# Patient Record
Sex: FEMALE | Race: OTHER | HISPANIC OR LATINO
[De-identification: names, ages, dates, MRNs, and addresses within clinical notes are randomized per-mention and may not be internally consistent; named-entity substitution may affect disease eponyms.]

---

## 2017-02-03 ENCOUNTER — TRANSCRIPTION ENCOUNTER (OUTPATIENT)
Age: 62
End: 2017-02-03

## 2017-09-30 ENCOUNTER — TRANSCRIPTION ENCOUNTER (OUTPATIENT)
Age: 62
End: 2017-09-30

## 2017-11-28 ENCOUNTER — TRANSCRIPTION ENCOUNTER (OUTPATIENT)
Age: 62
End: 2017-11-28

## 2017-12-06 ENCOUNTER — APPOINTMENT (OUTPATIENT)
Dept: NEUROSURGERY | Facility: CLINIC | Age: 62
End: 2017-12-06
Payer: MEDICARE

## 2017-12-06 VITALS
BODY MASS INDEX: 39.34 KG/M2 | TEMPERATURE: 98 F | HEART RATE: 76 BPM | WEIGHT: 222 LBS | SYSTOLIC BLOOD PRESSURE: 100 MMHG | HEIGHT: 63 IN | OXYGEN SATURATION: 95 % | DIASTOLIC BLOOD PRESSURE: 68 MMHG

## 2017-12-06 DIAGNOSIS — Z84.1 FAMILY HISTORY OF DISORDERS OF KIDNEY AND URETER: ICD-10-CM

## 2017-12-06 DIAGNOSIS — I10 ESSENTIAL (PRIMARY) HYPERTENSION: ICD-10-CM

## 2017-12-06 DIAGNOSIS — N20.0 CALCULUS OF KIDNEY: ICD-10-CM

## 2017-12-06 DIAGNOSIS — G43.011 MIGRAINE W/OUT AURA, INTRACTABLE, WITH STATUS MIGRAINOSUS: ICD-10-CM

## 2017-12-06 DIAGNOSIS — E66.01 MORBID (SEVERE) OBESITY DUE TO EXCESS CALORIES: ICD-10-CM

## 2017-12-06 DIAGNOSIS — Z82.49 FAMILY HISTORY OF ISCHEMIC HEART DISEASE AND OTHER DISEASES OF THE CIRCULATORY SYSTEM: ICD-10-CM

## 2017-12-06 PROCEDURE — 99215 OFFICE O/P EST HI 40 MIN: CPT

## 2017-12-06 RX ORDER — MECLIZINE HYDROCHLORIDE 12.5 MG/1
12.5 TABLET ORAL
Refills: 0 | Status: ACTIVE | COMMUNITY
Start: 2017-12-06

## 2017-12-06 RX ORDER — LOSARTAN POTASSIUM 50 MG/1
50 TABLET, FILM COATED ORAL TWICE DAILY
Refills: 0 | Status: ACTIVE | COMMUNITY
Start: 2017-12-06

## 2017-12-06 RX ORDER — CARVEDILOL 25 MG/1
25 TABLET, FILM COATED ORAL TWICE DAILY
Refills: 0 | Status: ACTIVE | COMMUNITY
Start: 2017-12-06

## 2018-01-03 ENCOUNTER — OTHER (OUTPATIENT)
Age: 63
End: 2018-01-03

## 2018-01-24 ENCOUNTER — APPOINTMENT (OUTPATIENT)
Dept: NEUROSURGERY | Facility: CLINIC | Age: 63
End: 2018-01-24
Payer: MEDICARE

## 2018-01-24 PROCEDURE — 99214 OFFICE O/P EST MOD 30 MIN: CPT

## 2018-02-01 ENCOUNTER — APPOINTMENT (OUTPATIENT)
Dept: SPINE | Facility: CLINIC | Age: 63
End: 2018-02-01
Payer: MEDICARE

## 2018-02-01 VITALS
DIASTOLIC BLOOD PRESSURE: 83 MMHG | HEIGHT: 63 IN | WEIGHT: 216 LBS | SYSTOLIC BLOOD PRESSURE: 155 MMHG | HEART RATE: 73 BPM | BODY MASS INDEX: 38.27 KG/M2

## 2018-02-01 DIAGNOSIS — Z83.3 FAMILY HISTORY OF DIABETES MELLITUS: ICD-10-CM

## 2018-02-01 DIAGNOSIS — Z82.49 FAMILY HISTORY OF ISCHEMIC HEART DISEASE AND OTHER DISEASES OF THE CIRCULATORY SYSTEM: ICD-10-CM

## 2018-02-01 DIAGNOSIS — Z84.1 FAMILY HISTORY OF DISORDERS OF KIDNEY AND URETER: ICD-10-CM

## 2018-02-01 PROCEDURE — 99215 OFFICE O/P EST HI 40 MIN: CPT

## 2018-02-01 RX ORDER — ACETAMINOPHEN AND CODEINE 300; 30 MG/1; MG/1
300-30 TABLET ORAL
Qty: 42 | Refills: 0 | Status: COMPLETED | COMMUNITY
Start: 2017-12-21

## 2018-02-01 RX ORDER — DIAZEPAM 5 MG/1
5 TABLET ORAL
Qty: 3 | Refills: 0 | Status: COMPLETED | COMMUNITY
Start: 2017-12-06 | End: 2018-02-01

## 2018-02-01 RX ORDER — FLUOROMETHOLONE 1 MG/ML
0.1 SOLUTION/ DROPS OPHTHALMIC
Qty: 5 | Refills: 0 | Status: COMPLETED | COMMUNITY
Start: 2017-08-03

## 2018-02-01 RX ORDER — SUMATRIPTAN 100 MG/1
100 TABLET, FILM COATED ORAL
Refills: 0 | Status: COMPLETED | COMMUNITY
Start: 2017-12-06 | End: 2018-02-01

## 2018-02-01 RX ORDER — TOPIRAMATE 25 MG/1
25 TABLET, FILM COATED ORAL
Qty: 30 | Refills: 0 | Status: COMPLETED | COMMUNITY
Start: 2017-08-08

## 2018-02-01 RX ORDER — MUPIROCIN 20 MG/G
2 OINTMENT TOPICAL
Qty: 22 | Refills: 0 | Status: COMPLETED | COMMUNITY
Start: 2017-10-24

## 2018-02-01 RX ORDER — TOLTERODINE TARTRATE 4 MG/1
4 CAPSULE, EXTENDED RELEASE ORAL
Qty: 30 | Refills: 0 | Status: COMPLETED | COMMUNITY
Start: 2017-08-08

## 2018-02-01 RX ORDER — AZITHROMYCIN 250 MG/1
250 TABLET, FILM COATED ORAL
Qty: 6 | Refills: 0 | Status: COMPLETED | COMMUNITY
Start: 2017-10-24

## 2018-02-01 RX ORDER — ONDANSETRON 4 MG/1
4 TABLET, ORALLY DISINTEGRATING ORAL
Qty: 12 | Refills: 0 | Status: COMPLETED | COMMUNITY
Start: 2017-09-29

## 2018-02-01 RX ORDER — AMITRIPTYLINE HYDROCHLORIDE 10 MG/1
10 TABLET, FILM COATED ORAL
Qty: 30 | Refills: 0 | Status: COMPLETED | COMMUNITY
Start: 2017-11-30

## 2018-02-01 RX ORDER — B-COMPLEX WITH VITAMIN C
TABLET ORAL
Qty: 30 | Refills: 0 | Status: COMPLETED | COMMUNITY
Start: 2017-08-08

## 2018-02-01 RX ORDER — MELOXICAM 7.5 MG/1
7.5 TABLET ORAL
Qty: 14 | Refills: 0 | Status: COMPLETED | COMMUNITY
Start: 2017-10-10

## 2018-02-01 RX ORDER — DICLOFENAC SODIUM 10 MG/G
1 GEL TOPICAL
Qty: 100 | Refills: 0 | Status: COMPLETED | COMMUNITY
Start: 2017-09-15

## 2018-02-21 ENCOUNTER — OUTPATIENT (OUTPATIENT)
Dept: OUTPATIENT SERVICES | Facility: HOSPITAL | Age: 63
LOS: 1 days | End: 2018-02-21
Payer: MEDICAID

## 2018-02-21 VITALS
DIASTOLIC BLOOD PRESSURE: 81 MMHG | TEMPERATURE: 98 F | OXYGEN SATURATION: 98 % | RESPIRATION RATE: 15 BRPM | HEIGHT: 61 IN | HEART RATE: 67 BPM | WEIGHT: 222.01 LBS | SYSTOLIC BLOOD PRESSURE: 124 MMHG

## 2018-02-21 DIAGNOSIS — E66.9 OBESITY, UNSPECIFIED: ICD-10-CM

## 2018-02-21 DIAGNOSIS — Z01.818 ENCOUNTER FOR OTHER PREPROCEDURAL EXAMINATION: ICD-10-CM

## 2018-02-21 DIAGNOSIS — Z98.49 CATARACT EXTRACTION STATUS, UNSPECIFIED EYE: Chronic | ICD-10-CM

## 2018-02-21 DIAGNOSIS — M54.42 LUMBAGO WITH SCIATICA, LEFT SIDE: ICD-10-CM

## 2018-02-21 DIAGNOSIS — G56.03 CARPAL TUNNEL SYNDROME, BILATERAL UPPER LIMBS: Chronic | ICD-10-CM

## 2018-02-21 DIAGNOSIS — I10 ESSENTIAL (PRIMARY) HYPERTENSION: ICD-10-CM

## 2018-02-21 DIAGNOSIS — M54.40 LUMBAGO WITH SCIATICA, UNSPECIFIED SIDE: ICD-10-CM

## 2018-02-21 LAB
ANION GAP SERPL CALC-SCNC: 10 MMOL/L — SIGNIFICANT CHANGE UP (ref 5–17)
BUN SERPL-MCNC: 16 MG/DL — SIGNIFICANT CHANGE UP (ref 7–23)
CALCIUM SERPL-MCNC: 9.1 MG/DL — SIGNIFICANT CHANGE UP (ref 8.4–10.5)
CHLORIDE SERPL-SCNC: 101 MMOL/L — SIGNIFICANT CHANGE UP (ref 96–108)
CO2 SERPL-SCNC: 30 MMOL/L — SIGNIFICANT CHANGE UP (ref 22–31)
CREAT SERPL-MCNC: 0.85 MG/DL — SIGNIFICANT CHANGE UP (ref 0.5–1.3)
GLUCOSE SERPL-MCNC: 104 MG/DL — HIGH (ref 70–99)
HCT VFR BLD CALC: 43.2 % — SIGNIFICANT CHANGE UP (ref 34.5–45)
HGB BLD-MCNC: 14.2 G/DL — SIGNIFICANT CHANGE UP (ref 11.5–15.5)
MCHC RBC-ENTMCNC: 31.1 PG — SIGNIFICANT CHANGE UP (ref 27–34)
MCHC RBC-ENTMCNC: 32.9 GM/DL — SIGNIFICANT CHANGE UP (ref 32–36)
MCV RBC AUTO: 94.3 FL — SIGNIFICANT CHANGE UP (ref 80–100)
MRSA PCR RESULT.: SIGNIFICANT CHANGE UP
PLATELET # BLD AUTO: 197 K/UL — SIGNIFICANT CHANGE UP (ref 150–400)
POTASSIUM SERPL-MCNC: 4.5 MMOL/L — SIGNIFICANT CHANGE UP (ref 3.5–5.3)
POTASSIUM SERPL-SCNC: 4.5 MMOL/L — SIGNIFICANT CHANGE UP (ref 3.5–5.3)
RBC # BLD: 4.58 M/UL — SIGNIFICANT CHANGE UP (ref 3.8–5.2)
RBC # FLD: 12.6 % — SIGNIFICANT CHANGE UP (ref 10.3–14.5)
S AUREUS DNA NOSE QL NAA+PROBE: SIGNIFICANT CHANGE UP
SODIUM SERPL-SCNC: 141 MMOL/L — SIGNIFICANT CHANGE UP (ref 135–145)
WBC # BLD: 5.4 K/UL — SIGNIFICANT CHANGE UP (ref 3.8–10.5)
WBC # FLD AUTO: 5.4 K/UL — SIGNIFICANT CHANGE UP (ref 3.8–10.5)

## 2018-02-21 PROCEDURE — 85027 COMPLETE CBC AUTOMATED: CPT

## 2018-02-21 PROCEDURE — 87640 STAPH A DNA AMP PROBE: CPT

## 2018-02-21 PROCEDURE — 80048 BASIC METABOLIC PNL TOTAL CA: CPT

## 2018-02-21 PROCEDURE — 87641 MR-STAPH DNA AMP PROBE: CPT

## 2018-02-21 PROCEDURE — G0463: CPT

## 2018-02-21 RX ORDER — LIDOCAINE HCL 20 MG/ML
0.2 VIAL (ML) INJECTION ONCE
Qty: 0 | Refills: 0 | Status: DISCONTINUED | OUTPATIENT
Start: 2018-02-28 | End: 2018-03-02

## 2018-02-21 RX ORDER — SODIUM CHLORIDE 9 MG/ML
3 INJECTION INTRAMUSCULAR; INTRAVENOUS; SUBCUTANEOUS EVERY 8 HOURS
Qty: 0 | Refills: 0 | Status: DISCONTINUED | OUTPATIENT
Start: 2018-02-28 | End: 2018-03-06

## 2018-02-21 NOTE — H&P PST ADULT - HISTORY OF PRESENT ILLNESS
64 y/o Gibraltarian speaking female with PMHx HTN, 64 y/o Armenian speaking female with PMHx HTN and chronic back x 4yrs c/o worsening lower back pain radiating to bilateral legs R>L and numbness to both feet. Reports trying physical therapy and has had epidural injections without significant relief. Patient accompanied by daughter. Presents to PST for scheduled L2-S1 lumbar laminectomy on 2/28/2018.

## 2018-02-21 NOTE — H&P PST ADULT - PMH
HTN (hypertension)    Lumbago with sciatica HTN (hypertension)    Kidney stones    Lumbago with sciatica    Obesity HTN (hypertension)    Kidney stones    Lumbago with sciatica    Obesity    Vertigo

## 2018-02-21 NOTE — H&P PST ADULT - PROBLEM SELECTOR PLAN 1
Scheduled L2-S1 lumbar laminectomy on 2/28/2018  CBC, BMP, nasal swab sent at PST  Last dose of ASA 81mg 2/21/2018 Scheduled L2-S1 lumbar laminectomy on 2/28/2018  CBC, BMP, nasal swab sent at [facility]  Last dose of ASA 81mg 2/21/2018  PMD sent pt for cardiac eval to see Dr. Barone. Holter monitor placed 2/20/18 for 3 days. Pt has follow-up appt with cards next week.

## 2018-02-21 NOTE — H&P PST ADULT - PAIN DESCRIPTION (FREQUENCY/QUALITY), PROFILE
[place]  Caller (mom) states patient is having a hard time eating   Caller states patient has a bad cough  Caller also states patient is having water stools  Caller states if nurse could call back soon   frequent

## 2018-02-21 NOTE — H&P PST ADULT - ACTIVITY
walks daily, grocery shopping, able to participate in moderate activity, limited due to chronic back

## 2018-02-28 ENCOUNTER — TRANSCRIPTION ENCOUNTER (OUTPATIENT)
Age: 63
End: 2018-02-28

## 2018-02-28 ENCOUNTER — INPATIENT (INPATIENT)
Facility: HOSPITAL | Age: 63
LOS: 5 days | Discharge: HOME | DRG: 516 | End: 2018-03-06
Attending: NEUROLOGICAL SURGERY | Admitting: NEUROLOGICAL SURGERY
Payer: MEDICAID

## 2018-02-28 ENCOUNTER — APPOINTMENT (OUTPATIENT)
Dept: SPINE | Facility: HOSPITAL | Age: 63
End: 2018-02-28

## 2018-02-28 VITALS
WEIGHT: 222.01 LBS | DIASTOLIC BLOOD PRESSURE: 84 MMHG | OXYGEN SATURATION: 100 % | TEMPERATURE: 98 F | HEIGHT: 61 IN | SYSTOLIC BLOOD PRESSURE: 157 MMHG | RESPIRATION RATE: 18 BRPM

## 2018-02-28 DIAGNOSIS — M54.42 LUMBAGO WITH SCIATICA, LEFT SIDE: ICD-10-CM

## 2018-02-28 DIAGNOSIS — Z98.49 CATARACT EXTRACTION STATUS, UNSPECIFIED EYE: Chronic | ICD-10-CM

## 2018-02-28 DIAGNOSIS — Z01.818 ENCOUNTER FOR OTHER PREPROCEDURAL EXAMINATION: ICD-10-CM

## 2018-02-28 DIAGNOSIS — G56.03 CARPAL TUNNEL SYNDROME, BILATERAL UPPER LIMBS: Chronic | ICD-10-CM

## 2018-02-28 PROCEDURE — 72020 X-RAY EXAM OF SPINE 1 VIEW: CPT | Mod: 26

## 2018-02-28 RX ORDER — DEXTROSE MONOHYDRATE, SODIUM CHLORIDE, AND POTASSIUM CHLORIDE 50; .745; 4.5 G/1000ML; G/1000ML; G/1000ML
1000 INJECTION, SOLUTION INTRAVENOUS
Qty: 0 | Refills: 0 | Status: DISCONTINUED | OUTPATIENT
Start: 2018-02-28 | End: 2018-03-02

## 2018-02-28 RX ORDER — DOCUSATE SODIUM 100 MG
100 CAPSULE ORAL THREE TIMES A DAY
Qty: 0 | Refills: 0 | Status: DISCONTINUED | OUTPATIENT
Start: 2018-02-28 | End: 2018-03-06

## 2018-02-28 RX ORDER — CEFAZOLIN SODIUM 1 G
2000 VIAL (EA) INJECTION EVERY 8 HOURS
Qty: 0 | Refills: 0 | Status: COMPLETED | OUTPATIENT
Start: 2018-02-28 | End: 2018-03-01

## 2018-02-28 RX ORDER — NALOXONE HYDROCHLORIDE 4 MG/.1ML
0.1 SPRAY NASAL
Qty: 0 | Refills: 0 | Status: DISCONTINUED | OUTPATIENT
Start: 2018-02-28 | End: 2018-03-02

## 2018-02-28 RX ORDER — GABAPENTIN 400 MG/1
300 CAPSULE ORAL THREE TIMES A DAY
Qty: 0 | Refills: 0 | Status: DISCONTINUED | OUTPATIENT
Start: 2018-02-28 | End: 2018-03-06

## 2018-02-28 RX ORDER — HYDROMORPHONE HYDROCHLORIDE 2 MG/ML
0.5 INJECTION INTRAMUSCULAR; INTRAVENOUS; SUBCUTANEOUS
Qty: 0 | Refills: 0 | Status: DISCONTINUED | OUTPATIENT
Start: 2018-02-28 | End: 2018-03-01

## 2018-02-28 RX ORDER — HYDROMORPHONE HYDROCHLORIDE 2 MG/ML
0.25 INJECTION INTRAMUSCULAR; INTRAVENOUS; SUBCUTANEOUS ONCE
Qty: 0 | Refills: 0 | Status: DISCONTINUED | OUTPATIENT
Start: 2018-02-28 | End: 2018-02-28

## 2018-02-28 RX ORDER — SENNA PLUS 8.6 MG/1
2 TABLET ORAL AT BEDTIME
Qty: 0 | Refills: 0 | Status: DISCONTINUED | OUTPATIENT
Start: 2018-02-28 | End: 2018-03-06

## 2018-02-28 RX ORDER — HYDROMORPHONE HYDROCHLORIDE 2 MG/ML
30 INJECTION INTRAMUSCULAR; INTRAVENOUS; SUBCUTANEOUS
Qty: 0 | Refills: 0 | Status: DISCONTINUED | OUTPATIENT
Start: 2018-02-28 | End: 2018-03-02

## 2018-02-28 RX ORDER — CARVEDILOL PHOSPHATE 80 MG/1
25 CAPSULE, EXTENDED RELEASE ORAL EVERY 12 HOURS
Qty: 0 | Refills: 0 | Status: DISCONTINUED | OUTPATIENT
Start: 2018-02-28 | End: 2018-03-06

## 2018-02-28 RX ORDER — HYDROMORPHONE HYDROCHLORIDE 2 MG/ML
0.5 INJECTION INTRAMUSCULAR; INTRAVENOUS; SUBCUTANEOUS
Qty: 0 | Refills: 0 | Status: DISCONTINUED | OUTPATIENT
Start: 2018-02-28 | End: 2018-03-02

## 2018-02-28 RX ORDER — ONDANSETRON 8 MG/1
4 TABLET, FILM COATED ORAL EVERY 6 HOURS
Qty: 0 | Refills: 0 | Status: DISCONTINUED | OUTPATIENT
Start: 2018-02-28 | End: 2018-03-06

## 2018-02-28 RX ORDER — ENOXAPARIN SODIUM 100 MG/ML
40 INJECTION SUBCUTANEOUS AT BEDTIME
Qty: 0 | Refills: 0 | Status: DISCONTINUED | OUTPATIENT
Start: 2018-03-01 | End: 2018-03-06

## 2018-02-28 RX ORDER — LOSARTAN POTASSIUM 100 MG/1
50 TABLET, FILM COATED ORAL
Qty: 0 | Refills: 0 | Status: DISCONTINUED | OUTPATIENT
Start: 2018-02-28 | End: 2018-03-06

## 2018-02-28 RX ORDER — MECLIZINE HCL 12.5 MG
12.5 TABLET ORAL
Qty: 0 | Refills: 0 | Status: DISCONTINUED | OUTPATIENT
Start: 2018-02-28 | End: 2018-03-06

## 2018-02-28 RX ORDER — ONDANSETRON 8 MG/1
4 TABLET, FILM COATED ORAL ONCE
Qty: 0 | Refills: 0 | Status: COMPLETED | OUTPATIENT
Start: 2018-02-28 | End: 2018-02-28

## 2018-02-28 RX ORDER — CEFAZOLIN SODIUM 1 G
2000 VIAL (EA) INJECTION ONCE
Qty: 0 | Refills: 0 | Status: COMPLETED | OUTPATIENT
Start: 2018-02-28 | End: 2018-02-28

## 2018-02-28 RX ADMIN — Medication 100 MILLIGRAM(S): at 17:33

## 2018-02-28 RX ADMIN — HYDROMORPHONE HYDROCHLORIDE 30 MILLILITER(S): 2 INJECTION INTRAMUSCULAR; INTRAVENOUS; SUBCUTANEOUS at 12:45

## 2018-02-28 RX ADMIN — ONDANSETRON 4 MILLIGRAM(S): 8 TABLET, FILM COATED ORAL at 23:00

## 2018-02-28 RX ADMIN — HYDROMORPHONE HYDROCHLORIDE 0.25 MILLIGRAM(S): 2 INJECTION INTRAMUSCULAR; INTRAVENOUS; SUBCUTANEOUS at 11:45

## 2018-02-28 RX ADMIN — DEXTROSE MONOHYDRATE, SODIUM CHLORIDE, AND POTASSIUM CHLORIDE 75 MILLILITER(S): 50; .745; 4.5 INJECTION, SOLUTION INTRAVENOUS at 11:20

## 2018-02-28 RX ADMIN — LOSARTAN POTASSIUM 50 MILLIGRAM(S): 100 TABLET, FILM COATED ORAL at 17:37

## 2018-02-28 RX ADMIN — SODIUM CHLORIDE 3 MILLILITER(S): 9 INJECTION INTRAMUSCULAR; INTRAVENOUS; SUBCUTANEOUS at 22:58

## 2018-02-28 RX ADMIN — Medication 100 MILLIGRAM(S): at 13:51

## 2018-02-28 RX ADMIN — HYDROMORPHONE HYDROCHLORIDE 30 MILLILITER(S): 2 INJECTION INTRAMUSCULAR; INTRAVENOUS; SUBCUTANEOUS at 23:59

## 2018-02-28 RX ADMIN — CARVEDILOL PHOSPHATE 25 MILLIGRAM(S): 80 CAPSULE, EXTENDED RELEASE ORAL at 17:37

## 2018-02-28 RX ADMIN — HYDROMORPHONE HYDROCHLORIDE 30 MILLILITER(S): 2 INJECTION INTRAMUSCULAR; INTRAVENOUS; SUBCUTANEOUS at 16:21

## 2018-02-28 RX ADMIN — GABAPENTIN 300 MILLIGRAM(S): 400 CAPSULE ORAL at 16:09

## 2018-02-28 NOTE — BRIEF OPERATIVE NOTE - PROCEDURE
<<-----Click on this checkbox to enter Procedure Lumbar laminectomy  02/28/2018  L2-L5 laminectomies  Active  NMEHAN

## 2018-03-01 LAB
ANION GAP SERPL CALC-SCNC: 9 MMOL/L — SIGNIFICANT CHANGE UP (ref 5–17)
APPEARANCE UR: CLEAR — SIGNIFICANT CHANGE UP
BASOPHILS # BLD AUTO: 0 K/UL — SIGNIFICANT CHANGE UP (ref 0–0.2)
BASOPHILS NFR BLD AUTO: 0.2 % — SIGNIFICANT CHANGE UP (ref 0–2)
BILIRUB UR-MCNC: NEGATIVE — SIGNIFICANT CHANGE UP
BUN SERPL-MCNC: 10 MG/DL — SIGNIFICANT CHANGE UP (ref 7–23)
CALCIUM SERPL-MCNC: 8.5 MG/DL — SIGNIFICANT CHANGE UP (ref 8.4–10.5)
CHLORIDE SERPL-SCNC: 102 MMOL/L — SIGNIFICANT CHANGE UP (ref 96–108)
CO2 SERPL-SCNC: 27 MMOL/L — SIGNIFICANT CHANGE UP (ref 22–31)
COLOR SPEC: YELLOW — SIGNIFICANT CHANGE UP
CREAT SERPL-MCNC: 0.72 MG/DL — SIGNIFICANT CHANGE UP (ref 0.5–1.3)
DIFF PNL FLD: NEGATIVE — SIGNIFICANT CHANGE UP
EOSINOPHIL # BLD AUTO: 0.1 K/UL — SIGNIFICANT CHANGE UP (ref 0–0.5)
EOSINOPHIL NFR BLD AUTO: 0.7 % — SIGNIFICANT CHANGE UP (ref 0–6)
GLUCOSE SERPL-MCNC: 120 MG/DL — HIGH (ref 70–99)
GLUCOSE UR QL: NEGATIVE — SIGNIFICANT CHANGE UP
HCT VFR BLD CALC: 38.4 % — SIGNIFICANT CHANGE UP (ref 34.5–45)
HGB BLD-MCNC: 13.2 G/DL — SIGNIFICANT CHANGE UP (ref 11.5–15.5)
KETONES UR-MCNC: NEGATIVE — SIGNIFICANT CHANGE UP
LEUKOCYTE ESTERASE UR-ACNC: NEGATIVE — SIGNIFICANT CHANGE UP
LYMPHOCYTES # BLD AUTO: 1.9 K/UL — SIGNIFICANT CHANGE UP (ref 1–3.3)
LYMPHOCYTES # BLD AUTO: 18.4 % — SIGNIFICANT CHANGE UP (ref 13–44)
MCHC RBC-ENTMCNC: 32.3 PG — SIGNIFICANT CHANGE UP (ref 27–34)
MCHC RBC-ENTMCNC: 34.5 GM/DL — SIGNIFICANT CHANGE UP (ref 32–36)
MCV RBC AUTO: 93.6 FL — SIGNIFICANT CHANGE UP (ref 80–100)
MONOCYTES # BLD AUTO: 0.9 K/UL — SIGNIFICANT CHANGE UP (ref 0–0.9)
MONOCYTES NFR BLD AUTO: 8.8 % — SIGNIFICANT CHANGE UP (ref 2–14)
NEUTROPHILS # BLD AUTO: 7.6 K/UL — HIGH (ref 1.8–7.4)
NEUTROPHILS NFR BLD AUTO: 72 % — SIGNIFICANT CHANGE UP (ref 43–77)
NITRITE UR-MCNC: NEGATIVE — SIGNIFICANT CHANGE UP
PH UR: 6.5 — SIGNIFICANT CHANGE UP (ref 5–8)
PLATELET # BLD AUTO: 167 K/UL — SIGNIFICANT CHANGE UP (ref 150–400)
POTASSIUM SERPL-MCNC: 4.2 MMOL/L — SIGNIFICANT CHANGE UP (ref 3.5–5.3)
POTASSIUM SERPL-SCNC: 4.2 MMOL/L — SIGNIFICANT CHANGE UP (ref 3.5–5.3)
PROT UR-MCNC: NEGATIVE — SIGNIFICANT CHANGE UP
RBC # BLD: 4.11 M/UL — SIGNIFICANT CHANGE UP (ref 3.8–5.2)
RBC # FLD: 12.5 % — SIGNIFICANT CHANGE UP (ref 10.3–14.5)
SODIUM SERPL-SCNC: 138 MMOL/L — SIGNIFICANT CHANGE UP (ref 135–145)
SP GR SPEC: 1.01 — SIGNIFICANT CHANGE UP (ref 1.01–1.02)
UROBILINOGEN FLD QL: NEGATIVE — SIGNIFICANT CHANGE UP
WBC # BLD: 10.5 K/UL — SIGNIFICANT CHANGE UP (ref 3.8–10.5)
WBC # FLD AUTO: 10.5 K/UL — SIGNIFICANT CHANGE UP (ref 3.8–10.5)

## 2018-03-01 RX ORDER — ACETAMINOPHEN 500 MG
650 TABLET ORAL EVERY 6 HOURS
Qty: 0 | Refills: 0 | Status: DISCONTINUED | OUTPATIENT
Start: 2018-03-01 | End: 2018-03-06

## 2018-03-01 RX ADMIN — GABAPENTIN 300 MILLIGRAM(S): 400 CAPSULE ORAL at 00:38

## 2018-03-01 RX ADMIN — Medication 100 MILLIGRAM(S): at 21:18

## 2018-03-01 RX ADMIN — SODIUM CHLORIDE 3 MILLILITER(S): 9 INJECTION INTRAMUSCULAR; INTRAVENOUS; SUBCUTANEOUS at 06:05

## 2018-03-01 RX ADMIN — Medication 100 MILLIGRAM(S): at 06:04

## 2018-03-01 RX ADMIN — Medication 650 MILLIGRAM(S): at 12:42

## 2018-03-01 RX ADMIN — HYDROMORPHONE HYDROCHLORIDE 30 MILLILITER(S): 2 INJECTION INTRAMUSCULAR; INTRAVENOUS; SUBCUTANEOUS at 11:36

## 2018-03-01 RX ADMIN — HYDROMORPHONE HYDROCHLORIDE 30 MILLILITER(S): 2 INJECTION INTRAMUSCULAR; INTRAVENOUS; SUBCUTANEOUS at 23:36

## 2018-03-01 RX ADMIN — CARVEDILOL PHOSPHATE 25 MILLIGRAM(S): 80 CAPSULE, EXTENDED RELEASE ORAL at 06:05

## 2018-03-01 RX ADMIN — GABAPENTIN 300 MILLIGRAM(S): 400 CAPSULE ORAL at 23:44

## 2018-03-01 RX ADMIN — CARVEDILOL PHOSPHATE 25 MILLIGRAM(S): 80 CAPSULE, EXTENDED RELEASE ORAL at 17:38

## 2018-03-01 RX ADMIN — ENOXAPARIN SODIUM 40 MILLIGRAM(S): 100 INJECTION SUBCUTANEOUS at 21:18

## 2018-03-01 RX ADMIN — SODIUM CHLORIDE 3 MILLILITER(S): 9 INJECTION INTRAMUSCULAR; INTRAVENOUS; SUBCUTANEOUS at 12:33

## 2018-03-01 RX ADMIN — HYDROMORPHONE HYDROCHLORIDE 30 MILLILITER(S): 2 INJECTION INTRAMUSCULAR; INTRAVENOUS; SUBCUTANEOUS at 18:17

## 2018-03-01 RX ADMIN — LOSARTAN POTASSIUM 50 MILLIGRAM(S): 100 TABLET, FILM COATED ORAL at 06:05

## 2018-03-01 RX ADMIN — Medication 100 MILLIGRAM(S): at 12:42

## 2018-03-01 RX ADMIN — SODIUM CHLORIDE 3 MILLILITER(S): 9 INJECTION INTRAMUSCULAR; INTRAVENOUS; SUBCUTANEOUS at 21:16

## 2018-03-01 RX ADMIN — SENNA PLUS 2 TABLET(S): 8.6 TABLET ORAL at 21:18

## 2018-03-01 RX ADMIN — HYDROMORPHONE HYDROCHLORIDE 30 MILLILITER(S): 2 INJECTION INTRAMUSCULAR; INTRAVENOUS; SUBCUTANEOUS at 12:46

## 2018-03-01 RX ADMIN — ONDANSETRON 4 MILLIGRAM(S): 8 TABLET, FILM COATED ORAL at 09:20

## 2018-03-01 RX ADMIN — Medication 100 MILLIGRAM(S): at 01:00

## 2018-03-01 RX ADMIN — LOSARTAN POTASSIUM 50 MILLIGRAM(S): 100 TABLET, FILM COATED ORAL at 17:38

## 2018-03-01 RX ADMIN — HYDROMORPHONE HYDROCHLORIDE 30 MILLILITER(S): 2 INJECTION INTRAMUSCULAR; INTRAVENOUS; SUBCUTANEOUS at 19:22

## 2018-03-01 RX ADMIN — GABAPENTIN 300 MILLIGRAM(S): 400 CAPSULE ORAL at 08:00

## 2018-03-01 RX ADMIN — HYDROMORPHONE HYDROCHLORIDE 30 MILLILITER(S): 2 INJECTION INTRAMUSCULAR; INTRAVENOUS; SUBCUTANEOUS at 08:04

## 2018-03-01 RX ADMIN — GABAPENTIN 300 MILLIGRAM(S): 400 CAPSULE ORAL at 17:39

## 2018-03-01 RX ADMIN — HYDROMORPHONE HYDROCHLORIDE 30 MILLILITER(S): 2 INJECTION INTRAMUSCULAR; INTRAVENOUS; SUBCUTANEOUS at 14:59

## 2018-03-01 NOTE — PHYSICAL THERAPY INITIAL EVALUATION ADULT - PERTINENT HX OF CURRENT PROBLEM, REHAB EVAL
62 y/o Beninese speaking female with PMHx HTN and chronic back x 4yrs c/o worsening lower back pain radiating to bilateral legs R>L and numbness to both feet. Reports trying physical therapy and has had epidural injections without significant relief. Patient accompanied by daughter. pt s/p L2-S1 lami on 2/28/18

## 2018-03-01 NOTE — PROGRESS NOTE ADULT - ASSESSMENT
64 y/o Korean speaking female with PMHx HTN and chronic back x 4yrs c/o worsening lower back pain radiating to bilateral legs R>L and numbness to both feet. Reports trying physical therapy and has had epidural injections without significant relief. Patient accompanied by daughter. Presents to PST for scheduled L2-S1 lumbar laminectomy on 2/28/2018.    PAST MEDICAL & SURGICAL HISTORY:  Vertigo  Kidney stones  Obesity  HTN (hypertension)  Lumbago with sciatica  History of cataract surgery: both eyes 2017  Carpal tunnel syndrome on both sides: sx done 2013    PROCEDURE: 2/28/18 L2-S1 Lumbar laminectomy     PLAN:  Neuro: cont PCA/IVF/GARCIA, increase OOB, valium PRN   Respiratory: patient instructed on incentive spirometer  CV: cont carvedilol and losartan for HTN               DVT ppx: [] SQH [x] SQL and venodynes bilaterally  Renal:   ID: tmax 101.3, UA negative, tylenol prn and monitor, will check cbc in am  GI: bowel regimen   PT/OT: ordered  f/u am labs    Will discuss with Dr Tee  [facility] # [id number]

## 2018-03-01 NOTE — PROGRESS NOTE ADULT - ATTENDING COMMENTS
I saw and evaluated the patient. The patient is a 63-year-old female s/p L2-S1 laminectomies and foraminotomies on 2/28/18. Postoperatively, the patient notes improvement in her bilateral lower extremity pain and paresthesias. The patient is currently neurologically stable. Begin mobilization OOB with PT and disposition planning.

## 2018-03-01 NOTE — PHYSICAL THERAPY INITIAL EVALUATION ADULT - ADDITIONAL COMMENTS
pt is Armenian speaking, lives with  and son in private house, no stairs to negotiate. pt independent with mobility, did not use assistive device. daughter present at bedside, provided history, dtr inquiring about HHA, HERMES zamora notified

## 2018-03-01 NOTE — PHYSICAL THERAPY INITIAL EVALUATION ADULT - DISCHARGE DISPOSITION, PT EVAL
anticipate home PT pending further ambulation, family available to assist, dtr requests info on HHA, HERMES david

## 2018-03-01 NOTE — PROGRESS NOTE ADULT - SUBJECTIVE AND OBJECTIVE BOX
CHIEF COMPLAINT:  patient c/o back pain, PCA working well, daughter at bedside translating- she refused the phone  +rodgers/IVF/PCA    Vital Signs Last 24 Hrs  T(C): 38.5 (01 Mar 2018 12:33), Max: 38.5 (01 Mar 2018 12:33)  T(F): 101.3 (01 Mar 2018 12:33), Max: 101.3 (01 Mar 2018 12:33)  HR: 95 (01 Mar 2018 12:33) (56 - 95)  BP: 130/71 (01 Mar 2018 15:16) (116/63 - 168/74)  BP(mean): 83 (01 Mar 2018 08:00) (80 - 114)  RR: 19 (01 Mar 2018 12:33) (14 - 19)  SpO2: 96% (01 Mar 2018 15:16) (95% - 100%)    PHYSICAL EXAM:    Constitutional: No Acute Distress     Neurological: AOx3, Following Commands, Moving all Extremities     Motor exam:          Upper extremity                         Delt     Bicep     Tricep    HG                                                 R         5/5        5/5        5/5       5/5                                               L          5/5        5/5        5/5       5/5          Lower extremity                        HF         KF        KE       DF         PF                                                  R        5/5        5/5        5/5       5/5         5/5                                               L         5/5        5/5       5/5       5/5          5/5                                                 Sensation: [x] intact to light touch  [] decreased:     Pulmonary: Clear to Auscultation, No rales, No rhonchi, No wheezes     Cardiovascular: S1, S2, Regular rate and rhythm     Gastrointestinal: Soft, Non-tender, Non-distended     Extremities: No calf tenderness     Incision: +dressing, c/d/i    LABS:                        13.2   10.5  )-----------( 167      ( 01 Mar 2018 04:50 )             38.4    03-01    138  |  102  |  10  ----------------------------<  120<H>  4.2   |  27  |  0.72    Ca    8.5      01 Mar 2018 04:50        MEDICATIONS:    Neuro:  acetaminophen   Tablet 650 milliGRAM(s) Oral every 6 hours PRN For Temp greater than 38 C (100.4 F)  acetaminophen   Tablet. 650 milliGRAM(s) Oral every 6 hours PRN Mild Pain (1 - 3)  diazepam    Tablet 5 milliGRAM(s) Oral every 8 hours PRN Muscle spasms  gabapentin 300 milliGRAM(s) Oral three times a day  HYDROmorphone PCA (1 mG/mL) 30 milliLiter(s) PCA Continuous PCA Continuous  HYDROmorphone PCA (1 mG/mL) Rescue Clinician Bolus 0.5 milliGRAM(s) IV Push every 15 minutes PRN for Pain Scale GREATER THAN 6  meclizine 12.5 milliGRAM(s) Oral two times a day PRN Dizziness  ondansetron Injectable 4 milliGRAM(s) IV Push every 6 hours PRN Nausea    Cardiac:  carvedilol 25 milliGRAM(s) Oral every 12 hours  losartan 50 milliGRAM(s) Oral two times a day    GI/:  docusate sodium 100 milliGRAM(s) Oral three times a day  senna 2 Tablet(s) Oral at bedtime    Other:   enoxaparin Injectable 40 milliGRAM(s) SubCutaneous at bedtime  lidocaine 1% Injectable 0.2 milliLiter(s) Local Injection once  naloxone Injectable 0.1 milliGRAM(s) IV Push every 3 minutes PRN For ANY of the following changes in patient status:  A. RR LESS THAN 10 breaths per minute, B. Oxygen saturation LESS THAN 90%, C. Sedation score of 6  sodium chloride 0.9% lock flush 3 milliLiter(s) IV Push every 8 hours  sodium chloride 0.9% with potassium chloride 20 mEq/L 1000 milliLiter(s) IV Continuous <Continuous>    DIET: [x] Regular [] CCD [] Renal [] Puree [] Dysphagia [] Tube Feeds:

## 2018-03-01 NOTE — PROGRESS NOTE ADULT - SUBJECTIVE AND OBJECTIVE BOX
Day 1 of Anesthesia Pain Management Service    SUBJECTIVE: Patient is doing well with IV PCA    Pain Scale Score:	[X] Refer to charted pain scores    THERAPY:    [ ] IV PCA Morphine		[ ] 5 mg/mL	[ ] 1 mg/mL  [X] IV PCA Hydromorphone	[ ] 5 mg/mL	[X] 1 mg/mL  [ ] IV PCA Fentanyl		[ ] 50 micrograms/mL    Demand dose: 0.2 mg     Lockout: 6 minutes   Continuous Rate: 0 mg/hr  4 Hour Limit: 4 mg    MEDICATIONS  (STANDING):  carvedilol 25 milliGRAM(s) Oral every 12 hours  docusate sodium 100 milliGRAM(s) Oral three times a day  enoxaparin Injectable 40 milliGRAM(s) SubCutaneous at bedtime  gabapentin 300 milliGRAM(s) Oral three times a day  HYDROmorphone PCA (1 mG/mL) 30 milliLiter(s) PCA Continuous PCA Continuous  lidocaine 1% Injectable 0.2 milliLiter(s) Local Injection once  losartan 50 milliGRAM(s) Oral two times a day  senna 2 Tablet(s) Oral at bedtime  sodium chloride 0.9% lock flush 3 milliLiter(s) IV Push every 8 hours  sodium chloride 0.9% with potassium chloride 20 mEq/L 1000 milliLiter(s) (75 mL/Hr) IV Continuous <Continuous>    MEDICATIONS  (PRN):  diazepam    Tablet 5 milliGRAM(s) Oral every 8 hours PRN Muscle spasms  HYDROmorphone  Injectable 0.5 milliGRAM(s) IV Push every 10 minutes PRN Moderate Pain (4 - 6)  HYDROmorphone PCA (1 mG/mL) Rescue Clinician Bolus 0.5 milliGRAM(s) IV Push every 15 minutes PRN for Pain Scale GREATER THAN 6  meclizine 12.5 milliGRAM(s) Oral two times a day PRN Dizziness  naloxone Injectable 0.1 milliGRAM(s) IV Push every 3 minutes PRN For ANY of the following changes in patient status:  A. RR LESS THAN 10 breaths per minute, B. Oxygen saturation LESS THAN 90%, C. Sedation score of 6  ondansetron Injectable 4 milliGRAM(s) IV Push every 6 hours PRN Nausea      OBJECTIVE:    Sedation Score:	[ X] Alert	[ ] Drowsy 	[ ] Arousable	[ ] Asleep	[ ] Unresponsive    Side Effects:	[X ] None	[ ] Nausea	[ ] Vomiting	[ ] Pruritus  		[ ] Other:    Vital Signs Last 24 Hrs  T(C): 36.5 (01 Mar 2018 06:00), Max: 36.5 (01 Mar 2018 06:00)  T(F): 97.7 (01 Mar 2018 06:00), Max: 97.7 (01 Mar 2018 06:00)  HR: 90 (01 Mar 2018 08:00) (46 - 92)  BP: 124/60 (01 Mar 2018 08:00) (110/58 - 168/74)  BP(mean): 83 (01 Mar 2018 08:00) (80 - 114)  RR: 16 (01 Mar 2018 08:00) (9 - 17)  SpO2: 96% (01 Mar 2018 08:00) (94% - 100%)    ASSESSMENT/ PLAN    Therapy to  be:               [X] Continued   [ ] Discontinued   [ ] Changed to PRN Analgesics    Documentation and Verification of current medications:   [X] Done	[ ] Not done, not eligible    Comments:

## 2018-03-02 LAB
ANION GAP SERPL CALC-SCNC: 9 MMOL/L — SIGNIFICANT CHANGE UP (ref 5–17)
BASOPHILS # BLD AUTO: 0 K/UL — SIGNIFICANT CHANGE UP (ref 0–0.2)
BASOPHILS NFR BLD AUTO: 0.1 % — SIGNIFICANT CHANGE UP (ref 0–2)
BUN SERPL-MCNC: 7 MG/DL — SIGNIFICANT CHANGE UP (ref 7–23)
CALCIUM SERPL-MCNC: 8.5 MG/DL — SIGNIFICANT CHANGE UP (ref 8.4–10.5)
CHLORIDE SERPL-SCNC: 101 MMOL/L — SIGNIFICANT CHANGE UP (ref 96–108)
CO2 SERPL-SCNC: 24 MMOL/L — SIGNIFICANT CHANGE UP (ref 22–31)
CREAT SERPL-MCNC: 0.78 MG/DL — SIGNIFICANT CHANGE UP (ref 0.5–1.3)
EOSINOPHIL # BLD AUTO: 0.1 K/UL — SIGNIFICANT CHANGE UP (ref 0–0.5)
EOSINOPHIL NFR BLD AUTO: 0.5 % — SIGNIFICANT CHANGE UP (ref 0–6)
GLUCOSE SERPL-MCNC: 139 MG/DL — HIGH (ref 70–99)
HCT VFR BLD CALC: 35.7 % — SIGNIFICANT CHANGE UP (ref 34.5–45)
HGB BLD-MCNC: 12.3 G/DL — SIGNIFICANT CHANGE UP (ref 11.5–15.5)
LYMPHOCYTES # BLD AUTO: 17.4 % — SIGNIFICANT CHANGE UP (ref 13–44)
LYMPHOCYTES # BLD AUTO: 2 K/UL — SIGNIFICANT CHANGE UP (ref 1–3.3)
MCHC RBC-ENTMCNC: 32.1 PG — SIGNIFICANT CHANGE UP (ref 27–34)
MCHC RBC-ENTMCNC: 34.5 GM/DL — SIGNIFICANT CHANGE UP (ref 32–36)
MCV RBC AUTO: 93.1 FL — SIGNIFICANT CHANGE UP (ref 80–100)
MONOCYTES # BLD AUTO: 1.4 K/UL — HIGH (ref 0–0.9)
MONOCYTES NFR BLD AUTO: 11.9 % — SIGNIFICANT CHANGE UP (ref 2–14)
NEUTROPHILS # BLD AUTO: 8 K/UL — HIGH (ref 1.8–7.4)
NEUTROPHILS NFR BLD AUTO: 70 % — SIGNIFICANT CHANGE UP (ref 43–77)
PLATELET # BLD AUTO: 148 K/UL — LOW (ref 150–400)
POTASSIUM SERPL-MCNC: 3.7 MMOL/L — SIGNIFICANT CHANGE UP (ref 3.5–5.3)
POTASSIUM SERPL-SCNC: 3.7 MMOL/L — SIGNIFICANT CHANGE UP (ref 3.5–5.3)
RBC # BLD: 3.83 M/UL — SIGNIFICANT CHANGE UP (ref 3.8–5.2)
RBC # FLD: 12.5 % — SIGNIFICANT CHANGE UP (ref 10.3–14.5)
SODIUM SERPL-SCNC: 134 MMOL/L — LOW (ref 135–145)
WBC # BLD: 11.4 K/UL — HIGH (ref 3.8–10.5)
WBC # FLD AUTO: 11.4 K/UL — HIGH (ref 3.8–10.5)

## 2018-03-02 RX ORDER — OXYCODONE AND ACETAMINOPHEN 5; 325 MG/1; MG/1
2 TABLET ORAL EVERY 4 HOURS
Qty: 0 | Refills: 0 | Status: DISCONTINUED | OUTPATIENT
Start: 2018-03-02 | End: 2018-03-06

## 2018-03-02 RX ORDER — ENOXAPARIN SODIUM 100 MG/ML
40 INJECTION SUBCUTANEOUS
Qty: 0 | Refills: 0 | Status: DISCONTINUED | OUTPATIENT
Start: 2018-03-02 | End: 2018-03-03

## 2018-03-02 RX ORDER — OXYCODONE AND ACETAMINOPHEN 5; 325 MG/1; MG/1
1 TABLET ORAL EVERY 4 HOURS
Qty: 0 | Refills: 0 | Status: DISCONTINUED | OUTPATIENT
Start: 2018-03-02 | End: 2018-03-06

## 2018-03-02 RX ORDER — POTASSIUM CHLORIDE 20 MEQ
40 PACKET (EA) ORAL EVERY 4 HOURS
Qty: 0 | Refills: 0 | Status: COMPLETED | OUTPATIENT
Start: 2018-03-02 | End: 2018-03-02

## 2018-03-02 RX ADMIN — HYDROMORPHONE HYDROCHLORIDE 30 MILLILITER(S): 2 INJECTION INTRAMUSCULAR; INTRAVENOUS; SUBCUTANEOUS at 03:01

## 2018-03-02 RX ADMIN — ENOXAPARIN SODIUM 40 MILLIGRAM(S): 100 INJECTION SUBCUTANEOUS at 21:51

## 2018-03-02 RX ADMIN — GABAPENTIN 300 MILLIGRAM(S): 400 CAPSULE ORAL at 18:22

## 2018-03-02 RX ADMIN — OXYCODONE AND ACETAMINOPHEN 2 TABLET(S): 5; 325 TABLET ORAL at 14:30

## 2018-03-02 RX ADMIN — CARVEDILOL PHOSPHATE 25 MILLIGRAM(S): 80 CAPSULE, EXTENDED RELEASE ORAL at 06:35

## 2018-03-02 RX ADMIN — SODIUM CHLORIDE 3 MILLILITER(S): 9 INJECTION INTRAMUSCULAR; INTRAVENOUS; SUBCUTANEOUS at 05:11

## 2018-03-02 RX ADMIN — GABAPENTIN 300 MILLIGRAM(S): 400 CAPSULE ORAL at 12:57

## 2018-03-02 RX ADMIN — LOSARTAN POTASSIUM 50 MILLIGRAM(S): 100 TABLET, FILM COATED ORAL at 12:56

## 2018-03-02 RX ADMIN — Medication 40 MILLIEQUIVALENT(S): at 12:57

## 2018-03-02 RX ADMIN — Medication 650 MILLIGRAM(S): at 06:37

## 2018-03-02 RX ADMIN — OXYCODONE AND ACETAMINOPHEN 2 TABLET(S): 5; 325 TABLET ORAL at 13:35

## 2018-03-02 RX ADMIN — OXYCODONE AND ACETAMINOPHEN 2 TABLET(S): 5; 325 TABLET ORAL at 21:10

## 2018-03-02 RX ADMIN — OXYCODONE AND ACETAMINOPHEN 2 TABLET(S): 5; 325 TABLET ORAL at 20:41

## 2018-03-02 RX ADMIN — Medication 100 MILLIGRAM(S): at 06:35

## 2018-03-02 RX ADMIN — SODIUM CHLORIDE 3 MILLILITER(S): 9 INJECTION INTRAMUSCULAR; INTRAVENOUS; SUBCUTANEOUS at 18:17

## 2018-03-02 RX ADMIN — Medication 100 MILLIGRAM(S): at 13:36

## 2018-03-02 RX ADMIN — HYDROMORPHONE HYDROCHLORIDE 30 MILLILITER(S): 2 INJECTION INTRAMUSCULAR; INTRAVENOUS; SUBCUTANEOUS at 07:41

## 2018-03-02 RX ADMIN — CARVEDILOL PHOSPHATE 25 MILLIGRAM(S): 80 CAPSULE, EXTENDED RELEASE ORAL at 18:22

## 2018-03-02 RX ADMIN — SODIUM CHLORIDE 3 MILLILITER(S): 9 INJECTION INTRAMUSCULAR; INTRAVENOUS; SUBCUTANEOUS at 21:49

## 2018-03-02 RX ADMIN — SENNA PLUS 2 TABLET(S): 8.6 TABLET ORAL at 21:51

## 2018-03-02 RX ADMIN — GABAPENTIN 300 MILLIGRAM(S): 400 CAPSULE ORAL at 23:13

## 2018-03-02 RX ADMIN — ENOXAPARIN SODIUM 40 MILLIGRAM(S): 100 INJECTION SUBCUTANEOUS at 18:22

## 2018-03-02 RX ADMIN — Medication 100 MILLIGRAM(S): at 21:51

## 2018-03-02 RX ADMIN — Medication 650 MILLIGRAM(S): at 07:15

## 2018-03-02 RX ADMIN — Medication 40 MILLIEQUIVALENT(S): at 18:21

## 2018-03-02 RX ADMIN — LOSARTAN POTASSIUM 50 MILLIGRAM(S): 100 TABLET, FILM COATED ORAL at 18:23

## 2018-03-02 NOTE — PROGRESS NOTE ADULT - SUBJECTIVE AND OBJECTIVE BOX
SUBJECTIVE: pain better, complaining of b/l groin pain R >L, along with back pain    OVERNIGHT EVENTS: none    Vital Signs Last 24 Hrs  T(C): 37.7 (02 Mar 2018 06:07), Max: 38.5 (01 Mar 2018 12:33)  T(F): 99.9 (02 Mar 2018 06:07), Max: 101.3 (01 Mar 2018 12:33)  HR: 98 (02 Mar 2018 06:07) (78 - 98)  BP: 108/71 (02 Mar 2018 06:07) (106/63 - 152/83)  BP(mean): --  RR: 19 (02 Mar 2018 06:07) (16 - 19)  SpO2: 94% (02 Mar 2018 06:07) (93% - 97%)    DRAINS: no surgical drain, rodgers only    PHYSICAL EXAM:    Constitutional: No Acute Distress     Neurological: AOx3, Following Commands, Moving all Extremities     Motor exam:          Upper extremity                         Delt     Bicep     Tricep    HG                                                 R         5/5        5/5        5/5       5/5                                               L          5/5        5/5        5/5       5/5          Lower extremity                        HF         KF        KE       DF         PF                                                  R        5/5        5/5        5/5       5/5         5/5                                               L         5/5        5/5       5/5       5/5          5/5                                                 Sensation: [xxxxxxxxxxxx] intact to light touch  [] decreased:     Pulmonary: Clear to Auscultation, No rales, No rhonchi, No wheezes     Cardiovascular: S1, S2, Regular rate and rhythm     Gastrointestinal: Soft, Non-tender, Non-distended     Extremities: No calf tenderness     Incision: cdi    LABS:                        12.3   11.4  )-----------( 148      ( 02 Mar 2018 07:01 )             35.7    03-02    134<L>  |  101  |  7   ----------------------------<  139<H>  3.7   |  24  |  0.78    Ca    8.5      02 Mar 2018 07:01        MEDICATIONS:  Antibiotics:    Neuro:  acetaminophen   Tablet 650 milliGRAM(s) Oral every 6 hours PRN For Temp greater than 38 C (100.4 F)  acetaminophen   Tablet. 650 milliGRAM(s) Oral every 6 hours PRN Mild Pain (1 - 3)  gabapentin 300 milliGRAM(s) Oral three times a day  meclizine 12.5 milliGRAM(s) Oral two times a day PRN Dizziness  ondansetron Injectable 4 milliGRAM(s) IV Push every 6 hours PRN Nausea  oxyCODONE    5 mG/acetaminophen 325 mG 1 Tablet(s) Oral every 4 hours PRN Moderate Pain (4 - 6)  oxyCODONE    5 mG/acetaminophen 325 mG 2 Tablet(s) Oral every 4 hours PRN Severe Pain (7 - 10)    Cardiac:  carvedilol 25 milliGRAM(s) Oral every 12 hours  losartan 50 milliGRAM(s) Oral two times a day    Pulm:    GI/:  docusate sodium 100 milliGRAM(s) Oral three times a day  senna 2 Tablet(s) Oral at bedtime    Other:   enoxaparin Injectable 40 milliGRAM(s) SubCutaneous at bedtime  potassium chloride    Tablet ER 40 milliEquivalent(s) Oral every 4 hours  sodium chloride 0.9% lock flush 3 milliLiter(s) IV Push every 8 hours  sodium chloride 0.9% with potassium chloride 20 mEq/L 1000 milliLiter(s) IV Continuous <Continuous>    DIET: [xxxxxxxxxxxx] Regular [] CCD [] Renal [] Puree [] Dysphagia [] Tube Feeds:     IMAGING:

## 2018-03-02 NOTE — PROGRESS NOTE ADULT - ATTENDING COMMENTS
I saw and evaluated the patient. The patient is a 63-year-old female s/p L2-S1 laminectomies and foraminotomies on 2/28/18. Postoperatively, the patient notes improvement in her bilateral lower extremity pain and paresthesias. The patient is currently neurologically stable. D/C Matta catheter when more ambulatory and F/U trial of void. Continue mobilization OOB with PT and disposition planning.

## 2018-03-02 NOTE — PROGRESS NOTE ADULT - SUBJECTIVE AND OBJECTIVE BOX
Day 2 of Anesthesia Pain Management Service    SUBJECTIVE: Patient is doing well with IV PCA    Pain Scale Score:	[X] Refer to charted pain scores    THERAPY:    [ ] IV PCA Morphine		[ ] 5 mg/mL	[ ] 1 mg/mL  [X] IV PCA Hydromorphone	[ ] 5 mg/mL	[X] 1 mg/mL  [ ] IV PCA Fentanyl		[ ] 50 micrograms/mL    Demand dose: 0.2 mg     Lockout: 6 minutes   Continuous Rate: 0 mg/hr  4 Hour Limit: 4 mg    MEDICATIONS  (STANDING):  carvedilol 25 milliGRAM(s) Oral every 12 hours  docusate sodium 100 milliGRAM(s) Oral three times a day  enoxaparin Injectable 40 milliGRAM(s) SubCutaneous at bedtime  enoxaparin Injectable 40 milliGRAM(s) SubCutaneous <User Schedule>  gabapentin 300 milliGRAM(s) Oral three times a day  losartan 50 milliGRAM(s) Oral two times a day  potassium chloride    Tablet ER 40 milliEquivalent(s) Oral every 4 hours  senna 2 Tablet(s) Oral at bedtime  sodium chloride 0.9% lock flush 3 milliLiter(s) IV Push every 8 hours    MEDICATIONS  (PRN):  acetaminophen   Tablet 650 milliGRAM(s) Oral every 6 hours PRN For Temp greater than 38 C (100.4 F)  acetaminophen   Tablet. 650 milliGRAM(s) Oral every 6 hours PRN Mild Pain (1 - 3)  meclizine 12.5 milliGRAM(s) Oral two times a day PRN Dizziness  ondansetron Injectable 4 milliGRAM(s) IV Push every 6 hours PRN Nausea  oxyCODONE    5 mG/acetaminophen 325 mG 1 Tablet(s) Oral every 4 hours PRN Moderate Pain (4 - 6)  oxyCODONE    5 mG/acetaminophen 325 mG 2 Tablet(s) Oral every 4 hours PRN Severe Pain (7 - 10)      OBJECTIVE:    Sedation Score:	[ X] Alert	[ ] Drowsy 	[ ] Arousable	[ ] Asleep	[ ] Unresponsive    Side Effects:	[X ] None	[ ] Nausea	[ ] Vomiting	[ ] Pruritus  		[ ] Other:    Vital Signs Last 24 Hrs  T(C): 37.7 (02 Mar 2018 06:07), Max: 38.5 (01 Mar 2018 12:33)  T(F): 99.9 (02 Mar 2018 06:07), Max: 101.3 (01 Mar 2018 12:33)  HR: 98 (02 Mar 2018 06:07) (78 - 98)  BP: 108/71 (02 Mar 2018 06:07) (106/63 - 152/83)  BP(mean): --  RR: 19 (02 Mar 2018 06:07) (16 - 19)  SpO2: 94% (02 Mar 2018 06:07) (93% - 97%)    ASSESSMENT/ PLAN    Therapy to  be:               [ ] Continued   [X] Discontinued   [ ] Changed to PRN Analgesics    Documentation and Verification of current medications:   [X] Done	[ ] Not done, not eligible    Comments:

## 2018-03-02 NOTE — PROGRESS NOTE ADULT - ASSESSMENT
HPI:    PROCEDURE: Lumbar laminectomy     POD# 2 s/p L2-S1 lami    PLAN:  Neuro: dc pca, add percocet, mobilize, remove rodgers  Respiratory: IS, RA  CV: stable, dc bp meds by 2 hours each  Endocrine:   Heme/Onc:             DVT ppx: lovenox to be added  Renal: stable, potassium repleted  ID: tm yesterday 101.3 at noon, UA negative, observe, no sign of sepsis, no dysuria.  GI:          reg                  GI ppx:  PT/OT: pending  Will discuss with ___      Spectralink # HPI:    PROCEDURE: Lumbar laminectomy     POD# 2 s/p L2-S1 lami    PLAN:  Neuro: dc pca, add percocet, mobilize, remove rodgers  Respiratory: IS, RA  CV: stable, dc bp meds by 2 hours each  Endocrine:   Heme/Onc:             DVT ppx: lovenox added  Renal: stable, potassium repleted  ID: tm yesterday 101.3 at noon, UA negative, observe, no sign of sepsis, no dysuria, incentive   GI:          reg                  GI ppx:  PT/OT: pending  Will discuss with ___      Spectralink #

## 2018-03-03 RX ORDER — DEXAMETHASONE 0.5 MG/5ML
6 ELIXIR ORAL EVERY 6 HOURS
Qty: 0 | Refills: 0 | Status: COMPLETED | OUTPATIENT
Start: 2018-03-03 | End: 2018-03-04

## 2018-03-03 RX ADMIN — SODIUM CHLORIDE 3 MILLILITER(S): 9 INJECTION INTRAMUSCULAR; INTRAVENOUS; SUBCUTANEOUS at 05:29

## 2018-03-03 RX ADMIN — GABAPENTIN 300 MILLIGRAM(S): 400 CAPSULE ORAL at 23:06

## 2018-03-03 RX ADMIN — ONDANSETRON 4 MILLIGRAM(S): 8 TABLET, FILM COATED ORAL at 07:28

## 2018-03-03 RX ADMIN — Medication 6 MILLIGRAM(S): at 11:41

## 2018-03-03 RX ADMIN — Medication 100 MILLIGRAM(S): at 21:18

## 2018-03-03 RX ADMIN — Medication 6 MILLIGRAM(S): at 18:22

## 2018-03-03 RX ADMIN — LOSARTAN POTASSIUM 50 MILLIGRAM(S): 100 TABLET, FILM COATED ORAL at 05:27

## 2018-03-03 RX ADMIN — LOSARTAN POTASSIUM 50 MILLIGRAM(S): 100 TABLET, FILM COATED ORAL at 21:18

## 2018-03-03 RX ADMIN — SENNA PLUS 2 TABLET(S): 8.6 TABLET ORAL at 21:18

## 2018-03-03 RX ADMIN — Medication 100 MILLIGRAM(S): at 13:45

## 2018-03-03 RX ADMIN — OXYCODONE AND ACETAMINOPHEN 2 TABLET(S): 5; 325 TABLET ORAL at 23:06

## 2018-03-03 RX ADMIN — GABAPENTIN 300 MILLIGRAM(S): 400 CAPSULE ORAL at 11:41

## 2018-03-03 RX ADMIN — ENOXAPARIN SODIUM 40 MILLIGRAM(S): 100 INJECTION SUBCUTANEOUS at 21:18

## 2018-03-03 RX ADMIN — SODIUM CHLORIDE 3 MILLILITER(S): 9 INJECTION INTRAMUSCULAR; INTRAVENOUS; SUBCUTANEOUS at 21:21

## 2018-03-03 RX ADMIN — OXYCODONE AND ACETAMINOPHEN 2 TABLET(S): 5; 325 TABLET ORAL at 05:26

## 2018-03-03 RX ADMIN — CARVEDILOL PHOSPHATE 25 MILLIGRAM(S): 80 CAPSULE, EXTENDED RELEASE ORAL at 18:23

## 2018-03-03 RX ADMIN — GABAPENTIN 300 MILLIGRAM(S): 400 CAPSULE ORAL at 18:23

## 2018-03-03 RX ADMIN — OXYCODONE AND ACETAMINOPHEN 2 TABLET(S): 5; 325 TABLET ORAL at 18:31

## 2018-03-03 RX ADMIN — CARVEDILOL PHOSPHATE 25 MILLIGRAM(S): 80 CAPSULE, EXTENDED RELEASE ORAL at 05:27

## 2018-03-03 RX ADMIN — Medication 6 MILLIGRAM(S): at 23:06

## 2018-03-03 RX ADMIN — OXYCODONE AND ACETAMINOPHEN 2 TABLET(S): 5; 325 TABLET ORAL at 06:00

## 2018-03-03 RX ADMIN — Medication 100 MILLIGRAM(S): at 05:27

## 2018-03-03 RX ADMIN — OXYCODONE AND ACETAMINOPHEN 2 TABLET(S): 5; 325 TABLET ORAL at 18:57

## 2018-03-03 RX ADMIN — SODIUM CHLORIDE 3 MILLILITER(S): 9 INJECTION INTRAMUSCULAR; INTRAVENOUS; SUBCUTANEOUS at 13:45

## 2018-03-03 RX ADMIN — OXYCODONE AND ACETAMINOPHEN 2 TABLET(S): 5; 325 TABLET ORAL at 23:36

## 2018-03-03 NOTE — PROGRESS NOTE ADULT - ATTENDING COMMENTS
I reviewed the PA's note and agree.. The patient is a 63-year-old female s/p L2-S1 laminectomies and foraminotomies on 2/28/18. Postoperatively, the patient notes improvement in her bilateral lower extremity pain and paresthesias. The patient is currently neurologically stable. Continue mobilization OOB with PT and disposition planning. I reviewed the PA's note and agree. The patient is a 63-year-old female s/p L2-S1 laminectomies and foraminotomies on 2/28/18. Postoperatively, the patient notes improvement in her bilateral lower extremity pain and paresthesias. The patient is currently neurologically stable. Continue mobilization OOB with PT and disposition planning.

## 2018-03-03 NOTE — PROVIDER CONTACT NOTE (OTHER) - ACTION/TREATMENT ORDERED:
Tylenol ordered.
As per Jose ALVAREZ, pt started on 6mg decadron IVP today. 2nd dose given now. Pt currently on gabapentin PO as well. As per Jose, cont treatment as ordered. NO interventions at this time. Will monitor.
MD stated to let her know if pt does not void. no orders for blisters- will maintain intact blisters. will monitor.

## 2018-03-03 NOTE — PROVIDER CONTACT NOTE (OTHER) - ASSESSMENT
C/o of slight back pain however indicated PCA pump is maintaining pain. Back dsg CDI.
Pt denies calf pain, +pulses. NO discoloration or edema noted. However pt c/o "numbness" (denies tingling) in left inner calf area. States new numbness, different than preop (which was left upper thigh numbess). No sensation noted on left inner calf.
d/c rodgers was ordered but rodgers was still in place, so rodgers was d/c'd at 2030 and is now DTV at 0430. pt noted to have multiple fluid filled blisters to rt back.

## 2018-03-03 NOTE — PROGRESS NOTE ADULT - ASSESSMENT
64 y/o Yakut speaking female with PMHx HTN and chronic back x 4yrs c/o worsening lower back pain radiating to bilateral legs R>L and numbness to both feet. Reports trying physical therapy and has had epidural injections without significant relief. Patient accompanied by daughter. Presents to PST for scheduled L2-S1 lumbar laminectomy on 2/28/2018.    PAST MEDICAL & SURGICAL HISTORY:  Vertigo  Kidney stones  Obesity  HTN (hypertension)  Lumbago with sciatica  History of cataract surgery: both eyes 2017  Carpal tunnel syndrome on both sides: sx done 2013    PROCEDURE: 2/28/18 L2-S1 Lumbar laminectomy     PLAN:  Neuro: cont PO pain meds, valium PRN, decadron x 4doses for radicular pain  Respiratory: patient instructed on incentive spirometer  CV: cont carvedilol and losartan for HTN               DVT ppx: [] SQH [x] SQL and venodynes bilaterally  ID: remains afebrile  GI: bowel regimen   PT/OT: LINDA upon d/c  Monitor incision/blisters  f/u am labs    Will discuss with Dr Tee  [facility] # [id number]

## 2018-03-03 NOTE — PROGRESS NOTE ADULT - SUBJECTIVE AND OBJECTIVE BOX
CHIEF COMPLAINT:  sitting in chair, patient c/o back pain, radiating into her post thighs, daughter at bedside translating- she refused the phone    Vital Signs Last 24 Hrs  T(C): 36.9 (03 Mar 2018 05:01), Max: 37.6 (02 Mar 2018 14:39)  T(F): 98.4 (03 Mar 2018 05:01), Max: 99.7 (02 Mar 2018 14:39)  HR: 94 (03 Mar 2018 05:01) (82 - 94)  BP: 150/60 (03 Mar 2018 05:01) (103/63 - 150/60)  BP(mean): --  RR: 18 (03 Mar 2018 05:01) (18 - 18)  SpO2: 95% (03 Mar 2018 05:01) (94% - 97%)    PHYSICAL EXAM:    Constitutional: No Acute Distress     Neurological: AOx3, Following Commands, Moving all Extremities     Motor exam:          Upper extremity                         Delt     Bicep     Tricep    HG                                                 R         5/5        5/5        5/5       5/5                                               L          5/5        5/5        5/5       5/5          Lower extremity                        HF         KF        KE       DF         PF                                                  R        5/5        5/5        5/5       5/5         5/5                                               L         5/5        5/5       5/5       5/5          5/5                                                 Sensation: [x] intact to light touch  [] decreased:     Pulmonary: Clear to Auscultation, No rales, No rhonchi, No wheezes     Cardiovascular: S1, S2, Regular rate and rhythm     Gastrointestinal: Soft, Non-tender, Non-distended     Extremities: No calf tenderness     Incision: +staples, c/d/i, some bruising and blisters from dressing, will monitor    LABS:                        12.3   11.4  )-----------( 148      ( 02 Mar 2018 07:01 )             35.7   03-02    134<L>  |  101  |  7   ----------------------------<  139<H>  3.7   |  24  |  0.78    Ca    8.5      02 Mar 2018 07:01      MEDICATIONS  (STANDING):  carvedilol 25 milliGRAM(s) Oral every 12 hours  dexamethasone  Injectable 6 milliGRAM(s) IV Push every 6 hours  docusate sodium 100 milliGRAM(s) Oral three times a day  enoxaparin Injectable 40 milliGRAM(s) SubCutaneous at bedtime  gabapentin 300 milliGRAM(s) Oral three times a day  losartan 50 milliGRAM(s) Oral two times a day  senna 2 Tablet(s) Oral at bedtime  sodium chloride 0.9% lock flush 3 milliLiter(s) IV Push every 8 hours    MEDICATIONS  (PRN):  acetaminophen   Tablet 650 milliGRAM(s) Oral every 6 hours PRN For Temp greater than 38 C (100.4 F)  acetaminophen   Tablet. 650 milliGRAM(s) Oral every 6 hours PRN Mild Pain (1 - 3)  diazepam    Tablet 5 milliGRAM(s) Oral every 8 hours PRN spasms  meclizine 12.5 milliGRAM(s) Oral two times a day PRN Dizziness  ondansetron Injectable 4 milliGRAM(s) IV Push every 6 hours PRN Nausea  oxyCODONE    5 mG/acetaminophen 325 mG 1 Tablet(s) Oral every 4 hours PRN Moderate Pain (4 - 6)  oxyCODONE    5 mG/acetaminophen 325 mG 2 Tablet(s) Oral every 4 hours PRN Severe Pain (7 - 10)                DIET: [x] Regular [] CCD [] Renal [] Puree [] Dysphagia [] Tube Feeds:

## 2018-03-04 ENCOUNTER — TRANSCRIPTION ENCOUNTER (OUTPATIENT)
Age: 63
End: 2018-03-04

## 2018-03-04 LAB
ANION GAP SERPL CALC-SCNC: 14 MMOL/L — SIGNIFICANT CHANGE UP (ref 5–17)
BASOPHILS # BLD AUTO: 0 K/UL — SIGNIFICANT CHANGE UP (ref 0–0.2)
BASOPHILS NFR BLD AUTO: 0 % — SIGNIFICANT CHANGE UP (ref 0–2)
BUN SERPL-MCNC: 17 MG/DL — SIGNIFICANT CHANGE UP (ref 7–23)
CALCIUM SERPL-MCNC: 9.5 MG/DL — SIGNIFICANT CHANGE UP (ref 8.4–10.5)
CHLORIDE SERPL-SCNC: 99 MMOL/L — SIGNIFICANT CHANGE UP (ref 96–108)
CO2 SERPL-SCNC: 25 MMOL/L — SIGNIFICANT CHANGE UP (ref 22–31)
CREAT SERPL-MCNC: 0.73 MG/DL — SIGNIFICANT CHANGE UP (ref 0.5–1.3)
EOSINOPHIL # BLD AUTO: 0 K/UL — SIGNIFICANT CHANGE UP (ref 0–0.5)
EOSINOPHIL NFR BLD AUTO: 0 % — SIGNIFICANT CHANGE UP (ref 0–6)
GLUCOSE SERPL-MCNC: 174 MG/DL — HIGH (ref 70–99)
HCT VFR BLD CALC: 36 % — SIGNIFICANT CHANGE UP (ref 34.5–45)
HGB BLD-MCNC: 11.9 G/DL — SIGNIFICANT CHANGE UP (ref 11.5–15.5)
LYMPHOCYTES # BLD AUTO: 1.3 K/UL — SIGNIFICANT CHANGE UP (ref 1–3.3)
LYMPHOCYTES # BLD AUTO: 11.9 % — LOW (ref 13–44)
MCHC RBC-ENTMCNC: 30.8 PG — SIGNIFICANT CHANGE UP (ref 27–34)
MCHC RBC-ENTMCNC: 33.1 GM/DL — SIGNIFICANT CHANGE UP (ref 32–36)
MCV RBC AUTO: 92.9 FL — SIGNIFICANT CHANGE UP (ref 80–100)
MONOCYTES # BLD AUTO: 0.5 K/UL — SIGNIFICANT CHANGE UP (ref 0–0.9)
MONOCYTES NFR BLD AUTO: 4.6 % — SIGNIFICANT CHANGE UP (ref 2–14)
NEUTROPHILS # BLD AUTO: 9.2 K/UL — HIGH (ref 1.8–7.4)
NEUTROPHILS NFR BLD AUTO: 83.4 % — HIGH (ref 43–77)
PLATELET # BLD AUTO: 204 K/UL — SIGNIFICANT CHANGE UP (ref 150–400)
POTASSIUM SERPL-MCNC: 4.2 MMOL/L — SIGNIFICANT CHANGE UP (ref 3.5–5.3)
POTASSIUM SERPL-SCNC: 4.2 MMOL/L — SIGNIFICANT CHANGE UP (ref 3.5–5.3)
RBC # BLD: 3.88 M/UL — SIGNIFICANT CHANGE UP (ref 3.8–5.2)
RBC # FLD: 12.4 % — SIGNIFICANT CHANGE UP (ref 10.3–14.5)
SODIUM SERPL-SCNC: 138 MMOL/L — SIGNIFICANT CHANGE UP (ref 135–145)
WBC # BLD: 11.1 K/UL — HIGH (ref 3.8–10.5)
WBC # FLD AUTO: 11.1 K/UL — HIGH (ref 3.8–10.5)

## 2018-03-04 RX ADMIN — OXYCODONE AND ACETAMINOPHEN 2 TABLET(S): 5; 325 TABLET ORAL at 05:52

## 2018-03-04 RX ADMIN — OXYCODONE AND ACETAMINOPHEN 2 TABLET(S): 5; 325 TABLET ORAL at 17:28

## 2018-03-04 RX ADMIN — OXYCODONE AND ACETAMINOPHEN 2 TABLET(S): 5; 325 TABLET ORAL at 22:16

## 2018-03-04 RX ADMIN — SODIUM CHLORIDE 3 MILLILITER(S): 9 INJECTION INTRAMUSCULAR; INTRAVENOUS; SUBCUTANEOUS at 21:40

## 2018-03-04 RX ADMIN — SODIUM CHLORIDE 3 MILLILITER(S): 9 INJECTION INTRAMUSCULAR; INTRAVENOUS; SUBCUTANEOUS at 15:02

## 2018-03-04 RX ADMIN — CARVEDILOL PHOSPHATE 25 MILLIGRAM(S): 80 CAPSULE, EXTENDED RELEASE ORAL at 17:27

## 2018-03-04 RX ADMIN — Medication 6 MILLIGRAM(S): at 05:51

## 2018-03-04 RX ADMIN — LOSARTAN POTASSIUM 50 MILLIGRAM(S): 100 TABLET, FILM COATED ORAL at 21:44

## 2018-03-04 RX ADMIN — SODIUM CHLORIDE 3 MILLILITER(S): 9 INJECTION INTRAMUSCULAR; INTRAVENOUS; SUBCUTANEOUS at 05:45

## 2018-03-04 RX ADMIN — LOSARTAN POTASSIUM 50 MILLIGRAM(S): 100 TABLET, FILM COATED ORAL at 08:46

## 2018-03-04 RX ADMIN — SENNA PLUS 2 TABLET(S): 8.6 TABLET ORAL at 21:44

## 2018-03-04 RX ADMIN — ENOXAPARIN SODIUM 40 MILLIGRAM(S): 100 INJECTION SUBCUTANEOUS at 21:44

## 2018-03-04 RX ADMIN — GABAPENTIN 300 MILLIGRAM(S): 400 CAPSULE ORAL at 21:44

## 2018-03-04 RX ADMIN — OXYCODONE AND ACETAMINOPHEN 2 TABLET(S): 5; 325 TABLET ORAL at 06:22

## 2018-03-04 RX ADMIN — Medication 100 MILLIGRAM(S): at 05:51

## 2018-03-04 RX ADMIN — GABAPENTIN 300 MILLIGRAM(S): 400 CAPSULE ORAL at 08:46

## 2018-03-04 RX ADMIN — CARVEDILOL PHOSPHATE 25 MILLIGRAM(S): 80 CAPSULE, EXTENDED RELEASE ORAL at 05:51

## 2018-03-04 RX ADMIN — Medication 100 MILLIGRAM(S): at 17:27

## 2018-03-04 RX ADMIN — OXYCODONE AND ACETAMINOPHEN 2 TABLET(S): 5; 325 TABLET ORAL at 21:46

## 2018-03-04 RX ADMIN — Medication 100 MILLIGRAM(S): at 21:44

## 2018-03-04 RX ADMIN — GABAPENTIN 300 MILLIGRAM(S): 400 CAPSULE ORAL at 17:27

## 2018-03-04 NOTE — DISCHARGE NOTE ADULT - DURABLE MEDICAL EQUIPMENT AGENCY
[facility] SURGICAL   ([facility] )    [phone]

## 2018-03-04 NOTE — DISCHARGE NOTE ADULT - MEDICATION SUMMARY - MEDICATIONS TO TAKE
I will START or STAY ON the medications listed below when I get home from the hospital:    rolling walker  -- L2-S1 laminectomy  -- Indication: For walker     oxyCODONE-acetaminophen 5 mg-325 mg oral tablet  -- 1 tab(s) by mouth every 4 hours, As needed, Moderate Pain (4 - 6) MDD:6  -- Indication: For pain    acetaminophen 325 mg oral tablet  -- 2 tab(s) by mouth every 6 hours, As needed, Mild Pain (1 - 3)  -- Indication: For pain    diazePAM 5 mg oral tablet  -- 1 tab(s) by mouth every 8 hours, As needed, spasms MDD:3  -- Indication: For muscle spasm    gabapentin 300 mg oral capsule  -- 1 cap(s) by mouth 3 times a day  -- Indication: For pain    meclizine 12.5 mg oral tablet  -- 1 tab(s) by mouth 2 times a day, As Needed  -- Indication: For Vertigo     carvedilol 25 mg oral tablet  -- 1 tab(s) by mouth 2 times a day  -- Indication: For blood pressure     docusate sodium 100 mg oral capsule  -- 1 cap(s) by mouth 3 times a day  -- Indication: For stool softener     senna oral tablet  -- 2 tab(s) by mouth once a day (at bedtime)  -- Indication: For stool softner

## 2018-03-04 NOTE — DISCHARGE NOTE ADULT - MEDICATION SUMMARY - MEDICATIONS TO STOP TAKING
I will STOP taking the medications listed below when I get home from the hospital:    aspirin 81 mg oral tablet  -- 1 tab(s) by mouth once a day, prophylaxis    meloxicam 15 mg oral tablet  -- 1 tab(s) by mouth once a day

## 2018-03-04 NOTE — DISCHARGE NOTE ADULT - PLAN OF CARE
2/28/18 s/p L2-S1 lumbar laminectomies and foraminotomies for lumbar stenosis with claudication. Dr Tee- neurosurgeon- please call office for appointment upon discharge from rehab.   Primary care physician upon discharge from rehab.   please notify physician if fevers, bleeding, swelling, pain not relieved by medication, increased sluggishness or irritability, increased nausea or vomiting, inability to tolerate foods or liquids.   please do not engage in strenuous activity, heavy lifting, drive, or return to work or school until cleared by surgeon.   please keep incision clean and dry, do not submerge wound in water for prolonged periods of time, pat dry after showering, and do not use any creams or ointments to incision. Please continue medications and follow up with your primary care physician upon discharge from rehab. Dr Tee- neurosurgeon- please call office for appointment -see in 10 days   Primary care physician upon discharge from rehab.   please notify physician if fevers, bleeding, swelling, pain not relieved by medication, increased sluggishness or irritability, increased nausea or vomiting, inability to tolerate foods or liquids.   please do not engage in strenuous activity, heavy lifting, drive, or return to work or school until cleared by surgeon.   please keep incision clean and dry, do not submerge wound in water for prolonged periods of time, pat dry after showering, and do not use any creams or ointments to incision. Please continue medications and follow up with your primary care physician upon discharge

## 2018-03-04 NOTE — DISCHARGE NOTE ADULT - ADDITIONAL INSTRUCTIONS
staple removal for 3/14/18.   May restart aspirin on 3/14/18 POD #14 Please follow up with Surgeon regarding when to restart aspirin.

## 2018-03-04 NOTE — PROGRESS NOTE ADULT - ASSESSMENT
64 y/o Yoruba speaking female with PMHx HTN and chronic back x 4yrs c/o worsening lower back pain radiating to bilateral legs R>L and numbness to both feet. Reports trying physical therapy and has had epidural injections without significant relief. Patient accompanied by daughter. Presents to PST for scheduled L2-S1 lumbar laminectomy on 2/28/2018.    PAST MEDICAL & SURGICAL HISTORY:  Vertigo  Kidney stones  Obesity  HTN (hypertension)  Lumbago with sciatica  History of cataract surgery: both eyes 2017  Carpal tunnel syndrome on both sides: sx done 2013    PROCEDURE: 2/28/18 L2-S1 Lumbar laminectomy     PLAN:  Neuro: cont PO pain meds, valium PRN, decadron completed and improved radicular pain  Respiratory: patient instructed on incentive spirometer  CV: cont carvedilol and losartan for HTN               DVT ppx: [] SQH [x] SQL and venodynes bilaterally  ID: remains afebrile  GI: bowel regimen   PT/OT: LINDA upon d/c  Monitor incision/blisters    Will discuss with Dr Tee  [facility] # [id number]

## 2018-03-04 NOTE — DISCHARGE NOTE ADULT - PATIENT PORTAL LINK FT
You can access the [facility] Patient Portal, offered by [facility], by registering with the following website: [email or web address]

## 2018-03-04 NOTE — DISCHARGE NOTE ADULT - CARE PLAN
Principal Discharge DX:	Lumbar stenosis with neurogenic claudication  Goal:	2/28/18 s/p L2-S1 lumbar laminectomies and foraminotomies for lumbar stenosis with claudication.  Assessment and plan of treatment:	Dr Tee- neurosurgeon- please call office for appointment upon discharge from rehab.   Primary care physician upon discharge from rehab.   please notify physician if fevers, bleeding, swelling, pain not relieved by medication, increased sluggishness or irritability, increased nausea or vomiting, inability to tolerate foods or liquids.   please do not engage in strenuous activity, heavy lifting, drive, or return to work or school until cleared by surgeon.   please keep incision clean and dry, do not submerge wound in water for prolonged periods of time, pat dry after showering, and do not use any creams or ointments to incision.  Secondary Diagnosis:	HTN (hypertension)  Assessment and plan of treatment:	Please continue medications and follow up with your primary care physician upon discharge from rehab.  Secondary Diagnosis:	Vertigo  Assessment and plan of treatment:	Please continue medications and follow up with your primary care physician upon discharge from rehab. Principal Discharge DX:	Lumbar stenosis with neurogenic claudication  Goal:	2/28/18 s/p L2-S1 lumbar laminectomies and foraminotomies for lumbar stenosis with claudication.  Assessment and plan of treatment:	Dr Tee- neurosurgeon- please call office for appointment -see in 10 days   Primary care physician upon discharge from rehab.   please notify physician if fevers, bleeding, swelling, pain not relieved by medication, increased sluggishness or irritability, increased nausea or vomiting, inability to tolerate foods or liquids.   please do not engage in strenuous activity, heavy lifting, drive, or return to work or school until cleared by surgeon.   please keep incision clean and dry, do not submerge wound in water for prolonged periods of time, pat dry after showering, and do not use any creams or ointments to incision.  Secondary Diagnosis:	HTN (hypertension)  Assessment and plan of treatment:	Please continue medications and follow up with your primary care physician upon discharge  Secondary Diagnosis:	Vertigo  Assessment and plan of treatment:	Please continue medications and follow up with your primary care physician upon discharge

## 2018-03-04 NOTE — DISCHARGE NOTE ADULT - NS AS ACTIVITY OBS
No Heavy lifting/straining/Stairs allowed/Showering allowed/Walking-Indoors allowed/Do not drive or operate machinery/do not return to work/school/driving until cleared by surgeon/Bathing allowed/Walking-Outdoors allowed/Do not make important decisions

## 2018-03-04 NOTE — DISCHARGE NOTE ADULT - HOSPITAL COURSE
64 y/o Bulgarian speaking female with PMHx HTN and chronic back x 4yrs c/o worsening lower back pain radiating to bilateral legs R>L and numbness to both feet. Reports trying physical therapy and has had epidural injections without significant relief. Patient accompanied by daughter. Presented to PST for scheduled L2-S1 lumbar laminectomy on 2/28/2018.    Patient admitted via SDA and underwent on 2/28/18 a L2-S1 lumbar laminectomies and foraminotomies for lumbar stenosis with claudication. Post operative day #1 had fever, with negative workup and no further fevers. Routine post operative management with antibiotics, DVT prophylaxis, and PT evaluation. PT evaluated her and recommended subacute rehab. Blisters tim incisional from dressing stable. On the day of discharge she was medically and neurologically stable for discharge to rehab. 64 y/o Telugu speaking female with PMHx HTN and chronic back x 4yrs c/o worsening lower back pain radiating to bilateral legs R>L and numbness to both feet. Reports trying physical therapy and has had epidural injections without significant relief. Patient accompanied by daughter. Presented to PST for scheduled L2-S1 lumbar laminectomy on 2/28/2018.    Patient admitted via SDA and underwent on 2/28/18 a L2-S1 lumbar laminectomies and foraminotomies for lumbar stenosis with claudication. Post operative day #1 had fever, with negative workup and no further fevers. Routine post operative management with antibiotics, DVT prophylaxis, and PT evaluation. PT evaluated her and recommended home. Blisters tim incisional from dressing stable. On the day of discharge she was medically and neurologically stable for discharge to home

## 2018-03-04 NOTE — PROGRESS NOTE ADULT - SUBJECTIVE AND OBJECTIVE BOX
CHIEF COMPLAINT:  sitting in chair, reports back pain and radiating pain improved into her post thighs, daughter at bedside translating- she refused the phone    Vital Signs Last 24 Hrs  T(C): 36.7 (04 Mar 2018 08:06), Max: 36.7 (03 Mar 2018 23:39)  T(F): 98 (04 Mar 2018 08:06), Max: 98 (03 Mar 2018 23:39)  HR: 73 (04 Mar 2018 08:06) (73 - 83)  BP: 137/71 (04 Mar 2018 08:06) (109/68 - 144/75)  BP(mean): --  RR: 18 (04 Mar 2018 08:06) (16 - 18)  SpO2: 93% (04 Mar 2018 08:06) (93% - 96%)    PHYSICAL EXAM:    Constitutional: No Acute Distress     Neurological: AOx3, Following Commands, Moving all Extremities     Motor exam:          Upper extremity                         Delt     Bicep     Tricep    HG                                                 R         5/5        5/5        5/5       5/5                                               L          5/5        5/5        5/5       5/5          Lower extremity                        HF         KF        KE       DF         PF                                                  R        5/5        5/5        5/5       5/5         5/5                                               L         5/5        5/5       5/5       5/5          5/5                                                 Sensation: [x] intact to light touch  [] decreased:     Pulmonary: Clear to Auscultation, No rales, No rhonchi, No wheezes     Cardiovascular: S1, S2, Regular rate and rhythm     Gastrointestinal: Soft, Non-tender, Non-distended     Extremities: No calf tenderness     Incision: +staples, c/d/i, some bruising and blisters from dressing, will monitor    LABS:                                      11.9   11.1  )-----------( 204      ( 04 Mar 2018 06:23 )             36.0   03-04    138  |  99  |  17  ----------------------------<  174<H>  4.2   |  25  |  0.73    Ca    9.5      04 Mar 2018 06:21      MEDICATIONS  (STANDING):  carvedilol 25 milliGRAM(s) Oral every 12 hours  (dexamethasone  Injectable 6 milliGRAM(s) IV Push every 6 hours)-completed   docusate sodium 100 milliGRAM(s) Oral three times a day  enoxaparin Injectable 40 milliGRAM(s) SubCutaneous at bedtime  gabapentin 300 milliGRAM(s) Oral three times a day  losartan 50 milliGRAM(s) Oral two times a day  senna 2 Tablet(s) Oral at bedtime  sodium chloride 0.9% lock flush 3 milliLiter(s) IV Push every 8 hours    MEDICATIONS  (PRN):  acetaminophen   Tablet 650 milliGRAM(s) Oral every 6 hours PRN For Temp greater than 38 C (100.4 F)  acetaminophen   Tablet. 650 milliGRAM(s) Oral every 6 hours PRN Mild Pain (1 - 3)  diazepam    Tablet 5 milliGRAM(s) Oral every 8 hours PRN spasms  meclizine 12.5 milliGRAM(s) Oral two times a day PRN Dizziness  ondansetron Injectable 4 milliGRAM(s) IV Push every 6 hours PRN Nausea  oxyCODONE    5 mG/acetaminophen 325 mG 1 Tablet(s) Oral every 4 hours PRN Moderate Pain (4 - 6)  oxyCODONE    5 mG/acetaminophen 325 mG 2 Tablet(s) Oral every 4 hours PRN Severe Pain (7 - 10)                DIET: [x] Regular [] CCD [] Renal [] Puree [] Dysphagia [] Tube Feeds:

## 2018-03-04 NOTE — DISCHARGE NOTE ADULT - CARE PROVIDER_API CALL
Panda Tee (DO), Neurosurgery  Spine  [address]  [address]  [place] [address]  Phone: [phone]  Fax: [phone]

## 2018-03-05 DIAGNOSIS — M48.062 SPINAL STENOSIS, LUMBAR REGION WITH NEUROGENIC CLAUDICATION: ICD-10-CM

## 2018-03-05 RX ADMIN — SODIUM CHLORIDE 3 MILLILITER(S): 9 INJECTION INTRAMUSCULAR; INTRAVENOUS; SUBCUTANEOUS at 05:54

## 2018-03-05 RX ADMIN — CARVEDILOL PHOSPHATE 25 MILLIGRAM(S): 80 CAPSULE, EXTENDED RELEASE ORAL at 17:17

## 2018-03-05 RX ADMIN — SODIUM CHLORIDE 3 MILLILITER(S): 9 INJECTION INTRAMUSCULAR; INTRAVENOUS; SUBCUTANEOUS at 21:30

## 2018-03-05 RX ADMIN — LOSARTAN POTASSIUM 50 MILLIGRAM(S): 100 TABLET, FILM COATED ORAL at 21:27

## 2018-03-05 RX ADMIN — OXYCODONE AND ACETAMINOPHEN 2 TABLET(S): 5; 325 TABLET ORAL at 06:00

## 2018-03-05 RX ADMIN — OXYCODONE AND ACETAMINOPHEN 2 TABLET(S): 5; 325 TABLET ORAL at 06:30

## 2018-03-05 RX ADMIN — SODIUM CHLORIDE 3 MILLILITER(S): 9 INJECTION INTRAMUSCULAR; INTRAVENOUS; SUBCUTANEOUS at 13:14

## 2018-03-05 RX ADMIN — LOSARTAN POTASSIUM 50 MILLIGRAM(S): 100 TABLET, FILM COATED ORAL at 09:27

## 2018-03-05 RX ADMIN — GABAPENTIN 300 MILLIGRAM(S): 400 CAPSULE ORAL at 13:17

## 2018-03-05 RX ADMIN — GABAPENTIN 300 MILLIGRAM(S): 400 CAPSULE ORAL at 21:27

## 2018-03-05 RX ADMIN — ENOXAPARIN SODIUM 40 MILLIGRAM(S): 100 INJECTION SUBCUTANEOUS at 21:29

## 2018-03-05 RX ADMIN — OXYCODONE AND ACETAMINOPHEN 2 TABLET(S): 5; 325 TABLET ORAL at 23:35

## 2018-03-05 RX ADMIN — SENNA PLUS 2 TABLET(S): 8.6 TABLET ORAL at 21:27

## 2018-03-05 RX ADMIN — GABAPENTIN 300 MILLIGRAM(S): 400 CAPSULE ORAL at 05:55

## 2018-03-05 RX ADMIN — OXYCODONE AND ACETAMINOPHEN 2 TABLET(S): 5; 325 TABLET ORAL at 21:26

## 2018-03-05 RX ADMIN — Medication 100 MILLIGRAM(S): at 13:17

## 2018-03-05 RX ADMIN — Medication 100 MILLIGRAM(S): at 05:55

## 2018-03-05 RX ADMIN — Medication 100 MILLIGRAM(S): at 21:27

## 2018-03-05 RX ADMIN — CARVEDILOL PHOSPHATE 25 MILLIGRAM(S): 80 CAPSULE, EXTENDED RELEASE ORAL at 05:55

## 2018-03-05 NOTE — PROGRESS NOTE ADULT - PROBLEM SELECTOR PLAN 1
s/p laminectomy  1 Out of bed   2 continue PT  3 PRN pain meds   4 dvt ppx sql scds   5 continue valium for spasm   6 continue gabapentin   7 continue meclizine   8 dispo rehab:p

## 2018-03-05 NOTE — PROGRESS NOTE ADULT - SUBJECTIVE AND OBJECTIVE BOX
SUBJECTIVE: Patient was seen and evaluated at bedside. Patient is resting comfortably in bed and is in no new acute distress.     OVERNIGHT EVENTS: none     Vital Signs Last 24 Hrs  T(C): 36.8 (05 Mar 2018 08:00), Max: 36.8 (05 Mar 2018 08:00)  T(F): 98.2 (05 Mar 2018 08:00), Max: 98.2 (05 Mar 2018 08:00)  HR: 68 (05 Mar 2018 08:00) (68 - 74)  BP: 129/75 (05 Mar 2018 08:00) (129/75 - 155/81)  BP(mean): --  RR: 18 (05 Mar 2018 08:00) (18 - 18)  SpO2: 95% (05 Mar 2018 08:00) (94% - 96%)    PHYSICAL EXAM:    General: No Acute Distress     Neurological: Awake, alert oriented to person, place and time, Following Commands, PERRL, EOMI, Face Symmetrical, Speech Fluent, Moving all extremities, Muscle Strength normal in all four extremities, No Drift, Sensation to Light Touch Intact    Pulmonary: Clear to Auscultation, No Rales, No Rhonchi, No Wheezes     Cardiovascular: S1, S2, Regular Rate and Rhythm     Gastrointestinal: Soft, Nontender, Nondistended     Extremities: No calf tenderness     Incision: clean and dry -blisters resolved.     LABS:                        11.9   11.1  )-----------( 204      ( 04 Mar 2018 06:23 )             36.0    03-04    138  |  99  |  17  ----------------------------<  174<H>  4.2   |  25  |  0.73    Ca    9.5      04 Mar 2018 06:21          03-04 @ 07:01  -  03-05 @ 07:00  --------------------------------------------------------  IN: 1640 mL / OUT: 0 mL / NET: 1640 mL      DRAINS:     MEDICATIONS:  Antibiotics:    Neuro:  acetaminophen   Tablet 650 milliGRAM(s) Oral every 6 hours PRN For Temp greater than 38 C (100.4 F)  acetaminophen   Tablet. 650 milliGRAM(s) Oral every 6 hours PRN Mild Pain (1 - 3)  diazepam    Tablet 5 milliGRAM(s) Oral every 8 hours PRN spasms  gabapentin 300 milliGRAM(s) Oral three times a day  meclizine 12.5 milliGRAM(s) Oral two times a day PRN Dizziness  ondansetron Injectable 4 milliGRAM(s) IV Push every 6 hours PRN Nausea  oxyCODONE    5 mG/acetaminophen 325 mG 1 Tablet(s) Oral every 4 hours PRN Moderate Pain (4 - 6)  oxyCODONE    5 mG/acetaminophen 325 mG 2 Tablet(s) Oral every 4 hours PRN Severe Pain (7 - 10)    Cardiac:  carvedilol 25 milliGRAM(s) Oral every 12 hours  losartan 50 milliGRAM(s) Oral two times a day    Pulm:    GI/:  docusate sodium 100 milliGRAM(s) Oral three times a day  senna 2 Tablet(s) Oral at bedtime    Other:   enoxaparin Injectable 40 milliGRAM(s) SubCutaneous at bedtime  sodium chloride 0.9% lock flush 3 milliLiter(s) IV Push every 8 hours    DIET: [] Regular [] CCD [] Renal [] Puree [] Dysphagia [] Tube Feeds: regular     IMAGING: no new imaging

## 2018-03-05 NOTE — PROGRESS NOTE ADULT - ATTENDING COMMENTS
I saw and evaluated the patient. The patient is a 63-year-old female s/p L2-S1 laminectomies and foraminotomies on 2/28/18. Postoperatively, the patient notes improvement in her bilateral lower extremity pain and paresthesias. The patient is currently neurologically stable. Continue mobilization OOB with PT and disposition planning.

## 2018-03-06 VITALS
TEMPERATURE: 98 F | OXYGEN SATURATION: 95 % | HEART RATE: 69 BPM | RESPIRATION RATE: 17 BRPM | SYSTOLIC BLOOD PRESSURE: 138 MMHG | DIASTOLIC BLOOD PRESSURE: 81 MMHG

## 2018-03-06 RX ORDER — LOSARTAN POTASSIUM 100 MG/1
1 TABLET, FILM COATED ORAL
Qty: 0 | Refills: 0 | COMMUNITY

## 2018-03-06 RX ORDER — DOCUSATE SODIUM 100 MG
1 CAPSULE ORAL
Qty: 0 | Refills: 0 | COMMUNITY
Start: 2018-03-06

## 2018-03-06 RX ORDER — DIAZEPAM 5 MG
1 TABLET ORAL
Qty: 15 | Refills: 0 | OUTPATIENT
Start: 2018-03-06 | End: 2018-03-10

## 2018-03-06 RX ORDER — ACETAMINOPHEN 500 MG
2 TABLET ORAL
Qty: 0 | Refills: 0 | COMMUNITY
Start: 2018-03-06

## 2018-03-06 RX ORDER — ASPIRIN/CALCIUM CARB/MAGNESIUM 324 MG
1 TABLET ORAL
Qty: 0 | Refills: 0 | COMMUNITY

## 2018-03-06 RX ORDER — MELOXICAM 15 MG/1
1 TABLET ORAL
Qty: 0 | Refills: 0 | COMMUNITY

## 2018-03-06 RX ORDER — SENNA PLUS 8.6 MG/1
2 TABLET ORAL
Qty: 0 | Refills: 0 | COMMUNITY
Start: 2018-03-06

## 2018-03-06 RX ADMIN — CARVEDILOL PHOSPHATE 25 MILLIGRAM(S): 80 CAPSULE, EXTENDED RELEASE ORAL at 05:32

## 2018-03-06 RX ADMIN — Medication 100 MILLIGRAM(S): at 13:06

## 2018-03-06 RX ADMIN — SODIUM CHLORIDE 3 MILLILITER(S): 9 INJECTION INTRAMUSCULAR; INTRAVENOUS; SUBCUTANEOUS at 05:32

## 2018-03-06 RX ADMIN — LOSARTAN POTASSIUM 50 MILLIGRAM(S): 100 TABLET, FILM COATED ORAL at 09:41

## 2018-03-06 RX ADMIN — SODIUM CHLORIDE 3 MILLILITER(S): 9 INJECTION INTRAMUSCULAR; INTRAVENOUS; SUBCUTANEOUS at 13:06

## 2018-03-06 RX ADMIN — GABAPENTIN 300 MILLIGRAM(S): 400 CAPSULE ORAL at 13:06

## 2018-03-06 RX ADMIN — GABAPENTIN 300 MILLIGRAM(S): 400 CAPSULE ORAL at 05:32

## 2018-03-06 RX ADMIN — Medication 100 MILLIGRAM(S): at 05:32

## 2018-03-06 NOTE — PROGRESS NOTE ADULT - SUBJECTIVE AND OBJECTIVE BOX
SUBJECTIVE: Patient was seen and evaluated at bedside. Patient is resting comfortably in bed and is in no new acute distress.     OVERNIGHT EVENTS:  none     Vital Signs Last 24 Hrs  T(C): 36.5 (06 Mar 2018 04:18), Max: 36.8 (05 Mar 2018 13:15)  T(F): 97.7 (06 Mar 2018 04:18), Max: 98.2 (05 Mar 2018 13:15)  HR: 58 (06 Mar 2018 04:18) (58 - 75)  BP: 150/83 (06 Mar 2018 04:18) (121/73 - 156/75)  BP(mean): --  RR: 18 (06 Mar 2018 04:18) (18 - 18)  SpO2: 98% (06 Mar 2018 04:18) (94% - 98%)    PHYSICAL EXAM:    General: No Acute Distress     Neurological: Awake, alert oriented to person, place and time, Following Commands, PERRL, EOMI, Face Symmetrical, Speech Fluent, Moving all extremities, Muscle Strength normal in all four extremities, No Drift, Sensation to Light Touch Intact    Pulmonary: Clear to Auscultation, No Rales, No Rhonchi, No Wheezes     Cardiovascular: S1, S2, Regular Rate and Rhythm     Gastrointestinal: Soft, Nontender, Nondistended     Extremities: No calf tenderness     Incision:     LABS:             03-05 @ 07:01  -  03-06 @ 07:00  --------------------------------------------------------  IN: 1310 mL / OUT: 250 mL / NET: 1060 mL      DRAINS:     MEDICATIONS:  Antibiotics:    Neuro:  acetaminophen   Tablet 650 milliGRAM(s) Oral every 6 hours PRN For Temp greater than 38 C (100.4 F)  acetaminophen   Tablet. 650 milliGRAM(s) Oral every 6 hours PRN Mild Pain (1 - 3)  diazepam    Tablet 5 milliGRAM(s) Oral every 8 hours PRN spasms  gabapentin 300 milliGRAM(s) Oral three times a day  meclizine 12.5 milliGRAM(s) Oral two times a day PRN Dizziness  ondansetron Injectable 4 milliGRAM(s) IV Push every 6 hours PRN Nausea  oxyCODONE    5 mG/acetaminophen 325 mG 1 Tablet(s) Oral every 4 hours PRN Moderate Pain (4 - 6)  oxyCODONE    5 mG/acetaminophen 325 mG 2 Tablet(s) Oral every 4 hours PRN Severe Pain (7 - 10)    Cardiac:  carvedilol 25 milliGRAM(s) Oral every 12 hours  losartan 50 milliGRAM(s) Oral two times a day    Pulm:    GI/:  docusate sodium 100 milliGRAM(s) Oral three times a day  senna 2 Tablet(s) Oral at bedtime    Other:   enoxaparin Injectable 40 milliGRAM(s) SubCutaneous at bedtime  sodium chloride 0.9% lock flush 3 milliLiter(s) IV Push every 8 hours    DIET: [] Regular [] CCD [] Renal [] Puree [] Dysphagia [] Tube Feeds: regular     IMAGING: no new imaging

## 2018-03-06 NOTE — PROGRESS NOTE ADULT - PROBLEM SELECTOR PLAN 1
s/p laminectomy  1 Out of bed   2 continue PT  3 PRN pain meds   4 dvt ppx sql scds   5 continue valium for spasm   6 continue gabapentin   7 continue meclizine   8 dispo dc home today

## 2018-03-12 ENCOUNTER — APPOINTMENT (OUTPATIENT)
Dept: SPINE | Facility: CLINIC | Age: 63
End: 2018-03-12
Payer: MEDICARE

## 2018-03-12 VITALS
SYSTOLIC BLOOD PRESSURE: 110 MMHG | TEMPERATURE: 97.8 F | HEIGHT: 63 IN | DIASTOLIC BLOOD PRESSURE: 70 MMHG | BODY MASS INDEX: 38.62 KG/M2 | WEIGHT: 218 LBS

## 2018-03-12 PROCEDURE — 99024 POSTOP FOLLOW-UP VISIT: CPT

## 2018-03-12 RX ORDER — TRAMADOL HYDROCHLORIDE 50 MG/1
50 TABLET, COATED ORAL 4 TIMES DAILY
Refills: 0 | Status: DISCONTINUED | COMMUNITY
Start: 2017-12-06 | End: 2018-03-12

## 2018-03-15 ENCOUNTER — INPATIENT (INPATIENT)
Facility: HOSPITAL | Age: 63
LOS: 4 days | Discharge: HOME HEALTH | DRG: 857 | End: 2018-03-20
Attending: NEUROLOGICAL SURGERY | Admitting: NEUROLOGICAL SURGERY
Payer: MEDICAID

## 2018-03-15 ENCOUNTER — TRANSCRIPTION ENCOUNTER (OUTPATIENT)
Age: 63
End: 2018-03-15

## 2018-03-15 ENCOUNTER — OTHER (OUTPATIENT)
Age: 63
End: 2018-03-15

## 2018-03-15 VITALS
RESPIRATION RATE: 17 BRPM | SYSTOLIC BLOOD PRESSURE: 117 MMHG | OXYGEN SATURATION: 97 % | TEMPERATURE: 99 F | HEART RATE: 79 BPM | DIASTOLIC BLOOD PRESSURE: 69 MMHG

## 2018-03-15 DIAGNOSIS — E66.9 OBESITY, UNSPECIFIED: ICD-10-CM

## 2018-03-15 DIAGNOSIS — Z79.82 LONG TERM (CURRENT) USE OF ASPIRIN: ICD-10-CM

## 2018-03-15 DIAGNOSIS — B96.1 KLEBSIELLA PNEUMONIAE [K. PNEUMONIAE] AS THE CAUSE OF DISEASES CLASSIFIED ELSEWHERE: ICD-10-CM
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
## 2018-03-15 DIAGNOSIS — G56.03 CARPAL TUNNEL SYNDROME, BILATERAL UPPER LIMBS: Chronic | ICD-10-CM

## 2018-03-15 DIAGNOSIS — Z79.899 OTHER LONG TERM (CURRENT) DRUG THERAPY: ICD-10-CM

## 2018-03-15 DIAGNOSIS — R42 DIZZINESS AND GIDDINESS: ICD-10-CM

## 2018-03-15 DIAGNOSIS — Z98.49 CATARACT EXTRACTION STATUS, UNSPECIFIED EYE: Chronic | ICD-10-CM

## 2018-03-15 DIAGNOSIS — T81.4XXA INFECTION FOLLOWING A PROCEDURE, INITIAL ENCOUNTER: ICD-10-CM
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
## 2018-03-15 DIAGNOSIS — I10 ESSENTIAL (PRIMARY) HYPERTENSION: ICD-10-CM

## 2018-03-15 DIAGNOSIS — T81.31XA DISRUPTION OF EXTERNAL OPERATION (SURGICAL) WOUND, NOT ELSEWHERE CLASSIFIED, INITIAL ENCOUNTER: ICD-10-CM

## 2018-03-15 DIAGNOSIS — B95.2 ENTEROCOCCUS AS THE CAUSE OF DISEASES CLASSIFIED ELSEWHERE: ICD-10-CM

## 2018-03-15 LAB
ALBUMIN SERPL ELPH-MCNC: 3.9 G/DL — SIGNIFICANT CHANGE UP (ref 3.3–5)
ALP SERPL-CCNC: 64 U/L — SIGNIFICANT CHANGE UP (ref 40–120)
ALT FLD-CCNC: 13 U/L RC — SIGNIFICANT CHANGE UP (ref 10–45)
ANION GAP SERPL CALC-SCNC: 13 MMOL/L — SIGNIFICANT CHANGE UP (ref 5–17)
APTT BLD: 22.9 SEC — LOW (ref 27.5–37.4)
AST SERPL-CCNC: 14 U/L — SIGNIFICANT CHANGE UP (ref 10–40)
BASOPHILS # BLD AUTO: 0 K/UL — SIGNIFICANT CHANGE UP (ref 0–0.2)
BASOPHILS NFR BLD AUTO: 0.5 % — SIGNIFICANT CHANGE UP (ref 0–2)
BILIRUB SERPL-MCNC: 0.3 MG/DL — SIGNIFICANT CHANGE UP (ref 0.2–1.2)
BLD GP AB SCN SERPL QL: NEGATIVE — SIGNIFICANT CHANGE UP
BUN SERPL-MCNC: 18 MG/DL — SIGNIFICANT CHANGE UP (ref 7–23)
CALCIUM SERPL-MCNC: 9.7 MG/DL — SIGNIFICANT CHANGE UP (ref 8.4–10.5)
CHLORIDE SERPL-SCNC: 99 MMOL/L — SIGNIFICANT CHANGE UP (ref 96–108)
CO2 SERPL-SCNC: 28 MMOL/L — SIGNIFICANT CHANGE UP (ref 22–31)
CREAT SERPL-MCNC: 0.86 MG/DL — SIGNIFICANT CHANGE UP (ref 0.5–1.3)
EOSINOPHIL # BLD AUTO: 0.1 K/UL — SIGNIFICANT CHANGE UP (ref 0–0.5)
EOSINOPHIL NFR BLD AUTO: 1.4 % — SIGNIFICANT CHANGE UP (ref 0–6)
GLUCOSE SERPL-MCNC: 129 MG/DL — HIGH (ref 70–99)
HCT VFR BLD CALC: 40.3 % — SIGNIFICANT CHANGE UP (ref 34.5–45)
HGB BLD-MCNC: 13.8 G/DL — SIGNIFICANT CHANGE UP (ref 11.5–15.5)
INR BLD: 1.02 RATIO — SIGNIFICANT CHANGE UP (ref 0.88–1.16)
LYMPHOCYTES # BLD AUTO: 2.6 K/UL — SIGNIFICANT CHANGE UP (ref 1–3.3)
LYMPHOCYTES # BLD AUTO: 30.7 % — SIGNIFICANT CHANGE UP (ref 13–44)
MCHC RBC-ENTMCNC: 32.1 PG — SIGNIFICANT CHANGE UP (ref 27–34)
MCHC RBC-ENTMCNC: 34.3 GM/DL — SIGNIFICANT CHANGE UP (ref 32–36)
MCV RBC AUTO: 93.7 FL — SIGNIFICANT CHANGE UP (ref 80–100)
MONOCYTES # BLD AUTO: 0.7 K/UL — SIGNIFICANT CHANGE UP (ref 0–0.9)
MONOCYTES NFR BLD AUTO: 8.5 % — SIGNIFICANT CHANGE UP (ref 2–14)
NEUTROPHILS # BLD AUTO: 5 K/UL — SIGNIFICANT CHANGE UP (ref 1.8–7.4)
NEUTROPHILS NFR BLD AUTO: 58.8 % — SIGNIFICANT CHANGE UP (ref 43–77)
PLATELET # BLD AUTO: 254 K/UL — SIGNIFICANT CHANGE UP (ref 150–400)
POTASSIUM SERPL-MCNC: 4.1 MMOL/L — SIGNIFICANT CHANGE UP (ref 3.5–5.3)
POTASSIUM SERPL-SCNC: 4.1 MMOL/L — SIGNIFICANT CHANGE UP (ref 3.5–5.3)
PROT SERPL-MCNC: 8 G/DL — SIGNIFICANT CHANGE UP (ref 6–8.3)
PROTHROM AB SERPL-ACNC: 11 SEC — SIGNIFICANT CHANGE UP (ref 9.8–12.7)
RBC # BLD: 4.3 M/UL — SIGNIFICANT CHANGE UP (ref 3.8–5.2)
RBC # FLD: 12.6 % — SIGNIFICANT CHANGE UP (ref 10.3–14.5)
RH IG SCN BLD-IMP: POSITIVE — SIGNIFICANT CHANGE UP
SODIUM SERPL-SCNC: 140 MMOL/L — SIGNIFICANT CHANGE UP (ref 135–145)
WBC # BLD: 8.6 K/UL — SIGNIFICANT CHANGE UP (ref 3.8–10.5)
WBC # FLD AUTO: 8.6 K/UL — SIGNIFICANT CHANGE UP (ref 3.8–10.5)

## 2018-03-15 PROCEDURE — 99223 1ST HOSP IP/OBS HIGH 75: CPT

## 2018-03-15 PROCEDURE — 93010 ELECTROCARDIOGRAM REPORT: CPT

## 2018-03-15 PROCEDURE — 99285 EMERGENCY DEPT VISIT HI MDM: CPT

## 2018-03-15 RX ORDER — GABAPENTIN 400 MG/1
300 CAPSULE ORAL THREE TIMES A DAY
Qty: 0 | Refills: 0 | Status: DISCONTINUED | OUTPATIENT
Start: 2018-03-15 | End: 2018-03-16

## 2018-03-15 RX ORDER — ONDANSETRON 8 MG/1
4 TABLET, FILM COATED ORAL EVERY 6 HOURS
Qty: 0 | Refills: 0 | Status: DISCONTINUED | OUTPATIENT
Start: 2018-03-15 | End: 2018-03-16

## 2018-03-15 RX ORDER — DEXTROSE MONOHYDRATE, SODIUM CHLORIDE, AND POTASSIUM CHLORIDE 50; .745; 4.5 G/1000ML; G/1000ML; G/1000ML
1000 INJECTION, SOLUTION INTRAVENOUS
Qty: 0 | Refills: 0 | Status: DISCONTINUED | OUTPATIENT
Start: 2018-03-15 | End: 2018-03-16

## 2018-03-15 RX ORDER — SENNA PLUS 8.6 MG/1
2 TABLET ORAL AT BEDTIME
Qty: 0 | Refills: 0 | Status: DISCONTINUED | OUTPATIENT
Start: 2018-03-15 | End: 2018-03-16

## 2018-03-15 RX ORDER — OXYCODONE HYDROCHLORIDE 5 MG/1
10 TABLET ORAL
Qty: 0 | Refills: 0 | Status: DISCONTINUED | OUTPATIENT
Start: 2018-03-15 | End: 2018-03-16

## 2018-03-15 RX ORDER — MECLIZINE HCL 12.5 MG
12.5 TABLET ORAL
Qty: 0 | Refills: 0 | Status: DISCONTINUED | OUTPATIENT
Start: 2018-03-15 | End: 2018-03-16

## 2018-03-15 RX ORDER — ACETAMINOPHEN 500 MG
650 TABLET ORAL EVERY 6 HOURS
Qty: 0 | Refills: 0 | Status: DISCONTINUED | OUTPATIENT
Start: 2018-03-15 | End: 2018-03-16

## 2018-03-15 RX ORDER — DOCUSATE SODIUM 100 MG
100 CAPSULE ORAL THREE TIMES A DAY
Qty: 0 | Refills: 0 | Status: DISCONTINUED | OUTPATIENT
Start: 2018-03-15 | End: 2018-03-16

## 2018-03-15 RX ORDER — CARVEDILOL PHOSPHATE 80 MG/1
25 CAPSULE, EXTENDED RELEASE ORAL EVERY 12 HOURS
Qty: 0 | Refills: 0 | Status: DISCONTINUED | OUTPATIENT
Start: 2018-03-15 | End: 2018-03-16

## 2018-03-15 RX ADMIN — CARVEDILOL PHOSPHATE 25 MILLIGRAM(S): 80 CAPSULE, EXTENDED RELEASE ORAL at 23:10

## 2018-03-15 RX ADMIN — Medication 100 MILLIGRAM(S): at 23:10

## 2018-03-15 RX ADMIN — SENNA PLUS 2 TABLET(S): 8.6 TABLET ORAL at 23:11

## 2018-03-15 RX ADMIN — Medication 650 MILLIGRAM(S): at 19:58

## 2018-03-15 RX ADMIN — GABAPENTIN 300 MILLIGRAM(S): 400 CAPSULE ORAL at 23:10

## 2018-03-15 NOTE — H&P ADULT - ASSESSMENT
63F s/p L2-S1 laminectomy POD15 with wound infection    -Admit to neurosurgery floor  -Hold Antibiotics at this time- will send cultures in OR  -Pre-op for wound washout in AM  -Medical clearance  -Neurochecks q4h  -Plastics evaluation

## 2018-03-15 NOTE — CONSULT NOTE ADULT - PROBLEM SELECTOR PROBLEM 2
Post op infection Preoperative evaluation of a medical condition to rule out surgical contraindications (TAR required)

## 2018-03-15 NOTE — ED PROVIDER NOTE - PROGRESS NOTE DETAILS
Radha Dowling DO: Neurosurgery consulted - will see patient. Tia ALVAREZ: Neurosurgery saw patient. There is concern for infection but no Abx recommended at this time. Requesting pre-op labs and admission.

## 2018-03-15 NOTE — CONSULT NOTE ADULT - ASSESSMENT
62 yo  female, PMH of HTN, Obesity, vertigo s/p L2-S1 laminectomy on 2/28/18, presents with wound dehiscence.

## 2018-03-15 NOTE — CONSULT NOTE ADULT - SUBJECTIVE AND OBJECTIVE BOX
64 yo  female, PMH of HTN, Obesity, vertigo s/p L2-S1 laminectomy on 2/28/18, presents with wound dehiscence.  Patient was found to have erythema and tenderness at the incision site last week.  She was seen at the office for stable removal and was prescribed amoxicillin.  However, she noticed worsening of erythema and tenderness along with purulent discharge.  She had subjective fever, no chills.  Denies nausea, vomiting.  Denies numbness, tingling, anesthesia, incontinence.  She is sent here for further evaluation, and now planned for wound wash out on 3/16/18.      Patient at baseline, denies any chest pain, anginal symptoms, has no prior arrhythmias, no known valvular disease or heart failure.  She is planned for intermediate risk procedure.  She is able to climb flight of stairs without symptoms and able to walk 1-2 blocks without symptoms.  EKG is normal sinus rhythm, has no hx of DM, CKD or CVA. History obtained from Daughter (Felicita) by bedside    64 yo  female, PMH of HTN, Obesity, vertigo s/p L2-S1 laminectomy on 2/28/18, presents with wound dehiscence.  Patient was found to have erythema and tenderness at the incision site last week.  She was seen at the office for stable removal and was prescribed cephalexin on Monday.  However, she noticed worsening of erythema and tenderness along with purulent discharge.  Visiting nurse called the doctor today, who recommended coming to hospital.  She had subjective fever, no chills.  Denies nausea, vomiting.  Denies numbness, tingling, anesthesia, incontinence.  She is sent here for further evaluation, and now planned for wound wash out on 3/16/18.      Patient is s/p L2-S1 laminectomies and foraminotomies on 2/28/18.  She tolerated anesthesia well.  Discharged with walker after 1 week hospital stay.  Prior to surgery, she was able to perform moderate activities without any symptoms.  She had no chest pain, dyspnea.  No prior history of arrhythmias or heart failure.       ROS  CONSTITUTIONAL: No weakness; subjective fever, no chills  EYES/ENT: No visual changes;  No dysphagia  NECK: No pain or stiffness  RESPIRATORY: No cough, wheezing, hemoptysis; No shortness of breath  CARDIOVASCULAR: No chest pain or palpitations; No lower extremity edema  EXTREMITIES: no le edema, cyanosis, clubbing  MUSCULOSKELETAL: back pain radiates to lower extremities (improved from before)  GASTROINTESTINAL: No abdominal or epigastric pain. No nausea, vomiting, or hematemesis; No diarrhea or constipation. No melena or hematochezia.  BACK: No back pain  GENITOURINARY: No dysuria, frequency or hematuria  NEUROLOGICAL: No numbness or weakness  SKIN: +wound dehiscence  PSYCH: no agitation  All other review of systems is negative unless indicated above.      PMH: HTN, Obesity, Vertigo, s/p laminectomy, kidney stone, headache  PSH: Carpel tunnel syndrome, cataract, kidney stone, laminectomy    Medicine: Docusate 100mg tid, diazepam 5mg q8h PRN, oxycodone-acetaminophen 5/325 q4h PRN, meclizine 12.5mg bid, losratan 50mg bid, carvedilolol 25mg bid, gabapentin 300mg tid, cephalexin, florastor    Social history: denies tobacco use, denies alcohol use, , lives with  and son, walks with walker post op  FH: mother with history of heart disease    PHYSICAL EXAM:  Vital Signs Last 24 Hrs  T(C): 36.7 (03-15-18 @ 19:51)  T(F): 98 (03-15-18 @ 19:51), Max: 98.9 (03-15-18 @ 19:51)  HR: 81 (03-15-18 @ 19:51) (79 - 83)  BP: 126/76 (03-15-18 @ 19:51)  BP(mean): --  RR: 18 (03-15-18 @ 19:51) (17 - 18)  SpO2: 98% (03-15-18 @ 19:51) (97% - 98%)  Wt(kg): --    Constitutional: NAD, awake and alert  EYES: EOMI  ENT:  Normal Hearing, no tonsillar exudates   Neck: Soft and supple, No JVD  Lungs: Breath sounds are clear bilaterally, No wheezing, rales or rhonchi  Heart: S1 and S2, regular rate and rhythm, no Murmurs, gallops or rubs  Abdomen: Bowel Sounds present, soft, nontender, nondistended, no guarding, no rebound  Extremities: No cyanosis or clubbing; warm to touch  Vascular: 2+ peripheral pulses lower ex  Neurological: A/O x 3, no focal deficits  Musculoskeletal: 5/5 strength b/l upper and lower extremities  Skin: +open wound, with purulent discharge, slight erythema surrounding  Psych: no depression or anhedonia  HEME: no bruises, no nose bleeds    EKG ordered    Reviewed previous documentation  XR spine 2/28/18  posterior localizer noted at level of superior endplate of L4, no compression fracture or subluxation, multilevel marginal osteophytes, severe disc space narrowing L3-L4 and L5-S1 with vacuum phenomenon from L3-4 and L5-S1 History obtained from Daughter (Felicita) by bedside    64 yo  female, PMH of HTN, Obesity, vertigo s/p L2-S1 laminectomy on 2/28/18, presents with wound dehiscence.  Patient was found to have erythema and tenderness at the incision site last week.  She was seen at the office for stable removal and was prescribed cephalexin on Monday.  However, she noticed worsening of erythema and tenderness along with purulent discharge.  Visiting nurse called the doctor today, who recommended coming to hospital.  She had subjective fever, no chills.  Denies nausea, vomiting.  Denies numbness, tingling, anesthesia, incontinence.  She is sent here for further evaluation, and now planned for wound wash out on 3/16/18.      Patient is s/p L2-S1 laminectomies and foraminotomies on 2/28/18.  She tolerated anesthesia well.  Discharged with walker after 1 week hospital stay.  Prior to surgery, she was able to perform moderate activities without any symptoms.  She had no chest pain, dyspnea.  No prior history of arrhythmias or heart failure.       ROS  CONSTITUTIONAL: No weakness; subjective fever, no chills  EYES/ENT: No visual changes;  No dysphagia  NECK: No pain or stiffness  RESPIRATORY: No cough, wheezing, hemoptysis; No shortness of breath  CARDIOVASCULAR: No chest pain or palpitations; No lower extremity edema  EXTREMITIES: no le edema, cyanosis, clubbing  MUSCULOSKELETAL: back pain radiates to lower extremities (improved from before)  GASTROINTESTINAL: No abdominal or epigastric pain. No nausea, vomiting, or hematemesis; No diarrhea or constipation. No melena or hematochezia.  BACK: + back pain radiates to LE (improved)  GENITOURINARY: No dysuria, frequency or hematuria  NEUROLOGICAL: No numbness or weakness  SKIN: +wound dehiscence  PSYCH: no agitation  All other review of systems is negative unless indicated above.      PMH: HTN, Obesity, Vertigo, s/p laminectomy, kidney stone, headache  PSH: Carpel tunnel syndrome, cataract, kidney stone, laminectomy    Medicine: Docusate 100mg tid, diazepam 5mg q8h PRN, oxycodone-acetaminophen 5/325 q4h PRN, meclizine 12.5mg bid, losratan 50mg bid, carvedilolol 25mg bid, gabapentin 300mg tid, cephalexin, florastor    Social history: denies tobacco use, denies alcohol use, , lives with  and son, walks with walker post op  FH: mother with history of heart disease    PHYSICAL EXAM:  Vital Signs Last 24 Hrs  T(C): 36.7 (03-15-18 @ 19:51)  T(F): 98 (03-15-18 @ 19:51), Max: 98.9 (03-15-18 @ 19:51)  HR: 81 (03-15-18 @ 19:51) (79 - 83)  BP: 126/76 (03-15-18 @ 19:51)  BP(mean): --  RR: 18 (03-15-18 @ 19:51) (17 - 18)  SpO2: 98% (03-15-18 @ 19:51) (97% - 98%)  Wt(kg): --    Constitutional: NAD, awake and alert  EYES: EOMI  ENT:  Normal Hearing, no tonsillar exudates   Neck: Soft and supple, No JVD  Lungs: Breath sounds are clear bilaterally, No wheezing, rales or rhonchi  Heart: S1 and S2, regular rate and rhythm, no Murmurs, gallops or rubs  Abdomen: Bowel Sounds present, soft, nontender, nondistended, no guarding, no rebound  Extremities: No cyanosis or clubbing; warm to touch  Vascular: 2+ peripheral pulses lower ex  Neurological: A/O x 3, no focal deficits  Musculoskeletal: 5/5 strength b/l upper and lower extremities  Skin: +open wound, with purulent discharge, slight erythema surrounding  Psych: no depression or anhedonia  HEME: no bruises, no nose bleeds    EKG ordered    Reviewed previous documentation  XR spine 2/28/18  posterior localizer noted at level of superior endplate of L4, no compression fracture or subluxation, multilevel marginal osteophytes, severe disc space narrowing L3-L4 and L5-S1 with vacuum phenomenon from L3-4 and L5-S1

## 2018-03-15 NOTE — ED ADULT NURSE NOTE - OBJECTIVE STATEMENT
pt c/o erythema and pain to post surgical wound site to lumbar back.  pt has laminectomy on 2/28/2018.  pt states that she pt c/o erythema and pain to post surgical wound site to lumbar back associated with fever that has since resolved.  pt has laminectomy on 2/28/2018.  pt states that she went to have the site assessed by neurosurgeon who took out the staples. pt came because she still was uncomfortable.  pt is awake, alert and responsive to all stimuili.  no sob or respiratory distress noted.  no neurological deficits noted.  pt resting in bed awaiting admitting bed.  will continue to monitor.

## 2018-03-15 NOTE — H&P ADULT - NSHPPHYSICALEXAM_GEN_ALL_CORE
PHYSICAL EXAM:    Constitutional: No Acute Distress     Neurological: AOx3, Following Commands    Motor exam:          Upper extremity                         Delt     Bicep     Tricep    HG                                                 R         5/5 5/5 5/5 5/5                                               L          5/5 5/5 5/5 5/5          Lower extremity                        HF         KF        KE       DF         PF                                                  R        5/5 5/5 5/5 5/5 5/5                                               L         5/5 5/5 5/5 5/5 5/5                                                 Sensation: [x] intact to light touch  [] decreased:     No clonus    Incision: superficial dehiscence, purulent material noted, erythema, slightly tender

## 2018-03-15 NOTE — ED PROVIDER NOTE - OBJECTIVE STATEMENT
Radha Dowling, DO: 62 y/o Wolof speaking female with PMHx HTN and chronic back x 4yrs s/p L2-S1 lumbar laminectomy/foraminotomies on 2/28/2018 c/b POD #1 had fever, with negative workup and no further fevers here for Radha Dowling, DO: 62 y/o Bengali speaking female with PMHx HTN and chronic back x 4yrs s/p L2-S1 lumbar laminectomy/foraminotomies on 2/28/2018 c/b POD #1 had fever, with negative workup and no further fevers here for surgical scar erythema & concern for pustular discharge. Pt had fever on 4 days ago & was evaluated by her PMD at that time who removed staples. No fevers since. Denies nausea/vomiting, urinary symptoms, SOB. No motor weakness or new sensory deficits. No bowel or bladder dysfunction. Ambulatory with walker at baseline.

## 2018-03-15 NOTE — H&P ADULT - HISTORY OF PRESENT ILLNESS
63F with HTN, obesity, vertigo s/p L2-S1 laminectomy 2/28 p/w wound dehiscence. Patient noticed erythema and tenderness at incision beginning early this week and she was seen in the office for staple removal. At this time, patient was prescribed Amoxicillin however noticed purulent drainage from incision along with fevers. She was advised to come to ER for evaluation today. She denies weakness, numbness, tingling, saddle anesthesia, incontinence.

## 2018-03-15 NOTE — ED ADULT NURSE REASSESSMENT NOTE - NS ED NURSE REASSESS COMMENT FT1
Patient awake and alert with daughter at bedside.  Patient endorses some pain in the back, but is in no acute distress and safety ensured.  Patient updated on plan of care.

## 2018-03-15 NOTE — ED PROVIDER NOTE - MEDICAL DECISION MAKING DETAILS
Radha Dowling, DO: 63F with resolved infectious symptoms and well healing surgical scar. Denies calf pain or SOB/respiratory symptoms concerning for thromboembolism. Neurosurgical c/s for surgical scar evaluation. Dispo. Radha Dowling, DO: 63F with resolved infectious symptoms with purulence vs. fibrinous healing. Denies calf pain or SOB/respiratory symptoms concerning for thromboembolism. Neurosurgical c/s for surgical scar evaluation with pre-op labs for possible debridement.

## 2018-03-15 NOTE — CONSULT NOTE ADULT - PROBLEM SELECTOR PROBLEM 1
Preoperative evaluation of a medical condition to rule out surgical contraindications (TAR required) Post op infection

## 2018-03-15 NOTE — CONSULT NOTE ADULT - PROBLEM SELECTOR RECOMMENDATION 2
Plan for wash out  NPO after midnight  no abx as per primary team  c/w with pain control  anticoagulation per primary team Patient is planned for wound wash out.  Patient at baseline, denies any chest pain, anginal symptoms, has no prior arrhythmias, no known valvular disease or heart failure.  She is able to climb flight of stairs without symptoms and able to walk 1-2 blocks without symptoms, prior to the recent surgery.  EKG is not available at this time; EKG ordered STAT  If EKG shows normal sinus rhythm, without arrhythmia, without pathologic Qwaves, no further cardiac work up is necessary.  She also has no hx of DM, CKD or CVA.  Her RCRI score 0 with 0.4% for major cardiac risk.  She is now with acceptable risk for planned intermediate risk procedure.

## 2018-03-15 NOTE — CONSULT NOTE ADULT - PROBLEM SELECTOR RECOMMENDATION 9
Patient is planned for wound wash out.  Patient at baseline, denies any chest pain, anginal symptoms, has no prior arrhythmias, no known valvular disease or heart failure.  She is able to climb flight of stairs without symptoms and able to walk 1-2 blocks without symptoms.  EKG is not available at this time; EKG ordered STAT  If EKG shows normal sinus rhythm, without arrhythmia, without pathologic Qwaves, no further cardiac work up is necessary.  She also has no hx of DM, CKD or CVA.  Her RCRI score 0 with 0.4% for major cardiac risk.  She is now with acceptable risk for planned intermediate risk procedure. Patient is planned for wound wash out.  Patient at baseline, denies any chest pain, anginal symptoms, has no prior arrhythmias, no known valvular disease or heart failure.  She is able to climb flight of stairs without symptoms and able to walk 1-2 blocks without symptoms, prior to the recent surgery.  EKG is not available at this time; EKG ordered STAT  If EKG shows normal sinus rhythm, without arrhythmia, without pathologic Qwaves, no further cardiac work up is necessary.  She also has no hx of DM, CKD or CVA.  Her RCRI score 0 with 0.4% for major cardiac risk.  She is now with acceptable risk for planned intermediate risk procedure. Plan for wash out  NPO after midnight  no abx as per primary team  c/w with pain control  anticoagulation per primary team

## 2018-03-15 NOTE — CONSULT NOTE ADULT - PROBLEM SELECTOR RECOMMENDATION 3
c/w home regimen carvedilol and losartan c/w home regimen carvedilol and losartan until today;  can possibly hold losartan on day of surgery

## 2018-03-16 DIAGNOSIS — T81.30XA DISRUPTION OF WOUND, UNSPECIFIED, INITIAL ENCOUNTER: ICD-10-CM

## 2018-03-16 DIAGNOSIS — R42 DIZZINESS AND GIDDINESS: ICD-10-CM

## 2018-03-16 DIAGNOSIS — I10 ESSENTIAL (PRIMARY) HYPERTENSION: ICD-10-CM

## 2018-03-16 LAB — RH IG SCN BLD-IMP: POSITIVE — SIGNIFICANT CHANGE UP

## 2018-03-16 PROCEDURE — 14302 TIS TRNFR ADDL 30 SQ CM: CPT | Mod: 80,78

## 2018-03-16 PROCEDURE — 99232 SBSQ HOSP IP/OBS MODERATE 35: CPT

## 2018-03-16 PROCEDURE — 14301 TIS TRNFR ANY 30.1-60 SQ CM: CPT | Mod: 80,78

## 2018-03-16 RX ORDER — ACETAMINOPHEN 500 MG
1000 TABLET ORAL ONCE
Qty: 0 | Refills: 0 | Status: COMPLETED | OUTPATIENT
Start: 2018-03-16 | End: 2018-03-16

## 2018-03-16 RX ORDER — OXYCODONE HYDROCHLORIDE 5 MG/1
10 TABLET ORAL
Qty: 0 | Refills: 0 | Status: DISCONTINUED | OUTPATIENT
Start: 2018-03-16 | End: 2018-03-20

## 2018-03-16 RX ORDER — ACETAMINOPHEN 500 MG
650 TABLET ORAL EVERY 6 HOURS
Qty: 0 | Refills: 0 | Status: DISCONTINUED | OUTPATIENT
Start: 2018-03-16 | End: 2018-03-20

## 2018-03-16 RX ORDER — SENNA PLUS 8.6 MG/1
2 TABLET ORAL AT BEDTIME
Qty: 0 | Refills: 0 | Status: DISCONTINUED | OUTPATIENT
Start: 2018-03-16 | End: 2018-03-20

## 2018-03-16 RX ORDER — VANCOMYCIN HCL 1 G
1500 VIAL (EA) INTRAVENOUS ONCE
Qty: 0 | Refills: 0 | Status: COMPLETED | OUTPATIENT
Start: 2018-03-16 | End: 2018-03-16

## 2018-03-16 RX ORDER — MECLIZINE HCL 12.5 MG
12.5 TABLET ORAL
Qty: 0 | Refills: 0 | Status: DISCONTINUED | OUTPATIENT
Start: 2018-03-16 | End: 2018-03-20

## 2018-03-16 RX ORDER — GABAPENTIN 400 MG/1
300 CAPSULE ORAL THREE TIMES A DAY
Qty: 0 | Refills: 0 | Status: DISCONTINUED | OUTPATIENT
Start: 2018-03-16 | End: 2018-03-20

## 2018-03-16 RX ORDER — DOCUSATE SODIUM 100 MG
100 CAPSULE ORAL THREE TIMES A DAY
Qty: 0 | Refills: 0 | Status: DISCONTINUED | OUTPATIENT
Start: 2018-03-16 | End: 2018-03-20

## 2018-03-16 RX ORDER — ONDANSETRON 8 MG/1
4 TABLET, FILM COATED ORAL EVERY 6 HOURS
Qty: 0 | Refills: 0 | Status: DISCONTINUED | OUTPATIENT
Start: 2018-03-16 | End: 2018-03-20

## 2018-03-16 RX ORDER — DEXTROSE MONOHYDRATE, SODIUM CHLORIDE, AND POTASSIUM CHLORIDE 50; .745; 4.5 G/1000ML; G/1000ML; G/1000ML
1000 INJECTION, SOLUTION INTRAVENOUS
Qty: 0 | Refills: 0 | Status: DISCONTINUED | OUTPATIENT
Start: 2018-03-16 | End: 2018-03-17

## 2018-03-16 RX ORDER — BENZOCAINE AND MENTHOL 5; 1 G/100ML; G/100ML
1 LIQUID ORAL
Qty: 0 | Refills: 0 | Status: DISCONTINUED | OUTPATIENT
Start: 2018-03-16 | End: 2018-03-19

## 2018-03-16 RX ORDER — OXYCODONE HYDROCHLORIDE 5 MG/1
5 TABLET ORAL EVERY 4 HOURS
Qty: 0 | Refills: 0 | Status: DISCONTINUED | OUTPATIENT
Start: 2018-03-16 | End: 2018-03-16

## 2018-03-16 RX ORDER — OXYCODONE HYDROCHLORIDE 5 MG/1
5 TABLET ORAL EVERY 4 HOURS
Qty: 0 | Refills: 0 | Status: DISCONTINUED | OUTPATIENT
Start: 2018-03-16 | End: 2018-03-20

## 2018-03-16 RX ORDER — CARVEDILOL PHOSPHATE 80 MG/1
25 CAPSULE, EXTENDED RELEASE ORAL EVERY 12 HOURS
Qty: 0 | Refills: 0 | Status: DISCONTINUED | OUTPATIENT
Start: 2018-03-16 | End: 2018-03-20

## 2018-03-16 RX ORDER — ENOXAPARIN SODIUM 100 MG/ML
40 INJECTION SUBCUTANEOUS AT BEDTIME
Qty: 0 | Refills: 0 | Status: DISCONTINUED | OUTPATIENT
Start: 2018-03-17 | End: 2018-03-20

## 2018-03-16 RX ORDER — HYDROMORPHONE HYDROCHLORIDE 2 MG/ML
0.5 INJECTION INTRAMUSCULAR; INTRAVENOUS; SUBCUTANEOUS
Qty: 0 | Refills: 0 | Status: DISCONTINUED | OUTPATIENT
Start: 2018-03-16 | End: 2018-03-16

## 2018-03-16 RX ADMIN — OXYCODONE HYDROCHLORIDE 10 MILLIGRAM(S): 5 TABLET ORAL at 20:09

## 2018-03-16 RX ADMIN — CARVEDILOL PHOSPHATE 25 MILLIGRAM(S): 80 CAPSULE, EXTENDED RELEASE ORAL at 17:32

## 2018-03-16 RX ADMIN — CARVEDILOL PHOSPHATE 25 MILLIGRAM(S): 80 CAPSULE, EXTENDED RELEASE ORAL at 05:14

## 2018-03-16 RX ADMIN — Medication 1000 MILLIGRAM(S): at 19:40

## 2018-03-16 RX ADMIN — HYDROMORPHONE HYDROCHLORIDE 0.5 MILLIGRAM(S): 2 INJECTION INTRAMUSCULAR; INTRAVENOUS; SUBCUTANEOUS at 13:10

## 2018-03-16 RX ADMIN — HYDROMORPHONE HYDROCHLORIDE 0.5 MILLIGRAM(S): 2 INJECTION INTRAMUSCULAR; INTRAVENOUS; SUBCUTANEOUS at 12:55

## 2018-03-16 RX ADMIN — GABAPENTIN 300 MILLIGRAM(S): 400 CAPSULE ORAL at 05:14

## 2018-03-16 RX ADMIN — Medication 1000 MILLIGRAM(S): at 22:48

## 2018-03-16 RX ADMIN — HYDROMORPHONE HYDROCHLORIDE 0.5 MILLIGRAM(S): 2 INJECTION INTRAMUSCULAR; INTRAVENOUS; SUBCUTANEOUS at 12:37

## 2018-03-16 RX ADMIN — OXYCODONE HYDROCHLORIDE 5 MILLIGRAM(S): 5 TABLET ORAL at 05:33

## 2018-03-16 RX ADMIN — Medication 100 MILLIGRAM(S): at 22:04

## 2018-03-16 RX ADMIN — GABAPENTIN 300 MILLIGRAM(S): 400 CAPSULE ORAL at 22:04

## 2018-03-16 RX ADMIN — BENZOCAINE AND MENTHOL 1 LOZENGE: 5; 1 LIQUID ORAL at 16:42

## 2018-03-16 RX ADMIN — Medication 12.5 MILLIGRAM(S): at 05:18

## 2018-03-16 RX ADMIN — OXYCODONE HYDROCHLORIDE 5 MILLIGRAM(S): 5 TABLET ORAL at 07:38

## 2018-03-16 RX ADMIN — OXYCODONE HYDROCHLORIDE 10 MILLIGRAM(S): 5 TABLET ORAL at 20:40

## 2018-03-16 RX ADMIN — SENNA PLUS 2 TABLET(S): 8.6 TABLET ORAL at 22:04

## 2018-03-16 RX ADMIN — Medication 12.5 MILLIGRAM(S): at 00:13

## 2018-03-16 RX ADMIN — GABAPENTIN 300 MILLIGRAM(S): 400 CAPSULE ORAL at 15:44

## 2018-03-16 RX ADMIN — Medication 400 MILLIGRAM(S): at 22:18

## 2018-03-16 RX ADMIN — DEXTROSE MONOHYDRATE, SODIUM CHLORIDE, AND POTASSIUM CHLORIDE 75 MILLILITER(S): 50; .745; 4.5 INJECTION, SOLUTION INTRAVENOUS at 00:00

## 2018-03-16 RX ADMIN — Medication 400 MILLIGRAM(S): at 15:42

## 2018-03-16 RX ADMIN — HYDROMORPHONE HYDROCHLORIDE 0.5 MILLIGRAM(S): 2 INJECTION INTRAMUSCULAR; INTRAVENOUS; SUBCUTANEOUS at 13:25

## 2018-03-16 NOTE — BRIEF OPERATIVE NOTE - PROCEDURE
<<-----Click on this checkbox to enter Procedure Wound debridement  03/16/2018  Lumbar wound debridement and I&D  Active  FLETCHRE

## 2018-03-16 NOTE — PROGRESS NOTE ADULT - ASSESSMENT
64 yo  female, PMH of HTN, Obesity, vertigo s/p L2-S1 laminectomy on 2/28/18, presents with wound dehiscence.

## 2018-03-16 NOTE — PROGRESS NOTE ADULT - PROBLEM SELECTOR PLAN 1
L2-S1 laminectomy on 2/28/18, complicated by wound dehiscence and concern for wound infection   s/p wound washout today, no gross purulence   post-op IV Vanco   Pain control   F/up cultures   PT eval

## 2018-03-17 LAB
ANION GAP SERPL CALC-SCNC: 10 MMOL/L — SIGNIFICANT CHANGE UP (ref 5–17)
BASOPHILS # BLD AUTO: 0.02 K/UL — SIGNIFICANT CHANGE UP (ref 0–0.2)
BASOPHILS NFR BLD AUTO: 0.3 % — SIGNIFICANT CHANGE UP (ref 0–2)
BUN SERPL-MCNC: 9 MG/DL — SIGNIFICANT CHANGE UP (ref 7–23)
CALCIUM SERPL-MCNC: 9.2 MG/DL — SIGNIFICANT CHANGE UP (ref 8.4–10.5)
CHLORIDE SERPL-SCNC: 103 MMOL/L — SIGNIFICANT CHANGE UP (ref 96–108)
CO2 SERPL-SCNC: 28 MMOL/L — SIGNIFICANT CHANGE UP (ref 22–31)
CREAT SERPL-MCNC: 0.82 MG/DL — SIGNIFICANT CHANGE UP (ref 0.5–1.3)
EOSINOPHIL # BLD AUTO: 0.18 K/UL — SIGNIFICANT CHANGE UP (ref 0–0.5)
EOSINOPHIL NFR BLD AUTO: 2.5 % — SIGNIFICANT CHANGE UP (ref 0–6)
GLUCOSE SERPL-MCNC: 95 MG/DL — SIGNIFICANT CHANGE UP (ref 70–99)
GRAM STN FLD: SIGNIFICANT CHANGE UP
GRAM STN FLD: SIGNIFICANT CHANGE UP
HCT VFR BLD CALC: 39.1 % — SIGNIFICANT CHANGE UP (ref 34.5–45)
HGB BLD-MCNC: 12.6 G/DL — SIGNIFICANT CHANGE UP (ref 11.5–15.5)
IMM GRANULOCYTES NFR BLD AUTO: 0.3 % — SIGNIFICANT CHANGE UP (ref 0–1.5)
LYMPHOCYTES # BLD AUTO: 2.25 K/UL — SIGNIFICANT CHANGE UP (ref 1–3.3)
LYMPHOCYTES # BLD AUTO: 30.8 % — SIGNIFICANT CHANGE UP (ref 13–44)
MCHC RBC-ENTMCNC: 31 PG — SIGNIFICANT CHANGE UP (ref 27–34)
MCHC RBC-ENTMCNC: 32.2 GM/DL — SIGNIFICANT CHANGE UP (ref 32–36)
MCV RBC AUTO: 96.1 FL — SIGNIFICANT CHANGE UP (ref 80–100)
MONOCYTES # BLD AUTO: 0.75 K/UL — SIGNIFICANT CHANGE UP (ref 0–0.9)
MONOCYTES NFR BLD AUTO: 10.3 % — SIGNIFICANT CHANGE UP (ref 2–14)
NEUTROPHILS # BLD AUTO: 4.08 K/UL — SIGNIFICANT CHANGE UP (ref 1.8–7.4)
NEUTROPHILS NFR BLD AUTO: 55.8 % — SIGNIFICANT CHANGE UP (ref 43–77)
PLATELET # BLD AUTO: 207 K/UL — SIGNIFICANT CHANGE UP (ref 150–400)
POTASSIUM SERPL-MCNC: 4.9 MMOL/L — SIGNIFICANT CHANGE UP (ref 3.5–5.3)
POTASSIUM SERPL-SCNC: 4.9 MMOL/L — SIGNIFICANT CHANGE UP (ref 3.5–5.3)
RBC # BLD: 4.07 M/UL — SIGNIFICANT CHANGE UP (ref 3.8–5.2)
RBC # FLD: 14 % — SIGNIFICANT CHANGE UP (ref 10.3–14.5)
SODIUM SERPL-SCNC: 141 MMOL/L — SIGNIFICANT CHANGE UP (ref 135–145)
SPECIMEN SOURCE: SIGNIFICANT CHANGE UP
SPECIMEN SOURCE: SIGNIFICANT CHANGE UP
WBC # BLD: 7.3 K/UL — SIGNIFICANT CHANGE UP (ref 3.8–10.5)
WBC # FLD AUTO: 7.3 K/UL — SIGNIFICANT CHANGE UP (ref 3.8–10.5)

## 2018-03-17 RX ADMIN — OXYCODONE HYDROCHLORIDE 10 MILLIGRAM(S): 5 TABLET ORAL at 17:38

## 2018-03-17 RX ADMIN — SENNA PLUS 2 TABLET(S): 8.6 TABLET ORAL at 21:40

## 2018-03-17 RX ADMIN — Medication 100 MILLIGRAM(S): at 21:40

## 2018-03-17 RX ADMIN — ENOXAPARIN SODIUM 40 MILLIGRAM(S): 100 INJECTION SUBCUTANEOUS at 21:40

## 2018-03-17 RX ADMIN — GABAPENTIN 300 MILLIGRAM(S): 400 CAPSULE ORAL at 06:42

## 2018-03-17 RX ADMIN — GABAPENTIN 300 MILLIGRAM(S): 400 CAPSULE ORAL at 21:40

## 2018-03-17 RX ADMIN — Medication 5 MILLIGRAM(S): at 21:40

## 2018-03-17 RX ADMIN — CARVEDILOL PHOSPHATE 25 MILLIGRAM(S): 80 CAPSULE, EXTENDED RELEASE ORAL at 17:39

## 2018-03-17 RX ADMIN — OXYCODONE HYDROCHLORIDE 10 MILLIGRAM(S): 5 TABLET ORAL at 06:42

## 2018-03-17 RX ADMIN — CARVEDILOL PHOSPHATE 25 MILLIGRAM(S): 80 CAPSULE, EXTENDED RELEASE ORAL at 06:42

## 2018-03-17 RX ADMIN — Medication 100 MILLIGRAM(S): at 06:42

## 2018-03-17 RX ADMIN — OXYCODONE HYDROCHLORIDE 10 MILLIGRAM(S): 5 TABLET ORAL at 22:01

## 2018-03-17 RX ADMIN — OXYCODONE HYDROCHLORIDE 10 MILLIGRAM(S): 5 TABLET ORAL at 07:12

## 2018-03-17 RX ADMIN — Medication 300 MILLIGRAM(S): at 00:32

## 2018-03-17 RX ADMIN — OXYCODONE HYDROCHLORIDE 10 MILLIGRAM(S): 5 TABLET ORAL at 18:13

## 2018-03-17 RX ADMIN — OXYCODONE HYDROCHLORIDE 10 MILLIGRAM(S): 5 TABLET ORAL at 15:39

## 2018-03-17 RX ADMIN — GABAPENTIN 300 MILLIGRAM(S): 400 CAPSULE ORAL at 15:38

## 2018-03-17 RX ADMIN — OXYCODONE HYDROCHLORIDE 10 MILLIGRAM(S): 5 TABLET ORAL at 11:40

## 2018-03-17 RX ADMIN — OXYCODONE HYDROCHLORIDE 10 MILLIGRAM(S): 5 TABLET ORAL at 23:46

## 2018-03-17 NOTE — PROGRESS NOTE ADULT - SUBJECTIVE AND OBJECTIVE BOX
SUBJECTIVE:   Ambulating with walker. Some incisional pain. No fevers. Interpretter phone used for exam and explanation of situation  OVERNIGHT EVENTS: none    Vital Signs Last 24 Hrs  T(C): 36.3 (17 Mar 2018 09:27), Max: 36.7 (17 Mar 2018 05:36)  T(F): 97.3 (17 Mar 2018 09:27), Max: 98.1 (17 Mar 2018 05:36)  HR: 79 (17 Mar 2018 09:27) (65 - 79)  BP: 132/81 (17 Mar 2018 09:27) (108/61 - 132/81)  BP(mean): --  RR: 18 (17 Mar 2018 09:27) (18 - 18)  SpO2: 94% (17 Mar 2018 09:27) (92% - 95%)    PHYSICAL EXAM:    Constitutional: No Acute Distress     Neurological: AOx3, Following Commands, Moving all Extremities 5/5. Lumbar dressing C/D/I. KEATON x 1 50 cc serousanginous drainage      Pulmonary: Clear to Auscultation, No rales, No rhonchi, No wheezes     Cardiovascular: S1, S2, Regular rate and rhythm     Gastrointestinal: Soft, Non-tender, Non-distended     Extremities: No calf tenderness     LABS:                        12.6   7.30  )-----------( 207      ( 17 Mar 2018 08:33 )             39.1    03-17    141  |  103  |  9   ----------------------------<  95  4.9   |  28  |  0.82    Ca    9.2      17 Mar 2018 06:39       Prelim- surgical Cx neg             IMAGING:         MEDICATIONS:    acetaminophen   Tablet. 650 milliGRAM(s) Oral every 6 hours PRN Mild Pain (1 - 3)  diazepam    Tablet 5 milliGRAM(s) Oral every 8 hours PRN spasms  gabapentin 300 milliGRAM(s) Oral three times a day  meclizine 12.5 milliGRAM(s) Oral two times a day PRN Dizziness  ondansetron Injectable 4 milliGRAM(s) IV Push every 6 hours PRN Nausea and/or Vomiting  oxyCODONE    IR 10 milliGRAM(s) Oral four times a day PRN Moderate Pain (4 - 6)  oxyCODONE    IR 5 milliGRAM(s) Oral every 4 hours PRN Moderate Pain (4 - 6)  carvedilol 25 milliGRAM(s) Oral every 12 hours  docusate sodium 100 milliGRAM(s) Oral three times a day  senna 2 Tablet(s) Oral at bedtime  enoxaparin Injectable 40 milliGRAM(s) SubCutaneous at bedtime        DIET:

## 2018-03-17 NOTE — PROGRESS NOTE ADULT - ASSESSMENT
63F with HTN, obesity, vertigo s/p L2-S1 laminectomy 2/28 p/w wound dehiscence. Presents with  erythema and tenderness at incision with yellowish drainage and fevers. s/p 03/16/2018  Lumbar wound debridement and I&D. No gross purulence; facial layer intact pod #1      Plan:  Neuro stable  Vitals stable  ID- Monitor OR Cx. No antibiotics for now  DVT ppx

## 2018-03-18 LAB
-  AMIKACIN: SIGNIFICANT CHANGE UP
-  AMPICILLIN/SULBACTAM: SIGNIFICANT CHANGE UP
-  AMPICILLIN: SIGNIFICANT CHANGE UP
-  AMPICILLIN: SIGNIFICANT CHANGE UP
-  AZTREONAM: SIGNIFICANT CHANGE UP
-  CEFAZOLIN: SIGNIFICANT CHANGE UP
-  CEFEPIME: SIGNIFICANT CHANGE UP
-  CEFOXITIN: SIGNIFICANT CHANGE UP
-  CEFTAZIDIME: SIGNIFICANT CHANGE UP
-  CEFTRIAXONE: SIGNIFICANT CHANGE UP
-  CIPROFLOXACIN: SIGNIFICANT CHANGE UP
-  CIPROFLOXACIN: SIGNIFICANT CHANGE UP
-  ERTAPENEM: SIGNIFICANT CHANGE UP
-  ERYTHROMYCIN: SIGNIFICANT CHANGE UP
-  GENTAMICIN: SIGNIFICANT CHANGE UP
-  IMIPENEM: SIGNIFICANT CHANGE UP
-  LEVOFLOXACIN: SIGNIFICANT CHANGE UP
-  MEROPENEM: SIGNIFICANT CHANGE UP
-  PIPERACILLIN/TAZOBACTAM: SIGNIFICANT CHANGE UP
-  TETRACYCLINE: SIGNIFICANT CHANGE UP
-  TOBRAMYCIN: SIGNIFICANT CHANGE UP
-  TRIMETHOPRIM/SULFAMETHOXAZOLE: SIGNIFICANT CHANGE UP
-  VANCOMYCIN: SIGNIFICANT CHANGE UP
METHOD TYPE: SIGNIFICANT CHANGE UP
METHOD TYPE: SIGNIFICANT CHANGE UP

## 2018-03-18 PROCEDURE — 99222 1ST HOSP IP/OBS MODERATE 55: CPT | Mod: GC

## 2018-03-18 RX ORDER — MEROPENEM 1 G/30ML
INJECTION INTRAVENOUS
Qty: 0 | Refills: 0 | Status: DISCONTINUED | OUTPATIENT
Start: 2018-03-18 | End: 2018-03-19

## 2018-03-18 RX ORDER — MEROPENEM 1 G/30ML
1000 INJECTION INTRAVENOUS ONCE
Qty: 0 | Refills: 0 | Status: COMPLETED | OUTPATIENT
Start: 2018-03-18 | End: 2018-03-18

## 2018-03-18 RX ORDER — CEFTRIAXONE 500 MG/1
INJECTION, POWDER, FOR SOLUTION INTRAMUSCULAR; INTRAVENOUS
Qty: 0 | Refills: 0 | Status: DISCONTINUED | OUTPATIENT
Start: 2018-03-18 | End: 2018-03-18

## 2018-03-18 RX ORDER — DIPHENHYDRAMINE HCL 50 MG
50 CAPSULE ORAL AT BEDTIME
Qty: 0 | Refills: 0 | Status: DISCONTINUED | OUTPATIENT
Start: 2018-03-18 | End: 2018-03-19

## 2018-03-18 RX ORDER — VANCOMYCIN HCL 1 G
1000 VIAL (EA) INTRAVENOUS EVERY 12 HOURS
Qty: 0 | Refills: 0 | Status: DISCONTINUED | OUTPATIENT
Start: 2018-03-18 | End: 2018-03-18

## 2018-03-18 RX ORDER — MEROPENEM 1 G/30ML
1000 INJECTION INTRAVENOUS EVERY 8 HOURS
Qty: 0 | Refills: 0 | Status: DISCONTINUED | OUTPATIENT
Start: 2018-03-18 | End: 2018-03-19

## 2018-03-18 RX ORDER — CEFTRIAXONE 500 MG/1
1 INJECTION, POWDER, FOR SOLUTION INTRAMUSCULAR; INTRAVENOUS ONCE
Qty: 0 | Refills: 0 | Status: COMPLETED | OUTPATIENT
Start: 2018-03-18 | End: 2018-03-18

## 2018-03-18 RX ORDER — VANCOMYCIN HCL 1 G
1250 VIAL (EA) INTRAVENOUS EVERY 12 HOURS
Qty: 0 | Refills: 0 | Status: DISCONTINUED | OUTPATIENT
Start: 2018-03-18 | End: 2018-03-19

## 2018-03-18 RX ADMIN — Medication 100 MILLIGRAM(S): at 22:05

## 2018-03-18 RX ADMIN — MEROPENEM 100 MILLIGRAM(S): 1 INJECTION INTRAVENOUS at 17:11

## 2018-03-18 RX ADMIN — GABAPENTIN 300 MILLIGRAM(S): 400 CAPSULE ORAL at 13:45

## 2018-03-18 RX ADMIN — Medication 650 MILLIGRAM(S): at 09:19

## 2018-03-18 RX ADMIN — Medication 650 MILLIGRAM(S): at 17:10

## 2018-03-18 RX ADMIN — Medication 650 MILLIGRAM(S): at 17:40

## 2018-03-18 RX ADMIN — Medication 100 MILLIGRAM(S): at 06:03

## 2018-03-18 RX ADMIN — CARVEDILOL PHOSPHATE 25 MILLIGRAM(S): 80 CAPSULE, EXTENDED RELEASE ORAL at 17:10

## 2018-03-18 RX ADMIN — SENNA PLUS 2 TABLET(S): 8.6 TABLET ORAL at 22:05

## 2018-03-18 RX ADMIN — GABAPENTIN 300 MILLIGRAM(S): 400 CAPSULE ORAL at 06:03

## 2018-03-18 RX ADMIN — Medication 166.67 MILLIGRAM(S): at 17:55

## 2018-03-18 RX ADMIN — CEFTRIAXONE 100 GRAM(S): 500 INJECTION, POWDER, FOR SOLUTION INTRAMUSCULAR; INTRAVENOUS at 12:00

## 2018-03-18 RX ADMIN — Medication 100 MILLIGRAM(S): at 13:45

## 2018-03-18 RX ADMIN — Medication 50 MILLIGRAM(S): at 23:36

## 2018-03-18 RX ADMIN — ENOXAPARIN SODIUM 40 MILLIGRAM(S): 100 INJECTION SUBCUTANEOUS at 22:05

## 2018-03-18 RX ADMIN — GABAPENTIN 300 MILLIGRAM(S): 400 CAPSULE ORAL at 22:05

## 2018-03-18 RX ADMIN — Medication 650 MILLIGRAM(S): at 09:49

## 2018-03-18 NOTE — PROGRESS NOTE ADULT - SUBJECTIVE AND OBJECTIVE BOX
Visit Summary: Patient seen and evaluated at bedside.    Overnight Events: none    Exam:  T(C): 36.9 (03-18-18 @ 06:02), Max: 37.2 (03-18-18 @ 01:11)  HR: 86 (03-18-18 @ 06:02) (75 - 86)  BP: 108/62 (03-18-18 @ 06:02) (103/56 - 112/66)  RR: 18 (03-18-18 @ 06:02) (18 - 18)  SpO2: 96% (03-18-18 @ 06:02) (94% - 96%)  Wt(kg): --    AOx3, FC, PERRL, EOMI, V1-3 intact, no facial, palate casey symmetric, tongue midline, shrug 5/5  5/5 throughout, no drift  SILT  No clonus or babinski                          12.6   7.30  )-----------( 207      ( 17 Mar 2018 08:33 )             39.1     03-17    141  |  103  |  9   ----------------------------<  95  4.9   |  28  |  0.82    Ca    9.2      17 Mar 2018 06:39

## 2018-03-18 NOTE — CONSULT NOTE ADULT - SUBJECTIVE AND OBJECTIVE BOX
HPI:  63F with HTN, obesity, vertigo s/p L2-S1 laminectomy 2/28 p/w wound dehiscence. Patient noticed erythema and tenderness at incision beginning early this week and she was seen in the office for staple removal. At this time, patient was prescribed Amoxicillin however noticed purulent drainage from incision along with fevers. She was advised to come to ER for evaluation today. She denies weakness, numbness, tingling, saddle anesthesia, incontinence. (15 Mar 2018 18:13)  Id note-above reviewed. Recent laminectomy 02/28, noted erythema pain and drainage with fever. Admitted 03/15, s/p I+D 03/16, Or cultures growing kliebsiella and e faecalis. Afebrile no leukocystosis    PAST MEDICAL & SURGICAL HISTORY:  Vertigo  Kidney stones  Obesity  HTN (hypertension)  Lumbago with sciatica  History of cataract surgery: both eyes 2017  Carpal tunnel syndrome on both sides: sx done 2013      Allergies  No Known Allergies        ANTIMICROBIALS:  cefTRIAXone   IVPB    vancomycin  IVPB 1000 every 12 hours      OTHER MEDS: MEDICATIONS  (STANDING):  acetaminophen   Tablet 650 every 6 hours PRN  acetaminophen   Tablet. 650 every 6 hours PRN  carvedilol 25 every 12 hours  diazepam    Tablet 5 every 8 hours PRN  docusate sodium 100 three times a day  enoxaparin Injectable 40 at bedtime  gabapentin 300 three times a day  meclizine 12.5 two times a day PRN  ondansetron Injectable 4 every 6 hours PRN  oxyCODONE    IR 10 four times a day PRN  oxyCODONE    IR 5 every 4 hours PRN  senna 2 at bedtime      SOCIAL HISTORY:  [ ] etoh [ ] tobacco [ ] former smoker [ ] IVDU    FAMILY HISTORY:      REVIEW OF SYSTEMS  [  ] ROS unobtainable because:    [ x ] All other systems negative except as noted below:	    Constitutional:  [ ] fever [ ] chills  [ ] weight loss  [ ] weakness  Skin:  [ ] rash [ ] phlebitis	  Eyes: [ ] icterus [ ] pain  [ ] discharge	  ENMT: [ ] sore throat  [ ] thrush [ ] ulcers [ ] exudates  Respiratory: [ ] dyspnea [ ] hemoptysis [ ] cough [ ] sputum	  Cardiovascular:  [ ] chest pain [ ] palpitations [ ] edema	  Gastrointestinal:  [ ] nausea [ ] vomiting [ ] diarrhea [ ] constipation [ ] pain	  Genitourinary:  [ ] dysuria [ ] frequency [ ] hematuria [ ] discharge [ ] flank pain  [ ] incontinence  Musculoskeletal:  [ ] myalgias [ ] arthralgias [ ] arthritis  [ ] back pain  Neurological:  [ ] headache [ ] seizures  [ ] confusion/altered mental status  Psychiatric:  [ ] anxiety [ ] depression	  Hematology/Lymphatics:  [ ] lymphadenopathy  Endocrine:  [ ] adrenal [ ] thyroid  Allergic/Immunologic:	 [ ] transplant [ ] seasonal    Vital Signs Last 24 Hrs  T(F): 98.2 (03-18-18 @ 11:11), Max: 98.9 (03-15-18 @ 19:51)    Vital Signs Last 24 Hrs  HR: 86 (03-18-18 @ 11:52) (75 - 86)  BP: 118/81 (03-18-18 @ 11:52) (103/56 - 120/72)  RR: 18 (03-18-18 @ 11:11)  SpO2: 95% (03-18-18 @ 11:52) (94% - 96%)  Wt(kg): --    PHYSICAL EXAM:  General: non-toxic  HEAD/EYES: anicteric, PERRL  ENT:  supple  Cardiovascular:   S1, S2  Respiratory:  clear bilaterally  GI:  soft, non-tender, normal bowel sounds  :  no CVA tenderness   Musculoskeletal:  no synovitis  Neurologic:  grossly non-focal  Skin:  no rash  Lymph: no lymphadenopathy  Psychiatric:  appropriate affect  Vascular:  no phlebitis                                12.6   7.30  )-----------( 207      ( 17 Mar 2018 08:33 )             39.1       03-17    141  |  103  |  9   ----------------------------<  95  4.9   |  28  |  0.82    Ca    9.2      17 Mar 2018 06:39            MICROBIOLOGY:  .Surgical Swab lower back wound  03-17-18   Few Klebsiella pneumoniae  Few Enterococcus faecalis  --  --      .Tissue Other, lower back wound #1  03-17-18   Growing in broth media only Gram Positive Cocci in Pairs and Chains  --    No polymorphonuclear cells seen  No organisms seen      .Blood Blood  03-15-18   No growth to date.  --  --      .Blood Blood-Peripheral  03-15-18   No growth to date.  --  --              v            RADIOLOGY: HPI:  63F with HTN, obesity, vertigo s/p L2-S1 laminectomy 2/28 p/w wound dehiscence. Patient noticed erythema and tenderness at incision beginning early this week and she was seen in the office for staple removal. At this time, patient was prescribed Amoxicillin however noticed purulent drainage from incision along with fevers. She was advised to come to ER for evaluation today. She denies weakness, numbness, tingling, saddle anesthesia, incontinence. (15 Mar 2018 18:13)  Id note-above reviewed. Recent laminectomy 02/28, noted erythema pain and drainage with fever. Admitted 03/15, s/p I+D 03/16, Or cultures growing kliebsiella and e faecalis. Afebrile c/o pain at surgical site, no leukocytosis    PAST MEDICAL & SURGICAL HISTORY:  Vertigo  Kidney stones  Obesity  HTN (hypertension)  Lumbago with sciatica  History of cataract surgery: both eyes 2017  Carpal tunnel syndrome on both sides: sx done 2013      Allergies  No Known Allergies        ANTIMICROBIALS:  cefTRIAXone   IVPB    vancomycin  IVPB 1000 every 12 hours      OTHER MEDS: MEDICATIONS  (STANDING):  acetaminophen   Tablet 650 every 6 hours PRN  acetaminophen   Tablet. 650 every 6 hours PRN  carvedilol 25 every 12 hours  diazepam    Tablet 5 every 8 hours PRN  docusate sodium 100 three times a day  enoxaparin Injectable 40 at bedtime  gabapentin 300 three times a day  meclizine 12.5 two times a day PRN  ondansetron Injectable 4 every 6 hours PRN  oxyCODONE    IR 10 four times a day PRN  oxyCODONE    IR 5 every 4 hours PRN  senna 2 at bedtime      SOCIAL HISTORY: none [ ] etoh [ ] tobacco [ ] former smoker [ ] IVDU    FAMILY HISTORY:      REVIEW OF SYSTEMS  [  ] ROS unobtainable because:    [ x ] All other systems negative except as noted below:	    Constitutional:  [x ] fever [ ] chills  [ ] weight loss  [ ] weakness  Skin:  [ ] rash [ ] phlebitis	  Eyes: [ ] icterus [ ] pain  [ ] discharge	  ENMT: [ ] sore throat  [ ] thrush [ ] ulcers [ ] exudates  Respiratory: [ ] dyspnea [ ] hemoptysis [ ] cough [ ] sputum	  Cardiovascular:  [ ] chest pain [ ] palpitations [ ] edema	  Gastrointestinal:  [ ] nausea [ ] vomiting [ ] diarrhea [ ] constipation [ ] pain	  Genitourinary:  [ ] dysuria [ ] frequency [ ] hematuria [ ] discharge [ ] flank pain  [ ] incontinence  Musculoskeletal:  [ ] myalgias [ ] arthralgias [ ] arthritis  [x ] back pain  Neurological:  [ ] headache [ ] seizures  [ ] confusion/altered mental status  Psychiatric:  [ ] anxiety [ ] depression	  Hematology/Lymphatics:  [ ] lymphadenopathy  Endocrine:  [ ] adrenal [ ] thyroid  Allergic/Immunologic:	 [ ] transplant [ ] seasonal    Vital Signs Last 24 Hrs  T(F): 98.2 (03-18-18 @ 11:11), Max: 98.9 (03-15-18 @ 19:51)    Vital Signs Last 24 Hrs  HR: 86 (03-18-18 @ 11:52) (75 - 86)  BP: 118/81 (03-18-18 @ 11:52) (103/56 - 120/72)  RR: 18 (03-18-18 @ 11:11)  SpO2: 95% (03-18-18 @ 11:52) (94% - 96%)  Wt(kg): --    PHYSICAL EXAM:  General: non-toxic morbidly obese  HEAD/EYES: anicteric, PERRL  ENT:  supple  Cardiovascular:   S1, S2  Respiratory:  clear bilaterally  GI:  soft, non-tender, normal bowel sounds  :  no CVA tenderness   Musculoskeletal:  no synovitis  Neurologic:  grossly non-focal  Skin:  lumbar region midline incision erythema, tender no drainage, KEATON site in place  Lymph: no lymphadenopathy  Psychiatric:  appropriate affect  Vascular:  no phlebitis                                12.6   7.30  )-----------( 207      ( 17 Mar 2018 08:33 )             39.1       03-17    141  |  103  |  9   ----------------------------<  95  4.9   |  28  |  0.82    Ca    9.2      17 Mar 2018 06:39            MICROBIOLOGY:  .Surgical Swab lower back wound  03-17-18   Few Klebsiella pneumoniae  Few Enterococcus faecalis  --  --      .Tissue Other, lower back wound #1  03-17-18   Growing in broth media only Gram Positive Cocci in Pairs and Chains  --    No polymorphonuclear cells seen  No organisms seen      .Blood Blood  03-15-18   No growth to date.  --  --      .Blood Blood-Peripheral  03-15-18   No growth to date.  --  --              v            RADIOLOGY:

## 2018-03-18 NOTE — PHYSICAL THERAPY INITIAL EVALUATION ADULT - PERTINENT HX OF CURRENT PROBLEM, REHAB EVAL
63F with HTN, obesity, vertigo s/p L2-S1 laminectomy 2/28 now p/w wound dehiscence. Pt noticed erythema and tenderness at incision beginning early this week and she was seen in the office for staple removal.Pt was prescribed Amoxicillin however noticed purulent drainage from incision along with fevers. She was advised to come to ER for evaluation. Hospital course: Pt went to OR for Lumbar wound debridement and I&D on 3/16. Or cultures growing kliebsiella and e faecalis.

## 2018-03-18 NOTE — PHYSICAL THERAPY INITIAL EVALUATION ADULT - BALANCE TRAINING, PT EVAL
GOAL: Pt will demonstrate improved static/dynamic standing balance to good, in order to improve stability, decrease fall risk and increase independence with ADLs within 2 weeks.

## 2018-03-18 NOTE — PROGRESS NOTE ADULT - ASSESSMENT
63F with HTN, obesity, vertigo s/p L2-S1 laminectomy 2/28 p/w wound dehiscence. Presents with  erythema and tenderness at incision with yellowish drainage and fevers. s/p 03/16/2018  Lumbar wound debridement and I&D. No gross purulence; facial layer intact pod #1      Plan:  Neuro stable  Vitals stable  ID- Monitor OR Cx. now growing gram + cocci, Klebsiella and enterococci. Will call ID for Abx coverage  DVT ppx

## 2018-03-18 NOTE — PHYSICAL THERAPY INITIAL EVALUATION ADULT - GENERAL OBSERVATIONS, REHAB EVAL
Pt rec'd sitting at EOB in NAD, VSS, +dressing to low back C/D/I, +Croatian speaking  used t/o session #[id number], agreeable to PT

## 2018-03-18 NOTE — CONSULT NOTE ADULT - ATTENDING COMMENTS
Patient seen and examined with Dr. Coto  I agree with history , exam findings and plans as noted above.  Obese Patient with laminectomy L2-S1 with development of SSI requiring OR washout, drain placement and OR cultures growing klebsiella and e.faecalis sensitivities pending.  On exam, afebrile non-toxic  wound with mild erythema and a lumbar drain in place with sanguinous fluid    Would give Vanco and Meropenem pending sensitivities of the organisms  Will follow with you

## 2018-03-18 NOTE — PHYSICAL THERAPY INITIAL EVALUATION ADULT - STRENGTHENING, PT EVAL
GOAL: Pt will improve bilateral LE strength to 4/5, for increased limb stability, to improve gait and facilitate stair negotiation in 2 weeks.

## 2018-03-18 NOTE — PHYSICAL THERAPY INITIAL EVALUATION ADULT - ADDITIONAL COMMENTS
Pt lives in a  with her  and son, no stairs to negotiate. pt reports she was independent with mobility using rolling walker.

## 2018-03-18 NOTE — CONSULT NOTE ADULT - ASSESSMENT
63F with HTN, obesity, vertigo s/p L2-S1 laminectomy 2/28 p/w wound dehiscence after developing erythema, fever and purulent drainage. On 03/16 s/p I+D OR cultures growing kliebsiella and E.faecalis  Afebrile, no leukocytosis 63F with HTN, obesity, vertigo s/p L2-S1 laminectomy 2/28 p/w wound dehiscence after developing erythema, fever and purulent drainage. On 03/16 s/p I+D OR cultures growing kliebsiella and E.faecalis  Afebrile, no leukocytosis    #Post laminectomy surgical site infection    Recommend:  Stop ceftriaxone  Start meropenem 1g iv q8h and increase vancomycin to 1250 mg q12h  Check trough prior to 4th dose  Follow final surgical cx results

## 2018-03-19 PROCEDURE — 99232 SBSQ HOSP IP/OBS MODERATE 35: CPT

## 2018-03-19 RX ORDER — PIPERACILLIN AND TAZOBACTAM 4; .5 G/20ML; G/20ML
3.38 INJECTION, POWDER, LYOPHILIZED, FOR SOLUTION INTRAVENOUS EVERY 8 HOURS
Qty: 0 | Refills: 0 | Status: DISCONTINUED | OUTPATIENT
Start: 2018-03-19 | End: 2018-03-20

## 2018-03-19 RX ORDER — LANOLIN ALCOHOL/MO/W.PET/CERES
1 CREAM (GRAM) TOPICAL AT BEDTIME
Qty: 0 | Refills: 0 | Status: DISCONTINUED | OUTPATIENT
Start: 2018-03-19 | End: 2018-03-20

## 2018-03-19 RX ADMIN — Medication 650 MILLIGRAM(S): at 06:10

## 2018-03-19 RX ADMIN — Medication 100 MILLIGRAM(S): at 06:09

## 2018-03-19 RX ADMIN — Medication 100 MILLIGRAM(S): at 14:51

## 2018-03-19 RX ADMIN — Medication 650 MILLIGRAM(S): at 06:40

## 2018-03-19 RX ADMIN — Medication 100 MILLIGRAM(S): at 22:23

## 2018-03-19 RX ADMIN — OXYCODONE HYDROCHLORIDE 5 MILLIGRAM(S): 5 TABLET ORAL at 22:33

## 2018-03-19 RX ADMIN — OXYCODONE HYDROCHLORIDE 5 MILLIGRAM(S): 5 TABLET ORAL at 23:03

## 2018-03-19 RX ADMIN — CARVEDILOL PHOSPHATE 25 MILLIGRAM(S): 80 CAPSULE, EXTENDED RELEASE ORAL at 17:29

## 2018-03-19 RX ADMIN — ENOXAPARIN SODIUM 40 MILLIGRAM(S): 100 INJECTION SUBCUTANEOUS at 22:24

## 2018-03-19 RX ADMIN — MEROPENEM 100 MILLIGRAM(S): 1 INJECTION INTRAVENOUS at 10:53

## 2018-03-19 RX ADMIN — Medication 166.67 MILLIGRAM(S): at 06:09

## 2018-03-19 RX ADMIN — GABAPENTIN 300 MILLIGRAM(S): 400 CAPSULE ORAL at 06:09

## 2018-03-19 RX ADMIN — Medication 166.67 MILLIGRAM(S): at 17:29

## 2018-03-19 RX ADMIN — SENNA PLUS 2 TABLET(S): 8.6 TABLET ORAL at 22:23

## 2018-03-19 RX ADMIN — Medication 1 MILLIGRAM(S): at 22:22

## 2018-03-19 RX ADMIN — GABAPENTIN 300 MILLIGRAM(S): 400 CAPSULE ORAL at 14:51

## 2018-03-19 RX ADMIN — GABAPENTIN 300 MILLIGRAM(S): 400 CAPSULE ORAL at 22:23

## 2018-03-19 RX ADMIN — PIPERACILLIN AND TAZOBACTAM 25 GRAM(S): 4; .5 INJECTION, POWDER, LYOPHILIZED, FOR SOLUTION INTRAVENOUS at 22:22

## 2018-03-19 RX ADMIN — CARVEDILOL PHOSPHATE 25 MILLIGRAM(S): 80 CAPSULE, EXTENDED RELEASE ORAL at 06:09

## 2018-03-19 RX ADMIN — MEROPENEM 100 MILLIGRAM(S): 1 INJECTION INTRAVENOUS at 02:30

## 2018-03-19 NOTE — PROGRESS NOTE ADULT - SUBJECTIVE AND OBJECTIVE BOX
INFECTIOUS DISEASES FOLLOW UP-- Sun Reddy  [phone]    This is a follow up note for this  63yFemale with SSI post laminectomy L2-S1 s/p washout and OR debridement with a drain in place lumbar region  Infection following procedure      ROS:  CONSTITUTIONAL:  No fever, good appetite  CARDIOVASCULAR:  No chest pain or palpitations  RESPIRATORY:  No dyspnea  GASTROINTESTINAL:  No nausea, vomiting, diarrhea, or abdominal pain  GENITOURINARY:  No dysuria  NEUROLOGIC:  No headache,     Allergies    No Known Allergies    Intolerances        ANTIBIOTICS/RELEVANT:  antimicrobials  meropenem  IVPB      meropenem  IVPB 1000 milliGRAM(s) IV Intermittent every 8 hours  vancomycin  IVPB 1250 milliGRAM(s) IV Intermittent every 12 hours    immunologic:    OTHER:  acetaminophen   Tablet 650 milliGRAM(s) Oral every 6 hours PRN  acetaminophen   Tablet. 650 milliGRAM(s) Oral every 6 hours PRN  carvedilol 25 milliGRAM(s) Oral every 12 hours  diazepam    Tablet 5 milliGRAM(s) Oral every 8 hours PRN  docusate sodium 100 milliGRAM(s) Oral three times a day  enoxaparin Injectable 40 milliGRAM(s) SubCutaneous at bedtime  gabapentin 300 milliGRAM(s) Oral three times a day  meclizine 12.5 milliGRAM(s) Oral two times a day PRN  melatonin 1 milliGRAM(s) Oral at bedtime  ondansetron Injectable 4 milliGRAM(s) IV Push every 6 hours PRN  oxyCODONE    IR 10 milliGRAM(s) Oral four times a day PRN  oxyCODONE    IR 5 milliGRAM(s) Oral every 4 hours PRN  senna 2 Tablet(s) Oral at bedtime      Objective:  Vital Signs Last 24 Hrs  T(C): 37 (19 Mar 2018 17:28), Max: 37 (19 Mar 2018 17:28)  T(F): 98.6 (19 Mar 2018 17:28), Max: 98.6 (19 Mar 2018 17:28)  HR: 86 (19 Mar 2018 17:28) (79 - 86)  BP: 149/87 (19 Mar 2018 17:28) (119/76 - 149/88)  BP(mean): --  RR: 18 (19 Mar 2018 17:28) (17 - 18)  SpO2: 97% (19 Mar 2018 17:28) (93% - 97%)    PHYSICAL EXAM:  Constitutional:no acute distress  Eyes:WARREN, EOMI  Ear/Nose/Throat: no oral lesions, 	  Respiratory: clear BL  Cardiovascular: S1S2  Gastrointestinal:soft, (+) BS, no tenderness  posterior back with surgical wound apposed except in lower portion where KEATON drain in place  Extremities:no e/e/c  No Lymphadenopathy  IV sites not inflammed.    LABS:                MICROBIOLOGY:            RECENT CULTURES:  03-17 @ 00:34  .Surgical Swab lower back wound  Klebsiella pneumoniae  Enterococcus faecalis  Klebsiella pneumoniae  ARA    Few Klebsiella pneumoniae  Few Enterococcus faecalis  --  03-17 @ 00:21  .Tissue Other, lower back wound #1  --  --  --    Growing in broth media only Gram Positive Cocci in Pairs and Chains  --  03-15 @ 23:12  .Blood Blood  --  --  --    No growth to date.  --  03-15 @ 23:10  .Blood Blood-Peripheral  --  --  --    No growth to date.  --      RADIOLOGY & ADDITIONAL STUDIES:    < from: Xray Spine Single View (02.28.18 @ 10:56) >  IMPRESSION:   Posterior localizer is noted at the level of the superior endplate of L4.  No compression fracture or subluxation.  Multilevel marginal osteophytes, severe disc space narrowing at L3-4 and   L5-S1 with vacuum phenomenon from L3-4 and L5-S1.     < end of copied text >

## 2018-03-19 NOTE — PROGRESS NOTE ADULT - ASSESSMENT
63F with HTN, obesity, vertigo s/p L2-S1 laminectomy 2/28 p/w wound dehiscence after developing erythema, fever and purulent drainage. On 03/16 s/p I+D OR cultures growing kliebsiella and E.faecalis  Afebrile, no leukocytosis    #Post laminectomy surgical site infection    Recommend:  Change antibiotics to Zosyn 3.375gm IV q 8hr as it will cover both the enterococcus and the klebsiella    Per discussion with surgical service the infection depth was superficial and above the fascial plane and did not appear to involve bone or hardware.      Taz Reddy MD  [phone]  After 5pm/weekends [phone]

## 2018-03-19 NOTE — PROGRESS NOTE ADULT - SUBJECTIVE AND OBJECTIVE BOX
SUBJECTIVE:   OOB to chair. no fevers  OVERNIGHT EVENTS: none    Vital Signs Last 24 Hrs  T(C): 36.5 (19 Mar 2018 14:51), Max: 37 (18 Mar 2018 17:09)  T(F): 97.7 (19 Mar 2018 14:51), Max: 98.6 (18 Mar 2018 17:09)  HR: 81 (19 Mar 2018 14:51) (78 - 86)  BP: 123/69 (19 Mar 2018 14:51) (119/76 - 149/88)  RR: 18 (19 Mar 2018 14:51) (16 - 18)  SpO2: 96% (19 Mar 2018 14:51) (93% - 97%)    PHYSICAL EXAM:    Constitutional: No Acute Distress     Neurological: AOx3, Following Commands, Moving all Extremities 5/5. Lumbar incision -no drainage. Not closely  approximated. KEATON x1 10cc/24 hrs      Pulmonary: Clear to Auscultation,    Cardiovascular: S1, S2, Regular rate and rhythm     Gastrointestinal: Soft, Non-tender, Non-distended     Extremities: No calf tenderness       LABS:               IMAGING:   Surg Cx- Klebsiella and Enterococcus fecalis      MEDICATIONS:      meropenem  IVPB 1000 milliGRAM(s) IV Intermittent every 8 hours  vancomycin  IVPB 1250 milliGRAM(s) IV Intermittent every 12 hours  acetaminophen   Tablet 650 milliGRAM(s) Oral every 6 hours PRN For Temp greater than 38 C (100.4 F)  acetaminophen   Tablet. 650 milliGRAM(s) Oral every 6 hours PRN Mild Pain (1 - 3)  diazepam    Tablet 5 milliGRAM(s) Oral every 8 hours PRN spasms  diphenhydrAMINE   Capsule 50 milliGRAM(s) Oral at bedtime PRN Insomnia  gabapentin 300 milliGRAM(s) Oral three times a day  meclizine 12.5 milliGRAM(s) Oral two times a day PRN Dizziness  ondansetron Injectable 4 milliGRAM(s) IV Push every 6 hours PRN Nausea and/or Vomiting  oxyCODONE    IR 10 milliGRAM(s) Oral four times a day PRN Moderate Pain (4 - 6)  oxyCODONE    IR 5 milliGRAM(s) Oral every 4 hours PRN Moderate Pain (4 - 6)  carvedilol 25 milliGRAM(s) Oral every 12 hours  docusate sodium 100 milliGRAM(s) Oral three times a day  senna 2 Tablet(s) Oral at bedtime  enoxaparin Injectable 40 milliGRAM(s) SubCutaneous at bedtime      DIET:

## 2018-03-19 NOTE — PROGRESS NOTE ADULT - ASSESSMENT
63F with HTN, obesity, vertigo s/p L2-S1 laminectomy 2/28 p/w wound dehiscence. Presents with  erythema and tenderness at incision with yellowish drainage and fevers. s/p 03/16/2018  Lumbar wound debridement and I&D  .pod #3      Plan:  Neuro stable. Dressing changed with aquacel and tegaderm today  Incision care  and drain as per plastics-   ID- On meropenam and Vanco. Sensitivities back, ID to taper abx  Vitals stable  DVT ppx 63F with HTN, obesity, vertigo s/p L2-S1 laminectomy 2/28 p/w wound dehiscence. Presents with  erythema and tenderness at incision with yellowish drainage and fevers. s/p 03/16/2018  Lumbar wound debridement and I&D  .pod #3      Plan:  Neuro stable. Dressing changed with aquacel and tegaderm today  Incision care  and drain as per plastics-   ID- On meropenam and Vanco. Sensitivities back, Augmentin 875 BID x 2weeks. Follow up with Dr Reddy in 3 weeks  Vitals stable  DVT ppx  D/W Dr Yoo

## 2018-03-20 ENCOUNTER — TRANSCRIPTION ENCOUNTER (OUTPATIENT)
Age: 63
End: 2018-03-20

## 2018-03-20 VITALS
TEMPERATURE: 98 F | SYSTOLIC BLOOD PRESSURE: 166 MMHG | HEART RATE: 79 BPM | RESPIRATION RATE: 18 BRPM | DIASTOLIC BLOOD PRESSURE: 91 MMHG | OXYGEN SATURATION: 98 %

## 2018-03-20 RX ORDER — ACETAMINOPHEN 500 MG
2 TABLET ORAL
Qty: 0 | Refills: 0 | DISCHARGE
Start: 2018-03-20

## 2018-03-20 RX ORDER — OXYCODONE HYDROCHLORIDE 5 MG/1
1 TABLET ORAL
Qty: 42 | Refills: 0
Start: 2018-03-20 | End: 2018-03-26

## 2018-03-20 RX ORDER — DOCUSATE SODIUM 100 MG
1 CAPSULE ORAL
Qty: 0 | Refills: 0 | DISCHARGE
Start: 2018-03-20

## 2018-03-20 RX ORDER — CARVEDILOL PHOSPHATE 80 MG/1
1 CAPSULE, EXTENDED RELEASE ORAL
Qty: 0 | Refills: 0 | DISCHARGE
Start: 2018-03-20

## 2018-03-20 RX ORDER — GABAPENTIN 400 MG/1
1 CAPSULE ORAL
Qty: 0 | Refills: 0 | COMMUNITY

## 2018-03-20 RX ORDER — SENNA PLUS 8.6 MG/1
2 TABLET ORAL
Qty: 0 | Refills: 0 | DISCHARGE
Start: 2018-03-20

## 2018-03-20 RX ORDER — MECLIZINE HCL 12.5 MG
1 TABLET ORAL
Qty: 0 | Refills: 0 | DISCHARGE
Start: 2018-03-20

## 2018-03-20 RX ORDER — DIAZEPAM 5 MG
1 TABLET ORAL
Qty: 0 | Refills: 0 | DISCHARGE
Start: 2018-03-20

## 2018-03-20 RX ORDER — CARVEDILOL PHOSPHATE 80 MG/1
1 CAPSULE, EXTENDED RELEASE ORAL
Qty: 0 | Refills: 0 | COMMUNITY

## 2018-03-20 RX ORDER — MECLIZINE HCL 12.5 MG
1 TABLET ORAL
Qty: 0 | Refills: 0 | COMMUNITY

## 2018-03-20 RX ORDER — GABAPENTIN 400 MG/1
1 CAPSULE ORAL
Qty: 0 | Refills: 0 | DISCHARGE
Start: 2018-03-20

## 2018-03-20 RX ADMIN — Medication 100 MILLIGRAM(S): at 05:28

## 2018-03-20 RX ADMIN — PIPERACILLIN AND TAZOBACTAM 25 GRAM(S): 4; .5 INJECTION, POWDER, LYOPHILIZED, FOR SOLUTION INTRAVENOUS at 15:00

## 2018-03-20 RX ADMIN — CARVEDILOL PHOSPHATE 25 MILLIGRAM(S): 80 CAPSULE, EXTENDED RELEASE ORAL at 05:28

## 2018-03-20 RX ADMIN — Medication 100 MILLIGRAM(S): at 15:00

## 2018-03-20 RX ADMIN — CARVEDILOL PHOSPHATE 25 MILLIGRAM(S): 80 CAPSULE, EXTENDED RELEASE ORAL at 17:37

## 2018-03-20 RX ADMIN — GABAPENTIN 300 MILLIGRAM(S): 400 CAPSULE ORAL at 05:28

## 2018-03-20 RX ADMIN — GABAPENTIN 300 MILLIGRAM(S): 400 CAPSULE ORAL at 15:00

## 2018-03-20 RX ADMIN — PIPERACILLIN AND TAZOBACTAM 25 GRAM(S): 4; .5 INJECTION, POWDER, LYOPHILIZED, FOR SOLUTION INTRAVENOUS at 05:28

## 2018-03-20 NOTE — DISCHARGE NOTE ADULT - PATIENT PORTAL LINK FT
You can access the [facility] Patient Portal, offered by [facility], by registering with the following website: [email or web address]

## 2018-03-20 NOTE — DISCHARGE NOTE ADULT - HOSPITAL COURSE
The patient was admitted on 3/15/2018 and taken to the OR this same day.  Postop course was uneventful.  The patient was on Ancef, SQL, PCA Dilaudid and Decadron for routine postop management.    PROCEDURE: Adm 3/15 s/p Wound debridement and plastic closure     Neuro: Inc activity/OOB.  DC Home today once Home care in place.     ID-Zosyn 3.375gm IV q 8hr to cover both the enterococcus and the klebsiella, this was DC on 3/20 upon pt discharge from hospital and started on Augmentin 875 BID x 2weeks. Follow up with Dr Taz Reddy ([phone]) in 3 weeks. Per discussion with surgical service the infection depth was superficial and above the fascial plane and did not appear to involve bone or hardware.    Plastic-Per bang Bhatti to discharge patient today. Plan for Aquacell and dry dressing changes daily. Followup outpt.    Respiratory: Patient instructed to use incentive spirometer [X ] YES [ ] NO               DVT ppx: [ X] SQL [ ] SQH and Venodynes [ ] Left [ ] Right [X ] Bilateral    Discharge planning: PT-Home PT, Own    The patient was evaluated by PT and recommended out patient PT/A. Rehab/S. Acute Rehab.   She/He was subsequently DC on /2018 in stable condition.

## 2018-03-20 NOTE — DISCHARGE NOTE ADULT - MEDICATION SUMMARY - MEDICATIONS TO TAKE
I will START or STAY ON the medications listed below when I get home from the hospital:    rolling walker  -- L2-S1 laminectomy  -- Indication: For aid in walking    acetaminophen 325 mg oral tablet  -- 2 tab(s) by mouth every 6 hours, As needed, For Temp greater than 38 C (100.4 F)  -- Indication: For fever    acetaminophen 325 mg oral tablet  -- 2 tab(s) by mouth every 6 hours, As needed, Mild Pain (1 - 3)  -- Indication: For mild pain    oxyCODONE 5 mg oral tablet  -- 1 to 2 tab(s) by mouth every 4 hours, As needed, Moderate to severe Pain (4 - 6) MDD:5   -- Indication: For moderate to severe pain    gabapentin 300 mg oral capsule  -- 1 cap(s) by mouth 3 times a day  -- Indication: For nerve pain    diazePAM 5 mg oral tablet  -- 1 tab(s) by mouth every 8 hours, As needed, spasms  -- Indication: For muscle spasm    meclizine 12.5 mg oral tablet  -- 1 tab(s) by mouth 2 times a day, As needed, Dizziness  -- Indication: For diziness    carvedilol 25 mg oral tablet  -- 1 tab(s) by mouth every 12 hours  -- Indication: For Essential hypertension    docusate sodium 100 mg oral capsule  -- 1 cap(s) by mouth 3 times a day  -- Indication: For Laxative    senna oral tablet  -- 2 tab(s) by mouth once a day (at bedtime)  -- Indication: For constipation    amoxicillin-clavulanate 875 mg-125 mg oral tablet  -- 1 tab(s) by mouth 2 times a day   -- Finish all this medication unless otherwise directed by prescriber.  Take with food or milk.    -- Indication: For Wound dehiscence

## 2018-03-20 NOTE — PROGRESS NOTE ADULT - ASSESSMENT
A/P: 63F s/p lumbar wound washout and closure (POD 4)  - Will speak w/ attending re incision  - KEATON care  - care per NSx

## 2018-03-20 NOTE — DISCHARGE NOTE ADULT - CARE PROVIDERS DIRECT ADDRESSES
[email or web address]

## 2018-03-20 NOTE — DISCHARGE NOTE ADULT - PLAN OF CARE
Call Neurosurgery Dr MONROE Sheppard for appointment in 1 week for wound check.  Followup with Plastic surgery-Dr Pierce in  1week for wound check.  Followup with your Private MD in 1-2 weeks.  Return to Emergency Department or contact your Neurosurgeon if any changes in mental status, weakness, numbness or tingling of extremities; difficulty swallowing; drainage or redness of wound, fever; pain in legs; difficulty urinating or constipation.  Donot restart your Aspirin or take any Motrin/NSAIDS until checking with your Neurosurgeon. No strenous activity. No heavy lifting. Do not return to work until cleared by physician. No driving until cleared by physician.  You may shower on the 3rd day after you surgery.  No soaking in tub,  Donot apply any ointements to incision.  Aquacell and dry dressing changes to wound daily Wound healing, strengthening

## 2018-03-20 NOTE — DISCHARGE NOTE ADULT - MEDICATION SUMMARY - MEDICATIONS TO STOP TAKING
I will STOP taking the medications listed below when I get home from the hospital:    oxyCODONE-acetaminophen 5 mg-325 mg oral tablet  -- 1 tab(s) by mouth every 4 hours, As needed, Moderate Pain (4 - 6) MDD:6

## 2018-03-20 NOTE — DISCHARGE NOTE ADULT - CARE PROVIDER_API CALL
Scott Sheppard (DO), Neurological Surgery  [address]  [address]  [place] [address]  Phone: [phone]  Fax: [phone]    Marcelo Pierce), Plastic Surgery  [address] [place] [id number]  Phone: [phone]  Fax: [phone]    Sun Reddy), Infectious Disease; Internal Medicine  [address] [place] [id number]  Phone: [phone]  Fax: [phone]

## 2018-03-20 NOTE — DISCHARGE NOTE ADULT - CARE PLAN
Principal Discharge DX:	Wound dehiscence  Goal:	Wound healing, strengthening  Assessment and plan of treatment:	Call Neurosurgery Dr MONROE Sheppard for appointment in 1 week for wound check.  Followup with Plastic surgery-Dr Pierce in  1week for wound check.  Followup with your Private MD in 1-2 weeks.  Return to Emergency Department or contact your Neurosurgeon if any changes in mental status, weakness, numbness or tingling of extremities; difficulty swallowing; drainage or redness of wound, fever; pain in legs; difficulty urinating or constipation.  Donot restart your Aspirin or take any Motrin/NSAIDS until checking with your Neurosurgeon.  Assessment and plan of treatment:	No strenous activity. No heavy lifting. Do not return to work until cleared by physician. No driving until cleared by physician.  You may shower on the 3rd day after you surgery.  No soaking in tub,  Donot apply any ointements to incision.  Aquacell and dry dressing changes to wound daily

## 2018-03-20 NOTE — PROGRESS NOTE ADULT - SUBJECTIVE AND OBJECTIVE BOX
SUBJECTIVE: In chair.  Doing well, no complaints.     OVERNIGHT EVENTS: None    Vital Signs Last 24 Hrs  T(C): 36.6 (20 Mar 2018 05:17), Max: 37 (19 Mar 2018 17:28)  T(F): 97.9 (20 Mar 2018 05:17), Max: 98.6 (19 Mar 2018 17:28)  HR: 89 (20 Mar 2018 05:17) (81 - 89)  BP: 129/73 (20 Mar 2018 05:17) (123/69 - 149/87)  BP(mean): --  RR: 18 (20 Mar 2018 05:17) (18 - 18)  SpO2: 94% (20 Mar 2018 05:17) (94% - 97%)  IVF: [X ] IVL [ ] NS+K@   DIET: [ X] Regular [ ] CCD [ ] Renal [ ] Puree [ ] Dysphagia [ ] Tube Feeds:   PCA: [ ] YES [X ] NO   GARCIA: [ ] YES [X ] NO [X ] VOID   BM: [ X] YES on 3/19     DRAINS: [ X] KEATON (32cc/24h) - DC'd this AM    PHYSICAL EXAM:    Constitutional: No Acute Distress     Neurological: Lithuanian speaking. AOx3, Following Commands, Moving all Extremities     Motor exam:          Upper extremity                         Delt     Bicep     Tricep    HG                                                 R         5/5        5/5        5/5       5/5                                               L          5/5        5/5        5/5       5/5          Lower extremity                        HF         KF        KE       DF         PF                                                  R        5/5        5/5        5/5       5/5         5/5                                               L         5/5        5/5       5/5       5/5          5/5                                                 Sensation: [X] intact to light touch  [] decreased:     Pulmonary: Clear to Auscultation, No rales, No rhonchi, No wheezes     Cardiovascular: S1, S2, Regular rate and rhythm     Gastrointestinal: Soft, Non-tender, Non-distended     Extremities: No calf tenderness     Incision: wound is clean, no erythema, no drainage.  Superficial skin widening noted, no deep opening.      LABS:      IMAGING:     MEDICATIONS  (STANDING):  carvedilol 25 milliGRAM(s) Oral every 12 hours  docusate sodium 100 milliGRAM(s) Oral three times a day  enoxaparin Injectable 40 milliGRAM(s) SubCutaneous at bedtime  gabapentin 300 milliGRAM(s) Oral three times a day  melatonin 1 milliGRAM(s) Oral at bedtime  piperacillin/tazobactam IVPB. 3.375 Gram(s) IV Intermittent every 8 hours  senna 2 Tablet(s) Oral at bedtime    MEDICATIONS  (PRN):  acetaminophen   Tablet 650 milliGRAM(s) Oral every 6 hours PRN For Temp greater than 38 C (100.4 F)  acetaminophen   Tablet. 650 milliGRAM(s) Oral every 6 hours PRN Mild Pain (1 - 3)  diazepam    Tablet 5 milliGRAM(s) Oral every 8 hours PRN spasms  meclizine 12.5 milliGRAM(s) Oral two times a day PRN Dizziness  ondansetron Injectable 4 milliGRAM(s) IV Push every 6 hours PRN Nausea and/or Vomiting  oxyCODONE    IR 10 milliGRAM(s) Oral four times a day PRN Moderate Pain (4 - 6)  oxyCODONE    IR 5 milliGRAM(s) Oral every 4 hours PRN Moderate Pain (4 - 6)

## 2018-03-20 NOTE — DISCHARGE NOTE ADULT - REASON FOR ADMISSION
Wound infection Wound infection.  63F with HTN, obesity, vertigo s/p L2-S1 laminectomy 2/28 p/w wound dehiscence. Patient noticed erythema and tenderness at incision beginning early this week and she was seen in the office for staple removal. At this time, patient was prescribed Amoxicillin however noticed purulent drainage from incision along with fevers. She was advised to come to ER for evaluation today. She denies weakness, numbness, tingling, saddle anesthesia, incontinence.

## 2018-03-20 NOTE — PROGRESS NOTE ADULT - ASSESSMENT
63F with HTN, obesity, vertigo s/p L2-S1 laminectomy 2/28 p/w wound dehiscence. Patient noticed erythema and tenderness at incision beginning early this week and she was seen in the office for staple removal. At this time, patient was prescribed Amoxicillin however noticed purulent drainage from incision along with fevers. She was advised to come to ER for evaluation today. She denies weakness, numbness, tingling, saddle anesthesia, incontinence. (15 Mar 2018 18:13)    PROCEDURE: Adm 3/15 s/p Wound debridement and plastic closure     POD#5    PLAN:  Neuro: Inc activity/OOB.  DC Home today once Home care in place.     ID-Change antibiotics to Zosyn 3.375gm IV q 8hr as it will cover both the enterococcus and the klebsiella.  Sensitivities back, Augmentin 875 BID x 2weeks. Follow up with Dr Taz Reddy ([phone]) in 3 weeks. Per discussion with surgical service the infection depth was superficial and above the fascial plane and did not appear to involve bone or hardware.    Plastic-Per Dr. Pierce, ok to discharge patient today. Plan for Aquacell and dry dressing changes daily. Followup outpt.    Respiratory: Patient instructed to use incentive spirometer [X ] YES [ ] NO               DVT ppx: [ X] SQL [ ] SQH and Venodynes [ ] Left [ ] Right [X ] Bilateral    Discharge planning: PT-Home PT, Owns RW.

## 2018-03-20 NOTE — PROGRESS NOTE ADULT - PROVIDER SPECIALTY LIST ADULT
Hospitalist
Infectious Disease
Neurosurgery
Neurosurgery
Plastic Surgery
Plastic Surgery
Neurosurgery
Neurosurgery

## 2018-03-20 NOTE — PROGRESS NOTE ADULT - SUBJECTIVE AND OBJECTIVE BOX
Pt seen and examined  AVSS NAD    Lumbar Spine: wound is clean, no erythema, no drainage.  Superficial skin widening noted, no deep opening.    KEATON stripped and ss    A/P: Plan for Aquacell and dry dressing changes daily  continue to monitor drain output  anbx per ID   will follow

## 2018-03-20 NOTE — DISCHARGE NOTE ADULT - NS AS ACTIVITY OBS
Do not make important decisions/Walking-Outdoors allowed/Bathing allowed/Showering allowed/Walking-Indoors allowed/Stairs allowed/Do not drive or operate machinery/No Heavy lifting/straining

## 2018-03-20 NOTE — PROGRESS NOTE ADULT - SUBJECTIVE AND OBJECTIVE BOX
Plastic Surgery Progress Note (pg [facility]: [id number], [facility]: [phone])    SUBJECTIVE:  Doing well. No overnight events. Pain well controlled.     OBJECTIVE:     ** VITAL SIGNS / I&O's **    Vital Signs Last 24 Hrs  T(C): 36.6 (20 Mar 2018 05:17), Max: 37 (19 Mar 2018 17:28)  T(F): 97.9 (20 Mar 2018 05:17), Max: 98.6 (19 Mar 2018 17:28)  HR: 89 (20 Mar 2018 05:17) (81 - 89)  BP: 129/73 (20 Mar 2018 05:17) (119/76 - 149/87)  BP(mean): --  RR: 18 (20 Mar 2018 05:17) (18 - 18)  SpO2: 94% (20 Mar 2018 05:17) (94% - 97%)      18 Mar 2018 07:01  -  19 Mar 2018 07:00  --------------------------------------------------------  IN:    IV PiggyBack: 650 mL    Oral Fluid: 620 mL  Total IN: 1270 mL    OUT:    Bulb: 10 mL  Total OUT: 10 mL    Total NET: 1260 mL      19 Mar 2018 07:01  -  20 Mar 2018 06:34  --------------------------------------------------------  IN:    IV PiggyBack: 300 mL    Oral Fluid: 1380 mL  Total IN: 1680 mL    OUT:    Bulb: 32 mL  Total OUT: 32 mL    Total NET: 1648 mL          ** PHYSICAL EXAM **    -- CONSTITUTIONAL: AOx3. NAD.   -- BACK: the incision is not completely healed; the epidermis not intact; gentle pressure separates the incision throughout the majority of its length; there is no drainage, no palpable collections, KEATON ss.    **MEDS**  acetaminophen   Tablet 650 milliGRAM(s) Oral every 6 hours PRN  acetaminophen   Tablet. 650 milliGRAM(s) Oral every 6 hours PRN  carvedilol 25 milliGRAM(s) Oral every 12 hours  diazepam    Tablet 5 milliGRAM(s) Oral every 8 hours PRN  docusate sodium 100 milliGRAM(s) Oral three times a day  enoxaparin Injectable 40 milliGRAM(s) SubCutaneous at bedtime  gabapentin 300 milliGRAM(s) Oral three times a day  meclizine 12.5 milliGRAM(s) Oral two times a day PRN  melatonin 1 milliGRAM(s) Oral at bedtime  ondansetron Injectable 4 milliGRAM(s) IV Push every 6 hours PRN  oxyCODONE    IR 10 milliGRAM(s) Oral four times a day PRN  oxyCODONE    IR 5 milliGRAM(s) Oral every 4 hours PRN  piperacillin/tazobactam IVPB. 3.375 Gram(s) IV Intermittent every 8 hours  senna 2 Tablet(s) Oral at bedtime      ** LABS **              CAPILLARY BLOOD GLUCOSE Plastic Surgery Progress Note (pg [facility]: [id number], [facility]: [phone])    SUBJECTIVE:  Doing well. No overnight events. Pain well controlled.     OBJECTIVE:     ** VITAL SIGNS / I&O's **    Vital Signs Last 24 Hrs  T(C): 36.6 (20 Mar 2018 05:17), Max: 37 (19 Mar 2018 17:28)  T(F): 97.9 (20 Mar 2018 05:17), Max: 98.6 (19 Mar 2018 17:28)  HR: 89 (20 Mar 2018 05:17) (81 - 89)  BP: 129/73 (20 Mar 2018 05:17) (119/76 - 149/87)  BP(mean): --  RR: 18 (20 Mar 2018 05:17) (18 - 18)  SpO2: 94% (20 Mar 2018 05:17) (94% - 97%)      18 Mar 2018 07:01  -  19 Mar 2018 07:00  --------------------------------------------------------  IN:    IV PiggyBack: 650 mL    Oral Fluid: 620 mL  Total IN: 1270 mL    OUT:    Bulb: 10 mL  Total OUT: 10 mL    Total NET: 1260 mL      19 Mar 2018 07:01  -  20 Mar 2018 06:34  --------------------------------------------------------  IN:    IV PiggyBack: 300 mL    Oral Fluid: 1380 mL  Total IN: 1680 mL    OUT:    Bulb: 32 mL  Total OUT: 32 mL    Total NET: 1648 mL          ** PHYSICAL EXAM **    -- CONSTITUTIONAL: AOx3. NAD.   -- BACK: the incision is not completely healed; the epidermis not intact; gentle pressure separates the incision throughout the majority of its length; there is no drainage, no palpable collections, KEATON ss.    **MEDS**  acetaminophen   Tablet 650 milliGRAM(s) Oral every 6 hours PRN  acetaminophen   Tablet. 650 milliGRAM(s) Oral every 6 hours PRN  carvedilol 25 milliGRAM(s) Oral every 12 hours  diazepam    Tablet 5 milliGRAM(s) Oral every 8 hours PRN  docusate sodium 100 milliGRAM(s) Oral three times a day  enoxaparin Injectable 40 milliGRAM(s) SubCutaneous at bedtime  gabapentin 300 milliGRAM(s) Oral three times a day  meclizine 12.5 milliGRAM(s) Oral two times a day PRN  melatonin 1 milliGRAM(s) Oral at bedtime  ondansetron Injectable 4 milliGRAM(s) IV Push every 6 hours PRN  oxyCODONE    IR 10 milliGRAM(s) Oral four times a day PRN  oxyCODONE    IR 5 milliGRAM(s) Oral every 4 hours PRN  piperacillin/tazobactam IVPB. 3.375 Gram(s) IV Intermittent every 8 hours  senna 2 Tablet(s) Oral at bedtime      Culture - Surgical Swab (03.17.18 @ 00:34)  Enterococcus faecalis    Organism: Klebsiella pneumoniae    Organism: Enterococcus faecalis

## 2018-03-21 LAB
CULTURE RESULTS: SIGNIFICANT CHANGE UP
ORGANISM # SPEC MICROSCOPIC CNT: SIGNIFICANT CHANGE UP
SPECIMEN SOURCE: SIGNIFICANT CHANGE UP

## 2018-03-26 ENCOUNTER — APPOINTMENT (OUTPATIENT)
Dept: SPINE | Facility: CLINIC | Age: 63
End: 2018-03-26
Payer: MEDICARE

## 2018-03-26 VITALS
TEMPERATURE: 98.1 F | HEART RATE: 68 BPM | SYSTOLIC BLOOD PRESSURE: 120 MMHG | BODY MASS INDEX: 38.62 KG/M2 | WEIGHT: 218 LBS | HEIGHT: 63 IN | DIASTOLIC BLOOD PRESSURE: 80 MMHG

## 2018-03-26 DIAGNOSIS — Z86.19 PERSONAL HISTORY OF OTHER INFECTIOUS AND PARASITIC DISEASES: ICD-10-CM

## 2018-03-26 DIAGNOSIS — Z86.39 PERSONAL HISTORY OF OTHER ENDOCRINE, NUTRITIONAL AND METABOLIC DISEASE: ICD-10-CM

## 2018-03-26 PROCEDURE — 99024 POSTOP FOLLOW-UP VISIT: CPT

## 2018-03-26 RX ORDER — CEPHALEXIN 500 MG/1
500 CAPSULE ORAL
Qty: 20 | Refills: 0 | Status: DISCONTINUED | COMMUNITY
Start: 2018-03-12 | End: 2018-03-26

## 2018-04-02 ENCOUNTER — OUTPATIENT (OUTPATIENT)
Dept: OUTPATIENT SERVICES | Facility: HOSPITAL | Age: 63
LOS: 1 days | End: 2018-04-02
Payer: MEDICAID

## 2018-04-02 ENCOUNTER — APPOINTMENT (OUTPATIENT)
Dept: INFECTIOUS DISEASE | Facility: CLINIC | Age: 63
End: 2018-04-02
Payer: MEDICAID

## 2018-04-02 VITALS
DIASTOLIC BLOOD PRESSURE: 84 MMHG | HEART RATE: 68 BPM | TEMPERATURE: 97 F | BODY MASS INDEX: 39.69 KG/M2 | SYSTOLIC BLOOD PRESSURE: 162 MMHG | HEIGHT: 63 IN | OXYGEN SATURATION: 98 % | WEIGHT: 224 LBS

## 2018-04-02 DIAGNOSIS — G56.03 CARPAL TUNNEL SYNDROME, BILATERAL UPPER LIMBS: Chronic | ICD-10-CM

## 2018-04-02 DIAGNOSIS — Z98.49 CATARACT EXTRACTION STATUS, UNSPECIFIED EYE: Chronic | ICD-10-CM

## 2018-04-02 DIAGNOSIS — B97.89 OTHER VIRAL AGENTS AS THE CAUSE OF DISEASES CLASSIFIED ELSEWHERE: ICD-10-CM

## 2018-04-02 PROBLEM — Z86.39 HISTORY OF OBESITY: Status: RESOLVED | Noted: 2018-04-02 | Resolved: 2018-04-02

## 2018-04-02 PROBLEM — Z86.19 HISTORY OF SURGICAL SITE INFECTION: Status: RESOLVED | Noted: 2018-04-02 | Resolved: 2018-04-02

## 2018-04-02 PROCEDURE — G0009: CPT

## 2018-04-02 PROCEDURE — G0463: CPT | Mod: 25

## 2018-04-02 PROCEDURE — 99213 OFFICE O/P EST LOW 20 MIN: CPT

## 2018-04-02 PROCEDURE — 90670 PCV13 VACCINE IM: CPT

## 2018-04-13 DIAGNOSIS — Z23 ENCOUNTER FOR IMMUNIZATION: ICD-10-CM

## 2018-04-13 DIAGNOSIS — L03.90 CELLULITIS, UNSPECIFIED: ICD-10-CM

## 2018-04-26 ENCOUNTER — APPOINTMENT (OUTPATIENT)
Dept: SPINE | Facility: CLINIC | Age: 63
End: 2018-04-26
Payer: MEDICARE

## 2018-04-26 VITALS
BODY MASS INDEX: 39.69 KG/M2 | DIASTOLIC BLOOD PRESSURE: 83 MMHG | HEART RATE: 66 BPM | HEIGHT: 63 IN | SYSTOLIC BLOOD PRESSURE: 134 MMHG | WEIGHT: 224 LBS

## 2018-04-26 PROCEDURE — 99024 POSTOP FOLLOW-UP VISIT: CPT

## 2018-04-26 RX ORDER — SACCHAROMYCES BOULARDII 250 MG
250 CAPSULE ORAL TWICE DAILY
Qty: 60 | Refills: 0 | Status: COMPLETED | COMMUNITY
Start: 2018-03-12 | End: 2018-04-26

## 2018-04-26 RX ORDER — DIAZEPAM 5 MG/1
5 TABLET ORAL
Refills: 0 | Status: COMPLETED | COMMUNITY
End: 2018-04-26

## 2018-04-26 RX ORDER — AMOXICILLIN AND CLAVULANATE POTASSIUM 875; 125 MG/1; MG/1
875-125 TABLET, COATED ORAL
Refills: 0 | Status: COMPLETED | COMMUNITY
End: 2018-04-26

## 2018-05-23 PROCEDURE — 99285 EMERGENCY DEPT VISIT HI MDM: CPT

## 2018-05-23 PROCEDURE — 80048 BASIC METABOLIC PNL TOTAL CA: CPT

## 2018-05-23 PROCEDURE — 97116 GAIT TRAINING THERAPY: CPT

## 2018-05-23 PROCEDURE — 86901 BLOOD TYPING SEROLOGIC RH(D): CPT

## 2018-05-23 PROCEDURE — 93005 ELECTROCARDIOGRAM TRACING: CPT

## 2018-05-23 PROCEDURE — C1889: CPT

## 2018-05-23 PROCEDURE — 86850 RBC ANTIBODY SCREEN: CPT

## 2018-05-23 PROCEDURE — 86900 BLOOD TYPING SEROLOGIC ABO: CPT

## 2018-05-23 PROCEDURE — 81003 URINALYSIS AUTO W/O SCOPE: CPT

## 2018-05-23 PROCEDURE — 97110 THERAPEUTIC EXERCISES: CPT

## 2018-05-23 PROCEDURE — 85610 PROTHROMBIN TIME: CPT

## 2018-05-23 PROCEDURE — 97161 PT EVAL LOW COMPLEX 20 MIN: CPT

## 2018-05-23 PROCEDURE — 87040 BLOOD CULTURE FOR BACTERIA: CPT

## 2018-05-23 PROCEDURE — 85730 THROMBOPLASTIN TIME PARTIAL: CPT

## 2018-05-23 PROCEDURE — 72020 X-RAY EXAM OF SPINE 1 VIEW: CPT

## 2018-05-23 PROCEDURE — 87186 SC STD MICRODIL/AGAR DIL: CPT

## 2018-05-23 PROCEDURE — 85027 COMPLETE CBC AUTOMATED: CPT

## 2018-05-23 PROCEDURE — 87070 CULTURE OTHR SPECIMN AEROBIC: CPT

## 2018-05-23 PROCEDURE — 80053 COMPREHEN METABOLIC PANEL: CPT

## 2018-05-23 PROCEDURE — 97530 THERAPEUTIC ACTIVITIES: CPT

## 2018-06-07 ENCOUNTER — APPOINTMENT (OUTPATIENT)
Dept: SPINE | Facility: CLINIC | Age: 63
End: 2018-06-07
Payer: MEDICARE

## 2018-06-07 VITALS
HEART RATE: 62 BPM | DIASTOLIC BLOOD PRESSURE: 97 MMHG | SYSTOLIC BLOOD PRESSURE: 183 MMHG | WEIGHT: 224 LBS | HEIGHT: 63 IN | BODY MASS INDEX: 39.69 KG/M2

## 2018-06-07 PROCEDURE — 99214 OFFICE O/P EST MOD 30 MIN: CPT

## 2018-08-24 ENCOUNTER — TRANSCRIPTION ENCOUNTER (OUTPATIENT)
Age: 63
End: 2018-08-24

## 2018-09-06 ENCOUNTER — APPOINTMENT (OUTPATIENT)
Dept: SPINE | Facility: CLINIC | Age: 63
End: 2018-09-06
Payer: MEDICARE

## 2018-09-06 VITALS
RESPIRATION RATE: 14 BRPM | HEART RATE: 61 BPM | BODY MASS INDEX: 38.45 KG/M2 | DIASTOLIC BLOOD PRESSURE: 76 MMHG | HEIGHT: 63 IN | SYSTOLIC BLOOD PRESSURE: 123 MMHG | TEMPERATURE: 97.4 F | WEIGHT: 217 LBS

## 2018-09-06 DIAGNOSIS — Z86.79 PERSONAL HISTORY OF OTHER DISEASES OF THE CIRCULATORY SYSTEM: ICD-10-CM

## 2018-09-06 PROCEDURE — 99214 OFFICE O/P EST MOD 30 MIN: CPT

## 2018-10-01 ENCOUNTER — OUTPATIENT (OUTPATIENT)
Dept: OUTPATIENT SERVICES | Facility: HOSPITAL | Age: 63
LOS: 1 days | End: 2018-10-01
Payer: MEDICAID

## 2018-10-01 DIAGNOSIS — Z98.49 CATARACT EXTRACTION STATUS, UNSPECIFIED EYE: Chronic | ICD-10-CM

## 2018-10-01 DIAGNOSIS — G56.03 CARPAL TUNNEL SYNDROME, BILATERAL UPPER LIMBS: Chronic | ICD-10-CM

## 2018-10-01 PROCEDURE — G9001: CPT

## 2018-10-19 DIAGNOSIS — Z71.89 OTHER SPECIFIED COUNSELING: ICD-10-CM

## 2018-10-19 PROBLEM — M54.40 LUMBAGO WITH SCIATICA, UNSPECIFIED SIDE: Chronic | Status: ACTIVE | Noted: 2018-02-21

## 2018-10-19 PROBLEM — R42 DIZZINESS AND GIDDINESS: Chronic | Status: ACTIVE | Noted: 2018-02-21

## 2018-10-19 PROBLEM — N20.0 CALCULUS OF KIDNEY: Chronic | Status: ACTIVE | Noted: 2018-02-21

## 2018-10-19 PROBLEM — I10 ESSENTIAL (PRIMARY) HYPERTENSION: Chronic | Status: ACTIVE | Noted: 2018-02-21

## 2018-10-19 PROBLEM — E66.9 OBESITY, UNSPECIFIED: Chronic | Status: ACTIVE | Noted: 2018-02-21

## 2019-11-25 NOTE — ED PROVIDER NOTE - PHYSICAL EXAMINATION
Radha Dowling, DO:   Gen: Well appearing, obese, NAD  Head: NCAT  HEENT: PERRL, MMM, normal conjunctiva, anicteric, neck supple  Lung: CTAB, no adventitious sounds  CV: RRR, no murmurs, rubs or gallops  Abd: soft, NTND, no rebound or guarding, no CVAT  MSK: No edema, no visible deformities  Neuro: 5/5 strength b/l LE with sensation intact. Ambulatory with walker.   Skin: Warm and dry, surgical scar with minimal surrounding erythema and granulation tissue within scar consistent with well healing scar, no warmth.   Psych: normal mood and affect
149.9

## 2020-02-15 ENCOUNTER — TRANSCRIPTION ENCOUNTER (OUTPATIENT)
Age: 65
End: 2020-02-15

## 2020-07-13 ENCOUNTER — FORM ENCOUNTER (OUTPATIENT)
Age: 65
End: 2020-07-13

## 2020-07-14 ENCOUNTER — APPOINTMENT (OUTPATIENT)
Dept: RHEUMATOLOGY | Facility: CLINIC | Age: 65
End: 2020-07-14
Payer: MEDICARE

## 2020-07-14 VITALS
TEMPERATURE: 97.2 F | HEART RATE: 70 BPM | SYSTOLIC BLOOD PRESSURE: 151 MMHG | WEIGHT: 217 LBS | DIASTOLIC BLOOD PRESSURE: 89 MMHG | OXYGEN SATURATION: 98 % | BODY MASS INDEX: 42.6 KG/M2 | HEIGHT: 60 IN

## 2020-07-14 DIAGNOSIS — M19.079 PRIMARY OSTEOARTHRITIS, UNSPECIFIED ANKLE AND FOOT: ICD-10-CM

## 2020-07-14 DIAGNOSIS — M25.511 PAIN IN RIGHT SHOULDER: ICD-10-CM

## 2020-07-14 PROCEDURE — 99204 OFFICE O/P NEW MOD 45 MIN: CPT

## 2020-07-14 PROCEDURE — 73610 X-RAY EXAM OF ANKLE: CPT | Mod: LT

## 2020-07-14 PROCEDURE — 73030 X-RAY EXAM OF SHOULDER: CPT | Mod: LT,76

## 2020-07-16 NOTE — HISTORY OF PRESENT ILLNESS
[FreeTextEntry1] : patient with ongoing back pain with radiculopathy bilaterally - had previous laminectomy about 3 years ago with residual symptoms at the time - but reports that this now has worsen. Cannot stand for more than 5 minutes - has also numbness over bilateral legs - \par Takes tylenol as needed with mild improvement - was given naproxen with no improvement\par PMH of HTN and migraines\par

## 2020-07-16 NOTE — PHYSICAL EXAM
[General Appearance - Alert] : alert [Neck Appearance] : the appearance of the neck was normal [General Appearance - In No Acute Distress] : in no acute distress [Neck Cervical Mass (___cm)] : no neck mass was observed [Jugular Venous Distention Increased] : there was no jugular-venous distention [Thyroid Diffuse Enlargement] : the thyroid was not enlarged [Auscultation Breath Sounds / Voice Sounds] : lungs were clear to auscultation bilaterally [Thyroid Nodule] : there were no palpable thyroid nodules [Heart Sounds] : normal S1 and S2 [Heart Rate And Rhythm] : heart rate was normal and rhythm regular [Murmurs] : no murmurs [Heart Sounds Pericardial Friction Rub] : no pericardial rub [Heart Sounds Gallop] : no gallops [Edema] : there was no peripheral edema [Bowel Sounds] : normal bowel sounds [Abdomen Tenderness] : non-tender [Abdomen Soft] : soft [Cervical Lymph Nodes Enlarged Posterior Bilaterally] : posterior cervical [Cervical Lymph Nodes Enlarged Anterior Bilaterally] : anterior cervical [Abdomen Mass (___ Cm)] : no abdominal mass palpated [Supraclavicular Lymph Nodes Enlarged Bilaterally] : supraclavicular [No CVA Tenderness] : no ~M costovertebral angle tenderness [Skin Color & Pigmentation] : normal skin color and pigmentation [FreeTextEntry1] : lumbar tenderness - left leg weakness 3/5 [Oriented To Time, Place, And Person] : oriented to person, place, and time [Skin Turgor] : normal skin turgor [] : no rash [Impaired Insight] : insight and judgment were intact [Affect] : the affect was normal

## 2020-07-16 NOTE — ASSESSMENT
[FreeTextEntry1] : 65 year-old female with hx of chronic lumbar radiculopathy, now worsening with severe pain and bilateral leg radiculopathy\par Right shoulder with decreased ROM - x-rays today - r/o OA X R tear\par left ankle swelling - x-rays today\par Lumbar MRI order given severe pain and radiculopathy\par start gabapentin 300 mg at bed time to help with her radiculopathy symptoms\par meloxicam 15 mg daily -\par may consider PT - but will wait on results - will need referral to physiatry X spine surgery\par f/u 4 weeks after MRI\par

## 2020-07-16 NOTE — PHYSICAL EXAM
[General Appearance - Alert] : alert [Neck Appearance] : the appearance of the neck was normal [Neck Cervical Mass (___cm)] : no neck mass was observed [General Appearance - In No Acute Distress] : in no acute distress [Thyroid Diffuse Enlargement] : the thyroid was not enlarged [Jugular Venous Distention Increased] : there was no jugular-venous distention [Auscultation Breath Sounds / Voice Sounds] : lungs were clear to auscultation bilaterally [Thyroid Nodule] : there were no palpable thyroid nodules [Heart Sounds] : normal S1 and S2 [Heart Rate And Rhythm] : heart rate was normal and rhythm regular [Heart Sounds Pericardial Friction Rub] : no pericardial rub [Murmurs] : no murmurs [Heart Sounds Gallop] : no gallops [Bowel Sounds] : normal bowel sounds [Edema] : there was no peripheral edema [Abdomen Soft] : soft [Abdomen Tenderness] : non-tender [Cervical Lymph Nodes Enlarged Posterior Bilaterally] : posterior cervical [Cervical Lymph Nodes Enlarged Anterior Bilaterally] : anterior cervical [Abdomen Mass (___ Cm)] : no abdominal mass palpated [Supraclavicular Lymph Nodes Enlarged Bilaterally] : supraclavicular [No CVA Tenderness] : no ~M costovertebral angle tenderness [FreeTextEntry1] : right shoulder with decreased ROM in all directions and pain on movement and passive ROM -  left ankle swelling and pain to palpation and movement [Skin Color & Pigmentation] : normal skin color and pigmentation [Oriented To Time, Place, And Person] : oriented to person, place, and time [] : no rash [Skin Turgor] : normal skin turgor [Impaired Insight] : insight and judgment were intact [Affect] : the affect was normal

## 2020-07-22 ENCOUNTER — OUTPATIENT (OUTPATIENT)
Dept: OUTPATIENT SERVICES | Facility: HOSPITAL | Age: 65
LOS: 1 days | End: 2020-07-22
Payer: MEDICARE

## 2020-07-22 ENCOUNTER — APPOINTMENT (OUTPATIENT)
Dept: MRI IMAGING | Facility: CLINIC | Age: 65
End: 2020-07-22
Payer: MEDICARE

## 2020-07-22 ENCOUNTER — RESULT REVIEW (OUTPATIENT)
Age: 65
End: 2020-07-22

## 2020-07-22 DIAGNOSIS — R20.0 ANESTHESIA OF SKIN: ICD-10-CM

## 2020-07-22 DIAGNOSIS — Z98.49 CATARACT EXTRACTION STATUS, UNSPECIFIED EYE: Chronic | ICD-10-CM

## 2020-07-22 DIAGNOSIS — G56.03 CARPAL TUNNEL SYNDROME, BILATERAL UPPER LIMBS: Chronic | ICD-10-CM

## 2020-07-22 PROCEDURE — 72148 MRI LUMBAR SPINE W/O DYE: CPT | Mod: 26

## 2020-07-22 PROCEDURE — 72148 MRI LUMBAR SPINE W/O DYE: CPT

## 2020-07-24 RX ORDER — MELOXICAM 15 MG/1
15 TABLET ORAL DAILY
Refills: 0 | Status: DISCONTINUED | COMMUNITY
Start: 2017-12-06 | End: 2020-07-24

## 2020-07-29 ENCOUNTER — APPOINTMENT (OUTPATIENT)
Dept: CT IMAGING | Facility: CLINIC | Age: 65
End: 2020-07-29
Payer: MEDICARE

## 2020-07-29 ENCOUNTER — OUTPATIENT (OUTPATIENT)
Dept: OUTPATIENT SERVICES | Facility: HOSPITAL | Age: 65
LOS: 1 days | End: 2020-07-29
Payer: MEDICARE

## 2020-07-29 DIAGNOSIS — N28.89 OTHER SPECIFIED DISORDERS OF KIDNEY AND URETER: ICD-10-CM

## 2020-07-29 DIAGNOSIS — Z98.49 CATARACT EXTRACTION STATUS, UNSPECIFIED EYE: Chronic | ICD-10-CM

## 2020-07-29 DIAGNOSIS — G56.03 CARPAL TUNNEL SYNDROME, BILATERAL UPPER LIMBS: Chronic | ICD-10-CM

## 2020-07-29 PROCEDURE — 82565 ASSAY OF CREATININE: CPT

## 2020-07-29 PROCEDURE — 74170 CT ABD WO CNTRST FLWD CNTRST: CPT | Mod: 26

## 2020-07-29 PROCEDURE — 74170 CT ABD WO CNTRST FLWD CNTRST: CPT

## 2020-08-05 ENCOUNTER — APPOINTMENT (OUTPATIENT)
Dept: SPINE | Facility: CLINIC | Age: 65
End: 2020-08-05
Payer: MEDICARE

## 2020-08-05 VITALS
SYSTOLIC BLOOD PRESSURE: 150 MMHG | TEMPERATURE: 97 F | HEART RATE: 80 BPM | HEIGHT: 60 IN | WEIGHT: 217 LBS | BODY MASS INDEX: 42.6 KG/M2 | DIASTOLIC BLOOD PRESSURE: 100 MMHG | RESPIRATION RATE: 18 BRPM

## 2020-08-05 DIAGNOSIS — R20.0 ANESTHESIA OF SKIN: ICD-10-CM

## 2020-08-05 PROCEDURE — 99214 OFFICE O/P EST MOD 30 MIN: CPT

## 2020-08-05 NOTE — REVIEW OF SYSTEMS
[Poor Coordination] : poor coordination [Tingling] : tingling [Abnormal Sensation] : an abnormal sensation [Numbness] : numbness [Joint Pain] : joint pain [Difficulty Walking] : difficulty walking [Joint Stiffness] : joint stiffness [Limb Pain] : limb pain [Negative] : Heme/Lymph [Joint Swelling] : no joint swelling

## 2020-08-05 NOTE — PHYSICAL EXAM
[General Appearance - In No Acute Distress] : in no acute distress [General Appearance - Alert] : alert [Impaired Insight] : insight and judgment were intact [Oriented To Time, Place, And Person] : oriented to person, place, and time [Cranial Nerves Oculomotor (III)] : extraocular motion intact [Cranial Nerves Optic (II)] : visual acuity intact bilaterally,  pupils equal round and reactive to light [Cranial Nerves Glossopharyngeal (IX)] : tongue and palate midline [Cranial Nerves Vestibulocochlear (VIII)] : hearing was intact bilaterally [Cranial Nerves Facial (VII)] : face symmetrical [Cranial Nerves Trigeminal (V)] : facial sensation intact symmetrically [Cranial Nerves Hypoglossal (XII)] : there was no tongue deviation with protrusion [Cranial Nerves Accessory (XI - Cranial And Spinal)] : head turning and shoulder shrug symmetric [5] : C8 finger flexors 5/5 [4] : S1 toe walking 4/5 [Limited Balance] : the patient's balance was impaired [2+] : Ankle jerk left 2+ [Straight-Leg Raise Test - Left] : straight leg raise of the left leg was positive [No Visual Abnormalities] : no visible abnormailities [Antalgic] : antalgic [Straight-Leg Raise Test - Right] : straight leg raise of the right leg was positive [Sclera] : the sclera and conjunctiva were normal [] : no respiratory distress [Heart Rate And Rhythm] : heart rate was normal and rhythm regular [Neck Appearance] : the appearance of the neck was normal [Skin Color & Pigmentation] : normal skin color and pigmentation [Able to toe walk] : the patient was not able to toe walk [Able to heel walk] : the patient was not able to heel walk [FreeTextEntry1] : ambulates with a quad cane

## 2020-08-05 NOTE — ASSESSMENT
[FreeTextEntry1] : surgery recommended; THORACIC TO PELVIS FUSION\par patient and daughter are ready to proceed as soon as possible\par \par She will need scoliosis x-ray prior to surgery\par \par After detailed imaging review and patient examination surgical intervention was discussed with the patient to halt progression of symptoms. \par Potential surgical risks and benefits and alternative discussed with time allowed for questions and answers given. \par Dr Sheppard discussed in detail that loss of function preop is not guaranteed to return. Surgical intervention is to halt further progression although at times the process still continues despite intervention Risks could include but is not limited to infection, no resolution of symptoms/device failure; Paralysis, death.\par Pre-op requirements and postop recovery and expectations discussed regarding Follow up NP for wound care assessment and medication management.\par \par \par \par \par \par

## 2020-08-05 NOTE — REASON FOR VISIT
[Follow-Up: _____] : a [unfilled] follow-up visit [FreeTextEntry1] : this is a 65-year-old woman status post L2-S1 lumbar laminectomies and foraminotomieson 2/28/18 for the treatment of  lumbar spinal stenosis with neurogenic claudication. She reports that she did well for about 5 months after surgery and then her symptoms reoccurred and continued to progress. she now report [place] pain accompanied by Bilateral leg pain and numbness. The pain is rated 9/10 most days and is debilitating. the pain interferes with all levels of activities and during sleep. she ambulates with a quad cane for balance. \par \par \par \par \par

## 2020-08-06 ENCOUNTER — APPOINTMENT (OUTPATIENT)
Dept: RADIOLOGY | Facility: CLINIC | Age: 65
End: 2020-08-06
Payer: MEDICARE

## 2020-08-06 ENCOUNTER — OUTPATIENT (OUTPATIENT)
Dept: OUTPATIENT SERVICES | Facility: HOSPITAL | Age: 65
LOS: 1 days | End: 2020-08-06
Payer: MEDICARE

## 2020-08-06 ENCOUNTER — APPOINTMENT (OUTPATIENT)
Dept: UROLOGY | Facility: CLINIC | Age: 65
End: 2020-08-06
Payer: MEDICARE

## 2020-08-06 VITALS
OXYGEN SATURATION: 98 % | SYSTOLIC BLOOD PRESSURE: 144 MMHG | WEIGHT: 217 LBS | TEMPERATURE: 97.3 F | BODY MASS INDEX: 42.6 KG/M2 | RESPIRATION RATE: 14 BRPM | HEIGHT: 60 IN | HEART RATE: 60 BPM | DIASTOLIC BLOOD PRESSURE: 80 MMHG

## 2020-08-06 DIAGNOSIS — Z98.890 OTHER SPECIFIED POSTPROCEDURAL STATES: ICD-10-CM

## 2020-08-06 DIAGNOSIS — G56.03 CARPAL TUNNEL SYNDROME, BILATERAL UPPER LIMBS: Chronic | ICD-10-CM

## 2020-08-06 DIAGNOSIS — Z98.49 CATARACT EXTRACTION STATUS, UNSPECIFIED EYE: Chronic | ICD-10-CM

## 2020-08-06 DIAGNOSIS — R32 UNSPECIFIED URINARY INCONTINENCE: ICD-10-CM

## 2020-08-06 DIAGNOSIS — N28.89 OTHER SPECIFIED DISORDERS OF KIDNEY AND URETER: ICD-10-CM

## 2020-08-06 PROCEDURE — 72082 X-RAY EXAM ENTIRE SPI 2/3 VW: CPT | Mod: 26

## 2020-08-06 PROCEDURE — 72082 X-RAY EXAM ENTIRE SPI 2/3 VW: CPT

## 2020-08-06 PROCEDURE — 99204 OFFICE O/P NEW MOD 45 MIN: CPT

## 2020-08-06 NOTE — HISTORY OF PRESENT ILLNESS
[FreeTextEntry1] : She is a 65-year-old [place]-speaking woman who is seen today for initial visit. She has nocturia 8-10 times and sometimes urge incontinence. She also has daytime frequency and urgency. There is no gross hematuria or dysuria. An MRI was done for her back which showed bilateral renal cysts and left lower pole renal lesion. CT scan with contrast showed a left lower pole 1.8 cm lesion consistent with a hemorrhagic renal cyst. She has history of back pain and uses a cane to walk. She was able to urinate 350 cc of clear yellow urine and residual urine was minimal. Calculated BMI is 42.

## 2020-08-06 NOTE — PHYSICAL EXAM
[Well Groomed] : well groomed [General Appearance - Well Developed] : well developed [Normal Appearance] : normal appearance [General Appearance - Well Nourished] : well nourished [General Appearance - In No Acute Distress] : no acute distress [Respiration, Rhythm And Depth] : normal respiratory rhythm and effort [Exaggerated Use Of Accessory Muscles For Inspiration] : no accessory muscle use [Abdomen Tenderness] : non-tender [Abdomen Soft] : soft [Costovertebral Angle Tenderness] : no ~M costovertebral angle tenderness [FreeTextEntry1] : using a cane [] : no rash [Oriented To Time, Place, And Person] : oriented to person, place, and time [Affect] : the affect was normal [Mood] : the mood was normal [Not Anxious] : not anxious [Inguinal Lymph Nodes Enlarged Bilaterally] : inguinal

## 2020-08-06 NOTE — ASSESSMENT
[FreeTextEntry1] : We reviewed her CT scan images on the computer screen. The difference between simple and complex renal cysts were discussed. She has bilateral renal cysts and a complex hemorrhagic cyst in the lower pole of the left kidney. It was measured around 1.8 cm. She also had an MRI prior to CT scan. Given the size and the characteristics of the cyst on CT scan and MRI, she could be followed with repeat imaging studies in about 6 months.\par We had a long discussion regarding patient's urinary symptoms. Initial workup with cystoscopy, determination of urinary flow rate, post void residual volume and urodynamic study was discussed. We discussed conservative management without using medications such as timed voiding, controlling constipation, limiting fluids before bed-time, and limiting irritating substances such as coffee, tea, alcohol, spicy foods, etc. We also discussed medication therapy for treatment of urinary symptoms with anticholinergic medications (such as VESIcare, Toviaz), Myrbetriq or a combination of the above medications. Side effects of the medications discussed included but were not limited to constipation, dry mouth, change in vision, memory loss, hypertension, etc.Treatment of overactive bladder symptoms with Botox intravesical injections was reviewed. Side effects of Botox were reviewed including urinary retention, need to self-catheterize, UTIs, systemic effects of Botox, etc. Questions were answered. She will start with over-the-counter Azo bladder control.

## 2020-08-09 LAB
APPEARANCE: CLEAR
BACTERIA UR CULT: NORMAL
BACTERIA: NEGATIVE
BILIRUBIN URINE: NEGATIVE
BLOOD URINE: NEGATIVE
COLOR: COLORLESS
GLUCOSE QUALITATIVE U: NEGATIVE
HYALINE CASTS: 0 /LPF
KETONES URINE: NEGATIVE
LEUKOCYTE ESTERASE URINE: NEGATIVE
MICROSCOPIC-UA: NORMAL
NITRITE URINE: NEGATIVE
PH URINE: 6.5
PROTEIN URINE: NEGATIVE
RED BLOOD CELLS URINE: 0 /HPF
SPECIFIC GRAVITY URINE: 1.01
SQUAMOUS EPITHELIAL CELLS: 0 /HPF
UROBILINOGEN URINE: NORMAL
WHITE BLOOD CELLS URINE: 0 /HPF

## 2020-09-04 ENCOUNTER — OUTPATIENT (OUTPATIENT)
Dept: OUTPATIENT SERVICES | Facility: HOSPITAL | Age: 65
LOS: 1 days | End: 2020-09-04
Payer: MEDICARE

## 2020-09-04 VITALS
TEMPERATURE: 97 F | WEIGHT: 218.04 LBS | HEIGHT: 60 IN | SYSTOLIC BLOOD PRESSURE: 162 MMHG | DIASTOLIC BLOOD PRESSURE: 78 MMHG | HEART RATE: 72 BPM | RESPIRATION RATE: 18 BRPM | OXYGEN SATURATION: 98 %

## 2020-09-04 DIAGNOSIS — Z29.9 ENCOUNTER FOR PROPHYLACTIC MEASURES, UNSPECIFIED: ICD-10-CM

## 2020-09-04 DIAGNOSIS — M48.061 SPINAL STENOSIS, LUMBAR REGION WITHOUT NEUROGENIC CLAUDICATION: ICD-10-CM

## 2020-09-04 DIAGNOSIS — G56.03 CARPAL TUNNEL SYNDROME, BILATERAL UPPER LIMBS: Chronic | ICD-10-CM

## 2020-09-04 DIAGNOSIS — Z98.890 OTHER SPECIFIED POSTPROCEDURAL STATES: Chronic | ICD-10-CM

## 2020-09-04 DIAGNOSIS — M48.01 SPINAL STENOSIS, OCCIPITO-ATLANTO-AXIAL REGION: ICD-10-CM

## 2020-09-04 DIAGNOSIS — Z98.49 CATARACT EXTRACTION STATUS, UNSPECIFIED EYE: Chronic | ICD-10-CM

## 2020-09-04 DIAGNOSIS — Z01.818 ENCOUNTER FOR OTHER PREPROCEDURAL EXAMINATION: ICD-10-CM

## 2020-09-04 RX ORDER — CEFAZOLIN SODIUM 1 G
2000 VIAL (EA) INJECTION ONCE
Refills: 0 | Status: DISCONTINUED | OUTPATIENT
Start: 2020-09-18 | End: 2020-09-19

## 2020-09-04 RX ORDER — SODIUM CHLORIDE 9 MG/ML
3 INJECTION INTRAMUSCULAR; INTRAVENOUS; SUBCUTANEOUS EVERY 8 HOURS
Refills: 0 | Status: DISCONTINUED | OUTPATIENT
Start: 2020-09-18 | End: 2020-09-29

## 2020-09-04 RX ORDER — LIDOCAINE HCL 20 MG/ML
0.2 VIAL (ML) INJECTION ONCE
Refills: 0 | Status: DISCONTINUED | OUTPATIENT
Start: 2020-09-18 | End: 2020-09-29

## 2020-09-04 NOTE — H&P PST ADULT - ASSESSMENT
BRENNANI VTE 2.0 SCORE [CLOT updated 2019]    AGE RELATED RISK FACTORS                                                       MOBILITY RELATED FACTORS  [ ] Age 41-60 years                                            (1 Point)                    [ ] Bed rest                                                        (1 Point)  [x ] Age: 61-74 years                                           (2 Points)                  [ ] Plaster cast                                                   (2 Points)  [ ] Age= 75 years                                              (3 Points)                    [ ] Bed bound for more than 72 hours                 (2 Points)    DISEASE RELATED RISK FACTORS                                               GENDER SPECIFIC FACTORS  [ ] Edema in the lower extremities                       (1 Point)              [ ] Pregnancy                                                     (1 Point)  [ ] Varicose veins                                               (1 Point)                     [ ] Post-partum < 6 weeks                                   (1 Point)             [x ] BMI > 25 Kg/m2                                            (1 Point)                     [ ] Hormonal therapy  or oral contraception          (1 Point)                 [ ] Sepsis (in the previous month)                        (1 Point)               [ ] History of pregnancy complications                 (1 point)  [ ] Pneumonia or serious lung disease                                               [ ] Unexplained or recurrent                     (1 Point)           (in the previous month)                               (1 Point)  [ ] Abnormal pulmonary function test                     (1 Point)                 SURGERY RELATED RISK FACTORS  [ ] Acute myocardial infarction                              (1 Point)               [ ]  Section                                             (1 Point)  [ ] Congestive heart failure (in the previous month)  (1 Point)      [ ] Minor surgery                                                  (1 Point)   [ ] Inflammatory bowel disease                             (1 Point)               [ ] Arthroscopic surgery                                        (2 Points)  [ ] Central venous access                                      (2 Points)                [x ] General surgery lasting more than 45 minutes (2 points)  [ ] Malignancy- Present or previous                   (2 Points)                [ ] Elective arthroplasty                                         (5 points)    [ ] Stroke (in the previous month)                          (5 Points)                                                                                                                                                           HEMATOLOGY RELATED FACTORS                                                 TRAUMA RELATED RISK FACTORS  [ ] Prior episodes of VTE                                     (3 Points)                [ ] Fracture of the hip, pelvis, or leg                       (5 Points)  [ ] Positive family history for VTE                         (3 Points)             [ ] Acute spinal cord injury (in the previous month)  (5 Points)  [ ] Prothrombin 44360 A                                     (3 Points)               [ ] Paralysis  (less than 1 month)                             (5 Points)  [ ] Factor V Leiden                                             (3 Points)                  [ ] Multiple Trauma within 1 month                        (5 Points)  [ ] Lupus anticoagulants                                     (3 Points)                                                           [ ] Anticardiolipin antibodies                               (3 Points)                                                       [ ] High homocysteine in the blood                      (3 Points)                                             [ ] Other congenital or acquired thrombophilia      (3 Points)                                                [ ] Heparin induced thrombocytopenia                  (3 Points)                                     Total Score [  5        ]

## 2020-09-04 NOTE — H&P PST ADULT - HISTORY OF PRESENT ILLNESS
65yr old female with spinal stenosis lumbar region.  Pt coming pain unable to walk and numbness in legs.  Pt had laminectomy 2018 now coming for T10-pelvis  instrumentation and fusion. L2-S1 posterior lumbar fusion.  Pt hx sig for obesity and HTN.    Note ;covid test 9/15/20/Hillary

## 2020-09-04 NOTE — H&P PST ADULT - NSICDXPASTSURGICALHX_GEN_ALL_CORE_FT
PAST SURGICAL HISTORY:  Carpal tunnel syndrome on both sides sx done 2013    History of cataract surgery both eyes 2017    History of laminectomy 3/2018    History of lithotripsy

## 2020-09-04 NOTE — H&P PST ADULT - NSICDXPROBLEM_GEN_ALL_CORE_FT
PROBLEM DIAGNOSES  Problem: Spinal stenosis, lumbar  Assessment and Plan: cominf in for T10 pelvis instrumentation and fusion  L2-S1 posterior lumbar fusion    Problem: Need for prophylactic measure  Assessment and Plan: The Caprini score indicates this patient is at risk for a VTE event (score 3-5).  Most surgical patients in this group would benefit from pharmacologic prophylaxis.  The surgical team will determine the balance between VTE risk and bleeding risk

## 2020-09-04 NOTE — H&P PST ADULT - NSICDXPASTMEDICALHX_GEN_ALL_CORE_FT
PAST MEDICAL HISTORY:  HTN (hypertension)     Kidney stones     Lumbago with sciatica     Lumbar stenosis     Obesity     Vertigo

## 2020-09-15 ENCOUNTER — APPOINTMENT (OUTPATIENT)
Dept: DISASTER EMERGENCY | Facility: CLINIC | Age: 65
End: 2020-09-15

## 2020-09-16 LAB — SARS-COV-2 N GENE NPH QL NAA+PROBE: NOT DETECTED

## 2020-09-17 ENCOUNTER — TRANSCRIPTION ENCOUNTER (OUTPATIENT)
Age: 65
End: 2020-09-17

## 2020-09-18 ENCOUNTER — INPATIENT (INPATIENT)
Facility: HOSPITAL | Age: 65
LOS: 10 days | Discharge: HOME | DRG: 454 | End: 2020-09-29
Attending: NEUROLOGICAL SURGERY | Admitting: NEUROLOGICAL SURGERY
Payer: MEDICARE

## 2020-09-18 ENCOUNTER — APPOINTMENT (OUTPATIENT)
Dept: SPINE | Facility: HOSPITAL | Age: 65
End: 2020-09-18
Payer: MEDICARE

## 2020-09-18 ENCOUNTER — APPOINTMENT (OUTPATIENT)
Dept: PLASTIC SURGERY | Facility: HOSPITAL | Age: 65
End: 2020-09-18
Payer: MEDICARE

## 2020-09-18 VITALS — WEIGHT: 218.04 LBS | HEIGHT: 59.84 IN

## 2020-09-18 DIAGNOSIS — Z98.49 CATARACT EXTRACTION STATUS, UNSPECIFIED EYE: Chronic | ICD-10-CM

## 2020-09-18 DIAGNOSIS — G56.03 CARPAL TUNNEL SYNDROME, BILATERAL UPPER LIMBS: Chronic | ICD-10-CM

## 2020-09-18 DIAGNOSIS — Z98.890 OTHER SPECIFIED POSTPROCEDURAL STATES: Chronic | ICD-10-CM

## 2020-09-18 DIAGNOSIS — M48.01 SPINAL STENOSIS, OCCIPITO-ATLANTO-AXIAL REGION: ICD-10-CM

## 2020-09-18 PROBLEM — M48.061 SPINAL STENOSIS, LUMBAR REGION WITHOUT NEUROGENIC CLAUDICATION: Chronic | Status: ACTIVE | Noted: 2020-09-04

## 2020-09-18 LAB
BASOPHILS # BLD AUTO: 0.02 K/UL — SIGNIFICANT CHANGE UP (ref 0–0.2)
BASOPHILS NFR BLD AUTO: 0.1 % — SIGNIFICANT CHANGE UP (ref 0–2)
EOSINOPHIL # BLD AUTO: 0 K/UL — SIGNIFICANT CHANGE UP (ref 0–0.5)
EOSINOPHIL NFR BLD AUTO: 0 % — SIGNIFICANT CHANGE UP (ref 0–6)
GAS PNL BLDA: SIGNIFICANT CHANGE UP
HCT VFR BLD CALC: 28.3 % — LOW (ref 34.5–45)
HGB BLD-MCNC: 9.4 G/DL — LOW (ref 11.5–15.5)
IMM GRANULOCYTES NFR BLD AUTO: 0.8 % — SIGNIFICANT CHANGE UP (ref 0–1.5)
LYMPHOCYTES # BLD AUTO: 1.07 K/UL — SIGNIFICANT CHANGE UP (ref 1–3.3)
LYMPHOCYTES # BLD AUTO: 7.4 % — LOW (ref 13–44)
MCHC RBC-ENTMCNC: 28.6 PG — SIGNIFICANT CHANGE UP (ref 27–34)
MCHC RBC-ENTMCNC: 33.2 GM/DL — SIGNIFICANT CHANGE UP (ref 32–36)
MCV RBC AUTO: 86 FL — SIGNIFICANT CHANGE UP (ref 80–100)
MONOCYTES # BLD AUTO: 0.72 K/UL — SIGNIFICANT CHANGE UP (ref 0–0.9)
MONOCYTES NFR BLD AUTO: 5 % — SIGNIFICANT CHANGE UP (ref 2–14)
NEUTROPHILS # BLD AUTO: 12.52 K/UL — HIGH (ref 1.8–7.4)
NEUTROPHILS NFR BLD AUTO: 86.7 % — HIGH (ref 43–77)
NRBC # BLD: 0 /100 WBCS — SIGNIFICANT CHANGE UP (ref 0–0)
PLATELET # BLD AUTO: 151 K/UL — SIGNIFICANT CHANGE UP (ref 150–400)
RBC # BLD: 3.29 M/UL — LOW (ref 3.8–5.2)
RBC # FLD: 16.2 % — HIGH (ref 10.3–14.5)
WBC # BLD: 14.45 K/UL — HIGH (ref 3.8–10.5)
WBC # FLD AUTO: 14.45 K/UL — HIGH (ref 3.8–10.5)

## 2020-09-18 PROCEDURE — 87641 MR-STAPH DNA AMP PROBE: CPT

## 2020-09-18 PROCEDURE — 86923 COMPATIBILITY TEST ELECTRIC: CPT

## 2020-09-18 PROCEDURE — 22633 ARTHRD CMBN 1NTRSPC LUMBAR: CPT

## 2020-09-18 PROCEDURE — 86850 RBC ANTIBODY SCREEN: CPT

## 2020-09-18 PROCEDURE — 22853 INSJ BIOMECHANICAL DEVICE: CPT

## 2020-09-18 PROCEDURE — 86901 BLOOD TYPING SEROLOGIC RH(D): CPT

## 2020-09-18 PROCEDURE — 22634 ARTHRD CMBN 1NTRSPC EA ADDL: CPT

## 2020-09-18 PROCEDURE — 72131 CT LUMBAR SPINE W/O DYE: CPT | Mod: 26

## 2020-09-18 PROCEDURE — 22843 INSERT SPINE FIXATION DEVICE: CPT

## 2020-09-18 PROCEDURE — 87640 STAPH A DNA AMP PROBE: CPT

## 2020-09-18 PROCEDURE — P9016: CPT

## 2020-09-18 PROCEDURE — 99233 SBSQ HOSP IP/OBS HIGH 50: CPT

## 2020-09-18 PROCEDURE — 27280 ARTHR SI JT OPN B1GRF INSTRM: CPT | Mod: 50

## 2020-09-18 PROCEDURE — G0463: CPT

## 2020-09-18 PROCEDURE — 63047 LAM FACETEC & FORAMOT LUMBAR: CPT | Mod: 59

## 2020-09-18 PROCEDURE — P9041: CPT

## 2020-09-18 PROCEDURE — 15734 MUSCLE-SKIN GRAFT TRUNK: CPT

## 2020-09-18 PROCEDURE — 80048 BASIC METABOLIC PNL TOTAL CA: CPT

## 2020-09-18 PROCEDURE — 85027 COMPLETE CBC AUTOMATED: CPT

## 2020-09-18 PROCEDURE — 13102 CMPLX RPR TRUNK ADDL 5CM/<: CPT | Mod: 59

## 2020-09-18 PROCEDURE — 86900 BLOOD TYPING SEROLOGIC ABO: CPT

## 2020-09-18 PROCEDURE — 22614 ARTHRD PST TQ 1NTRSPC EA ADD: CPT

## 2020-09-18 PROCEDURE — 13101 CMPLX RPR TRUNK 2.6-7.5 CM: CPT | Mod: 59

## 2020-09-18 RX ORDER — FENTANYL CITRATE 50 UG/ML
50 INJECTION INTRAVENOUS
Refills: 0 | Status: DISCONTINUED | OUTPATIENT
Start: 2020-09-18 | End: 2020-09-18

## 2020-09-18 RX ORDER — INFLUENZA VIRUS VACCINE 15; 15; 15; 15 UG/.5ML; UG/.5ML; UG/.5ML; UG/.5ML
0.5 SUSPENSION INTRAMUSCULAR ONCE
Refills: 0 | Status: DISCONTINUED | OUTPATIENT
Start: 2020-09-18 | End: 2020-09-29

## 2020-09-18 RX ORDER — LOSARTAN POTASSIUM 100 MG/1
50 TABLET, FILM COATED ORAL
Refills: 0 | Status: DISCONTINUED | OUTPATIENT
Start: 2020-09-18 | End: 2020-09-22

## 2020-09-18 RX ORDER — GABAPENTIN 400 MG/1
300 CAPSULE ORAL DAILY
Refills: 0 | Status: DISCONTINUED | OUTPATIENT
Start: 2020-09-18 | End: 2020-09-18

## 2020-09-18 RX ORDER — CALCIUM GLUCONATE 100 MG/ML
2 VIAL (ML) INTRAVENOUS ONCE
Refills: 0 | Status: COMPLETED | OUTPATIENT
Start: 2020-09-18 | End: 2020-09-18

## 2020-09-18 RX ORDER — CHLORHEXIDINE GLUCONATE 213 G/1000ML
1 SOLUTION TOPICAL ONCE
Refills: 0 | Status: COMPLETED | OUTPATIENT
Start: 2020-09-18 | End: 2020-09-18

## 2020-09-18 RX ORDER — ONDANSETRON 8 MG/1
4 TABLET, FILM COATED ORAL ONCE
Refills: 0 | Status: COMPLETED | OUTPATIENT
Start: 2020-09-18 | End: 2020-09-19

## 2020-09-18 RX ORDER — GABAPENTIN 400 MG/1
300 CAPSULE ORAL DAILY
Refills: 0 | Status: DISCONTINUED | OUTPATIENT
Start: 2020-09-18 | End: 2020-09-29

## 2020-09-18 RX ORDER — NALOXONE HYDROCHLORIDE 4 MG/.1ML
0.1 SPRAY NASAL
Refills: 0 | Status: DISCONTINUED | OUTPATIENT
Start: 2020-09-18 | End: 2020-09-29

## 2020-09-18 RX ORDER — CARVEDILOL PHOSPHATE 80 MG/1
25 CAPSULE, EXTENDED RELEASE ORAL EVERY 12 HOURS
Refills: 0 | Status: DISCONTINUED | OUTPATIENT
Start: 2020-09-18 | End: 2020-09-22

## 2020-09-18 RX ORDER — CLONAZEPAM 1 MG
0.5 TABLET ORAL THREE TIMES A DAY
Refills: 0 | Status: DISCONTINUED | OUTPATIENT
Start: 2020-09-18 | End: 2020-09-18

## 2020-09-18 RX ORDER — CLONAZEPAM 1 MG
0.5 TABLET ORAL THREE TIMES A DAY
Refills: 0 | Status: DISCONTINUED | OUTPATIENT
Start: 2020-09-18 | End: 2020-09-21

## 2020-09-18 RX ORDER — ACETAMINOPHEN 500 MG
650 TABLET ORAL EVERY 6 HOURS
Refills: 0 | Status: DISCONTINUED | OUTPATIENT
Start: 2020-09-18 | End: 2020-09-29

## 2020-09-18 RX ORDER — LOSARTAN POTASSIUM 100 MG/1
50 TABLET, FILM COATED ORAL
Refills: 0 | Status: DISCONTINUED | OUTPATIENT
Start: 2020-09-18 | End: 2020-09-18

## 2020-09-18 RX ORDER — CARVEDILOL PHOSPHATE 80 MG/1
25 CAPSULE, EXTENDED RELEASE ORAL EVERY 12 HOURS
Refills: 0 | Status: DISCONTINUED | OUTPATIENT
Start: 2020-09-18 | End: 2020-09-18

## 2020-09-18 RX ORDER — HYDROMORPHONE HYDROCHLORIDE 2 MG/ML
30 INJECTION INTRAMUSCULAR; INTRAVENOUS; SUBCUTANEOUS
Refills: 0 | Status: DISCONTINUED | OUTPATIENT
Start: 2020-09-18 | End: 2020-09-20

## 2020-09-18 RX ORDER — SENNA PLUS 8.6 MG/1
2 TABLET ORAL AT BEDTIME
Refills: 0 | Status: DISCONTINUED | OUTPATIENT
Start: 2020-09-18 | End: 2020-09-29

## 2020-09-18 RX ORDER — CEFAZOLIN SODIUM 1 G
2000 VIAL (EA) INJECTION EVERY 8 HOURS
Refills: 0 | Status: COMPLETED | OUTPATIENT
Start: 2020-09-18 | End: 2020-09-19

## 2020-09-18 RX ORDER — ONDANSETRON 8 MG/1
4 TABLET, FILM COATED ORAL EVERY 6 HOURS
Refills: 0 | Status: DISCONTINUED | OUTPATIENT
Start: 2020-09-18 | End: 2020-09-29

## 2020-09-18 RX ORDER — ENOXAPARIN SODIUM 100 MG/ML
40 INJECTION SUBCUTANEOUS DAILY
Refills: 0 | Status: DISCONTINUED | OUTPATIENT
Start: 2020-09-19 | End: 2020-09-19

## 2020-09-18 RX ORDER — DIAZEPAM 5 MG
5 TABLET ORAL EVERY 6 HOURS
Refills: 0 | Status: DISCONTINUED | OUTPATIENT
Start: 2020-09-18 | End: 2020-09-22

## 2020-09-18 RX ORDER — HYDROMORPHONE HYDROCHLORIDE 2 MG/ML
0.5 INJECTION INTRAMUSCULAR; INTRAVENOUS; SUBCUTANEOUS
Refills: 0 | Status: DISCONTINUED | OUTPATIENT
Start: 2020-09-18 | End: 2020-09-20

## 2020-09-18 RX ORDER — DEXTROSE MONOHYDRATE, SODIUM CHLORIDE, AND POTASSIUM CHLORIDE 50; .745; 4.5 G/1000ML; G/1000ML; G/1000ML
1000 INJECTION, SOLUTION INTRAVENOUS
Refills: 0 | Status: DISCONTINUED | OUTPATIENT
Start: 2020-09-18 | End: 2020-09-19

## 2020-09-18 RX ADMIN — Medication 100 MILLIGRAM(S): at 23:50

## 2020-09-18 RX ADMIN — Medication 200 GRAM(S): at 23:51

## 2020-09-18 RX ADMIN — HYDROMORPHONE HYDROCHLORIDE 0.5 MILLIGRAM(S): 2 INJECTION INTRAMUSCULAR; INTRAVENOUS; SUBCUTANEOUS at 22:15

## 2020-09-18 RX ADMIN — HYDROMORPHONE HYDROCHLORIDE 30 MILLILITER(S): 2 INJECTION INTRAMUSCULAR; INTRAVENOUS; SUBCUTANEOUS at 20:15

## 2020-09-18 RX ADMIN — HYDROMORPHONE HYDROCHLORIDE 0.5 MILLIGRAM(S): 2 INJECTION INTRAMUSCULAR; INTRAVENOUS; SUBCUTANEOUS at 21:14

## 2020-09-18 RX ADMIN — SODIUM CHLORIDE 3 MILLILITER(S): 9 INJECTION INTRAMUSCULAR; INTRAVENOUS; SUBCUTANEOUS at 22:00

## 2020-09-18 NOTE — PATIENT PROFILE ADULT - FLU SEASON?
Interval H&P   I reviewed the H&P, I examined the patient, and there are no changes in the patient's condition. Risks and Benefits discussed. Proceed with surgery.  Sosa Lozada MD  6:55 AM    
Yes...

## 2020-09-18 NOTE — PRE-OP CHECKLIST - SITE MARKED BY ANESTHESIOLOGIST
Subjective   Maranda Gillis is a 50 y.o. female.     History of Present Illness    The patient is here today with c/o lower abd pain radiating in to her vaginal canal. This occurred for 30 minutes this am. This occurred 7 months ago. Lots of bloating, had a 3 week period. She has not gone a whole year with out a period. She started with hematuria a few days ago.   She denies hx of kidney stones.     Last pap- 3 years ago    She started running again a few weeks.     The following portions of the patient's history were reviewed and updated as appropriate: allergies, current medications, past family history, past medical history, past social history, past surgical history and problem list.    Review of Systems   Constitutional: Negative.    Respiratory: Negative.    Cardiovascular: Negative.    Genitourinary: Positive for flank pain, hematuria and urgency. Negative for dysuria and frequency.       Objective   Physical Exam   Constitutional: She appears well-developed and well-nourished.   Neck: Normal range of motion. Neck supple. No thyromegaly present.   Cardiovascular: Normal rate, regular rhythm, normal heart sounds and intact distal pulses.   Pulmonary/Chest: Effort normal and breath sounds normal.   Abdominal: Soft. Normal appearance, normal aorta and bowel sounds are normal. There is no hepatosplenomegaly. There is generalized tenderness (worse on right side and right flank). There is no CVA tenderness.   Lymphadenopathy:     She has no cervical adenopathy.   Skin: Skin is warm and dry.   Psychiatric: She has a normal mood and affect. Her behavior is normal. Judgment and thought content normal.       Assessment/Plan   There are no diagnoses linked to this encounter.           1. Abd pain/ right flank pain, suspected, Kidney stone- check CT pelvis, tylenol, ibuprofen for pain, to ER with severe pain or fevers. Hydrate well, strain urine. Check urine cx    Over due for well check and GYN, she will schedule   n/a

## 2020-09-18 NOTE — PROGRESS NOTE ADULT - SUBJECTIVE AND OBJECTIVE BOX
SUMMARY: Per H/P, 65yr old female with spinal stenosis lumbar region.  Pt coming pain unable to walk and numbness in legs.  Pt had laminectomy 2018 now coming for T10-pelvis  instrumentation and fusion. L2-S1 posterior lumbar fusion.  Pt hx sig for obesity and HTN.    OVERNIGHT EVENTS:  brought to [facility] after T10-pelvis fusion, L3-S1 PLIF  3pRBC intra op     ADMISSION SCORES:   GCS: 15 HH: MF: NIHSS: ICH Score:    REVIEW OF SYSTEMS: post op back pain    VITALS: [x] Reviewed    IMAGING/DATA: [x] Reviewed    IVF FLUIDS/MEDICATIONS: [x] Reviewed    ALLERGIES: Allergies    No Known Allergies    Intolerances        DEVICES:   [] Restraints [x] PIVs [] ET tube [] central line [] PICC [x] arterial line [x] rodgers [] NGT/OGT [] EVD [] LD [] KEATON/HMV [] LiCOX [] ICP monitor [] Trach [] PEG [] Chest Tube [] other:    EXAMINATION:  General: No acute distress  HEENT: Anicteric sclerae  Cardiac: [id number]  Lungs: Clear  Abdomen: Soft, non-tender, +BS  Extremities: No c/c/e  Skin/Incision Site: Clean, dry and intact  Neurologic: Awake, alert, fully oriented, follows commands, EOMI, face symmetric, tongue midline, no drift, full strength

## 2020-09-18 NOTE — BRIEF OPERATIVE NOTE - NSICDXBRIEFPREOP_GEN_ALL_CORE_FT
PRE-OP DIAGNOSIS:  Mechanical back pain 18-Sep-2020 10:41:27  Bill Prieto  
PRE-OP DIAGNOSIS:  Mechanical back pain 18-Sep-2020 10:41:27  Bill Prieto

## 2020-09-18 NOTE — PROGRESS NOTE ADULT - ASSESSMENT
ASSESSMENT/PLAN: POD#0  T10-pelvis fusion, L3-S1 PLIF    NEURO:  pain control--PCA pump  imaging per neurosurgery  surgical drains x2 per plastic surgery  hold home dose ASA  Activity: [x] mobilize as tolerated [] Bedrest [x] PT [x] OT [] PMNR    PULM: encourage incentive spirometry    CV:  SBP goal 100-160, d/c jessie  continue home dose carvedilol and losartan    RENAL:  Fluids: IVL, d/c rodgers    GI:  Diet: dysphagia screen and advance as tolerated  GI prophylaxis [x] not indicated [] PPI [] other:  Bowel regimen [] colace [x] senna [] other:    ENDO:   Goal euglycemia (-180)    HEME/ONC:  VTE prophylaxis: [x] SCDs [] chemoprophylaxis [x] hold chemoprophylaxis due to: fresh post op [] high risk of DVT/PE on admission due to:  3pRBC give intra op, check post op labs      ID: monitor for fevers    MISC:    SOCIAL/FAMILY:  [] awaiting [x] updated at bedside  #[id number] [] family meeting    CODE STATUS:  [x] Full Code [] DNR [] DNI [] Palliative/Comfort Care    Not critically ill but medically complex  transfer to floor pending imaging per neurosurgery

## 2020-09-18 NOTE — BRIEF OPERATIVE NOTE - OPERATION/FINDINGS
bilateral paraspinal muscle flap elevation and closure, fascial closure, and complex soft tissue
T10 - PELVIS INSTRUMENTATION

## 2020-09-18 NOTE — CHART NOTE - NSCHARTNOTEFT_GEN_A_CORE
CAPRINI SCORE [CLOT] Score on Admission for     AGE RELATED RISK FACTORS                                                       MOBILITY RELATED FACTORS  [ ] Age 41-60 years                                            (1 Point)                  [ x] Bed rest                                                        (1 Point)  [ x] Age: 61-74 years                                           (2 Points)                 [ ] Plaster cast                                                   (2 Points)  [ ] Age= 75 years                                              (3 Points)                 [ ] Bed bound for more than 72 hours                 (2 Points)    DISEASE RELATED RISK FACTORS                                               GENDER SPECIFIC FACTORS  [ ] Edema in the lower extremities                       (1 Point)                  [ ] Pregnancy                                                     (1 Point)  [ ] Varicose veins                                               (1 Point)                  [ ] Post-partum < 6 weeks                                   (1 Point)             [x ] BMI > 25 Kg/m2                                            (1 Point)                  [ ] Hormonal therapy  or oral contraception          (1 Point)                 [ ] Sepsis (in the previous month)                        (1 Point)                  [ ] History of pregnancy complications                 (1 point)  [ ] Pneumonia or serious lung disease                                               [ ] Unexplained or recurrent                     (1 Point)           (in the previous month)                               (1 Point)  [ ] Abnormal pulmonary function test                     (1 Point)                 SURGERY RELATED RISK FACTORS (include planned surgeries)  [ ] Acute myocardial infarction                              (1 Point)                 [ ]  Section                                             (1 Point)  [ ] Congestive heart failure (in the previous month)  (1 Point)         [ ] Minor surgery                                                  (1 Point)   [ ] Inflammatory bowel disease                             (1 Point)                 [ ] Arthroscopic surgery                                        (2 Points)  [ ] Central venous access                                      (2 Points)                [x ] General surgery lasting more than 45 minutes   (2 Points)       [ ] Stroke (in the previous month)                          (5 Points)               [ ] Elective arthroplasty                                         (5 Points)            [ ] current or past malignancy                              (2 Points)                                                                                                       HEMATOLOGY RELATED FACTORS                                                 TRAUMA RELATED RISK FACTORS  [ ] Prior episodes of VTE                                     (3 Points)                [ ] Fracture of the hip, pelvis, or leg                       (5 Points)  [ ] Positive family history for VTE                         (3 Points)                 [ ] Acute spinal cord injury (in the previous month)  (5 Points)  [ ] Prothrombin 90146 A                                     (3 Points)                 [ ] Paralysis  (less than 1 month)                             (5 Points)  [ ] Factor V Leiden                                             (3 Points)                  [ ] Multiple Trauma within 1 month                        (5 Points)  [ ] Lupus anticoagulants                                     (3 Points)                                                           [ ] Anticardiolipin antibodies                               (3 Points)                                                       [ ] High homocysteine in the blood                      (3 Points)                                             [ ] Other congenital or acquired thrombophilia      (3 Points)                                                [ ] Heparin induced thrombocytopenia                  (3 Points)                                          Total Score [     6     ]    Risk:  Very low 0   Low 1 to 2   Moderate 3 to 4   High =5       VTE Prophylasix Recommednations:  [x ] mechanical pneumatic compression devices                                      [ ] contraindicated: _____________________  [ ] chemo prophylasix                                                                                   [x ] contraindicated __frsh post op___________________    **** HIGH LIKELIHOOD DVT PRESENT ON ADMISSION  [ x] (please order LE dopplers within 24 hours of admission)

## 2020-09-18 NOTE — BRIEF OPERATIVE NOTE - NSICDXBRIEFPROCEDURE_GEN_ALL_CORE_FT
PROCEDURES:  Closure, surgical wound, back 18-Sep-2020 18:46:54 complex closure Sunny Nice  Muscle flap of back 18-Sep-2020 18:46:28  Sunny Nice  
PROCEDURES:  Lumbar arthrodesis 18-Sep-2020 10:41:19  Bill Prieto

## 2020-09-18 NOTE — BRIEF OPERATIVE NOTE - NSICDXBRIEFPOSTOP_GEN_ALL_CORE_FT
POST-OP DIAGNOSIS:  Mechanical back pain 18-Sep-2020 10:41:34  Bill Prieto  
POST-OP DIAGNOSIS:  Mechanical back pain 18-Sep-2020 10:41:34  Bill Prieto

## 2020-09-19 LAB
ANION GAP SERPL CALC-SCNC: 12 MMOL/L — SIGNIFICANT CHANGE UP (ref 5–17)
BLD GP AB SCN SERPL QL: NEGATIVE — SIGNIFICANT CHANGE UP
BUN SERPL-MCNC: 17 MG/DL — SIGNIFICANT CHANGE UP (ref 7–23)
CALCIUM SERPL-MCNC: 8.5 MG/DL — SIGNIFICANT CHANGE UP (ref 8.4–10.5)
CHLORIDE SERPL-SCNC: 109 MMOL/L — HIGH (ref 96–108)
CO2 SERPL-SCNC: 21 MMOL/L — LOW (ref 22–31)
CREAT SERPL-MCNC: 0.66 MG/DL — SIGNIFICANT CHANGE UP (ref 0.5–1.3)
GLUCOSE SERPL-MCNC: 177 MG/DL — HIGH (ref 70–99)
HCT VFR BLD CALC: 24.5 % — LOW (ref 34.5–45)
HCT VFR BLD CALC: 26.8 % — LOW (ref 34.5–45)
HCT VFR BLD CALC: 27 % — LOW (ref 34.5–45)
HEPARINASE TEG R TIME: 4.8 MIN — SIGNIFICANT CHANGE UP (ref 4.3–8.3)
HGB BLD-MCNC: 8 G/DL — LOW (ref 11.5–15.5)
HGB BLD-MCNC: 8.8 G/DL — LOW (ref 11.5–15.5)
HGB BLD-MCNC: 8.9 G/DL — LOW (ref 11.5–15.5)
MCHC RBC-ENTMCNC: 28.6 PG — SIGNIFICANT CHANGE UP (ref 27–34)
MCHC RBC-ENTMCNC: 28.8 PG — SIGNIFICANT CHANGE UP (ref 27–34)
MCHC RBC-ENTMCNC: 28.9 PG — SIGNIFICANT CHANGE UP (ref 27–34)
MCHC RBC-ENTMCNC: 32.6 GM/DL — SIGNIFICANT CHANGE UP (ref 32–36)
MCHC RBC-ENTMCNC: 32.7 GM/DL — SIGNIFICANT CHANGE UP (ref 32–36)
MCHC RBC-ENTMCNC: 33.2 GM/DL — SIGNIFICANT CHANGE UP (ref 32–36)
MCV RBC AUTO: 86.7 FL — SIGNIFICANT CHANGE UP (ref 80–100)
MCV RBC AUTO: 87.5 FL — SIGNIFICANT CHANGE UP (ref 80–100)
MCV RBC AUTO: 88.5 FL — SIGNIFICANT CHANGE UP (ref 80–100)
NRBC # BLD: 0 /100 WBCS — SIGNIFICANT CHANGE UP (ref 0–0)
PLATELET # BLD AUTO: 119 K/UL — LOW (ref 150–400)
PLATELET # BLD AUTO: 133 K/UL — LOW (ref 150–400)
PLATELET # BLD AUTO: 134 K/UL — LOW (ref 150–400)
PLATELET MAPPING ACTF MAX AMPLITUDE: 10.3 MM — SIGNIFICANT CHANGE UP (ref 2–19)
PLATELET MAPPING ADP MAXIMUM AMPLITUDE: 51.9 MM — SIGNIFICANT CHANGE UP (ref 45–69)
PLATELET MAPPING ADP PERCENT INHIBITION: 17.1 % (ref 0–17)
PLATELET MAPPING HKH MAXIMUM AMPLITUDE: 60.5 MM — SIGNIFICANT CHANGE UP (ref 53–68)
POTASSIUM SERPL-MCNC: 3.7 MMOL/L — SIGNIFICANT CHANGE UP (ref 3.5–5.3)
POTASSIUM SERPL-SCNC: 3.7 MMOL/L — SIGNIFICANT CHANGE UP (ref 3.5–5.3)
RAPIDTEG MAXIMUM AMPLITUDE: 56.3 MM — SIGNIFICANT CHANGE UP (ref 52–70)
RBC # BLD: 2.8 M/UL — LOW (ref 3.8–5.2)
RBC # BLD: 3.05 M/UL — LOW (ref 3.8–5.2)
RBC # BLD: 3.09 M/UL — LOW (ref 3.8–5.2)
RBC # FLD: 16 % — HIGH (ref 10.3–14.5)
RBC # FLD: 16.2 % — HIGH (ref 10.3–14.5)
RBC # FLD: 16.4 % — HIGH (ref 10.3–14.5)
RH IG SCN BLD-IMP: POSITIVE — SIGNIFICANT CHANGE UP
SODIUM SERPL-SCNC: 142 MMOL/L — SIGNIFICANT CHANGE UP (ref 135–145)
TEG FUNCTIONAL FIBRINOGEN: 16.5 MM — SIGNIFICANT CHANGE UP (ref 15–32)
TEG MAXIMUM AMPLITUDE: 58.7 MM — SIGNIFICANT CHANGE UP (ref 52–69)
TEG REACTION TIME: 4.7 MIN — SIGNIFICANT CHANGE UP (ref 4.6–9.1)
WBC # BLD: 14.28 K/UL — HIGH (ref 3.8–10.5)
WBC # BLD: 14.5 K/UL — HIGH (ref 3.8–10.5)
WBC # BLD: 15.19 K/UL — HIGH (ref 3.8–10.5)
WBC # FLD AUTO: 14.28 K/UL — HIGH (ref 3.8–10.5)
WBC # FLD AUTO: 14.5 K/UL — HIGH (ref 3.8–10.5)
WBC # FLD AUTO: 15.19 K/UL — HIGH (ref 3.8–10.5)

## 2020-09-19 PROCEDURE — 99233 SBSQ HOSP IP/OBS HIGH 50: CPT

## 2020-09-19 RX ORDER — POTASSIUM CHLORIDE 20 MEQ
40 PACKET (EA) ORAL ONCE
Refills: 0 | Status: COMPLETED | OUTPATIENT
Start: 2020-09-19 | End: 2020-09-19

## 2020-09-19 RX ORDER — HYDRALAZINE HCL 50 MG
10 TABLET ORAL ONCE
Refills: 0 | Status: COMPLETED | OUTPATIENT
Start: 2020-09-19 | End: 2020-09-19

## 2020-09-19 RX ADMIN — SODIUM CHLORIDE 3 MILLILITER(S): 9 INJECTION INTRAMUSCULAR; INTRAVENOUS; SUBCUTANEOUS at 07:09

## 2020-09-19 RX ADMIN — Medication 0.5 MILLIGRAM(S): at 13:19

## 2020-09-19 RX ADMIN — LOSARTAN POTASSIUM 50 MILLIGRAM(S): 100 TABLET, FILM COATED ORAL at 18:25

## 2020-09-19 RX ADMIN — GABAPENTIN 300 MILLIGRAM(S): 400 CAPSULE ORAL at 11:29

## 2020-09-19 RX ADMIN — HYDROMORPHONE HYDROCHLORIDE 30 MILLILITER(S): 2 INJECTION INTRAMUSCULAR; INTRAVENOUS; SUBCUTANEOUS at 17:05

## 2020-09-19 RX ADMIN — SENNA PLUS 2 TABLET(S): 8.6 TABLET ORAL at 22:19

## 2020-09-19 RX ADMIN — LOSARTAN POTASSIUM 50 MILLIGRAM(S): 100 TABLET, FILM COATED ORAL at 05:58

## 2020-09-19 RX ADMIN — Medication 0.5 MILLIGRAM(S): at 22:19

## 2020-09-19 RX ADMIN — HYDROMORPHONE HYDROCHLORIDE 30 MILLILITER(S): 2 INJECTION INTRAMUSCULAR; INTRAVENOUS; SUBCUTANEOUS at 19:40

## 2020-09-19 RX ADMIN — Medication 1 DROP(S): at 05:58

## 2020-09-19 RX ADMIN — Medication 40 MILLIEQUIVALENT(S): at 00:58

## 2020-09-19 RX ADMIN — SENNA PLUS 2 TABLET(S): 8.6 TABLET ORAL at 00:02

## 2020-09-19 RX ADMIN — Medication 10 MILLIGRAM(S): at 00:48

## 2020-09-19 RX ADMIN — LOSARTAN POTASSIUM 50 MILLIGRAM(S): 100 TABLET, FILM COATED ORAL at 00:03

## 2020-09-19 RX ADMIN — Medication 0.5 MILLIGRAM(S): at 00:02

## 2020-09-19 RX ADMIN — Medication 100 MILLIGRAM(S): at 13:19

## 2020-09-19 RX ADMIN — Medication 100 MILLIGRAM(S): at 05:57

## 2020-09-19 RX ADMIN — Medication 1 TABLET(S): at 11:29

## 2020-09-19 RX ADMIN — CARVEDILOL PHOSPHATE 25 MILLIGRAM(S): 80 CAPSULE, EXTENDED RELEASE ORAL at 18:25

## 2020-09-19 RX ADMIN — HYDROMORPHONE HYDROCHLORIDE 30 MILLILITER(S): 2 INJECTION INTRAMUSCULAR; INTRAVENOUS; SUBCUTANEOUS at 07:07

## 2020-09-19 RX ADMIN — SODIUM CHLORIDE 3 MILLILITER(S): 9 INJECTION INTRAMUSCULAR; INTRAVENOUS; SUBCUTANEOUS at 13:17

## 2020-09-19 RX ADMIN — HYDROMORPHONE HYDROCHLORIDE 0.5 MILLIGRAM(S): 2 INJECTION INTRAMUSCULAR; INTRAVENOUS; SUBCUTANEOUS at 03:15

## 2020-09-19 RX ADMIN — ONDANSETRON 4 MILLIGRAM(S): 8 TABLET, FILM COATED ORAL at 00:49

## 2020-09-19 RX ADMIN — HYDROMORPHONE HYDROCHLORIDE 30 MILLILITER(S): 2 INJECTION INTRAMUSCULAR; INTRAVENOUS; SUBCUTANEOUS at 22:21

## 2020-09-19 RX ADMIN — SODIUM CHLORIDE 3 MILLILITER(S): 9 INJECTION INTRAMUSCULAR; INTRAVENOUS; SUBCUTANEOUS at 22:25

## 2020-09-19 RX ADMIN — CARVEDILOL PHOSPHATE 25 MILLIGRAM(S): 80 CAPSULE, EXTENDED RELEASE ORAL at 05:58

## 2020-09-19 RX ADMIN — Medication 0.5 MILLIGRAM(S): at 05:58

## 2020-09-19 NOTE — PHYSICAL THERAPY INITIAL EVALUATION ADULT - LIVES WITH, PROFILE
children/Pt resides in private home with son, no stairs to enter or inside +first floor setup. Prior to admit, pt reports ambulating independently with straight cane, (-) driving and no longer works.

## 2020-09-19 NOTE — PROGRESS NOTE ADULT - SUBJECTIVE AND OBJECTIVE BOX
INTERVAL HISTORY: HPI:  65yr old female with spinal stenosis lumbar region.  Pt coming pain unable to walk and numbness in legs.  Pt had laminectomy 2018 now coming for T10-pelvis  instrumentation and fusion. L2-S1 posterior lumbar fusion.  Pt hx sig for obesity and HTN.    Note ;covid test 9/15/20/Hillary (04 Sep 2020 07:43)      PRESSORS: [ ] YES [ ] NO  WHICH:  DOSE:    ANTIBIOTICS:                  DATE STARTED:  ANTIBIOTICS:                  DATE STARTED:  ANTIBIOTICS:                  DATE STARTED:    MEDICATIONS  (STANDING):  carvedilol 25 milliGRAM(s) Oral every 12 hours  ceFAZolin   IVPB 2000 milliGRAM(s) IV Intermittent once  ceFAZolin   IVPB 2000 milliGRAM(s) IV Intermittent every 8 hours  clonazePAM  Tablet 0.5 milliGRAM(s) Oral three times a day  enoxaparin Injectable 40 milliGRAM(s) SubCutaneous daily  gabapentin 300 milliGRAM(s) Oral daily  HYDROmorphone PCA (1 mG/mL) 30 milliLiter(s) PCA Continuous PCA Continuous  influenza   Vaccine 0.5 milliLiter(s) IntraMuscular once  lidocaine 1% Injectable 0.2 milliLiter(s) Local Injection once  losartan 50 milliGRAM(s) Oral two times a day  multivitamin 1 Tablet(s) Oral daily  senna 2 Tablet(s) Oral at bedtime  sodium chloride 0.9% lock flush 3 milliLiter(s) IV Push every 8 hours  sodium chloride 0.9% with potassium chloride 20 mEq/L 1000 milliLiter(s) (75 mL/Hr) IV Continuous <Continuous>    MEDICATIONS  (PRN):  acetaminophen   Tablet .. 650 milliGRAM(s) Oral every 6 hours PRN Temp greater or equal to 38C (100.4F), Mild Pain (1 - 3)  diazepam    Tablet 5 milliGRAM(s) Oral every 6 hours PRN SPASM  HYDROmorphone  Injectable 0.5 milliGRAM(s) IV Push every 10 minutes PRN Moderate Pain (4 - 6)  HYDROmorphone PCA (1 mG/mL) Rescue Clinician Bolus 0.5 milliGRAM(s) IV Push every 15 minutes PRN for Pain Scale GREATER THAN 6  naloxone Injectable 0.1 milliGRAM(s) IV Push every 3 minutes PRN For ANY of the following changes in patient status:  A. RR LESS THAN 10 breaths per minute, B. Oxygen saturation LESS THAN 90%, C. Sedation score of 6  ondansetron Injectable 4 milliGRAM(s) IV Push every 6 hours PRN Nausea      Drug Dosing Weight  Height (cm): 152.4 (18 Sep 2020 09:51)  Weight (kg): 98.9 (18 Sep 2020 09:51)  BMI (kg/m2): 42.6 (18 Sep 2020 09:51)  BSA (m2): 1.94 (18 Sep 2020 09:51)    CENTRAL LINE: [ ] YES [ ] NO  LOCATION:   DATE INSERTED:  REMOVE: [ ] YES [ ] NO  EXPLAIN:    GARCIA: [ ] YES [ ] NO    DATE INSERTED:  REMOVE: [ ] YES [ ] NO  EXPLAIN:    A-LINE: [ ] YES [ ] NO  LOCATION:   DATE INSERTED:  REMOVE: [ ] YES [ ] NO  EXPLAIN:    PAST MEDICAL & SURGICAL HISTORY:  Lumbar stenosis    Vertigo    Kidney stones    Obesity    HTN (hypertension)    Lumbago with sciatica    History of lithotripsy    History of laminectomy  3/2018    History of cataract surgery  both eyes 2017    Carpal tunnel syndrome on both sides  sx done 2013        REVIEW OF SYSTEMS: [ ] Unable to Assess due to neurologic exam   [ ] All ROS addressed below are non-contributory, except:  Neuro: [ ] Headache [ ] Back pain [ ] Numbness [ ] Weakness [ ] Ataxia [ ] Dizziness [ ] Aphasia [ ] Dysarthria [ ] Visual disturbance  Resp: [ ] Shortness of breath/dyspnea, [ ] Orthopnea [ ] Cough  CV: [ ] Chest pain [ ] Palpitation [ ] Lightheadedness [ ] Syncope  Renal: [ ] Thirst [ ] Edema  GI: [ ] Nausea [ ] Emesis [ ] Abdominal pain [ ] Constipation [ ] Diarrhea  Hem: [ ] Hematemesis [ ] bright red blood per rectum  ID: [ ] Fever [ ] Chills [ ] Dysuria  ENT: [ ] Rhinorrhea      ICU Vital Signs Last 24 Hrs  T(C): 36.7 (19 Sep 2020 03:00), Max: 36.8 (18 Sep 2020 18:40)  T(F): 98.1 (19 Sep 2020 03:00), Max: 98.2 (18 Sep 2020 18:40)  HR: 80 (19 Sep 2020 06:00) (63 - 93)  BP: 170/82 (18 Sep 2020 09:51) (170/82 - 170/82)  BP(mean): --  ABP: 139/56 (19 Sep 2020 06:00) (130/61 - 200/99)  ABP(mean): 80 (19 Sep 2020 06:00) (80 - 141)  RR: 16 (19 Sep 2020 06:00) (13 - 20)  SpO2: 92% (19 Sep 2020 06:00) (87% - 100%)      ABG - ( 18 Sep 2020 17:23 )  pH, Arterial: 7.39  pH, Blood: x     /  pCO2: 36    /  pO2: 311   / HCO3: 21    / Base Excess: -3.1  /  SaO2: 100           I&O's Detail    18 Sep 2020 07:01  -  19 Sep 2020 07:00  --------------------------------------------------------  IN:    sodium chloride 0.9% w/ Additives: 225 mL  Total IN: 225 mL    OUT:    Accordian (mL): 415 mL    Accordian (mL): 0 mL    Indwelling Catheter - Urethral (mL): 375 mL  Total OUT: 790 mL    Total NET: -565 mL      PHYSICAL EXAM:    General: No Acute Distress     Neurological: Awake, alert oriented to person, place and time, Following Commands, PERRL, EOMI, Face Symmetrical, Speech Fluent, Moving all extremities, Muscle Strength normal in all four extremities, No Drift, Sensation to Light Touch Intact    Pulmonary: Clear to Auscultation, No Rales, No Rhonchi, No Wheezes     Cardiovascular: S1, S2, Regular Rate and Rhythm     Gastrointestinal: Soft, Nontender, Nondistended     Extremities: No calf tenderness     Incision:         LABS:  CBC Full  -  ( 19 Sep 2020 04:55 )  WBC Count : 15.19 K/uL  RBC Count : 3.09 M/uL  Hemoglobin : 8.9 g/dL  Hematocrit : 26.8 %  Platelet Count - Automated : 134 K/uL  Mean Cell Volume : 86.7 fl  Mean Cell Hemoglobin : 28.8 pg  Mean Cell Hemoglobin Concentration : 33.2 gm/dL  Auto Neutrophil # : x  Auto Lymphocyte # : x  Auto Monocyte # : x  Auto Eosinophil # : x  Auto Basophil # : x  Auto Neutrophil % : x  Auto Lymphocyte % : x  Auto Monocyte % : x  Auto Eosinophil % : x  Auto Basophil % : x    09-18    142  |  109<H>  |  17  ----------------------------<  177<H>  3.7   |  21<L>  |  0.66    Ca    8.5      18 Sep 2020 23:42            RADIOLOGY & ADDITIONAL STUDIES:   INTERVAL HISTORY: HPI:  65yr old female with spinal stenosis lumbar region.  Pt coming pain unable to walk and numbness in legs.  Pt had laminectomy 2018 now coming for T10-pelvis  instrumentation and fusion. L2-S1 posterior lumbar fusion.  Pt hx sig for obesity and HTN.    Note ;covid test 9/15/20/Hillary (04 Sep 2020 07:43)    on: no acute issues, 3U PRBC in OR   PRESSORS: [ ] YES [ x] NO      ANTIBIOTICS:                  DATE STARTED:  ANTIBIOTICS:                  DATE STARTED:  ANTIBIOTICS:                  DATE STARTED:    MEDICATIONS  (STANDING):  carvedilol 25 milliGRAM(s) Oral every 12 hours  ceFAZolin   IVPB 2000 milliGRAM(s) IV Intermittent once  ceFAZolin   IVPB 2000 milliGRAM(s) IV Intermittent every 8 hours  clonazePAM  Tablet 0.5 milliGRAM(s) Oral three times a day  enoxaparin Injectable 40 milliGRAM(s) SubCutaneous daily  gabapentin 300 milliGRAM(s) Oral daily  HYDROmorphone PCA (1 mG/mL) 30 milliLiter(s) PCA Continuous PCA Continuous  influenza   Vaccine 0.5 milliLiter(s) IntraMuscular once  lidocaine 1% Injectable 0.2 milliLiter(s) Local Injection once  losartan 50 milliGRAM(s) Oral two times a day  multivitamin 1 Tablet(s) Oral daily  senna 2 Tablet(s) Oral at bedtime  sodium chloride 0.9% lock flush 3 milliLiter(s) IV Push every 8 hours  sodium chloride 0.9% with potassium chloride 20 mEq/L 1000 milliLiter(s) (75 mL/Hr) IV Continuous <Continuous>    MEDICATIONS  (PRN):  acetaminophen   Tablet .. 650 milliGRAM(s) Oral every 6 hours PRN Temp greater or equal to 38C (100.4F), Mild Pain (1 - 3)  diazepam    Tablet 5 milliGRAM(s) Oral every 6 hours PRN SPASM  HYDROmorphone  Injectable 0.5 milliGRAM(s) IV Push every 10 minutes PRN Moderate Pain (4 - 6)  HYDROmorphone PCA (1 mG/mL) Rescue Clinician Bolus 0.5 milliGRAM(s) IV Push every 15 minutes PRN for Pain Scale GREATER THAN 6  naloxone Injectable 0.1 milliGRAM(s) IV Push every 3 minutes PRN For ANY of the following changes in patient status:  A. RR LESS THAN 10 breaths per minute, B. Oxygen saturation LESS THAN 90%, C. Sedation score of 6  ondansetron Injectable 4 milliGRAM(s) IV Push every 6 hours PRN Nausea      Drug Dosing Weight  Height (cm): 152.4 (18 Sep 2020 09:51)  Weight (kg): 98.9 (18 Sep 2020 09:51)  BMI (kg/m2): 42.6 (18 Sep 2020 09:51)  BSA (m2): 1.94 (18 Sep 2020 09:51)    CENTRAL LINE: [ ] YES [x ] NO  LOCATION:   DATE INSERTED:  REMOVE: [ ] YES [ ] NO  EXPLAIN:    GARCIA: [ ] YES [x ] NO    DATE INSERTED:  REMOVE: [ ] YES [ ] NO  EXPLAIN:    A-LINE: [ ] YES [x ] NO  LOCATION:   DATE INSERTED:  REMOVE: [ ] YES [ ] NO  EXPLAIN:    PAST MEDICAL & SURGICAL HISTORY:  Lumbar stenosis    Vertigo    Kidney stones    Obesity    HTN (hypertension)    Lumbago with sciatica    History of lithotripsy    History of laminectomy  3/2018    History of cataract surgery  both eyes 2017    Carpal tunnel syndrome on both sides  sx done 2013        REVIEW OF SYSTEMS: [ ] Unable to Assess due to neurologic exam   [x ] All ROS addressed below are non-contributory, except:  Neuro: [ ] Headache [ ] Back pain [ ] Numbness [ ] Weakness [ ] Ataxia [ ] Dizziness [ ] Aphasia [ ] Dysarthria [ ] Visual disturbance  Resp: [ ] Shortness of breath/dyspnea, [ ] Orthopnea [ ] Cough  CV: [ ] Chest pain [ ] Palpitation [ ] Lightheadedness [ ] Syncope  Renal: [ ] Thirst [ ] Edema  GI: [ ] Nausea [ ] Emesis [ ] Abdominal pain [ ] Constipation [ ] Diarrhea  Hem: [ ] Hematemesis [ ] bright red blood per rectum  ID: [ ] Fever [ ] Chills [ ] Dysuria  ENT: [ ] Rhinorrhea      ICU Vital Signs Last 24 Hrs  T(C): 36.7 (19 Sep 2020 03:00), Max: 36.8 (18 Sep 2020 18:40)  T(F): 98.1 (19 Sep 2020 03:00), Max: 98.2 (18 Sep 2020 18:40)  HR: 80 (19 Sep 2020 06:00) (63 - 93)  BP: 170/82 (18 Sep 2020 09:51) (170/82 - 170/82)  BP(mean): --  ABP: 139/56 (19 Sep 2020 06:00) (130/61 - 200/99)  ABP(mean): 80 (19 Sep 2020 06:00) (80 - 141)  RR: 16 (19 Sep 2020 06:00) (13 - 20)  SpO2: 92% (19 Sep 2020 06:00) (87% - 100%)      ABG - ( 18 Sep 2020 17:23 )  pH, Arterial: 7.39  pH, Blood: x     /  pCO2: 36    /  pO2: 311   / HCO3: 21    / Base Excess: -3.1  /  SaO2: 100           I&O's Detail    18 Sep 2020 07:01  -  19 Sep 2020 07:00  --------------------------------------------------------  IN:    sodium chloride 0.9% w/ Additives: 225 mL  Total IN: 225 mL    OUT:    Accordian (mL): 415 mL    Accordian (mL): 0 mL    Indwelling Catheter - Urethral (mL): 375 mL  Total OUT: 790 mL    Total NET: -565 mL      PHYSICAL EXAM:    General: No Acute Distress     Neurological: Awake, alert oriented to person, place and time, Following Commands, PERRL, EOMI, Face Symmetrical, Speech Fluent, Moving all extremities, Muscle Strength normal in all four extremities, No Drift, Sensation to Light Touch Intact  Pulmonary: Clear to Auscultation, No Rales, No Rhonchi, No Wheezes   Cardiovascular: S1, S2, Regular Rate and Rhythm   Gastrointestinal: Soft, Nontender, Nondistended   Extremities: No calf tenderness           LABS:  CBC Full  -  ( 19 Sep 2020 04:55 )  WBC Count : 15.19 K/uL  RBC Count : 3.09 M/uL  Hemoglobin : 8.9 g/dL  Hematocrit : 26.8 %  Platelet Count - Automated : 134 K/uL  Mean Cell Volume : 86.7 fl  Mean Cell Hemoglobin : 28.8 pg  Mean Cell Hemoglobin Concentration : 33.2 gm/dL  Auto Neutrophil # : x  Auto Lymphocyte # : x  Auto Monocyte # : x  Auto Eosinophil # : x  Auto Basophil # : x  Auto Neutrophil % : x  Auto Lymphocyte % : x  Auto Monocyte % : x  Auto Eosinophil % : x  Auto Basophil % : x    09-18    142  |  109<H>  |  17  ----------------------------<  177<H>  3.7   |  21<L>  |  0.66    Ca    8.5      18 Sep 2020 23:42            RADIOLOGY & ADDITIONAL STUDIES:

## 2020-09-19 NOTE — PROGRESS NOTE ADULT - SUBJECTIVE AND OBJECTIVE BOX
Plastic Surgery    SUBJECTIVE:   Patient seen and examined on rounds with team. NAEON. No acute complaints.     VITALS  T(C): 36.9 (09-19-20 @ 11:00), Max: 36.9 (09-19-20 @ 07:00)  HR: 72 (09-19-20 @ 11:00) (70 - 93)  BP: --  RR: 13 (09-19-20 @ 11:00) (13 - 20)  SpO2: 94% (09-19-20 @ 11:00) (82% - 100%)  CAPILLARY BLOOD GLUCOSE      Is/Os    09-18 @ 07:01  -  09-19 @ 07:00  --------------------------------------------------------  IN:    sodium chloride 0.9% w/ Additives: 225 mL  Total IN: 225 mL    OUT:    Accordian (mL): 415 mL    Accordian (mL): 0 mL    Indwelling Catheter - Urethral (mL): 375 mL  Total OUT: 790 mL    Total NET: -565 mL    PHYSICAL EXAM:   General: NAD, Lying in bed comfortably  Neuro: Awake and alert  Back: soft, Aqaucel c/d/i, no fluid collections, HVC x 2 with SSF  GI/Abd: Soft, NT/ND    MEDICATIONS (STANDING): carvedilol 25 milliGRAM(s) Oral every 12 hours  ceFAZolin   IVPB 2000 milliGRAM(s) IV Intermittent once  ceFAZolin   IVPB 2000 milliGRAM(s) IV Intermittent every 8 hours  clonazePAM  Tablet 0.5 milliGRAM(s) Oral three times a day  enoxaparin Injectable 40 milliGRAM(s) SubCutaneous daily  gabapentin 300 milliGRAM(s) Oral daily  HYDROmorphone PCA (1 mG/mL) 30 milliLiter(s) PCA Continuous PCA Continuous  influenza   Vaccine 0.5 milliLiter(s) IntraMuscular once  lidocaine 1% Injectable 0.2 milliLiter(s) Local Injection once  losartan 50 milliGRAM(s) Oral two times a day  multivitamin 1 Tablet(s) Oral daily  senna 2 Tablet(s) Oral at bedtime  sodium chloride 0.9% lock flush 3 milliLiter(s) IV Push every 8 hours  sodium chloride 0.9% with potassium chloride 20 mEq/L 1000 milliLiter(s) IV Continuous <Continuous>    MEDICATIONS (PRN):acetaminophen   Tablet .. 650 milliGRAM(s) Oral every 6 hours PRN Temp greater or equal to 38C (100.4F), Mild Pain (1 - 3)  diazepam    Tablet 5 milliGRAM(s) Oral every 6 hours PRN SPASM  HYDROmorphone  Injectable 0.5 milliGRAM(s) IV Push every 10 minutes PRN Moderate Pain (4 - 6)  HYDROmorphone PCA (1 mG/mL) Rescue Clinician Bolus 0.5 milliGRAM(s) IV Push every 15 minutes PRN for Pain Scale GREATER THAN 6  naloxone Injectable 0.1 milliGRAM(s) IV Push every 3 minutes PRN For ANY of the following changes in patient status:  A. RR LESS THAN 10 breaths per minute, B. Oxygen saturation LESS THAN 90%, C. Sedation score of 6  ondansetron Injectable 4 milliGRAM(s) IV Push every 6 hours PRN Nausea      LABS  CBC (09-19 @ 04:55)                              8.9<L>                         15.19<H>  )----------------(  134<L>     --    % Neutrophils, --    % Lymphocytes, ANC: --                                  26.8<L>  CBC (09-18 @ 23:42)                              9.4<L>                         14.45<H>  )----------------(  151        86.7<H>% Neutrophils, 7.4<L>% Lymphocytes, ANC: 12.52<H>                              28.3<L>    BMP (09-18 @ 23:42)             142     |  109<H>  |  17    		Ca++ --      Ca 8.5                ---------------------------------( 177<H>		Mg --                 3.7     |  21<L>   |  0.66  			Ph --              ABG (09-18 @ 17:23)     7.39 / 36 / 311<H> / 21 / -3.1<L> / 100<H>%     Lactate:    ABG (09-18 @ 15:30)     7.36 / 44 / 183<H> / 24 / -.7 / 99<H>%     Lactate:

## 2020-09-19 NOTE — PROGRESS NOTE ADULT - ASSESSMENT
ASSESSMENT/PLAN: POD#1  T10-pelvis fusion, L3-S1 PLIF    NEURO:  pain control--PCA pump  imaging per neurosurgery  surgical drains x2 per plastic surgery  hold home dose ASA  Activity: [x] mobilize as tolerated [] Bedrest [x] PT [x] OT [] PMNR    PULM: encourage incentive spirometry    CV:  SBP goal 100-160, d/c jessie  continue home dose carvedilol and losartan    RENAL:  Fluids: IVL, d/c rodgers    GI:  Diet: dysphagia screen and advance as tolerated  GI prophylaxis [x] not indicated [] PPI [] other:  Bowel regimen [] colace [x] senna [] other:    ENDO:   Goal euglycemia (-180)    HEME/ONC:  VTE prophylaxis: [x] SCDs [] chemoprophylaxis [x] hold chemoprophylaxis due to: fresh post op [] high risk of DVT/PE on admission due to:  3pRBC give intra op, check post op labs      ID: monitor for fevers    MISC:    SOCIAL/FAMILY:  [] awaiting [x] updated at bedside  #[id number] [] family meeting    CODE STATUS:  [x] Full Code [] DNR [] DNI [] Palliative/Comfort Care    Not critically ill but medically complex  transfer to floor pending imaging per neurosurgery   ASSESSMENT/PLAN: POD#1  T10-pelvis fusion, L3-S1 PLIF     #[id number] katina   NEURO:  neurocheck q4  pain control--PCA pump  surgical drains x2 per plastic surgery  hold home dose ASA  Activity: [x] mobilize as tolerated [] Bedrest [x] PT [x] OT [] PMNR    PULM: encourage incentive spirometry    CV:  SBP goal 100-160, d/c jessie  continue home dose carvedilol and losartan    RENAL:  Fluids: IVL,    GI:  Diet:  GI prophylaxis [x] not indicated [] PPI [] other:  Bowel regimen [] colace [x] senna [] other:    ENDO:   Goal euglycemia (-180)    HEME/ONC:  VTE prophylaxis: [x] SCDs [] chemoprophylaxis [x] hold chemoprophylaxis due to: fresh post op [] high risk of DVT/PE on admission due to:  3pRBC give intra op, check post op labs  cbc checked and hgb 8, will given 1U PRBC, recheck in 1 hr     ID: monitor for fevers    MISC:    SOCIAL/FAMILY:  [x] awaiting [] updated at bedside [] family meeting    CODE STATUS:  [x] Full Code [] DNR [] DNI [] Palliative/Comfort Care    Not critically ill but medically complex  transfer to floor pending imaging per neurosurgery   ASSESSMENT/PLAN: POD#1  T10-pelvis fusion, L3-S1 PLIF     #[id number] katina   NEURO:  neurocheck q4  pain control--PCA pump  surgical drains x2 per plastic surgery  hold home dose ASA  Activity: [x] mobilize as tolerated [] Bedrest [x] PT [x] OT [] PMNR    PULM: encourage incentive spirometry    CV:  SBP goal 100-160, d/c jessie  continue home dose carvedilol and losartan    RENAL:  Fluids: IVL,    GI:  Diet:  GI prophylaxis [x] not indicated [] PPI [] other:  Bowel regimen [] colace [x] senna [] other:    ENDO:   Goal euglycemia (-180)    HEME/ONC:  VTE prophylaxis: [x] SCDs [] chemoprophylaxis [x] hold chemoprophylaxis due to: fresh post op [] high risk of DVT/PE on admission due to:  3pRBC give intra op, check post op labs  cbc checked and hgb 8, will given 1U PRBC, recheck in 1 hr     ID: monitor for fevers    MISC:    SOCIAL/FAMILY:  [x] awaiting [] updated at bedside [] family meeting    CODE STATUS:  [x] Full Code [] DNR [] DNI [] Palliative/Comfort Care    Not critically ill but medically complex  transfer to floor per neurosurgery

## 2020-09-19 NOTE — PHYSICAL THERAPY INITIAL EVALUATION ADULT - TRANSFER TRAINING, PT EVAL
GOAL: Pt will perform all functional transfers independently with or without AD as needed within 3-4 wks.

## 2020-09-19 NOTE — PHYSICAL THERAPY INITIAL EVALUATION ADULT - GENERAL OBSERVATIONS, REHAB EVAL
Pt received sidelying in bed in NAD, +tele, +cont pulse ox, +BP cuff, +IVF, +A-line, +PCA pump, +b/l venodynes, +2HMVs, VSS, agreeable to participate in therapy at this time.

## 2020-09-19 NOTE — PHYSICAL THERAPY INITIAL EVALUATION ADULT - DIAGNOSIS, PT EVAL
Pt presents with postop pain, impairments in strength, balance, and endurance all impacting ability to perform ADLs and functional mobility.

## 2020-09-19 NOTE — PHYSICAL THERAPY INITIAL EVALUATION ADULT - FOLLOWS COMMANDS/ANSWERS QUESTIONS, REHAB EVAL
100% of the time/able to follow single-step instructions able to follow single-step instructions/100% of the time/French Speaking,  services utilized ID#[id number]

## 2020-09-19 NOTE — PROGRESS NOTE ADULT - ASSESSMENT
65F PMHx HTN, lumbar stenosis, obesity s/p T10-pelvis fusion, L3-S1 PLIF 9/18 with back closure with PRS, recovering in NSCU.    Plan:  - Continue to monitor Aquacel dressing, changed as needed for saturation or POD5, whichever comes first  - Monitor HVC output, management per NSGY  - Care per primary/NSCU team    Plastic Surgery  [id number]

## 2020-09-19 NOTE — PHYSICAL THERAPY INITIAL EVALUATION ADULT - GAIT DISTANCE, PT EVAL
5 feet/via side stepping towards HOB, further ambulation deferred per NSCU RN Manju' request patient with low H&H at this time

## 2020-09-19 NOTE — PHYSICAL THERAPY INITIAL EVALUATION ADULT - PERTINENT HX OF CURRENT PROBLEM, REHAB EVAL
66 yo F with PMHx HTN and spinal stenosis in lumbar region presents with complaints of pain, numbness in legs and unable to ambulate. Pt had laminectomy 2018 now presents POD#1 s/p T10-pelvis fusion, L3-S1 PLIF with Dr. Sheppard.

## 2020-09-19 NOTE — PHYSICAL THERAPY INITIAL EVALUATION ADULT - ADDITIONAL COMMENTS
CT L-Spine 9/18/20: Postop changes compatible with bilateral laminectomy is seen extending from L2 to S1 with discectomy at the L3-4 L4-5 and L5-S1 levels. There is also evidence of posterior paraspinal fusion extending from T10 to S1 with fixation screws seen involving both iliac bones as well. Demineralization of the bones is seen. Reversal of the normal lumbar lordosis seen. Mild compression deformity is seen involving the T11 vertebral body. No abnormal sclerotic or blastic changes are seen to suggest underlying pathologic compression fracture. Vacuum disc changes are seen involving the L2-3 level which is secondary to degenerative change. No acute fractures or dislocations are seen. Evaluation of paraspinal soft tissues appear grossly unremarkable. There is evidence of abnormal soft tissue with associated calcification partially demonstrated in the right buttock region. This appears confined to subcutaneous region.

## 2020-09-19 NOTE — CHART NOTE - NSCHARTNOTEFT_GEN_A_CORE
Day __ of Anesthesia Pain Management Service    SUBJECTIVE: Patient is doing well with IV PCA    Pain Scale Score:	[X] Refer to charted pain scores    THERAPY:    [ ] IV PCA Morphine		[ ] 5 mg/mL	[ ] 1 mg/mL  [X] IV PCA Hydromorphone	[ ] 5 mg/mL	[X] 1 mg/mL  [ ] IV PCA Fentanyl		[ ] 50 micrograms/mL    Demand dose: 0.2 mg     Lockout: 6 minutes   Continuous Rate: 0 mg/hr  4 Hour Limit: 4 mg    MEDICATIONS  (STANDING):  carvedilol 25 milliGRAM(s) Oral every 12 hours  ceFAZolin   IVPB 2000 milliGRAM(s) IV Intermittent once  clonazePAM  Tablet 0.5 milliGRAM(s) Oral three times a day  enoxaparin Injectable 40 milliGRAM(s) SubCutaneous daily  gabapentin 300 milliGRAM(s) Oral daily  HYDROmorphone PCA (1 mG/mL) 30 milliLiter(s) PCA Continuous PCA Continuous  influenza   Vaccine 0.5 milliLiter(s) IntraMuscular once  lidocaine 1% Injectable 0.2 milliLiter(s) Local Injection once  losartan 50 milliGRAM(s) Oral two times a day  multivitamin 1 Tablet(s) Oral daily  senna 2 Tablet(s) Oral at bedtime  sodium chloride 0.9% lock flush 3 milliLiter(s) IV Push every 8 hours    MEDICATIONS  (PRN):  acetaminophen   Tablet .. 650 milliGRAM(s) Oral every 6 hours PRN Temp greater or equal to 38C (100.4F), Mild Pain (1 - 3)  diazepam    Tablet 5 milliGRAM(s) Oral every 6 hours PRN SPASM  HYDROmorphone  Injectable 0.5 milliGRAM(s) IV Push every 10 minutes PRN Moderate Pain (4 - 6)  HYDROmorphone PCA (1 mG/mL) Rescue Clinician Bolus 0.5 milliGRAM(s) IV Push every 15 minutes PRN for Pain Scale GREATER THAN 6  naloxone Injectable 0.1 milliGRAM(s) IV Push every 3 minutes PRN For ANY of the following changes in patient status:  A. RR LESS THAN 10 breaths per minute, B. Oxygen saturation LESS THAN 90%, C. Sedation score of 6  ondansetron Injectable 4 milliGRAM(s) IV Push every 6 hours PRN Nausea      OBJECTIVE:    Sedation Score:	[ X] Alert	[ ] Drowsy 	[ ] Arousable	[ ] Asleep	[ ] Unresponsive    Side Effects:	[X ] None	[ ] Nausea	[ ] Vomiting	[ ] Pruritus  		[ ] Other:    Vital Signs Last 24 Hrs  T(C): 36.9 (19 Sep 2020 11:00), Max: 36.9 (19 Sep 2020 07:00)  T(F): 98.5 (19 Sep 2020 11:00), Max: 98.5 (19 Sep 2020 11:00)  HR: 72 (19 Sep 2020 11:00) (70 - 93)  BP: --  BP(mean): --  RR: 13 (19 Sep 2020 11:00) (13 - 20)  SpO2: 94% (19 Sep 2020 11:00) (82% - 100%)    ASSESSMENT/ PLAN    Therapy to  be:               [X] Continued   [ ] Discontinued   [ ] Changed to PRN Analgesics    Documentation and Verification of current medications:   [X] Done	[ ] Not done, not eligible    Comments:

## 2020-09-20 DIAGNOSIS — Z79.899 OTHER LONG TERM (CURRENT) DRUG THERAPY: ICD-10-CM

## 2020-09-20 DIAGNOSIS — D69.6 THROMBOCYTOPENIA, UNSPECIFIED: ICD-10-CM

## 2020-09-20 DIAGNOSIS — I10 ESSENTIAL (PRIMARY) HYPERTENSION: ICD-10-CM

## 2020-09-20 LAB
ANION GAP SERPL CALC-SCNC: 8 MMOL/L — SIGNIFICANT CHANGE UP (ref 5–17)
BUN SERPL-MCNC: 13 MG/DL — SIGNIFICANT CHANGE UP (ref 7–23)
CALCIUM SERPL-MCNC: 9 MG/DL — SIGNIFICANT CHANGE UP (ref 8.4–10.5)
CHLORIDE SERPL-SCNC: 102 MMOL/L — SIGNIFICANT CHANGE UP (ref 96–108)
CO2 SERPL-SCNC: 26 MMOL/L — SIGNIFICANT CHANGE UP (ref 22–31)
CREAT SERPL-MCNC: 0.75 MG/DL — SIGNIFICANT CHANGE UP (ref 0.5–1.3)
GLUCOSE SERPL-MCNC: 123 MG/DL — HIGH (ref 70–99)
HCT VFR BLD CALC: 27.7 % — LOW (ref 34.5–45)
HCT VFR BLD CALC: 28.4 % — LOW (ref 34.5–45)
HGB BLD-MCNC: 9.1 G/DL — LOW (ref 11.5–15.5)
HGB BLD-MCNC: 9.1 G/DL — LOW (ref 11.5–15.5)
MCHC RBC-ENTMCNC: 28.6 PG — SIGNIFICANT CHANGE UP (ref 27–34)
MCHC RBC-ENTMCNC: 29.4 PG — SIGNIFICANT CHANGE UP (ref 27–34)
MCHC RBC-ENTMCNC: 32 GM/DL — SIGNIFICANT CHANGE UP (ref 32–36)
MCHC RBC-ENTMCNC: 32.9 GM/DL — SIGNIFICANT CHANGE UP (ref 32–36)
MCV RBC AUTO: 89.3 FL — SIGNIFICANT CHANGE UP (ref 80–100)
MCV RBC AUTO: 89.4 FL — SIGNIFICANT CHANGE UP (ref 80–100)
NRBC # BLD: 0 /100 WBCS — SIGNIFICANT CHANGE UP (ref 0–0)
NRBC # BLD: 0 /100 WBCS — SIGNIFICANT CHANGE UP (ref 0–0)
PLATELET # BLD AUTO: 115 K/UL — LOW (ref 150–400)
PLATELET # BLD AUTO: 115 K/UL — LOW (ref 150–400)
POTASSIUM SERPL-MCNC: 3.8 MMOL/L — SIGNIFICANT CHANGE UP (ref 3.5–5.3)
POTASSIUM SERPL-SCNC: 3.8 MMOL/L — SIGNIFICANT CHANGE UP (ref 3.5–5.3)
RBC # BLD: 3.1 M/UL — LOW (ref 3.8–5.2)
RBC # BLD: 3.18 M/UL — LOW (ref 3.8–5.2)
RBC # FLD: 16.1 % — HIGH (ref 10.3–14.5)
RBC # FLD: 16.2 % — HIGH (ref 10.3–14.5)
SODIUM SERPL-SCNC: 136 MMOL/L — SIGNIFICANT CHANGE UP (ref 135–145)
WBC # BLD: 12.45 K/UL — HIGH (ref 3.8–10.5)
WBC # BLD: 12.68 K/UL — HIGH (ref 3.8–10.5)
WBC # FLD AUTO: 12.45 K/UL — HIGH (ref 3.8–10.5)
WBC # FLD AUTO: 12.68 K/UL — HIGH (ref 3.8–10.5)

## 2020-09-20 PROCEDURE — 93970 EXTREMITY STUDY: CPT | Mod: 26

## 2020-09-20 PROCEDURE — 99223 1ST HOSP IP/OBS HIGH 75: CPT

## 2020-09-20 RX ORDER — OXYCODONE HYDROCHLORIDE 5 MG/1
10 TABLET ORAL EVERY 4 HOURS
Refills: 0 | Status: DISCONTINUED | OUTPATIENT
Start: 2020-09-20 | End: 2020-09-27

## 2020-09-20 RX ORDER — OXYCODONE HYDROCHLORIDE 5 MG/1
5 TABLET ORAL EVERY 4 HOURS
Refills: 0 | Status: DISCONTINUED | OUTPATIENT
Start: 2020-09-20 | End: 2020-09-27

## 2020-09-20 RX ORDER — ENOXAPARIN SODIUM 100 MG/ML
40 INJECTION SUBCUTANEOUS AT BEDTIME
Refills: 0 | Status: DISCONTINUED | OUTPATIENT
Start: 2020-09-20 | End: 2020-09-21

## 2020-09-20 RX ORDER — HYDROMORPHONE HYDROCHLORIDE 2 MG/ML
0.5 INJECTION INTRAMUSCULAR; INTRAVENOUS; SUBCUTANEOUS DAILY
Refills: 0 | Status: DISCONTINUED | OUTPATIENT
Start: 2020-09-20 | End: 2020-09-24

## 2020-09-20 RX ADMIN — SODIUM CHLORIDE 3 MILLILITER(S): 9 INJECTION INTRAMUSCULAR; INTRAVENOUS; SUBCUTANEOUS at 05:53

## 2020-09-20 RX ADMIN — OXYCODONE HYDROCHLORIDE 10 MILLIGRAM(S): 5 TABLET ORAL at 09:34

## 2020-09-20 RX ADMIN — Medication 0.5 MILLIGRAM(S): at 05:56

## 2020-09-20 RX ADMIN — HYDROMORPHONE HYDROCHLORIDE 30 MILLILITER(S): 2 INJECTION INTRAMUSCULAR; INTRAVENOUS; SUBCUTANEOUS at 05:58

## 2020-09-20 RX ADMIN — SODIUM CHLORIDE 3 MILLILITER(S): 9 INJECTION INTRAMUSCULAR; INTRAVENOUS; SUBCUTANEOUS at 21:11

## 2020-09-20 RX ADMIN — GABAPENTIN 300 MILLIGRAM(S): 400 CAPSULE ORAL at 13:04

## 2020-09-20 RX ADMIN — LOSARTAN POTASSIUM 50 MILLIGRAM(S): 100 TABLET, FILM COATED ORAL at 05:56

## 2020-09-20 RX ADMIN — SENNA PLUS 2 TABLET(S): 8.6 TABLET ORAL at 21:12

## 2020-09-20 RX ADMIN — HYDROMORPHONE HYDROCHLORIDE 0.5 MILLIGRAM(S): 2 INJECTION INTRAMUSCULAR; INTRAVENOUS; SUBCUTANEOUS at 10:40

## 2020-09-20 RX ADMIN — HYDROMORPHONE HYDROCHLORIDE 30 MILLILITER(S): 2 INJECTION INTRAMUSCULAR; INTRAVENOUS; SUBCUTANEOUS at 02:00

## 2020-09-20 RX ADMIN — Medication 0.5 MILLIGRAM(S): at 13:08

## 2020-09-20 RX ADMIN — OXYCODONE HYDROCHLORIDE 10 MILLIGRAM(S): 5 TABLET ORAL at 13:05

## 2020-09-20 RX ADMIN — Medication 0.5 MILLIGRAM(S): at 21:12

## 2020-09-20 RX ADMIN — OXYCODONE HYDROCHLORIDE 10 MILLIGRAM(S): 5 TABLET ORAL at 13:35

## 2020-09-20 RX ADMIN — SODIUM CHLORIDE 3 MILLILITER(S): 9 INJECTION INTRAMUSCULAR; INTRAVENOUS; SUBCUTANEOUS at 13:06

## 2020-09-20 RX ADMIN — CARVEDILOL PHOSPHATE 25 MILLIGRAM(S): 80 CAPSULE, EXTENDED RELEASE ORAL at 05:56

## 2020-09-20 RX ADMIN — HYDROMORPHONE HYDROCHLORIDE 0.5 MILLIGRAM(S): 2 INJECTION INTRAMUSCULAR; INTRAVENOUS; SUBCUTANEOUS at 10:55

## 2020-09-20 RX ADMIN — OXYCODONE HYDROCHLORIDE 10 MILLIGRAM(S): 5 TABLET ORAL at 09:04

## 2020-09-20 RX ADMIN — Medication 1 TABLET(S): at 13:04

## 2020-09-20 RX ADMIN — OXYCODONE HYDROCHLORIDE 10 MILLIGRAM(S): 5 TABLET ORAL at 22:02

## 2020-09-20 RX ADMIN — HYDROMORPHONE HYDROCHLORIDE 30 MILLILITER(S): 2 INJECTION INTRAMUSCULAR; INTRAVENOUS; SUBCUTANEOUS at 07:31

## 2020-09-20 RX ADMIN — ENOXAPARIN SODIUM 40 MILLIGRAM(S): 100 INJECTION SUBCUTANEOUS at 21:12

## 2020-09-20 NOTE — CONSULT NOTE ADULT - ASSESSMENT
65y.o female w/ PMHx of Lumbar spinal stenosis, HTN and obesity presented to [facility] for planned Surgery now s/p T10-pelvis fusion and L3-S1 PLIF on 9/18.

## 2020-09-20 NOTE — OCCUPATIONAL THERAPY INITIAL EVALUATION ADULT - PERTINENT HX OF CURRENT PROBLEM, REHAB EVAL
65F with PMHx HTN and spinal stenosis in lumbar region presents with complaints of pain, numbness in legs and unable to ambulate. Pt had laminectomy 2018 now presents POD#1 s/p T10-pelvis fusion, L3-S1 PLIF with Dr. Sheppard.

## 2020-09-20 NOTE — CONSULT NOTE ADULT - PROBLEM SELECTOR RECOMMENDATION 9
s/p T10-pelvis fusion and L3-S1 PLIF on 9/18. s/p 4 units PRBC   monitor [facility] outpt  PCA d/c'ed on PO pain meds, pain well controlled

## 2020-09-20 NOTE — OCCUPATIONAL THERAPY INITIAL EVALUATION ADULT - LIVES WITH, PROFILE
Pt resides in private home with son, no stairs to enter or inside +first floor setup. Prior to admit, pt reports ambulating independently with straight cane, (-) driving and no longer works.

## 2020-09-20 NOTE — CONSULT NOTE ADULT - PROBLEM SELECTOR RECOMMENDATION 4
Patient takes Klonopin 0.5 PRN at home, currently ordered as standing,   pt also receiving diazepam Q6h PRN for spams, rec to change Klonopin to home PRN dose as benzodiazepines can cause adverse effects in [facility] patients.

## 2020-09-20 NOTE — PROGRESS NOTE ADULT - ASSESSMENT
ASSESSMENT/PLAN: POD#2  T10-pelvis fusion, L3-S1 PLIF      NEURO:  neurocheck q4  pain control- DC PCA, transition to oral meds  surgical drains x2 per plastic surgery  hold home dose ASA  Activity: [x] mobilize as tolerated [] Bedrest [x] PT [x] OT [] PMNR    PULM: encourage incentive spirometry    CV:  SBP goal 100-160, d/c jessie  continue home dose carvedilol and losartan    RENAL:  Fluids: IVL,    GI:  Diet:  GI prophylaxis [x] not indicated [] PPI [] other:  Bowel regimen [] colace [x] senna [] other:    ENDO:   Goal euglycemia (-180)    HEME/ONC:  VTE prophylaxis: [x] SCDs [] chemoprophylaxis [x] hold chemoprophylaxis due to: fresh post op [] high risk of DVT/PE on admission due to:  3pRBC give intra op, check post op labs  F/u AM labs    ID: monitor for fevers

## 2020-09-20 NOTE — CONSULT NOTE ADULT - SUBJECTIVE AND OBJECTIVE BOX
HPI:  65y.o female w/ PMHx of Lumbar spinal stenosis, HTN and obesity presented to [facility] for planned Surgery. Patient complained of numbness b/l LE, and difficulty ambulating. She is now s/p T10-pelvis fusion and L3-S1 PLIF on 9/18. Patient received 3 units PRBC in OR and 1 unit yesterday. She was seen and examined at bedside. States she feels well, no new complaints. Denies any CP, SOB, abd pain and n/v.       PMHx:  HTN   Kidney stones   Lumbago with sciatica   Lumbar stenosis   Obesity   Vertigo.     PSHx:  Carpal tunnel syndrome on both sides sx done 2013  History of cataract surgery both eyes 2017  History of laminectomy 3/2018  History of lithotripsy    FHx:  not significant for Heart disease     SHx:  Denies using tobacco, alcohol and illicit drugs     REVIEW OF SYSTEMS:  CONSTITUTIONAL: No weakness, fevers or chills  EYES: no blurry vision or eye pain.   ENT: No throat pain. No dysphagia.    NECK: No pain or stiffness  RESPIRATORY: No cough, wheezing, hemoptysis; No shortness of breath  CARDIOVASCULAR: No chest pain or palpitations.  GASTROINTESTINAL: No abdominal pain. No nausea or vomiting; No diarrhea or constipation. No melena or hematochezia.  GENITOURINARY: No dysuria, frequency or hematuria  NEUROLOGICAL: No numbness or weakness. No dizziness or falls.   SKIN: No itching, burning, rashes, or lesions.   LYMPHATIC: No masses or swelling.   All other review of systems is negative unless indicated above.    Allergies  No Known Allergies    Intolerances        MEDICATIONS  (STANDING):  carvedilol 25 milliGRAM(s) Oral every 12 hours  clonazePAM  Tablet 0.5 milliGRAM(s) Oral three times a day  gabapentin 300 milliGRAM(s) Oral daily  influenza   Vaccine 0.5 milliLiter(s) IntraMuscular once  lidocaine 1% Injectable 0.2 milliLiter(s) Local Injection once  losartan 50 milliGRAM(s) Oral two times a day  multivitamin 1 Tablet(s) Oral daily  senna 2 Tablet(s) Oral at bedtime  sodium chloride 0.9% lock flush 3 milliLiter(s) IV Push every 8 hours    MEDICATIONS  (PRN):  acetaminophen   Tablet .. 650 milliGRAM(s) Oral every 6 hours PRN Temp greater or equal to 38C (100.4F), Mild Pain (1 - 3)  diazepam    Tablet 5 milliGRAM(s) Oral every 6 hours PRN SPASM  HYDROmorphone  Injectable 0.5 milliGRAM(s) IV Push daily PRN Breakthrough  naloxone Injectable 0.1 milliGRAM(s) IV Push every 3 minutes PRN For ANY of the following changes in patient status:  A. RR LESS THAN 10 breaths per minute, B. Oxygen saturation LESS THAN 90%, C. Sedation score of 6  ondansetron Injectable 4 milliGRAM(s) IV Push every 6 hours PRN Nausea  oxyCODONE    IR 5 milliGRAM(s) Oral every 4 hours PRN Moderate Pain (4 - 6)  oxyCODONE    IR 10 milliGRAM(s) Oral every 4 hours PRN Severe Pain (7 - 10)      Vital Signs Last 24 Hrs  T(C): 36.8 (20 Sep 2020 08:12), Max: 37.2 (19 Sep 2020 15:00)  T(F): 98.3 (20 Sep 2020 08:12), Max: 99 (19 Sep 2020 15:00)  HR: 88 (20 Sep 2020 08:12) (72 - 88)  BP: 139/74 (20 Sep 2020 10:55) (115/72 - 162/77)  BP(mean): --  RR: 18 (20 Sep 2020 08:12) (16 - 19)  SpO2: 98% (20 Sep 2020 10:55) (95% - 99%)  CAPILLARY BLOOD GLUCOSE        I&O's Summary    19 Sep 2020 07:01  -  20 Sep 2020 07:00  --------------------------------------------------------  IN: 1430 mL / OUT: 2765 mL / NET: -1335 mL    20 Sep 2020 07:01  -  20 Sep 2020 12:04  --------------------------------------------------------  IN: 360 mL / OUT: 0 mL / NET: 360 mL        PHYSICAL EXAM:  GENERAL: obese  HEAD:  Atraumatic, Normocephalic  EYES: EOMI, PERRLA, conjunctiva and sclera clear  NECK: Supple, No JVD  CHEST/LUNG: Clear to auscultation bilaterally; No wheeze  HEART: Regular rate and rhythm; No murmurs, rubs, or gallops  ABDOMEN: Soft, Nontender, Nondistended; Bowel sounds present  EXTREMITIES:  No edema,   NEUROLOGY: AAOx3, non-focal, + HMV  PSYCH: calm  SKIN: No rashes or lesions    LABS:                        9.1    12.45 )-----------( 115      ( 20 Sep 2020 10:04 )             27.7     09-20    136  |  102  |  13  ----------------------------<  123<H>  3.8   |  26  |  0.75    Ca    9.0      20 Sep 2020 06:51                RADIOLOGY & ADDITIONAL TESTS:    Imaging Personally Reviewed:    Consultant(s) Notes Reviewed:      Care Discussed with Consultants/Other Providers:    Case Discussed with Family:    Goals of Care:

## 2020-09-20 NOTE — PROGRESS NOTE ADULT - ASSESSMENT
65F PMHx HTN, lumbar stenosis, obesity s/p T10-pelvis fusion, L3-S1 PLIF 9/18 with back closure with PRS, recovering in NSCU.    Plan:  - Continue to monitor Aquacel dressing, changed as needed for saturation or POD5, whichever comes first  - Monitor HVC output, management per NSGY  - Care per primary/NSCU team    Plastic Surgery  [phone]

## 2020-09-20 NOTE — CONSULT NOTE ADULT - SUBJECTIVE AND OBJECTIVE BOX
VASCULAR SURGERY CONSULT NOTE  ROSALES LAWSON  |  [id number]  |  09-20-20 @ 23:01    CC: Patient is a 65y old  Female who presents with a chief complaint of low back pain (04 Sep 2020 07:43)    HPI: 65F PMH HTN, obesity, with spinal stenosis lumbar region. Pt was c/o pain unable to walk and numbness in legs. Patient had laminectomy 2018 and presented for T10-pelvis instrumentation and fusion. L2-S1 posterior lumbar fusion. Patient underwent T10-pelvis fusion, L3-S1 PLIF.     INTERVAL EVENTS: Patient c/o LE pain. LE venous duplex revealing extensive RLE DVTs. Vascular surgery consulted.    REVIEW OF SYSTEMS:  General: denies weight change, fever or fatigue  HEENT: denies sore throat, hoarseness  Respiratory: denies cough, shortness of breath at rest and on exertion, wheezing  Cardiovascular: denies chest pain, abnormal heart rhythm, PND, palpitations  Gastrointestinal: denies nausea, vomiting, diarrhea, bloody or black bowel movements  Genitourinary: denies frequent urination, painful urination, kidney disease  Neurological: denies seizures, headaches  Muscoloskeletal: denies any joint pains  Psychiatric: denies depression, anxiety    PAST MEDICAL & SURGICAL HISTORY:  Lumbar stenosis  Vertigo  Kidney stones  Obesity  HTN (hypertension)  Lumbago with sciatica  History of lithotripsy  History of laminectomy 3/2018  History of cataract surgery both eyes 2017  Carpal tunnel syndrome on both sides sx done 2013    MEDICATIONS  (STANDING):  carvedilol 25 milliGRAM(s) Oral every 12 hours  clonazePAM  Tablet 0.5 milliGRAM(s) Oral three times a day  enoxaparin Injectable 40 milliGRAM(s) SubCutaneous at bedtime  gabapentin 300 milliGRAM(s) Oral daily  influenza   Vaccine 0.5 milliLiter(s) IntraMuscular once  lidocaine 1% Injectable 0.2 milliLiter(s) Local Injection once  losartan 50 milliGRAM(s) Oral two times a day  multivitamin 1 Tablet(s) Oral daily  senna 2 Tablet(s) Oral at bedtime  sodium chloride 0.9% lock flush 3 milliLiter(s) IV Push every 8 hours    MEDICATIONS  (PRN):  acetaminophen   Tablet .. 650 milliGRAM(s) Oral every 6 hours PRN Temp greater or equal to 38C (100.4F), Mild Pain (1 - 3)  diazepam    Tablet 5 milliGRAM(s) Oral every 6 hours PRN SPASM  HYDROmorphone  Injectable 0.5 milliGRAM(s) IV Push daily PRN Breakthrough  naloxone Injectable 0.1 milliGRAM(s) IV Push every 3 minutes PRN For ANY of the following changes in patient status:  A. RR LESS THAN 10 breaths per minute, B. Oxygen saturation LESS THAN 90%, C. Sedation score of 6  ondansetron Injectable 4 milliGRAM(s) IV Push every 6 hours PRN Nausea  oxyCODONE    IR 5 milliGRAM(s) Oral every 4 hours PRN Moderate Pain (4 - 6)  oxyCODONE    IR 10 milliGRAM(s) Oral every 4 hours PRN Severe Pain (7 - 10)    Allergies No Known Allergies    SOCIAL HISTORY:    FAMILY HISTORY: noncontributory    Objective:   Vital Signs Last 24 Hrs  T(C): 36.6 (20 Sep 2020 21:17), Max: 36.9 (20 Sep 2020 01:45)  T(F): 97.8 (20 Sep 2020 21:17), Max: 98.5 (20 Sep 2020 01:45)  HR: 78 (20 Sep 2020 21:17) (69 - 88)  BP: 104/60 (20 Sep 2020 21:17) (104/60 - 162/77)  BP(mean): --  RR: 16 (20 Sep 2020 21:17) (16 - 18)  SpO2: 94% (20 Sep 2020 21:17) (94% - 100%)    Physical Exam:  General: NAD, resting comfortably   CV: regular rate and rhythm   Pulm: no increased work of breathing   Abdomen: soft, nontender, nondistended, no rebound or guarding    Extremities: warm and well perfused      LABS:                        9.1    12.45 )-----------( 115      ( 20 Sep 2020 10:04 )             27.7     09-20    136  |  102  |  13  ----------------------------<  123<H>  3.8   |  26  |  0.75    Ca    9.0      20 Sep 2020 06:51        RADIOLOGY & ADDITIONAL STUDIES:    < from: [facility] Duplex Lower Ext Vein Scan, Bilat (09.20.20 @ 15:20) >  FINDINGS:    Right lower extremity:  There is thrombus within the right external iliac vein, right common femoral and right deep femoral veins. The right femoral and popliteal veins are patent.    Thrombus within the right posterior tibial and peroneal veins.    4.2 cm right Baker's cyst.    Left lower extremity:  There is normal compressibility of the bilateral common femoral, femoral and popliteal veins.  Doppler examination shows normal spontaneous and phasic flow.    No calf vein thrombosis is detected.    5.0 cm left Baker's cyst.    IMPRESSION:  Acute deep venous thrombosis: above and below the RIGHT knee.    Findings were discussed with Dr. Escalera on 9/20/2020  by vascular ultrasound technologist.    < end of copied text >       VASCULAR SURGERY CONSULT NOTE  ROSALES LAWSON  |  [id number]  |  09-20-20 @ 23:01    CC: Patient is a 65y old  Female who presents with a chief complaint of low back pain (04 Sep 2020 07:43)    HPI: 65F PMH HTN, obesity, with spinal stenosis lumbar region. Pt was c/o pain unable to walk and numbness in legs. Patient had laminectomy 2018 and presented for T10-pelvis instrumentation and fusion. L2-S1 posterior lumbar fusion. Patient underwent T10-pelvis fusion, L3-S1 PLIF.     INTERVAL EVENTS: Patient c/o LE pain. LE venous duplex revealing extensive RLE DVTs. Vascular surgery consulted.    REVIEW OF SYSTEMS:  General: denies weight change, fever or fatigue  HEENT: denies sore throat, hoarseness  Respiratory: denies cough, shortness of breath at rest and on exertion, wheezing  Cardiovascular: denies chest pain, abnormal heart rhythm, PND, palpitations  Gastrointestinal: denies nausea, vomiting, diarrhea, bloody or black bowel movements  Genitourinary: denies frequent urination, painful urination, kidney disease  Neurological: denies seizures, headaches  Muscoloskeletal: denies any joint pains  Psychiatric: denies depression, anxiety    PAST MEDICAL & SURGICAL HISTORY:  Lumbar stenosis  Vertigo  Kidney stones  Obesity  HTN (hypertension)  Lumbago with sciatica  History of lithotripsy  History of laminectomy 3/2018  History of cataract surgery both eyes 2017  Carpal tunnel syndrome on both sides sx done 2013    MEDICATIONS  (STANDING):  carvedilol 25 milliGRAM(s) Oral every 12 hours  clonazePAM  Tablet 0.5 milliGRAM(s) Oral three times a day  enoxaparin Injectable 40 milliGRAM(s) SubCutaneous at bedtime  gabapentin 300 milliGRAM(s) Oral daily  influenza   Vaccine 0.5 milliLiter(s) IntraMuscular once  lidocaine 1% Injectable 0.2 milliLiter(s) Local Injection once  losartan 50 milliGRAM(s) Oral two times a day  multivitamin 1 Tablet(s) Oral daily  senna 2 Tablet(s) Oral at bedtime  sodium chloride 0.9% lock flush 3 milliLiter(s) IV Push every 8 hours    MEDICATIONS  (PRN):  acetaminophen   Tablet .. 650 milliGRAM(s) Oral every 6 hours PRN Temp greater or equal to 38C (100.4F), Mild Pain (1 - 3)  diazepam    Tablet 5 milliGRAM(s) Oral every 6 hours PRN SPASM  HYDROmorphone  Injectable 0.5 milliGRAM(s) IV Push daily PRN Breakthrough  naloxone Injectable 0.1 milliGRAM(s) IV Push every 3 minutes PRN For ANY of the following changes in patient status:  A. RR LESS THAN 10 breaths per minute, B. Oxygen saturation LESS THAN 90%, C. Sedation score of 6  ondansetron Injectable 4 milliGRAM(s) IV Push every 6 hours PRN Nausea  oxyCODONE    IR 5 milliGRAM(s) Oral every 4 hours PRN Moderate Pain (4 - 6)  oxyCODONE    IR 10 milliGRAM(s) Oral every 4 hours PRN Severe Pain (7 - 10)    Allergies No Known Allergies    SOCIAL HISTORY:    FAMILY HISTORY: noncontributory    Objective:   Vital Signs Last 24 Hrs  T(C): 36.6 (20 Sep 2020 21:17), Max: 36.9 (20 Sep 2020 01:45)  T(F): 97.8 (20 Sep 2020 21:17), Max: 98.5 (20 Sep 2020 01:45)  HR: 78 (20 Sep 2020 21:17) (69 - 88)  BP: 104/60 (20 Sep 2020 21:17) (104/60 - 162/77)  BP(mean): --  RR: 16 (20 Sep 2020 21:17) (16 - 18)  SpO2: 94% (20 Sep 2020 21:17) (94% - 100%)    Physical Exam:  General: NAD, resting comfortably   CV: regular rate and rhythm   Pulm: no increased work of breathing   Abdomen: soft, nontender, nondistended, no rebound or guarding    Extremities: warm and well perfused, no LE edema  Vasc: palpable femoral, DP, PT pulses       LABS:                        9.1    12.45 )-----------( 115      ( 20 Sep 2020 10:04 )             27.7     09-20    136  |  102  |  13  ----------------------------<  123<H>  3.8   |  26  |  0.75    Ca    9.0      20 Sep 2020 06:51        RADIOLOGY & ADDITIONAL STUDIES:    VA Duplex Lower Ext Vein Scan, Bilat (09.20.20 @ 15:20)  FINDINGS:  Right lower extremity:  There is thrombus within the right external iliac vein, right common femoral and right deep femoral veins. The right femoral and popliteal veins are patent.  Thrombus within the right posterior tibial and peroneal veins.  4.2 cm right Baker's cyst.    Left lower extremity:  There is normal compressibility of the bilateral common femoral, femoral and popliteal veins.  Doppler examination shows normal spontaneous and phasic flow.  No calf vein thrombosis is detected.  5.0 cm left Baker's cyst.    IMPRESSION:  Acute deep venous thrombosis: above and below the RIGHT knee.  Findings were discussed with Dr. Escalera on 9/20/2020  by vascular ultrasound technologist.           VASCULAR SURGERY CONSULT NOTE  ROSALES LAWSON  |  [id number]  |  09-20-20 @ 23:01    CC: Patient is a 65y old  Female who presents with a chief complaint of low back pain (04 Sep 2020 07:43)    HPI: 65F PMH HTN, obesity, with spinal stenosis lumbar region. Pt was c/o pain unable to walk and numbness in legs. Patient had laminectomy 2018 and presented for T10-pelvis instrumentation and fusion. L2-S1 posterior lumbar fusion. Patient underwent T10-pelvis fusion, L3-S1 PLIF.     INTERVAL EVENTS: Patient c/o LE pain. LE venous duplex revealing extensive RLE DVTs. Vascular surgery consulted.    REVIEW OF SYSTEMS:  General: denies weight change, fever or fatigue  HEENT: denies sore throat, hoarseness  Respiratory: denies cough, shortness of breath at rest and on exertion, wheezing  Cardiovascular: denies chest pain, abnormal heart rhythm, PND, palpitations  Gastrointestinal: denies nausea, vomiting, diarrhea, bloody or black bowel movements  Genitourinary: denies frequent urination, painful urination, kidney disease  Neurological: denies seizures, headaches  Muscoloskeletal: denies any joint pains  Psychiatric: denies depression, anxiety    PAST MEDICAL & SURGICAL HISTORY:  Lumbar stenosis  Vertigo  Kidney stones  Obesity  HTN (hypertension)  Lumbago with sciatica  History of lithotripsy  History of laminectomy 3/2018  History of cataract surgery both eyes 2017  Carpal tunnel syndrome on both sides sx done 2013    MEDICATIONS  (STANDING):  carvedilol 25 milliGRAM(s) Oral every 12 hours  clonazePAM  Tablet 0.5 milliGRAM(s) Oral three times a day  enoxaparin Injectable 40 milliGRAM(s) SubCutaneous at bedtime  gabapentin 300 milliGRAM(s) Oral daily  influenza   Vaccine 0.5 milliLiter(s) IntraMuscular once  lidocaine 1% Injectable 0.2 milliLiter(s) Local Injection once  losartan 50 milliGRAM(s) Oral two times a day  multivitamin 1 Tablet(s) Oral daily  senna 2 Tablet(s) Oral at bedtime  sodium chloride 0.9% lock flush 3 milliLiter(s) IV Push every 8 hours    MEDICATIONS  (PRN):  acetaminophen   Tablet .. 650 milliGRAM(s) Oral every 6 hours PRN Temp greater or equal to 38C (100.4F), Mild Pain (1 - 3)  diazepam    Tablet 5 milliGRAM(s) Oral every 6 hours PRN SPASM  HYDROmorphone  Injectable 0.5 milliGRAM(s) IV Push daily PRN Breakthrough  naloxone Injectable 0.1 milliGRAM(s) IV Push every 3 minutes PRN For ANY of the following changes in patient status:  A. RR LESS THAN 10 breaths per minute, B. Oxygen saturation LESS THAN 90%, C. Sedation score of 6  ondansetron Injectable 4 milliGRAM(s) IV Push every 6 hours PRN Nausea  oxyCODONE    IR 5 milliGRAM(s) Oral every 4 hours PRN Moderate Pain (4 - 6)  oxyCODONE    IR 10 milliGRAM(s) Oral every 4 hours PRN Severe Pain (7 - 10)    Allergies No Known Allergies    SOCIAL HISTORY:    FAMILY HISTORY: noncontributory    Objective:   Vital Signs Last 24 Hrs  T(C): 36.6 (20 Sep 2020 21:17), Max: 36.9 (20 Sep 2020 01:45)  T(F): 97.8 (20 Sep 2020 21:17), Max: 98.5 (20 Sep 2020 01:45)  HR: 78 (20 Sep 2020 21:17) (69 - 88)  BP: 104/60 (20 Sep 2020 21:17) (104/60 - 162/77)  BP(mean): --  RR: 16 (20 Sep 2020 21:17) (16 - 18)  SpO2: 94% (20 Sep 2020 21:17) (94% - 100%)    Physical Exam:  General: NAD, resting comfortably   CV: regular rate and rhythm   Pulm: no increased work of breathing   Abdomen: soft, nontender, nondistended, no rebound or guarding    Extremities: warm and well perfused, no LE edema  Vasc: palpable femoral, DP, PT pulses       LABS:                        9.1    12.45 )-----------( 115      ( 20 Sep 2020 10:04 )             27.7     09-20    136  |  102  |  13  ----------------------------<  123<H>  3.8   |  26  |  0.75    Ca    9.0      20 Sep 2020 06:51        RADIOLOGY & ADDITIONAL STUDIES:    VA Duplex Lower Ext Vein Scan, Bilat (09.20.20 @ 15:20)  FINDINGS:  Right lower extremity:  There is thrombus within the right external iliac vein, right common femoral and right deep femoral veins. The right femoral and popliteal veins are patent.  Thrombus within the right posterior tibial and peroneal veins.  4.2 cm right Baker's cyst.    Left lower extremity:  There is normal compressibility of the bilateral common femoral, femoral and popliteal veins.  Doppler examination shows normal spontaneous and phasic flow.  No calf vein thrombosis is detected.  5.0 cm left Baker's cyst.    IMPRESSION:  Acute deep venous thrombosis: above and below the RIGHT knee.  Findings were discussed with Dr. Escalera on 9/20/2020  by vascular ultrasound technologist.           VASCULAR SURGERY CONSULT NOTE  ROSALES LAWSON  |  [id number]  |  09-20-20 @ 23:01    Telephone : [phone]    CC: Patient is a 65y old  Female who presents with a chief complaint of low back pain (04 Sep 2020 07:43)    HPI: 65F PMH HTN, obesity, with spinal stenosis lumbar region. Pt was c/o pain unable to walk and numbness in legs. Patient had laminectomy 2018 and presented for T10-pelvis instrumentation and fusion. L2-S1 posterior lumbar fusion. Patient underwent T10-pelvis fusion, L3-S1 PLIF.     INTERVAL EVENTS: Patient c/o LE pain. LE venous duplex revealing extensive RLE DVTs. Vascular surgery consulted.    REVIEW OF SYSTEMS:  General: denies weight change, fever or fatigue  HEENT: denies sore throat, hoarseness  Respiratory: denies cough, shortness of breath at rest and on exertion, wheezing  Cardiovascular: denies chest pain, abnormal heart rhythm, PND, palpitations  Gastrointestinal: denies nausea, vomiting, diarrhea, bloody or black bowel movements  Genitourinary: denies frequent urination, painful urination, kidney disease  Neurological: denies seizures, headaches  Muscoloskeletal: denies any joint pains  Psychiatric: denies depression, anxiety    PAST MEDICAL & SURGICAL HISTORY:  Lumbar stenosis  Vertigo  Kidney stones  Obesity  HTN (hypertension)  Lumbago with sciatica  History of lithotripsy  History of laminectomy 3/2018  History of cataract surgery both eyes 2017  Carpal tunnel syndrome on both sides sx done 2013    MEDICATIONS  (STANDING):  carvedilol 25 milliGRAM(s) Oral every 12 hours  clonazePAM  Tablet 0.5 milliGRAM(s) Oral three times a day  enoxaparin Injectable 40 milliGRAM(s) SubCutaneous at bedtime  gabapentin 300 milliGRAM(s) Oral daily  influenza   Vaccine 0.5 milliLiter(s) IntraMuscular once  lidocaine 1% Injectable 0.2 milliLiter(s) Local Injection once  losartan 50 milliGRAM(s) Oral two times a day  multivitamin 1 Tablet(s) Oral daily  senna 2 Tablet(s) Oral at bedtime  sodium chloride 0.9% lock flush 3 milliLiter(s) IV Push every 8 hours    MEDICATIONS  (PRN):  acetaminophen   Tablet .. 650 milliGRAM(s) Oral every 6 hours PRN Temp greater or equal to 38C (100.4F), Mild Pain (1 - 3)  diazepam    Tablet 5 milliGRAM(s) Oral every 6 hours PRN SPASM  HYDROmorphone  Injectable 0.5 milliGRAM(s) IV Push daily PRN Breakthrough  naloxone Injectable 0.1 milliGRAM(s) IV Push every 3 minutes PRN For ANY of the following changes in patient status:  A. RR LESS THAN 10 breaths per minute, B. Oxygen saturation LESS THAN 90%, C. Sedation score of 6  ondansetron Injectable 4 milliGRAM(s) IV Push every 6 hours PRN Nausea  oxyCODONE    IR 5 milliGRAM(s) Oral every 4 hours PRN Moderate Pain (4 - 6)  oxyCODONE    IR 10 milliGRAM(s) Oral every 4 hours PRN Severe Pain (7 - 10)    Allergies No Known Allergies    SOCIAL HISTORY:    FAMILY HISTORY: noncontributory    Objective:   Vital Signs Last 24 Hrs  T(C): 36.6 (20 Sep 2020 21:17), Max: 36.9 (20 Sep 2020 01:45)  T(F): 97.8 (20 Sep 2020 21:17), Max: 98.5 (20 Sep 2020 01:45)  HR: 78 (20 Sep 2020 21:17) (69 - 88)  BP: 104/60 (20 Sep 2020 21:17) (104/60 - 162/77)  BP(mean): --  RR: 16 (20 Sep 2020 21:17) (16 - 18)  SpO2: 94% (20 Sep 2020 21:17) (94% - 100%)    Physical Exam:  General: NAD, resting comfortably   CV: regular rate and rhythm   Pulm: no increased work of breathing   Abdomen: soft, nontender, nondistended, no rebound or guarding    Extremities: warm and well perfused, no LE edema  Vasc: palpable femoral, DP, PT pulses       LABS:                        9.1    12.45 )-----------( 115      ( 20 Sep 2020 10:04 )             27.7     09-20    136  |  102  |  13  ----------------------------<  123<H>  3.8   |  26  |  0.75    Ca    9.0      20 Sep 2020 06:51        RADIOLOGY & ADDITIONAL STUDIES:    VA Duplex Lower Ext Vein Scan, Bilat (09.20.20 @ 15:20)  FINDINGS:  Right lower extremity:  There is thrombus within the right external iliac vein, right common femoral and right deep femoral veins. The right femoral and popliteal veins are patent.  Thrombus within the right posterior tibial and peroneal veins.  4.2 cm right Baker's cyst.    Left lower extremity:  There is normal compressibility of the bilateral common femoral, femoral and popliteal veins.  Doppler examination shows normal spontaneous and phasic flow.  No calf vein thrombosis is detected.  5.0 cm left Baker's cyst.    IMPRESSION:  Acute deep venous thrombosis: above and below the RIGHT knee.  Findings were discussed with Dr. Escalera on 9/20/2020  by vascular ultrasound technologist.

## 2020-09-20 NOTE — PROGRESS NOTE ADULT - SUBJECTIVE AND OBJECTIVE BOX
SUBJECTIVE: No acute events overnight, complaining of a significant amount of pain    Vital Signs Last 24 Hrs  T(C): 36.8 (09-20-20 @ 08:12), Max: 37.2 (09-19-20 @ 15:00)  T(F): 98.3 (09-20-20 @ 08:12), Max: 99 (09-19-20 @ 15:00)  HR: 88 (09-20-20 @ 08:12) (70 - 88)  BP: 148/80 (09-20-20 @ 08:12) (115/72 - 162/77)  BP(mean): --  RR: 18 (09-20-20 @ 08:12) (13 - 19)  SpO2: 95% (09-20-20 @ 08:12) (93% - 99%)    PHYSICAL EXAM:    Constitutional: No Acute Distress     Neurological: Awake, alert, oriented to person, place and time, speech clear and fluent, face equal, tongue midline, briskly following commands, no drift, moves all extremities with 5/5 strength, sensation intact to light touch throughout, pupils 4mm and reactive bilaterally, extraocular movements intact, no nystagmus    Incision: Aquacel dressing in plce    Drains: HMV drain    Pulmonary: no resp distress    Cardiovascular: Regular rate and rhythm     Gastrointestinal: Soft, Non-tender, Non-distended    Extremities: No calf tenderness bilaterally, no cyanosis, clubbing or edema          LABS:                          9.1    12.68 )-----------( 115      ( 20 Sep 2020 06:51 )             28.4    09-20    136  |  102  |  13  ----------------------------<  123<H>  3.8   |  26  |  0.75    Ca    9.0      20 Sep 2020 06:51        09-19 @ 07:01  -  09-20 @ 07:00  --------------------------------------------------------  IN: 1430 mL / OUT: 2765 mL / NET: -1335 mL    09-20 @ 07:01  -  09-20 @ 08:21  --------------------------------------------------------  IN: 360 mL / OUT: 0 mL / NET: 360 mL        IMAGING:     MEDICATIONS:  Antibiotics:    Neuro:  acetaminophen   Tablet .. 650 milliGRAM(s) Oral every 6 hours PRN Temp greater or equal to 38C (100.4F), Mild Pain (1 - 3)  clonazePAM  Tablet 0.5 milliGRAM(s) Oral three times a day  diazepam    Tablet 5 milliGRAM(s) Oral every 6 hours PRN SPASM  gabapentin 300 milliGRAM(s) Oral daily  HYDROmorphone  Injectable 0.5 milliGRAM(s) IV Push daily PRN Breakthrough  ondansetron Injectable 4 milliGRAM(s) IV Push every 6 hours PRN Nausea  oxyCODONE    IR 5 milliGRAM(s) Oral every 4 hours PRN Moderate Pain (4 - 6)  oxyCODONE    IR 10 milliGRAM(s) Oral every 4 hours PRN Severe Pain (7 - 10)    Cardiac:  carvedilol 25 milliGRAM(s) Oral every 12 hours  losartan 50 milliGRAM(s) Oral two times a day    Pulm:    GI/:  senna 2 Tablet(s) Oral at bedtime    Other:   influenza   Vaccine 0.5 milliLiter(s) IntraMuscular once  lidocaine 1% Injectable 0.2 milliLiter(s) Local Injection once  multivitamin 1 Tablet(s) Oral daily  naloxone Injectable 0.1 milliGRAM(s) IV Push every 3 minutes PRN For ANY of the following changes in patient status:  A. RR LESS THAN 10 breaths per minute, B. Oxygen saturation LESS THAN 90%, C. Sedation score of 6  sodium chloride 0.9% lock flush 3 milliLiter(s) IV Push every 8 hours        DIET:  [id number]  SUBJECTIVE: No acute events overnight, complaining of a significant amount of pain, although throughout the day has improved. Happy with current pain regimen.     Vital Signs Last 24 Hrs  T(C): 36.8 (09-20-20 @ 08:12), Max: 37.2 (09-19-20 @ 15:00)  T(F): 98.3 (09-20-20 @ 08:12), Max: 99 (09-19-20 @ 15:00)  HR: 88 (09-20-20 @ 08:12) (70 - 88)  BP: 148/80 (09-20-20 @ 08:12) (115/72 - 162/77)  BP(mean): --  RR: 18 (09-20-20 @ 08:12) (13 - 19)  SpO2: 95% (09-20-20 @ 08:12) (93% - 99%)    PHYSICAL EXAM:    Constitutional: No Acute Distress     Neurological: Awake, alert, oriented to person, place and time, speech clear and fluent, face equal, tongue midline, briskly following commands, no drift, moves all extremities with 5/5 strength, sensation intact to light touch throughout, pupils 4mm and reactive bilaterally, extraocular movements intact, no nystagmus    Incision: Aquacel dressing in place    Drains: HMV drain    Pulmonary: no resp distress    Cardiovascular: Regular rate and rhythm     Gastrointestinal: Soft, Non-tender, Non-distended    Extremities: Bilateral calf tenderness          LABS:                          9.1    12.68 )-----------( 115      ( 20 Sep 2020 06:51 )             28.4    09-20    136  |  102  |  13  ----------------------------<  123<H>  3.8   |  26  |  0.75    Ca    9.0      20 Sep 2020 06:51        09-19 @ 07:01  -  09-20 @ 07:00  --------------------------------------------------------  IN: 1430 mL / OUT: 2765 mL / NET: -1335 mL    09-20 @ 07:01 - 09-20 @ 08:21  --------------------------------------------------------  IN: 360 mL / OUT: 0 mL / NET: 360 mL        IMAGING:     MEDICATIONS:  Antibiotics:    Neuro:  acetaminophen   Tablet .. 650 milliGRAM(s) Oral every 6 hours PRN Temp greater or equal to 38C (100.4F), Mild Pain (1 - 3)  clonazePAM  Tablet 0.5 milliGRAM(s) Oral three times a day  diazepam    Tablet 5 milliGRAM(s) Oral every 6 hours PRN SPASM  gabapentin 300 milliGRAM(s) Oral daily  HYDROmorphone  Injectable 0.5 milliGRAM(s) IV Push daily PRN Breakthrough  ondansetron Injectable 4 milliGRAM(s) IV Push every 6 hours PRN Nausea  oxyCODONE    IR 5 milliGRAM(s) Oral every 4 hours PRN Moderate Pain (4 - 6)  oxyCODONE    IR 10 milliGRAM(s) Oral every 4 hours PRN Severe Pain (7 - 10)    Cardiac:  carvedilol 25 milliGRAM(s) Oral every 12 hours  losartan 50 milliGRAM(s) Oral two times a day    Pulm:    GI/:  senna 2 Tablet(s) Oral at bedtime    Other:   influenza   Vaccine 0.5 milliLiter(s) IntraMuscular once  lidocaine 1% Injectable 0.2 milliLiter(s) Local Injection once  multivitamin 1 Tablet(s) Oral daily  naloxone Injectable 0.1 milliGRAM(s) IV Push every 3 minutes PRN For ANY of the following changes in patient status:  A. RR LESS THAN 10 breaths per minute, B. Oxygen saturation LESS THAN 90%, C. Sedation score of 6  sodium chloride 0.9% lock flush 3 milliLiter(s) IV Push every 8 hours        DIET:

## 2020-09-20 NOTE — OCCUPATIONAL THERAPY INITIAL EVALUATION ADULT - DIAGNOSIS, OT EVAL
Pt p/w impairments in balance, strength,  endurance, coordination impacting independence with ADLs and functional mobility.

## 2020-09-20 NOTE — CHART NOTE - NSCHARTNOTEFT_GEN_A_CORE
Anesthesia Pain Management Service    SUBJECTIVE: Patient is doing well with IV PCA    OBJECTIVE:       Pain Scale Score:	[X] Refer to charted pain scores            MEDICATIONS  (STANDING):  carvedilol 25 milliGRAM(s) Oral every 12 hours  clonazePAM  Tablet 0.5 milliGRAM(s) Oral three times a day  gabapentin 300 milliGRAM(s) Oral daily  influenza   Vaccine 0.5 milliLiter(s) IntraMuscular once  lidocaine 1% Injectable 0.2 milliLiter(s) Local Injection once  losartan 50 milliGRAM(s) Oral two times a day  multivitamin 1 Tablet(s) Oral daily  senna 2 Tablet(s) Oral at bedtime  sodium chloride 0.9% lock flush 3 milliLiter(s) IV Push every 8 hours    MEDICATIONS  (PRN):  acetaminophen   Tablet .. 650 milliGRAM(s) Oral every 6 hours PRN Temp greater or equal to 38C (100.4F), Mild Pain (1 - 3)  diazepam    Tablet 5 milliGRAM(s) Oral every 6 hours PRN SPASM  HYDROmorphone  Injectable 0.5 milliGRAM(s) IV Push daily PRN Breakthrough  naloxone Injectable 0.1 milliGRAM(s) IV Push every 3 minutes PRN For ANY of the following changes in patient status:  A. RR LESS THAN 10 breaths per minute, B. Oxygen saturation LESS THAN 90%, C. Sedation score of 6  ondansetron Injectable 4 milliGRAM(s) IV Push every 6 hours PRN Nausea  oxyCODONE    IR 5 milliGRAM(s) Oral every 4 hours PRN Moderate Pain (4 - 6)  oxyCODONE    IR 10 milliGRAM(s) Oral every 4 hours PRN Severe Pain (7 - 10)          Sedation Score:	[ X] Alert	[ ] Drowsy 	[ ] Arousable	[ ] Asleep	[ ] Unresponsive        Side Effects:	[X ] None	[ ] Nausea	[ ] Vomiting	[ ] Pruritus  		[ ] Other:       Vital Signs Last 24 Hrs  T(C): 36.8 (20 Sep 2020 08:12), Max: 37.2 (19 Sep 2020 15:00)  T(F): 98.3 (20 Sep 2020 08:12), Max: 99 (19 Sep 2020 15:00)  HR: 88 (20 Sep 2020 08:12) (72 - 88)  BP: 139/74 (20 Sep 2020 10:55) (115/72 - 162/77)  BP(mean): --  RR: 18 (20 Sep 2020 08:12) (16 - 19)  SpO2: 98% (20 Sep 2020 10:55) (95% - 99%)    ASSESSMENT/ PLAN    Therapy to  be:               [x] Discontinued   [x ] Changed to PRN Analgesics    Documentation and Verification of current medications:   [X] Done	[ ] Not done, not eligible

## 2020-09-20 NOTE — PROGRESS NOTE ADULT - SUBJECTIVE AND OBJECTIVE BOX
Plastic Surgery Progress Note (pg [facility]: [id number], [facility]: [phone])    SUBJECTIVE:  Patient seen and examined at bedside this AM. No acute events overnight. AF/VSS. Pain well controlled.     OBJECTIVE:     ** VITAL SIGNS / I&O's **    Vital Signs Last 24 Hrs  T(C): 36.8 (20 Sep 2020 08:12), Max: 37.2 (19 Sep 2020 15:00)  T(F): 98.3 (20 Sep 2020 08:12), Max: 99 (19 Sep 2020 15:00)  HR: 88 (20 Sep 2020 08:12) (72 - 88)  BP: 148/80 (20 Sep 2020 08:12) (115/72 - 162/77)  BP(mean): --  RR: 18 (20 Sep 2020 08:12) (13 - 19)  SpO2: 95% (20 Sep 2020 08:12) (94% - 99%)      19 Sep 2020 07:01  -  20 Sep 2020 07:00  --------------------------------------------------------  IN:    IV PiggyBack: 350 mL    Oral Fluid: 1080 mL  Total IN: 1430 mL    OUT:    Accordian (mL): 550 mL    Accordian (mL): 465 mL    Intermittent Catheterization - Urethral (mL): 800 mL    Voided (mL): 950 mL  Total OUT: 2765 mL    Total NET: -1335 mL      20 Sep 2020 07:01  -  20 Sep 2020 09:36  --------------------------------------------------------  IN:    Oral Fluid: 360 mL  Total IN: 360 mL    OUT:  Total OUT: 0 mL    Total NET: 360 mL          PHYSICAL EXAM:   General: NAD, Lying in bed comfortably  Neuro: Awake and alert  Back: soft, Aqaucel c/d/i, no fluid collections, HVC x 2 with SSF  GI/Abd: Soft, NT/ND      **MEDS**  acetaminophen   Tablet .. 650 milliGRAM(s) Oral every 6 hours PRN  carvedilol 25 milliGRAM(s) Oral every 12 hours  clonazePAM  Tablet 0.5 milliGRAM(s) Oral three times a day  diazepam    Tablet 5 milliGRAM(s) Oral every 6 hours PRN  gabapentin 300 milliGRAM(s) Oral daily  HYDROmorphone  Injectable 0.5 milliGRAM(s) IV Push daily PRN  influenza   Vaccine 0.5 milliLiter(s) IntraMuscular once  lidocaine 1% Injectable 0.2 milliLiter(s) Local Injection once  losartan 50 milliGRAM(s) Oral two times a day  multivitamin 1 Tablet(s) Oral daily  naloxone Injectable 0.1 milliGRAM(s) IV Push every 3 minutes PRN  ondansetron Injectable 4 milliGRAM(s) IV Push every 6 hours PRN  oxyCODONE    IR 5 milliGRAM(s) Oral every 4 hours PRN  oxyCODONE    IR 10 milliGRAM(s) Oral every 4 hours PRN  senna 2 Tablet(s) Oral at bedtime  sodium chloride 0.9% lock flush 3 milliLiter(s) IV Push every 8 hours      ** LABS **                          9.1    12.68 )-----------( 115      ( 20 Sep 2020 06:51 )             28.4     20 Sep 2020 06:51    136    |  102    |  13     ----------------------------<  123    3.8     |  26     |  0.75     Ca    9.0        20 Sep 2020 06:51        CAPILLARY BLOOD GLUCOSE           Plastic Surgery Progress Note (pg [facility]: [id number], NS: [phone])    SUBJECTIVE:  Patient seen and examined at bedside this AM. No acute events overnight. AF/VSS. Pain well controlled. PCA discontinued this AM    OBJECTIVE:     ** VITAL SIGNS / I&O's **    Vital Signs Last 24 Hrs  T(C): 36.8 (20 Sep 2020 08:12), Max: 37.2 (19 Sep 2020 15:00)  T(F): 98.3 (20 Sep 2020 08:12), Max: 99 (19 Sep 2020 15:00)  HR: 88 (20 Sep 2020 08:12) (72 - 88)  BP: 148/80 (20 Sep 2020 08:12) (115/72 - 162/77)  BP(mean): --  RR: 18 (20 Sep 2020 08:12) (13 - 19)  SpO2: 95% (20 Sep 2020 08:12) (94% - 99%)      19 Sep 2020 07:01  -  20 Sep 2020 07:00  --------------------------------------------------------  IN:    IV PiggyBack: 350 mL    Oral Fluid: 1080 mL  Total IN: 1430 mL    OUT:    Accordian (mL): 550 mL    Accordian (mL): 465 mL    Intermittent Catheterization - Urethral (mL): 800 mL    Voided (mL): 950 mL  Total OUT: 2765 mL    Total NET: -1335 mL      20 Sep 2020 07:01  -  20 Sep 2020 09:36  --------------------------------------------------------  IN:    Oral Fluid: 360 mL  Total IN: 360 mL    OUT:  Total OUT: 0 mL    Total NET: 360 mL          PHYSICAL EXAM:   General: NAD, Lying in bed comfortably  Neuro: Awake and alert  Back: soft, Aqaucel c/d/i, no fluid collections, HVC x 2 with SSF  GI/Abd: Soft, NT/ND      **MEDS**  acetaminophen   Tablet .. 650 milliGRAM(s) Oral every 6 hours PRN  carvedilol 25 milliGRAM(s) Oral every 12 hours  clonazePAM  Tablet 0.5 milliGRAM(s) Oral three times a day  diazepam    Tablet 5 milliGRAM(s) Oral every 6 hours PRN  gabapentin 300 milliGRAM(s) Oral daily  HYDROmorphone  Injectable 0.5 milliGRAM(s) IV Push daily PRN  influenza   Vaccine 0.5 milliLiter(s) IntraMuscular once  lidocaine 1% Injectable 0.2 milliLiter(s) Local Injection once  losartan 50 milliGRAM(s) Oral two times a day  multivitamin 1 Tablet(s) Oral daily  naloxone Injectable 0.1 milliGRAM(s) IV Push every 3 minutes PRN  ondansetron Injectable 4 milliGRAM(s) IV Push every 6 hours PRN  oxyCODONE    IR 5 milliGRAM(s) Oral every 4 hours PRN  oxyCODONE    IR 10 milliGRAM(s) Oral every 4 hours PRN  senna 2 Tablet(s) Oral at bedtime  sodium chloride 0.9% lock flush 3 milliLiter(s) IV Push every 8 hours      ** LABS **                          9.1    12.68 )-----------( 115      ( 20 Sep 2020 06:51 )             28.4     20 Sep 2020 06:51    136    |  102    |  13     ----------------------------<  123    3.8     |  26     |  0.75     Ca    9.0        20 Sep 2020 06:51        CAPILLARY BLOOD GLUCOSE

## 2020-09-20 NOTE — CONSULT NOTE ADULT - ASSESSMENT
Assessment:   65F T10-pelvis fusion, L3-S1 PLIF, found to have RLE common femoral, deep femoral, posterior tibial and peroneal veins.     Recommendations:   - If patient unable to be anticoagulated, would be candidate for IVC filter  - Recommend IR consultation for placement of IVC filter for more timely placement given lack of cath-lab access  - If vascular surgery team is desired for placement of IVC filter, likely would not have availability until mid-week    Discussed with Vascular Surgery Fellow, Dr. Finch     Please contact Vascular Surgery (p. [phone]) with any questions.     Soila Sharma, PGY2  Surgery, Vascular Team   Pager [phone]  [facility] Assessment:   65F T10-pelvis fusion, L3-S1 PLIF, found to have RLE common femoral, deep femoral, posterior tibial and peroneal veins.     Recommendations:   - If patient is unable to be anticoagulated, would be candidate for IVC filter  - Recommend IR consultation for placement of IVC filter for more timely placement given vascular surgery team's lack of cath-lab access  - If vascular surgery team is desired for placement of IVC filter, likely would not have resource availability for placement until mid-week    Discussed with Vascular Surgery Fellow, Dr. Finch     Please contact Vascular Surgery (p. [phone]) with any questions.     Soila Sharma, PGY2  Surgery, Vascular Team   Pager [phone]  [facility]

## 2020-09-21 DIAGNOSIS — Z02.9 ENCOUNTER FOR ADMINISTRATIVE EXAMINATIONS, UNSPECIFIED: ICD-10-CM

## 2020-09-21 DIAGNOSIS — F41.9 ANXIETY DISORDER, UNSPECIFIED: ICD-10-CM

## 2020-09-21 DIAGNOSIS — Z29.9 ENCOUNTER FOR PROPHYLACTIC MEASURES, UNSPECIFIED: ICD-10-CM

## 2020-09-21 DIAGNOSIS — I82.409 ACUTE EMBOLISM AND THROMBOSIS OF UNSPECIFIED DEEP VEINS OF UNSPECIFIED LOWER EXTREMITY: ICD-10-CM

## 2020-09-21 LAB
APTT BLD: 24.9 SEC — LOW (ref 27.5–35.5)
INR BLD: 1.09 RATIO — SIGNIFICANT CHANGE UP (ref 0.88–1.16)
PROTHROM AB SERPL-ACNC: 12.9 SEC — SIGNIFICANT CHANGE UP (ref 10.6–13.6)

## 2020-09-21 PROCEDURE — 37191 INS ENDOVAS VENA CAVA FILTR: CPT

## 2020-09-21 PROCEDURE — 99222 1ST HOSP IP/OBS MODERATE 55: CPT

## 2020-09-21 PROCEDURE — 99233 SBSQ HOSP IP/OBS HIGH 50: CPT

## 2020-09-21 RX ORDER — ACETAMINOPHEN 500 MG
1000 TABLET ORAL ONCE
Refills: 0 | Status: COMPLETED | OUTPATIENT
Start: 2020-09-21 | End: 2020-09-21

## 2020-09-21 RX ORDER — SODIUM CHLORIDE 9 MG/ML
1000 INJECTION INTRAMUSCULAR; INTRAVENOUS; SUBCUTANEOUS
Refills: 0 | Status: DISCONTINUED | OUTPATIENT
Start: 2020-09-21 | End: 2020-09-21

## 2020-09-21 RX ORDER — POLYETHYLENE GLYCOL 3350 17 G/17G
17 POWDER, FOR SOLUTION ORAL
Refills: 0 | Status: DISCONTINUED | OUTPATIENT
Start: 2020-09-21 | End: 2020-09-29

## 2020-09-21 RX ORDER — SODIUM CHLORIDE 9 MG/ML
500 INJECTION INTRAMUSCULAR; INTRAVENOUS; SUBCUTANEOUS ONCE
Refills: 0 | Status: COMPLETED | OUTPATIENT
Start: 2020-09-21 | End: 2020-09-21

## 2020-09-21 RX ORDER — CLONAZEPAM 1 MG
0.5 TABLET ORAL EVERY 8 HOURS
Refills: 0 | Status: DISCONTINUED | OUTPATIENT
Start: 2020-09-21 | End: 2020-09-22

## 2020-09-21 RX ORDER — MULTIVIT WITH MIN/MFOLATE/K2 340-15/3 G
1 POWDER (GRAM) ORAL ONCE
Refills: 0 | Status: COMPLETED | OUTPATIENT
Start: 2020-09-21 | End: 2020-09-22

## 2020-09-21 RX ORDER — ENOXAPARIN SODIUM 100 MG/ML
40 INJECTION SUBCUTANEOUS DAILY
Refills: 0 | Status: DISCONTINUED | OUTPATIENT
Start: 2020-09-22 | End: 2020-09-28

## 2020-09-21 RX ADMIN — SODIUM CHLORIDE 3 MILLILITER(S): 9 INJECTION INTRAMUSCULAR; INTRAVENOUS; SUBCUTANEOUS at 05:25

## 2020-09-21 RX ADMIN — LOSARTAN POTASSIUM 50 MILLIGRAM(S): 100 TABLET, FILM COATED ORAL at 09:15

## 2020-09-21 RX ADMIN — SODIUM CHLORIDE 500 MILLILITER(S): 9 INJECTION INTRAMUSCULAR; INTRAVENOUS; SUBCUTANEOUS at 18:09

## 2020-09-21 RX ADMIN — POLYETHYLENE GLYCOL 3350 17 GRAM(S): 17 POWDER, FOR SOLUTION ORAL at 21:30

## 2020-09-21 RX ADMIN — SODIUM CHLORIDE 75 MILLILITER(S): 9 INJECTION INTRAMUSCULAR; INTRAVENOUS; SUBCUTANEOUS at 09:14

## 2020-09-21 RX ADMIN — Medication 650 MILLIGRAM(S): at 09:47

## 2020-09-21 RX ADMIN — SODIUM CHLORIDE 3 MILLILITER(S): 9 INJECTION INTRAMUSCULAR; INTRAVENOUS; SUBCUTANEOUS at 13:48

## 2020-09-21 RX ADMIN — OXYCODONE HYDROCHLORIDE 10 MILLIGRAM(S): 5 TABLET ORAL at 00:00

## 2020-09-21 RX ADMIN — SENNA PLUS 2 TABLET(S): 8.6 TABLET ORAL at 21:30

## 2020-09-21 RX ADMIN — Medication 1 TABLET(S): at 11:25

## 2020-09-21 RX ADMIN — GABAPENTIN 300 MILLIGRAM(S): 400 CAPSULE ORAL at 11:25

## 2020-09-21 RX ADMIN — SODIUM CHLORIDE 3 MILLILITER(S): 9 INJECTION INTRAMUSCULAR; INTRAVENOUS; SUBCUTANEOUS at 21:26

## 2020-09-21 RX ADMIN — Medication 650 MILLIGRAM(S): at 09:16

## 2020-09-21 RX ADMIN — OXYCODONE HYDROCHLORIDE 5 MILLIGRAM(S): 5 TABLET ORAL at 09:47

## 2020-09-21 RX ADMIN — CARVEDILOL PHOSPHATE 25 MILLIGRAM(S): 80 CAPSULE, EXTENDED RELEASE ORAL at 09:15

## 2020-09-21 RX ADMIN — Medication 400 MILLIGRAM(S): at 18:24

## 2020-09-21 RX ADMIN — Medication 0.5 MILLIGRAM(S): at 05:30

## 2020-09-21 RX ADMIN — OXYCODONE HYDROCHLORIDE 5 MILLIGRAM(S): 5 TABLET ORAL at 09:17

## 2020-09-21 NOTE — CONSULT NOTE ADULT - ASSESSMENT
Assessment: 65y Female with recent spine surgery. Now with lower extremity DVT. Patient is contraindicated for AC for 6 weeks due to procedure. Request for retrievable IVC filter placement.    Plan:  - Please place order for IR Procedure, retrievable ICVVC filter, approving attending Dr. Guidry  - Keep patient NPO  - hold therpaeutic and prophylactic anticoagulants  - maintain active type and screen x 2

## 2020-09-21 NOTE — PROGRESS NOTE ADULT - SUBJECTIVE AND OBJECTIVE BOX
[facility] Division of Hospital Medicine  Saskia Bauer MD  spectra: [phone]    Patient is a 65y old  Female who presents with a chief complaint of low back pain (04 Sep 2020 07:43)      SUBJECTIVE / OVERNIGHT EVENTS: no acute events but patient noted with acute proximal/distal DVT on LE duplex yesterday.   ADDITIONAL REVIEW OF SYSTEMS:  used. last BM on the first day of hospitalization. denies SOB. + back pain with radiculopathy.     MEDICATIONS  (STANDING):  bisacodyl Suppository 10 milliGRAM(s) Rectal once  carvedilol 25 milliGRAM(s) Oral every 12 hours  gabapentin 300 milliGRAM(s) Oral daily  influenza   Vaccine 0.5 milliLiter(s) IntraMuscular once  lidocaine 1% Injectable 0.2 milliLiter(s) Local Injection once  losartan 50 milliGRAM(s) Oral two times a day  magnesium citrate Oral Solution 1 Bottle Oral once  multivitamin 1 Tablet(s) Oral daily  polyethylene glycol 3350 17 Gram(s) Oral two times a day  senna 2 Tablet(s) Oral at bedtime  sodium chloride 0.9% lock flush 3 milliLiter(s) IV Push every 8 hours  sodium chloride 0.9%. 1000 milliLiter(s) (75 mL/Hr) IV Continuous <Continuous>    MEDICATIONS  (PRN):  acetaminophen   Tablet .. 650 milliGRAM(s) Oral every 6 hours PRN Temp greater or equal to 38C (100.4F), Mild Pain (1 - 3)  clonazePAM  Tablet 0.5 milliGRAM(s) Oral every 8 hours PRN anxiety  diazepam    Tablet 5 milliGRAM(s) Oral every 6 hours PRN SPASM  HYDROmorphone  Injectable 0.5 milliGRAM(s) IV Push daily PRN Breakthrough  naloxone Injectable 0.1 milliGRAM(s) IV Push every 3 minutes PRN For ANY of the following changes in patient status:  A. RR LESS THAN 10 breaths per minute, B. Oxygen saturation LESS THAN 90%, C. Sedation score of 6  ondansetron Injectable 4 milliGRAM(s) IV Push every 6 hours PRN Nausea  oxyCODONE    IR 5 milliGRAM(s) Oral every 4 hours PRN Moderate Pain (4 - 6)  oxyCODONE    IR 10 milliGRAM(s) Oral every 4 hours PRN Severe Pain (7 - 10)      CAPILLARY BLOOD GLUCOSE        I&O's Summary    20 Sep 2020 07:01  -  21 Sep 2020 07:00  --------------------------------------------------------  IN: 2040 mL / OUT: 2350 mL / NET: -310 mL    21 Sep 2020 07:01  -  21 Sep 2020 13:42  --------------------------------------------------------  IN: 0 mL / OUT: 400 mL / NET: -400 mL        PHYSICAL EXAM:  Vital Signs Last 24 Hrs  T(C): 36.8 (21 Sep 2020 08:30), Max: 37.1 (21 Sep 2020 00:21)  T(F): 98.3 (21 Sep 2020 08:30), Max: 98.8 (21 Sep 2020 00:21)  HR: 78 (21 Sep 2020 08:30) (69 - 78)  BP: 116/73 (21 Sep 2020 08:30) (97/62 - 118/61)  BP(mean): --  RR: 16 (21 Sep 2020 08:30) (16 - 16)  SpO2: 94% (21 Sep 2020 08:30) (94% - 100%)    CONSTITUTIONAL: NAD, well-developed, well-groomed  NECK: Supple, no palpable masses; no thyromegaly  RESPIRATORY: Normal respiratory effort; lungs are clear to auscultation bilaterally  CARDIOVASCULAR: normal S1 and S2; No lower extremity edema  ABDOMEN: Nontender to palpation, normoactive bowel sounds, no rebound/guarding; No hepatosplenomegaly  MUSCULOSKELETAL:  no clubbing or cyanosis of digits; no joint swelling or tenderness to palpation  PSYCH: A+O to person, place, and time; affect appropriate   SKIN: No rashes; no palpable lesions + dressing in back with hemovac drains  NEURO: able to move extremities    LABS:                        9.1    12.45 )-----------( 115      ( 20 Sep 2020 10:04 )             27.7     09-20    136  |  102  |  13  ----------------------------<  123<H>  3.8   |  26  |  0.75    Ca    9.0      20 Sep 2020 06:51      PT/INR - ( 21 Sep 2020 06:58 )   PT: 12.9 sec;   INR: 1.09 ratio         PTT - ( 21 Sep 2020 06:58 )  PTT:24.9 sec            RADIOLOGY & ADDITIONAL TESTS:  Results Reviewed:     < from: [facility] Duplex Lower Ext Vein Scan, Bilat (09.20.20 @ 15:20) >  FINDINGS:    Right lower extremity:  There is thrombus within the right external iliac vein, right common femoral and right deep femoral veins. The right femoral and popliteal veins are patent.    Thrombus within the right posterior tibial and peroneal veins.    4.2 cm right Baker's cyst.    Left lower extremity:  There is normal compressibility of the bilateral common femoral, femoral and popliteal veins.  Doppler examination shows normal spontaneous and phasic flow.    No calf vein thrombosis is detected.    5.0 cm left Baker's cyst.    IMPRESSION:  Acute deep venous thrombosis: above and below the RIGHT knee.    < end of copied text >    Imaging Personally Reviewed:  Electrocardiogram Personally Reviewed:    COORDINATION OF CARE:  Care Discussed with Consultants/Other Providers [Y/N]: neurosurgery PA (James)  Prior or Outpatient Records Reviewed [Y/N]:

## 2020-09-21 NOTE — PROGRESS NOTE ADULT - SUBJECTIVE AND OBJECTIVE BOX
SUBJECTIVE: Doing well, no complaints. No CP/SOB    OVERNIGHT EVENTS: None    Vital Signs Last 24 Hrs  T(C): 36.8 (21 Sep 2020 08:30), Max: 37.1 (21 Sep 2020 00:21)  T(F): 98.3 (21 Sep 2020 08:30), Max: 98.8 (21 Sep 2020 00:21)  HR: 78 (21 Sep 2020 08:30) (69 - 79)  BP: 116/73 (21 Sep 2020 08:30) (97/62 - 139/74)  BP(mean): --  RR: 16 (21 Sep 2020 08:30) (16 - 16)  SpO2: 94% (21 Sep 2020 08:30) (94% - 100%)  IVF: [ ] IVL [ X] NS@75  DIET: [ X] Regular-Soft [ ] CCD [ ] Renal [ ] Puree [ ] Dysphagia [ ] Tube Feeds:   PCA: [ ] YES [X ] NO   GARCIA: [ ] YES [X ] NO [X ] VOID   BM: [ ] YES [X ] NO     DRAINS: [ ] KAETON (cc/24h) [X ] HMV L/R=375/175(cc/24h)    PHYSICAL EXAM:    Constitutional: No Acute Distress     Neurological: Swedish speaking. AAOx3, Following Commands, Moving all Extremities     Motor exam:          Upper extremity                         Delt     Bicep     Tricep    HG                                                 R         5/5        5/5        5/5       5/5                                               L          5/5        5/5        5/5       5/5          Lower extremity                     HF         KF        KE       DF         PF                                                  R        5/5        5/5        5/5       5/5         5/5                                               L         5/5        5/5       5/5       5/5          5/5                                                 Sensation: [X] intact to light touch  [] decreased:     Pulmonary: Clear to Auscultation, No rales, No rhonchi, No wheezes     Cardiovascular: S1, S2, Regular rate and rhythm     Gastrointestinal: Soft, Non-tender, Non-distended     Extremities: No calf tenderness     Incision: HMV x 2 active. Aquacel dsg-CDI/Flat.     LABS:                        9.1    12.45 )-----------( 115      ( 20 Sep 2020 10:04 )             27.7    09-20    136  |  102  |  13  ----------------------------<  123<H>  3.8   |  26  |  0.75    Ca    9.0      20 Sep 2020 06:51    PT/INR - ( 21 Sep 2020 06:58 )   PT: 12.9 sec;   INR: 1.09 ratio    PTT - ( 21 Sep 2020 06:58 )  PTT:24.9 sec    IMAGING:  < from: [facility] Duplex Lower Ext Vein Scan, Bilat (09.20.20 @ 15:20) >  Left lower extremity:  There is normal compressibility of the bilateral common femoral, femoral and popliteal veins.  Doppler examination shows normal spontaneous and phasic flow.    No calf vein thrombosis is detected.    5.0 cm left Baker's cyst.    IMPRESSION:  Acute deep venous thrombosis: above and below the RIGHT knee.  < from: CT Lumbar Spine No Cont (09.18.20 @ 22:39) >  This exam is compared with prior preop MRI lumbar spine performed on July 22, 2020.    Postop changes compatible with bilateral laminectomy is seen extending from L2 to S1 with discectomy is identified at the L3-4 L4-5 and L5-S1 levels. There is also evidence of posterior paraspinal fusion extending from T10 to S1 with fixation screws seen involving both iliac bones as well.    The tip of the left pedicle screw at T10 appears to be in the the left parasagittal region. The distal aspect of the right pedicle screw at the T12 level is also in the right parasagittal region. The rest of the metallic hardware appears be adequately placed.    Demineralization of the bones is seen.    Reversal of the normal lumbar lordosis seen.    Mild compression deformity is seen involving the T11 vertebral body. No abnormal sclerotic or blastic changes are seen to suggest underlying pathologic compression fracture.    Vacuum disc changes are seen involving the L2-3 level which is secondary to degenerative change    No acute fractures or dislocations are seen.    Postop changes involving the posterior paraspinal soft tissue region is seen.    Evaluation of paraspinal soft tissues appear grossly unremarkable    There isevidence of abnormal soft tissue with associated calcification partially demonstrated in the right buttock region. This appears confined to subcutaneous region. This could be compatible injection granulomas though please correlate clinically.    Asymmetry of the kidneys are seen. The right kidney is more prominent than left. This could be compatible with atrophic changes involving the left kidney. Please correlate clinically.    IMPRESSION: Extensive postop changes as described above.    MEDICATIONS  (STANDING):  carvedilol 25 milliGRAM(s) Oral every 12 hours  clonazePAM  Tablet 0.5 milliGRAM(s) Oral three times a day  enoxaparin Injectable 40 milliGRAM(s) SubCutaneous at bedtime  gabapentin 300 milliGRAM(s) Oral daily  influenza   Vaccine 0.5 milliLiter(s) IntraMuscular once  lidocaine 1% Injectable 0.2 milliLiter(s) Local Injection once  losartan 50 milliGRAM(s) Oral two times a day  multivitamin 1 Tablet(s) Oral daily  senna 2 Tablet(s) Oral at bedtime  sodium chloride 0.9% lock flush 3 milliLiter(s) IV Push every 8 hours  sodium chloride 0.9%. 1000 milliLiter(s) (75 mL/Hr) IV Continuous <Continuous>    MEDICATIONS  (PRN):  acetaminophen   Tablet .. 650 milliGRAM(s) Oral every 6 hours PRN Temp greater or equal to 38C (100.4F), Mild Pain (1 - 3)  diazepam    Tablet 5 milliGRAM(s) Oral every 6 hours PRN SPASM  HYDROmorphone  Injectable 0.5 milliGRAM(s) IV Push daily PRN Breakthrough  naloxone Injectable 0.1 milliGRAM(s) IV Push every 3 minutes PRN For ANY of the following changes in patient status:  A. RR LESS THAN 10 breaths per minute, B. Oxygen saturation LESS THAN 90%, C. Sedation score of 6  ondansetron Injectable 4 milliGRAM(s) IV Push every 6 hours PRN Nausea  oxyCODONE    IR 5 milliGRAM(s) Oral every 4 hours PRN Moderate Pain (4 - 6)  oxyCODONE    IR 10 milliGRAM(s) Oral every 4 hours PRN Severe Pain (7 - 10)

## 2020-09-21 NOTE — CONSULT NOTE ADULT - SUBJECTIVE AND OBJECTIVE BOX
Reason for Consult: 65y Female with recent spine surgery. Now with lower extremity DVT. Patient is contraindicated for AC for 6 weeks due to procedure. Request for retrievable IVC filter placement.    History of Present Illness:   65yr old female with spinal stenosis lumbar region. Pt coming pain unable to walk and numbness in legs. Pt had laminectomy 2018 now coming for T10-pelvis instrumentation and fusion. L2-S1 posterior lumbar fusion. Pt hx sig for obesity and HTN. Note ;covid test 9/15/20/Hillary (04 Sep 2020 07:43)      Pertinent PMH/PSH:   Lumbar stenosis  Vertigo  Kidney stones  Obesity  HTN (hypertension)  Lumbago with sciatica  History of lithotripsy  History of laminectomy 3/2018  History of cataract surgery both eyes 2017  Carpal tunnel syndrome on both sides sx done 2013    Allergies:   No Known Allergies      Medications:   sodium chloride 0.9%.  enoxaparin Injectable  HYDROmorphone  Injectable  oxyCODONE    IR  oxyCODONE    IR  multivitamin  senna  diazepam    Tablet  acetaminophen   Tablet ..  carvedilol  gabapentin  clonazePAM  Tablet  losartan  ondansetron Injectable  naloxone Injectable  influenza   Vaccine  lidocaine 1% Injectable  sodium chloride 0.9% lock flush      Vital Signs:  T(C): 36.8 (09-21-20 @ 08:30), Max: 37.1 (09-21-20 @ 00:21)  HR: 78 (09-21-20 @ 08:30) (69 - 79)  BP: 116/73 (09-21-20 @ 08:30) (97/62 - 139/74)  RR: 16 (09-21-20 @ 08:30) (16 - 16)  SpO2: 94% (09-21-20 @ 08:30) (94% - 100%)    Relevant Lab Results:            9.1  12.45)-----(115     (09-20-20 @ 10:04)         27.7     136 | 102 | 13  --------------------< 123     (09-20-20 @ 06:51)  3.8 | 26 | 0.75       PT: 12.9 09-21-20 @ 06:58  aPTT: 24.9<L> 09-21-20 @ 06:58   INR: 1.09 09-21-20 @ 06:58      Imaging:   CT Abdomen and Pelvic 7/29/20:  Lesion seen on recent CT scan involving the lower pole left kidney has imaging features on both MRI and CT consistent with a hemorrhagic cyst without definable enhancing component

## 2020-09-21 NOTE — CONSULT NOTE ADULT - SUBJECTIVE AND OBJECTIVE BOX
HPI:  65yr old female with spinal stenosis lumbar region.  Pt coming pain unable to walk and numbness in legs.  Pt had laminectomy 2018 now coming for T10-pelvis  instrumentation and fusion. L2-S1 posterior lumbar fusion.  Pt hx sig for obesity and HTN.    Patient was admitted on 9/18, T10 to pelvis fusion, L3-S1 PLIF w/plastic closure, post op anemia, received total 4 units, extensive RLE DVT, AC contraindicated for 6 weeks, to have retrievable IVC filter placed today. Patient seen earlier this morning, continues to have pain, some relief with pain meds. Denies weakness in upper or lower extremities. No BM x days. Reports she is unable to eat without her dentures.    REVIEW OF SYSTEMS  Constitutional - No fever, No weight loss, No fatigue  HEENT - No eye pain, No visual disturbances, No difficulty hearing, No tinnitus, No vertigo, No neck pain  Respiratory - No cough, No wheezing, No shortness of breath  Cardiovascular - No chest pain, No palpitations  Gastrointestinal - No abdominal pain, No nausea, No vomiting, No diarrhea, + constipation  Genitourinary - No dysuria, No frequency, No hematuria, No incontinence  Neurological - No headaches, No memory loss, No loss of strength, No numbness, No tremors  Skin - No itching, No rashes, No lesions   Endocrine - No temperature intolerance  Musculoskeletal - No joint pain, No joint swelling, No muscle pain  Psychiatric - No depression, No anxiety    VITALS  T(C): 36.8 (09-21-20 @ 08:30), Max: 37.1 (09-21-20 @ 00:21)  HR: 78 (09-21-20 @ 08:30) (69 - 79)  BP: 116/73 (09-21-20 @ 08:30) (97/62 - 118/61)  RR: 16 (09-21-20 @ 08:30) (16 - 16)  SpO2: 94% (09-21-20 @ 08:30) (94% - 100%)  Wt(kg): --    PAST MEDICAL & SURGICAL HISTORY  Lumbar stenosis    Vertigo    Kidney stones    Obesity    HTN (hypertension)    Lumbago with sciatica    History of lithotripsy    History of laminectomy    History of cataract surgery    Carpal tunnel syndrome on both sides        SOCIAL HISTORY  Smoking - Denied  EtOH - Denied   Drugs - Denied    FUNCTIONAL HISTORY  Pt resides in private home with son, no stairs to enter or inside +first floor setup. Prior to admit, pt reports ambulating independently with straight cane, (-) driving and no longer works.    CURRENT FUNCTIONAL STATUS  9/21 PT    Bed Mobility  Bed Mobility Training Supine-to-Sit: moderate assist (50% patient effort);  1 person assist;  nonverbal cues (demo/gestures);  verbal cues;  bed rails  Bed Mobility Training Limitations: decreased ability to use arms for pushing/pulling;  decreased ability to use legs for bridging/pushing;  decreased strength;  pain    Sit-Stand Transfer Training  Transfer Training Sit-to-Stand Transfer: contact guard;  1 person assist;  nonverbal cues (demo/gestures);  verbal cues;  weight-bearing as tolerated   rolling walker  Transfer Training Stand-to-Sit Transfer: contact guard;  1 person assist;  nonverbal cues (demo/gestures);  verbal cues;  weight-bearing as tolerated   rolling walker  Sit-to-Stand Transfer Training Transfer Safety Analysis: decreased weight-shifting ability;  decreased strength;  pain;  impaired balance;  rolling walker    Gait Training  Gait Training: contact guard;  1 person assist;  nonverbal cues (demo/gestures);  verbal cues;  weight-bearing as tolerated   rolling walker;  20 feet  Gait Analysis: decreased emily;  decreased step length;  pain;  decreased strength;  20 feet;  rolling walker    9/20 OT    Bed Mobility: Supine to Sit:     · Level of [place]	maximum assist (25% patients effort); log roll    Bed Mobility Analysis:     · Impairments Contributing to Impaired Bed Mobility	decreased strength; impaired balance; pain    Transfer: Bed to Chair:    Bed to Chair Transfer:    Transfer Skill: Bed to Chair   · Level of [place]	minimum assist (75% patients effort)  · Physical Assist/Nonphysical Assist	verbal cues; nonverbal cues (demo/gestures)  · Weight-Bearing Restrictions	weight-bearing as tolerated  · Assistive Device	rolling walker    Bed to Chair Safety Analysis:     · Impairments Contributing to Impaired Transfers	decreased strength; impaired balance; pain    Transfer: Sit to Stand:     · Level of [place]	minimum assist (75% patients effort)  · Physical Assist/Nonphysical Assist	verbal cues  · Weight-Bearing Restrictions	weight-bearing as tolerated  · Assistive Device	rolling walker    Transfer: Stand to Sit:     · Level of [place]	minimum assist (75% patients effort)  · Physical Assist/Nonphysical Assist	verbal cues  · Weight-Bearing Restrictions	weight-bearing as tolerated  · Assistive Device	rolling walker    Sit/Stand Transfer Safety Analysis:     · Impairments Contributing to Impaired Transfers	decreased strength; impaired balance    Balance Skills Assessment:     · Sitting Balance: Static	good balance   · Sitting Balance: Dynamic	good balance   · Sit-to-Stand Balance	fair minus   · Standing Balance: Static	fair balance   · Standing Balance: Dynamic	fair minus   · Identified Impairments Contributing to Balance Disturbance	decreased strength; pain        FAMILY HISTORY   no pertinent history in primary relatives     RECENT LABS/IMAGING  CBC Full  -  ( 20 Sep 2020 10:04 )  WBC Count : 12.45 K/uL  RBC Count : 3.10 M/uL  Hemoglobin : 9.1 g/dL  Hematocrit : 27.7 %  Platelet Count - Automated : 115 K/uL  Mean Cell Volume : 89.4 fl  Mean Cell Hemoglobin : 29.4 pg  Mean Cell Hemoglobin Concentration : 32.9 gm/dL  Auto Neutrophil # : x  Auto Lymphocyte # : x  Auto Monocyte # : x  Auto Eosinophil # : x  Auto Basophil # : x  Auto Neutrophil % : x  Auto Lymphocyte % : x  Auto Monocyte % : x  Auto Eosinophil % : x  Auto Basophil % : x    09-20    136  |  102  |  13  ----------------------------<  123<H>  3.8   |  26  |  0.75    Ca    9.0      20 Sep 2020 06:51    < from: [facility] Duplex Lower Ext Vein Scan, Bilat (09.20.20 @ 15:20) >    FINDINGS:    Right lower extremity:  There is thrombus within the right external iliac vein, right common femoral and right deep femoral veins. The right femoral and popliteal veins are patent.    Thrombus within the right posterior tibial and peroneal veins.    4.2 cm right Baker's cyst.    Left lower extremity:  There is normal compressibility of the bilateral common femoral, femoral and popliteal veins.  Doppler examination shows normal spontaneous and phasic flow.    No calf vein thrombosis is detected.    5.0 cm left Baker's cyst.    IMPRESSION:  Acute deep venous thrombosis: above and below the RIGHT knee.      < end of copied text >    < from: CT Lumbar Spine No Cont (09.18.20 @ 22:39) >      INTERPRETATION:  Clinical indication: Status post surgery.    Multiple axial sections were performed through the lumbar spine region. Coronal and sagittal reconstructions were performed as well.    This exam is compared with prior preop MRI lumbar spine performed on July 22, 2020.    Postop changes compatible with bilateral laminectomy is seen extending from L2 to S1 with discectomy is identified at the L3-4 L4-5 and L5-S1 levels. There is also evidence of posterior paraspinal fusion extending from T10 to S1 with fixation screws seen involving both iliac bones as well.    The tip of the left pedicle screw at T10 appears to be in the the left parasagittal region. The distal aspect of the right pedicle screw at the T12 level is also in the right parasagittal region. The rest of the metallic hardware appears be adequately placed.    Demineralization of the bones is seen.    Reversal of the normal lumbar lordosis seen.    Mild compression deformity is seen involving the T11 vertebral body. No abnormal sclerotic or blastic changes are seen to suggest underlying pathologic compression fracture.    Vacuum disc changes are seen involving the L2-3 level which is secondary to degenerative change    No acute fractures or dislocations are seen.    Postop changes involving the posterior paraspinal soft tissue region is seen.    Evaluation of paraspinal soft tissues appear grossly unremarkable    There isevidence of abnormal soft tissue with associated calcification partially demonstrated in the right buttock region. This appears confined to subcutaneous region. This could be compatible injection granulomas though please correlate clinically.    Asymmetry of the kidneys are seen. The right kidney is more prominent than left. This could be compatible with atrophic changes involving the left kidney. Please correlate clinically.    IMPRESSION: Extensive postop changes as described above.        < end of copied text >      ALLERGIES  No Known Allergies      MEDICATIONS   acetaminophen   Tablet .. 650 milliGRAM(s) Oral every 6 hours PRN  bisacodyl Suppository 10 milliGRAM(s) Rectal daily PRN  carvedilol 25 milliGRAM(s) Oral every 12 hours  clonazePAM  Tablet 0.5 milliGRAM(s) Oral every 8 hours PRN  diazepam    Tablet 5 milliGRAM(s) Oral every 6 hours PRN  gabapentin 300 milliGRAM(s) Oral daily  HYDROmorphone  Injectable 0.5 milliGRAM(s) IV Push daily PRN  influenza   Vaccine 0.5 milliLiter(s) IntraMuscular once  lidocaine 1% Injectable 0.2 milliLiter(s) Local Injection once  losartan 50 milliGRAM(s) Oral two times a day  magnesium citrate Oral Solution 1 Bottle Oral once  multivitamin 1 Tablet(s) Oral daily  naloxone Injectable 0.1 milliGRAM(s) IV Push every 3 minutes PRN  ondansetron Injectable 4 milliGRAM(s) IV Push every 6 hours PRN  oxyCODONE    IR 5 milliGRAM(s) Oral every 4 hours PRN  oxyCODONE    IR 10 milliGRAM(s) Oral every 4 hours PRN  polyethylene glycol 3350 17 Gram(s) Oral two times a day  senna 2 Tablet(s) Oral at bedtime  sodium chloride 0.9% lock flush 3 milliLiter(s) IV Push every 8 hours  sodium chloride 0.9%. 1000 milliLiter(s) IV Continuous <Continuous>      ----------------------------------------------------------------------------------------  PHYSICAL EXAM  Constitutional - NAD, Comfortable, in bed   HEENT - NCAT, EOMI  Neck - Supple, No limited ROM  Chest - Breathing comfortably, on room air   Cardiovascular - S1S2   Abdomen - Soft   Extremities - No C/C/E, No calf tenderness   Neurologic Exam -                    Cognitive - Awake, Alert, AAO to self, place, date, year, situation     Communication - Fluent, No dysarthria     Cranial Nerves - CN 2-12 intact     Motor - No focal deficits                    LEFT    UE - ShAB 5/5, EF 5/5, EE 5/5, WE 5/5,  5/5                    RIGHT UE - ShAB 5/5, EF 5/5, EE 5/5, WE 5/5,  5/5                    LEFT    LE - HF 5/5, KE 5/5, DF 5/5, PF 5/5                    RIGHT LE - HF 5/5, KE 5/5, DF 5/5, PF 5/5        Sensory - Intact to LT     Reflexes - DTR Intact, No primitive reflexes     Psychiatric - Mood stable, Affect WNL  ----------------------------------------------------------------------------------------  ASSESSMENT/PLAN  65 year old woman h/o HTN, obesity, lumbar laminectomy in 2018 with functional deficits after T10-pelvis fusion, L3-S1 PLIF  DVT RLE, IVC filter today  Pain - Tylenol, oxycodone, dilaudid IV Prn   DVT PPX - SCDs  Rehab - Will continue to follow for ongoing rehab needs and recommendations.   continue bedside PT/OT  out of bed to chair daily   min assist with transfers, contact guard with ambulation   Recommend ACUTE inpatient rehabilitation for the functional deficits consisting of 3 hours of therapy/day & 24 hour RN/daily PMR physician for comorbid medical management. Patient will be able to tolerate 3 hours a day.

## 2020-09-21 NOTE — PROGRESS NOTE ADULT - ASSESSMENT
65yr old female with spinal stenosis lumbar region.  Pt coming pain unable to walk and numbness in legs.  Pt had laminectomy 2018 now coming for T10-pelvis  instrumentation and fusion. L2-S1 posterior lumbar fusion.  Pt hx sig for obesity and HTN.    Note ;covid test 9/15/20/[place] (04 Sep 2020 07:43)    PROCEDURE: Adm 9/18 T10 to pelvis, L3-S1 PLIF w/plastic closure. Muscle flap of back    POD#3    PLAN:  Neuro: Cont HMV drain x2.  +RLE DVT- as d/w IR this AM/Dr Guidry to place Retrievable IVCF today. NPO. SQL Held for procedure. Anemia down trending-Ck CBC AM. Thrombocytopenia-monitor. No venodynes RLE.  Inc activity/OOB.     PMR cons called this AM FU.    IR noet of 9/21-Plan: - Please place order for IR Procedure, retrievable ICVVC filter, approving attending Dr. Guidry - Keep patient NPO - hold therpaeutic and prophylactic anticoagulants - maintain active type and screen x 2. Electronic Signatures: Akin Carlisle) Sage Guidry)     Plastic surg note of 9/21-Plan: - Continue to monitor Aquacel dressing, change dressing POD 5 or if saturated - Monitor HVC output, management per NSGY  - Care per primary/NSCU team Plastic Surgery Resident [facility]: [phone] Electronic Signatures: Mitzi Meehan)    Vasc surg note of 9/20-- If patient is unable to be anticoagulated, would be candidate for IVC filter. - Recommend IR consultation for placement of IVC filter for more timely placement given vascular surgery team's lack of cath-lab access. - If vascular surgery team is desired for placement of IVC filter, likely would not have resource availability for placement until mid-week. Discussed with Vascular Surgery Fellow, Dr. Finch Please contact Vascular Surgery (p. [phone]) with any questions. Siola Sharma, PGY2 Surgery, Vascular Team Pager [phone]  Electronic Signatures: Julio Cesar Finch (MANINDER)  Manolo Brown)  Soila Sharma)   (Signed 20-Sep-2020 23:31)     Med note of 9/20-Problem: Spinal stenosis, lumbar. Recommendation: s/p T10-pelvis fusion and L3-S1 PLIF on 9/18. s/p 4 units PRBC. monitor HMV outpt. PCA d/c'ed on PO pain meds, pain well controlled. {[id number]}Problem: HTN (hypertension).  Recommendation: BP stable  - c/w home BB and ARB. {[id number]}Problem: Thrombocytopenia. Recommendation: unclear etiology, current meds unlikely cause - continue to monitor - Hb stabilizing. {[id number]}Problem: Medication management.  Recommendation: Patient takes Klonopin 0.5 PRN at home, currently ordered as standing, pt also receiving diazepam Q6h PRN for spams, rec to change Klonopin to home PRN dose as benzodiazepines can cause adverse effects in [place] patients. Attending Attestation: Dr. Nubia Stanley Division of Hospital Medicine [facility] Pager: [phone]. Electronic Signatures:  Nubia Stanley (MD)     Respiratory: Patient instructed to use incentive spirometer [ X] YES [ ] NO              DVT ppx: [X ] SQL [ ] SQH and Venodynes [ ] Left [ ] Right [ X] Bilateral    Discharge Planning:  The patient was evaluated by PT/OT and recommended Acute Rehab.   PMR Eval-P FU.    More than 30 minutes spent on total encounter: more than 50% of the visit was spent on educating the patient and family regarding condition, medications, follow up plans, signs and symptoms to be concerned with, preparing paperwork, and questions answered regarding discharge.      Assessment:  Please Check When Present   []  GCS  E   V  M     Heart Failure: []Acute, [] acute on chronic , []chronic  Heart Failure:  [] Diastolic (HFpEF), [] Systolic (HFrEF), []Combined (HFpEF and HFrEF), [] RHF, [] Pulm HTN, [] Other    [] PARESH, [] ATN, [] AIN, [] other  [] CKD1, [] CKD2, [] CKD 3, [] CKD 4, [] CKD 5, []ESRD    Encephalopathy: [] Metabolic, [] Hepatic, [] toxic, [] Neurological, [] Other    Abnormal Nurtitional Status: [] malnurtition (see nutrition note), [ ]underweight: BMI < 19, [] morbid obesity: BMI >40, [] Cachexia    [] Sepsis  [] hypovolemic shock,[] cardiogenic shock, [] hemorrhagic shock, [] neuogenic shock  [] Acute Respiratory Failure  []Cerebral edema, [] Brain compression/ herniation,   [] Functional quadriplegia  [] Acute blood loss anemia

## 2020-09-21 NOTE — PROGRESS NOTE ADULT - SUBJECTIVE AND OBJECTIVE BOX
PLASTIC SURGERY DAILY PROGRESS NOTE:     INTERVAL: no acute events overnight.     SUBJECTIVE/ROS: Patient feels well. Seen and examined by team on AM rounds. No acute events overnight. AVSS. Pain well controlled. Dressing changed at bedside today (9/21)     MEDICATIONS  (STANDING):  carvedilol 25 milliGRAM(s) Oral every 12 hours  clonazePAM  Tablet 0.5 milliGRAM(s) Oral three times a day  enoxaparin Injectable 40 milliGRAM(s) SubCutaneous at bedtime  gabapentin 300 milliGRAM(s) Oral daily  influenza   Vaccine 0.5 milliLiter(s) IntraMuscular once  lidocaine 1% Injectable 0.2 milliLiter(s) Local Injection once  losartan 50 milliGRAM(s) Oral two times a day  multivitamin 1 Tablet(s) Oral daily  senna 2 Tablet(s) Oral at bedtime  sodium chloride 0.9% lock flush 3 milliLiter(s) IV Push every 8 hours  sodium chloride 0.9%. 1000 milliLiter(s) (75 mL/Hr) IV Continuous <Continuous>    MEDICATIONS  (PRN):  acetaminophen   Tablet .. 650 milliGRAM(s) Oral every 6 hours PRN Temp greater or equal to 38C (100.4F), Mild Pain (1 - 3)  diazepam    Tablet 5 milliGRAM(s) Oral every 6 hours PRN SPASM  HYDROmorphone  Injectable 0.5 milliGRAM(s) IV Push daily PRN Breakthrough  naloxone Injectable 0.1 milliGRAM(s) IV Push every 3 minutes PRN For ANY of the following changes in patient status:  A. RR LESS THAN 10 breaths per minute, B. Oxygen saturation LESS THAN 90%, C. Sedation score of 6  ondansetron Injectable 4 milliGRAM(s) IV Push every 6 hours PRN Nausea  oxyCODONE    IR 5 milliGRAM(s) Oral every 4 hours PRN Moderate Pain (4 - 6)  oxyCODONE    IR 10 milliGRAM(s) Oral every 4 hours PRN Severe Pain (7 - 10)      OBJECTIVE:    Vital Signs Last 24 Hrs  T(C): 36.9 (21 Sep 2020 04:47), Max: 37.1 (21 Sep 2020 00:21)  T(F): 98.5 (21 Sep 2020 04:47), Max: 98.8 (21 Sep 2020 00:21)  HR: 74 (21 Sep 2020 04:47) (69 - 88)  BP: 108/65 (21 Sep 2020 04:47) (97/62 - 148/80)  BP(mean): --  RR: 16 (21 Sep 2020 04:47) (16 - 18)  SpO2: 96% (21 Sep 2020 04:47) (94% - 100%)    I&O's Detail    20 Sep 2020 07:01  -  21 Sep 2020 07:00  --------------------------------------------------------  IN:    Oral Fluid: 2040 mL  Total IN: 2040 mL    OUT:    Accordian (mL): 375 mL    Accordian (mL): 175 mL    Voided (mL): 1800 mL  Total OUT: 2350 mL    Total NET: -310 mL          Daily     Daily     LABS:                        9.1    12.45 )-----------( 115      ( 20 Sep 2020 10:04 )             27.7     09-20    136  |  102  |  13  ----------------------------<  123<H>  3.8   |  26  |  0.75    Ca    9.0      20 Sep 2020 06:51      PT/INR - ( 21 Sep 2020 06:58 )   PT: 12.9 sec;   INR: 1.09 ratio         PTT - ( 21 Sep 2020 06:58 )  PTT:24.9 sec    General: NAD, Lying in bed comfortably  Neuro: Awake and alert  Back: soft, Aqaucel c/d/i, no fluid collections, HVC x 2 with SSF  GI/Abd: Soft, NT/ND      ASSESSMENT AND PLAN:     65F PMHx HTN, lumbar stenosis, obesity s/p T10-pelvis fusion, L3-S1 PLIF 9/18 with back closure with PRS, recovering in NSCU.    Plan:    - Continue to monitor Aquacel dressing, changed as needed for saturation, changed today, next change on 9/26 or before dc- whichever comes first  - Monitor HVC output, management per NSGY  - Care per primary/NSCU team    Plastic Surgery Resident  [facility]: [phone] PLASTIC SURGERY DAILY PROGRESS NOTE:     INTERVAL: no acute events overnight.     SUBJECTIVE/ROS: Patient feels well. Seen and examined by team on AM rounds. No acute events overnight. AVSS. Pain well controlled.     MEDICATIONS  (STANDING):  carvedilol 25 milliGRAM(s) Oral every 12 hours  clonazePAM  Tablet 0.5 milliGRAM(s) Oral three times a day  enoxaparin Injectable 40 milliGRAM(s) SubCutaneous at bedtime  gabapentin 300 milliGRAM(s) Oral daily  influenza   Vaccine 0.5 milliLiter(s) IntraMuscular once  lidocaine 1% Injectable 0.2 milliLiter(s) Local Injection once  losartan 50 milliGRAM(s) Oral two times a day  multivitamin 1 Tablet(s) Oral daily  senna 2 Tablet(s) Oral at bedtime  sodium chloride 0.9% lock flush 3 milliLiter(s) IV Push every 8 hours  sodium chloride 0.9%. 1000 milliLiter(s) (75 mL/Hr) IV Continuous <Continuous>    MEDICATIONS  (PRN):  acetaminophen   Tablet .. 650 milliGRAM(s) Oral every 6 hours PRN Temp greater or equal to 38C (100.4F), Mild Pain (1 - 3)  diazepam    Tablet 5 milliGRAM(s) Oral every 6 hours PRN SPASM  HYDROmorphone  Injectable 0.5 milliGRAM(s) IV Push daily PRN Breakthrough  naloxone Injectable 0.1 milliGRAM(s) IV Push every 3 minutes PRN For ANY of the following changes in patient status:  A. RR LESS THAN 10 breaths per minute, B. Oxygen saturation LESS THAN 90%, C. Sedation score of 6  ondansetron Injectable 4 milliGRAM(s) IV Push every 6 hours PRN Nausea  oxyCODONE    IR 5 milliGRAM(s) Oral every 4 hours PRN Moderate Pain (4 - 6)  oxyCODONE    IR 10 milliGRAM(s) Oral every 4 hours PRN Severe Pain (7 - 10)      OBJECTIVE:    Vital Signs Last 24 Hrs  T(C): 36.9 (21 Sep 2020 04:47), Max: 37.1 (21 Sep 2020 00:21)  T(F): 98.5 (21 Sep 2020 04:47), Max: 98.8 (21 Sep 2020 00:21)  HR: 74 (21 Sep 2020 04:47) (69 - 88)  BP: 108/65 (21 Sep 2020 04:47) (97/62 - 148/80)  BP(mean): --  RR: 16 (21 Sep 2020 04:47) (16 - 18)  SpO2: 96% (21 Sep 2020 04:47) (94% - 100%)    I&O's Detail    20 Sep 2020 07:01  -  21 Sep 2020 07:00  --------------------------------------------------------  IN:    Oral Fluid: 2040 mL  Total IN: 2040 mL    OUT:    Accordian (mL): 375 mL    Accordian (mL): 175 mL    Voided (mL): 1800 mL  Total OUT: 2350 mL    Total NET: -310 mL          Daily     Daily     LABS:                        9.1    12.45 )-----------( 115      ( 20 Sep 2020 10:04 )             27.7     09-20    136  |  102  |  13  ----------------------------<  123<H>  3.8   |  26  |  0.75    Ca    9.0      20 Sep 2020 06:51      PT/INR - ( 21 Sep 2020 06:58 )   PT: 12.9 sec;   INR: 1.09 ratio         PTT - ( 21 Sep 2020 06:58 )  PTT:24.9 sec    General: NAD, Lying in bed comfortably  Neuro: Awake and alert  Back: soft, Aqaucel c/d/i, no fluid collections, HVC x 2 with SSF  GI/Abd: Soft, NT/ND      ASSESSMENT AND PLAN:     65F PMHx HTN, lumbar stenosis, obesity s/p T10-pelvis fusion, L3-S1 PLIF 9/18 with back closure with PRS, recovering in NSCU.    Plan:    - Continue to monitor Aquacel dressing, change dressing POD 5 or if saturated  - Monitor HVC output, management per NSGY  - Care per primary/NSCU team    Plastic Surgery Resident  [facility]: [phone]

## 2020-09-21 NOTE — PRE PROCEDURE NOTE - PRE PROCEDURE EVALUATION
Interventional Radiology Pre-Procedure Note    This is a 65y Female with recent spine surgery. Now with lower extremity DVT. Patient is contraindicated for AC for 6 weeks due to procedure and presents to IR for retrievable IVC filter placement.    PAST MEDICAL & SURGICAL HISTORY:  Lumbar stenosis  Vertigo  Kidney stones  Obesity  HTN (hypertension)  Lumbago with sciatica  History of lithotripsy  History of laminectomy  3/2018  History of cataract surgery  both eyes 2017  Carpal tunnel syndrome on both sides  sx done 2013       Vital Signs Last 24 Hrs  T(C): 36.8 (21 Sep 2020 08:30), Max: 37.1 (21 Sep 2020 00:21)  T(F): 98.3 (21 Sep 2020 08:30), Max: 98.8 (21 Sep 2020 00:21)  HR: 78 (21 Sep 2020 08:30) (69 - 78)  BP: 116/73 (21 Sep 2020 08:30) (97/62 - 118/61)  BP(mean): --  RR: 16 (21 Sep 2020 08:30) (16 - 16)  SpO2: 94% (21 Sep 2020 08:30) (94% - 100%)    Allergies:  No Known Allergies    Physical Exam: GEN: NAD    Labs:                         9.1    12.45 )-----------( 115      ( 20 Sep 2020 10:04 )             27.7     09-20    136  |  102  |  13  ----------------------------<  123<H>  3.8   |  26  |  0.75    Ca    9.0      20 Sep 2020 06:51      PT/INR - ( 21 Sep 2020 06:58 )   PT: 12.9 sec;   INR: 1.09 ratio       PTT - ( 21 Sep 2020 06:58 )  PTT:24.9 sec    Informed consent obtained. All questions and concerns have been addressed at this time.     Plan: Retrievable IVC Filter Placement

## 2020-09-21 NOTE — PROGRESS NOTE ADULT - SUBJECTIVE AND OBJECTIVE BOX
Interventional Radiology Brief- Operative Note    Procedure: Retrievable IVC Filter Insertion    Operators: Royer Page    Anesthesia (type): Lidocaine 2%    Contrast: 30 cc Omni 240    EBL: Minimal    Findings/Follow up Plan of Care: Patent RIJ vein. Renal vein inflow identified at L1/L2 level. Successful deployment of an infrarenal retrievable IVC filter.     Specimens Removed: None    Implants: Retrievable option IVC filter    Complications: None    Condition/Disposition: Stable    Please call Interventional Radiology x [phone] with any questions, concerns, or issues.

## 2020-09-21 NOTE — PROGRESS NOTE ADULT - ASSESSMENT
65F w/ PMHx of lumbar spinal stenosis, HTN and obesity s/p T10-pelvis fusion and L3-S1 PLIF on 9/18. Patient noted with thrombocytopenia, RLE DVT awaiting IVC filter.

## 2020-09-21 NOTE — PROVIDER CONTACT NOTE (OTHER) - ACTION/TREATMENT ORDERED:
PA James Gonzalez made aware, 500 ml NS bolus ordered, PA to come and assess pt, will continue to monitor

## 2020-09-22 ENCOUNTER — TRANSCRIPTION ENCOUNTER (OUTPATIENT)
Age: 65
End: 2020-09-22

## 2020-09-22 LAB
BASOPHILS # BLD AUTO: 0.03 K/UL — SIGNIFICANT CHANGE UP (ref 0–0.2)
BASOPHILS NFR BLD AUTO: 0.4 % — SIGNIFICANT CHANGE UP (ref 0–2)
EOSINOPHIL # BLD AUTO: 0.14 K/UL — SIGNIFICANT CHANGE UP (ref 0–0.5)
EOSINOPHIL NFR BLD AUTO: 1.7 % — SIGNIFICANT CHANGE UP (ref 0–6)
HCT VFR BLD CALC: 26.1 % — LOW (ref 34.5–45)
HGB BLD-MCNC: 8.4 G/DL — LOW (ref 11.5–15.5)
IMM GRANULOCYTES NFR BLD AUTO: 0.5 % — SIGNIFICANT CHANGE UP (ref 0–1.5)
LYMPHOCYTES # BLD AUTO: 1.92 K/UL — SIGNIFICANT CHANGE UP (ref 1–3.3)
LYMPHOCYTES # BLD AUTO: 23.1 % — SIGNIFICANT CHANGE UP (ref 13–44)
MCHC RBC-ENTMCNC: 29.3 PG — SIGNIFICANT CHANGE UP (ref 27–34)
MCHC RBC-ENTMCNC: 32.2 GM/DL — SIGNIFICANT CHANGE UP (ref 32–36)
MCV RBC AUTO: 90.9 FL — SIGNIFICANT CHANGE UP (ref 80–100)
MONOCYTES # BLD AUTO: 0.83 K/UL — SIGNIFICANT CHANGE UP (ref 0–0.9)
MONOCYTES NFR BLD AUTO: 10 % — SIGNIFICANT CHANGE UP (ref 2–14)
NEUTROPHILS # BLD AUTO: 5.36 K/UL — SIGNIFICANT CHANGE UP (ref 1.8–7.4)
NEUTROPHILS NFR BLD AUTO: 64.3 % — SIGNIFICANT CHANGE UP (ref 43–77)
NRBC # BLD: 0 /100 WBCS — SIGNIFICANT CHANGE UP (ref 0–0)
PLATELET # BLD AUTO: 130 K/UL — LOW (ref 150–400)
RBC # BLD: 2.87 M/UL — LOW (ref 3.8–5.2)
RBC # FLD: 15.7 % — HIGH (ref 10.3–14.5)
WBC # BLD: 8.32 K/UL — SIGNIFICANT CHANGE UP (ref 3.8–10.5)
WBC # FLD AUTO: 8.32 K/UL — SIGNIFICANT CHANGE UP (ref 3.8–10.5)

## 2020-09-22 PROCEDURE — 99231 SBSQ HOSP IP/OBS SF/LOW 25: CPT

## 2020-09-22 PROCEDURE — 99233 SBSQ HOSP IP/OBS HIGH 50: CPT

## 2020-09-22 RX ORDER — CARVEDILOL PHOSPHATE 80 MG/1
25 CAPSULE, EXTENDED RELEASE ORAL EVERY 12 HOURS
Refills: 0 | Status: DISCONTINUED | OUTPATIENT
Start: 2020-09-22 | End: 2020-09-25

## 2020-09-22 RX ORDER — GABAPENTIN 400 MG/1
100 CAPSULE ORAL ONCE
Refills: 0 | Status: COMPLETED | OUTPATIENT
Start: 2020-09-22 | End: 2020-09-22

## 2020-09-22 RX ORDER — ACETAMINOPHEN 500 MG
1000 TABLET ORAL ONCE
Refills: 0 | Status: COMPLETED | OUTPATIENT
Start: 2020-09-22 | End: 2020-09-22

## 2020-09-22 RX ORDER — MINERAL OIL
133 OIL (ML) MISCELLANEOUS ONCE
Refills: 0 | Status: COMPLETED | OUTPATIENT
Start: 2020-09-22 | End: 2020-09-22

## 2020-09-22 RX ORDER — DIAZEPAM 5 MG
2 TABLET ORAL EVERY 6 HOURS
Refills: 0 | Status: DISCONTINUED | OUTPATIENT
Start: 2020-09-22 | End: 2020-09-25

## 2020-09-22 RX ORDER — ACETAMINOPHEN 500 MG
650 TABLET ORAL EVERY 8 HOURS
Refills: 0 | Status: COMPLETED | OUTPATIENT
Start: 2020-09-22 | End: 2020-09-24

## 2020-09-22 RX ORDER — LOSARTAN POTASSIUM 100 MG/1
50 TABLET, FILM COATED ORAL
Refills: 0 | Status: DISCONTINUED | OUTPATIENT
Start: 2020-09-22 | End: 2020-09-22

## 2020-09-22 RX ORDER — LOSARTAN POTASSIUM 100 MG/1
50 TABLET, FILM COATED ORAL DAILY
Refills: 0 | Status: DISCONTINUED | OUTPATIENT
Start: 2020-09-22 | End: 2020-09-23

## 2020-09-22 RX ADMIN — GABAPENTIN 300 MILLIGRAM(S): 400 CAPSULE ORAL at 11:25

## 2020-09-22 RX ADMIN — ONDANSETRON 4 MILLIGRAM(S): 8 TABLET, FILM COATED ORAL at 10:31

## 2020-09-22 RX ADMIN — Medication 2 MILLIGRAM(S): at 18:00

## 2020-09-22 RX ADMIN — Medication 1 BOTTLE: at 09:14

## 2020-09-22 RX ADMIN — OXYCODONE HYDROCHLORIDE 10 MILLIGRAM(S): 5 TABLET ORAL at 05:20

## 2020-09-22 RX ADMIN — ENOXAPARIN SODIUM 40 MILLIGRAM(S): 100 INJECTION SUBCUTANEOUS at 11:25

## 2020-09-22 RX ADMIN — POLYETHYLENE GLYCOL 3350 17 GRAM(S): 17 POWDER, FOR SOLUTION ORAL at 06:03

## 2020-09-22 RX ADMIN — Medication 400 MILLIGRAM(S): at 01:16

## 2020-09-22 RX ADMIN — Medication 650 MILLIGRAM(S): at 18:30

## 2020-09-22 RX ADMIN — Medication 1000 MILLIGRAM(S): at 10:00

## 2020-09-22 RX ADMIN — Medication 1 TABLET(S): at 11:25

## 2020-09-22 RX ADMIN — Medication 2 MILLIGRAM(S): at 23:00

## 2020-09-22 RX ADMIN — SODIUM CHLORIDE 3 MILLILITER(S): 9 INJECTION INTRAMUSCULAR; INTRAVENOUS; SUBCUTANEOUS at 13:48

## 2020-09-22 RX ADMIN — Medication 1 APPLICATION(S): at 22:55

## 2020-09-22 RX ADMIN — SODIUM CHLORIDE 3 MILLILITER(S): 9 INJECTION INTRAMUSCULAR; INTRAVENOUS; SUBCUTANEOUS at 22:27

## 2020-09-22 RX ADMIN — GABAPENTIN 100 MILLIGRAM(S): 400 CAPSULE ORAL at 15:43

## 2020-09-22 RX ADMIN — Medication 400 MILLIGRAM(S): at 09:45

## 2020-09-22 RX ADMIN — OXYCODONE HYDROCHLORIDE 10 MILLIGRAM(S): 5 TABLET ORAL at 04:51

## 2020-09-22 RX ADMIN — Medication 650 MILLIGRAM(S): at 18:00

## 2020-09-22 RX ADMIN — Medication 1000 MILLIGRAM(S): at 01:45

## 2020-09-22 RX ADMIN — SODIUM CHLORIDE 3 MILLILITER(S): 9 INJECTION INTRAMUSCULAR; INTRAVENOUS; SUBCUTANEOUS at 05:54

## 2020-09-22 NOTE — PROGRESS NOTE ADULT - ASSESSMENT
A/P  65y Female with PMH of Lumbar stenosis with recent spine surgery now with lower extremity DVT. Patient is contraindicated for AC for 6 weeks due to procedure and s/p retrievable IVC filter placement on 9/21/20 by IR Dr. Munoz.    Continue global care as per primary team.  IR will sign off.  Please re-consult IR as needed.     Follow up with IR Dr. Fausto Munoz in 4-6 weeks for a follow up regarding IVC filter.  Please call IR booking office at [phone] for an appointment.    Kyle Cannon, CHRIS  [facility] [address]  Ext [phone]     A/P  65y Female with PMH of Lumbar stenosis with recent spine surgery now with lower extremity DVT. Patient is contraindicated for AC for 6 weeks due to procedure and s/p retrievable IVC filter placement on 9/21/20 by IR Dr. Munoz.    Continue global care as per primary team.  IR will sign off.  Please re-consult IR as needed.     Follow up with IR Dr. Fausto Munoz in 4-6 weeks for a follow up regarding IVC filter.  Please call IR booking office at [phone] for an appointment.  Pt's daughter who was bedside was given IR contact information on business card.    Kyle Cannon, DANTE-SPARKLE  [facility] [address]  Ext [phone]     A/P  65y Female with PMH of Lumbar stenosis with recent spine surgery now with lower extremity DVT. Patient is contraindicated for AC for 6 weeks due to procedure and s/p retrievable IVC filter placement on 9/21/20 by IR Dr. Munoz.    Continue global care as per primary team.  IR will sign off.  Please re-consult IR as needed.     Follow up with IR Dr. Fausto Munoz in 4-6 weeks for a follow up regarding IVC filter.  Please call IR booking office at [phone] for an appointment.  Pt's daughter who was bedside was given IR contact information on business card and told to have her mother follow up with IR in 4-6 weeks.  The patient's daughter verbalized understanding.    Kyle Cannon, DANTE-C  [facility] [address]  Ext [phone]

## 2020-09-22 NOTE — DIETITIAN INITIAL EVALUATION ADULT. - OTHER INFO
pt speaks [place]. Daughter at bedside translating.   Reason for admission : low back pain  Diet PTA : coffee, oatmeal and banana for breakfast, chicken, soup for lunch, salad, fruit for dinner. she does not usually snack or use nutrition shakes. she agrees to mighty shake with meals. she lives with son and his family and her . pt prepares the meals.   Intake : <25% as per daughter  experiencing nausea/vomit : ordered for Zofran  Denies difficulty chewing /swallow : she now has her dentures so there no longer is the need for Mechanical Soft -RD alerted Neuro.   Denies diarrhea/constipation:  Last BM : today  NKFA   IBW +/- 10%= 110pounds  Ht: 62"  Ht taken from pt  Usual Weight PTA: 215pounds  BMI: 39.7  BMI calculated using wt from EMR  BMI calculated using ht from   Education Provided : pt was educated on the importance of supplements to increase calorie and protein intake in light of RD's nutritional findings.   pressure injury: none  edema: +1 left leg, right leg

## 2020-09-22 NOTE — DISCHARGE NOTE PROVIDER - NSDCACTIVITY_GEN_ALL_CORE
Do not drive or operate machinery/Showering allowed/Walking - Indoors allowed/Walking - Outdoors allowed/No heavy lifting/straining/Do not make important decisions

## 2020-09-22 NOTE — DISCHARGE NOTE PROVIDER - CARE PROVIDERS DIRECT ADDRESSES
[email or web address]

## 2020-09-22 NOTE — PROGRESS NOTE ADULT - SUBJECTIVE AND OBJECTIVE BOX
[facility] Division of Hospital Medicine  Sasika Bauer MD  spectra: [phone]    Patient is a 65y old  Female who presents with a chief complaint of low back pain (04 Sep 2020 07:43)      SUBJECTIVE / OVERNIGHT EVENTS: no acute events  ADDITIONAL REVIEW OF SYSTEMS: patient still with back incisional pain radiating down her legs. still no BM. urinating ok. per RN, oxycodone is being cautiously used due to borderline BP.     MEDICATIONS  (STANDING):  acetaminophen   Tablet .. 650 milliGRAM(s) Oral every 8 hours  carvedilol 25 milliGRAM(s) Oral every 12 hours  enoxaparin Injectable 40 milliGRAM(s) SubCutaneous daily  gabapentin 300 milliGRAM(s) Oral daily  influenza   Vaccine 0.5 milliLiter(s) IntraMuscular once  lidocaine 1% Injectable 0.2 milliLiter(s) Local Injection once  losartan 50 milliGRAM(s) Oral daily  mineral oil enema 133 milliLiter(s) Rectal once  multivitamin 1 Tablet(s) Oral daily  polyethylene glycol 3350 17 Gram(s) Oral two times a day  senna 2 Tablet(s) Oral at bedtime  sodium chloride 0.9% lock flush 3 milliLiter(s) IV Push every 8 hours    MEDICATIONS  (PRN):  acetaminophen   Tablet .. 650 milliGRAM(s) Oral every 6 hours PRN Temp greater or equal to 38C (100.4F), Mild Pain (1 - 3)  clonazePAM  Tablet 0.5 milliGRAM(s) Oral every 8 hours PRN anxiety  diazepam    Tablet 5 milliGRAM(s) Oral every 6 hours PRN SPASM  HYDROmorphone  Injectable 0.5 milliGRAM(s) IV Push daily PRN Breakthrough  naloxone Injectable 0.1 milliGRAM(s) IV Push every 3 minutes PRN For ANY of the following changes in patient status:  A. RR LESS THAN 10 breaths per minute, B. Oxygen saturation LESS THAN 90%, C. Sedation score of 6  ondansetron Injectable 4 milliGRAM(s) IV Push every 6 hours PRN Nausea  oxyCODONE    IR 5 milliGRAM(s) Oral every 4 hours PRN Moderate Pain (4 - 6)  oxyCODONE    IR 10 milliGRAM(s) Oral every 4 hours PRN Severe Pain (7 - 10)      CAPILLARY BLOOD GLUCOSE        I&O's Summary    21 Sep 2020 07:01  -  22 Sep 2020 07:00  --------------------------------------------------------  IN: 500 mL / OUT: 2155 mL / NET: -1655 mL        PHYSICAL EXAM:  Vital Signs Last 24 Hrs  T(C): 36.7 (22 Sep 2020 09:31), Max: 37.2 (21 Sep 2020 20:10)  T(F): 98 (22 Sep 2020 09:31), Max: 98.9 (21 Sep 2020 20:10)  HR: 88 (22 Sep 2020 09:31) (79 - 88)  BP: 116/66 (22 Sep 2020 09:31) (98/58 - 126/71)  BP(mean): --  RR: 18 (22 Sep 2020 09:31) (16 - 18)  SpO2: 99% (22 Sep 2020 09:31) (93% - 99%)    CONSTITUTIONAL: well-developed, well-groomed,  NECK: Supple, no palpable masses; no thyromegaly  RESPIRATORY: Normal respiratory effort; lungs are clear to auscultation bilaterally  CARDIOVASCULAR: normal S1 and S2; No lower extremity edema  ABDOMEN: Nontender to palpation, normoactive bowel sounds, no rebound/guarding; No hepatosplenomegaly  MUSCULOSKELETAL:  no clubbing or cyanosis of digits; no joint swelling or tenderness to palpation  PSYCH: A+O to person, place, and time; affect appropriate but  tearful at times due to pain  SKIN: No rashes; dressing with drain in place    LABS:                        8.4    8.32  )-----------( 130      ( 22 Sep 2020 06:44 )             26.1           PT/INR - ( 21 Sep 2020 06:58 )   PT: 12.9 sec;   INR: 1.09 ratio         PTT - ( 21 Sep 2020 06:58 )  PTT:24.9 sec            RADIOLOGY & ADDITIONAL TESTS:  Results Reviewed:   Imaging Personally Reviewed:  Electrocardiogram Personally Reviewed:    COORDINATION OF CARE:  Care Discussed with Consultants/Other Providers [Y/N]: neurosurgery MARIIA Londono)  Prior or Outpatient Records Reviewed [Y/N]:   [facility] Division of Hospital Medicine  Saskia Bauer MD  spectra: [phone]    Patient is a 65y old  Female who presents with a chief complaint of low back pain (04 Sep 2020 07:43)      SUBJECTIVE / OVERNIGHT EVENTS: no acute events  ADDITIONAL REVIEW OF SYSTEMS: patient still with back incisional pain radiating down her legs. still no BM. urinating ok. per RN, oxycodone is being cautiously used due to borderline BP.     MEDICATIONS  (STANDING):  acetaminophen   Tablet .. 650 milliGRAM(s) Oral every 8 hours  carvedilol 25 milliGRAM(s) Oral every 12 hours  enoxaparin Injectable 40 milliGRAM(s) SubCutaneous daily  gabapentin 300 milliGRAM(s) Oral daily  influenza   Vaccine 0.5 milliLiter(s) IntraMuscular once  lidocaine 1% Injectable 0.2 milliLiter(s) Local Injection once  losartan 50 milliGRAM(s) Oral daily  mineral oil enema 133 milliLiter(s) Rectal once  multivitamin 1 Tablet(s) Oral daily  polyethylene glycol 3350 17 Gram(s) Oral two times a day  senna 2 Tablet(s) Oral at bedtime  sodium chloride 0.9% lock flush 3 milliLiter(s) IV Push every 8 hours    MEDICATIONS  (PRN):  acetaminophen   Tablet .. 650 milliGRAM(s) Oral every 6 hours PRN Temp greater or equal to 38C (100.4F), Mild Pain (1 - 3)  clonazePAM  Tablet 0.5 milliGRAM(s) Oral every 8 hours PRN anxiety  diazepam    Tablet 5 milliGRAM(s) Oral every 6 hours PRN SPASM  HYDROmorphone  Injectable 0.5 milliGRAM(s) IV Push daily PRN Breakthrough  naloxone Injectable 0.1 milliGRAM(s) IV Push every 3 minutes PRN For ANY of the following changes in patient status:  A. RR LESS THAN 10 breaths per minute, B. Oxygen saturation LESS THAN 90%, C. Sedation score of 6  ondansetron Injectable 4 milliGRAM(s) IV Push every 6 hours PRN Nausea  oxyCODONE    IR 5 milliGRAM(s) Oral every 4 hours PRN Moderate Pain (4 - 6)  oxyCODONE    IR 10 milliGRAM(s) Oral every 4 hours PRN Severe Pain (7 - 10)      CAPILLARY BLOOD GLUCOSE        I&O's Summary    21 Sep 2020 07:01  -  22 Sep 2020 07:00  --------------------------------------------------------  IN: 500 mL / OUT: 2155 mL / NET: -1655 mL        PHYSICAL EXAM:  Vital Signs Last 24 Hrs  T(C): 36.7 (22 Sep 2020 09:31), Max: 37.2 (21 Sep 2020 20:10)  T(F): 98 (22 Sep 2020 09:31), Max: 98.9 (21 Sep 2020 20:10)  HR: 88 (22 Sep 2020 09:31) (79 - 88)  BP: 116/66 (22 Sep 2020 09:31) (98/58 - 126/71)  BP(mean): --  RR: 18 (22 Sep 2020 09:31) (16 - 18)  SpO2: 99% (22 Sep 2020 09:31) (93% - 99%)    CONSTITUTIONAL: well-developed, well-groomed,  NECK: Supple, no palpable masses; no thyromegaly  RESPIRATORY: Normal respiratory effort; lungs are clear to auscultation bilaterally  CARDIOVASCULAR: normal S1 and S2; No lower extremity edema  ABDOMEN: Nontender to palpation, normoactive bowel sounds, no rebound/guarding; No hepatosplenomegaly  MUSCULOSKELETAL:  no clubbing or cyanosis of digits; no joint swelling or tenderness to palpation, moves all extremities  PSYCH: A+O to person, place, and time; affect appropriate but  tearful at times due to pain  SKIN: No rashes; dressing with drain in place    LABS:                        8.4    8.32  )-----------( 130      ( 22 Sep 2020 06:44 )             26.1           PT/INR - ( 21 Sep 2020 06:58 )   PT: 12.9 sec;   INR: 1.09 ratio         PTT - ( 21 Sep 2020 06:58 )  PTT:24.9 sec            RADIOLOGY & ADDITIONAL TESTS:  Results Reviewed:   Imaging Personally Reviewed:  Electrocardiogram Personally Reviewed:    COORDINATION OF CARE:  Care Discussed with Consultants/Other Providers [Y/N]: neurosurgery PA (Larry)  Prior or Outpatient Records Reviewed [Y/N]:

## 2020-09-22 NOTE — DISCHARGE NOTE PROVIDER - CARE PROVIDER_API CALL
Scott Sheppard  NEUROSURGERY  [address] [facility] [facility] [address]  [place] [address]  Phone: [phone]  Fax: [phone]  Follow Up Time: 1 week    Alvin Conde  PLASTIC SURGERY  [address] [place] [address]  Phone: [phone]  Fax: [phone]  Follow Up Time: 2 weeks    Fausto Munoz  INTERVENTIONAL RADIOLOGY AND DIAGNOSTIC RADIOLOGY  [address] [place] [address]  Phone: [phone]  Fax: [phone]  Follow Up Time: 1 month

## 2020-09-22 NOTE — PROGRESS NOTE ADULT - REASON FOR ADMISSION
Patient was admitted with history of lower extremity radiculopathy. Patient has previous hx of spinal laminectomies.

## 2020-09-22 NOTE — DISCHARGE NOTE PROVIDER - NSDCCPTREATMENT_GEN_ALL_CORE_FT
PRINCIPAL PROCEDURE  Procedure: IVC filter placement  Findings and Treatment: Follow up with IR Dr. Fausto Munoz in 4-6 weeks for a follow up regarding IVC filter.  Please call IR booking office at [phone] for an appointment.

## 2020-09-22 NOTE — DIETITIAN INITIAL EVALUATION ADULT. - PERTINENT MEDS FT
MEDICATIONS  (STANDING):  acetaminophen   Tablet .. 650 milliGRAM(s) Oral every 8 hours  carvedilol 25 milliGRAM(s) Oral every 12 hours  diazepam    Tablet 2 milliGRAM(s) Oral every 6 hours  enoxaparin Injectable 40 milliGRAM(s) SubCutaneous daily  gabapentin 300 milliGRAM(s) Oral daily  influenza   Vaccine 0.5 milliLiter(s) IntraMuscular once  lidocaine 1% Injectable 0.2 milliLiter(s) Local Injection once  losartan 50 milliGRAM(s) Oral daily  multivitamin 1 Tablet(s) Oral daily  polyethylene glycol 3350 17 Gram(s) Oral two times a day  senna 2 Tablet(s) Oral at bedtime  sodium chloride 0.9% lock flush 3 milliLiter(s) IV Push every 8 hours    MEDICATIONS  (PRN):  acetaminophen   Tablet .. 650 milliGRAM(s) Oral every 6 hours PRN Temp greater or equal to 38C (100.4F), Mild Pain (1 - 3)  HYDROmorphone  Injectable 0.5 milliGRAM(s) IV Push daily PRN Breakthrough  naloxone Injectable 0.1 milliGRAM(s) IV Push every 3 minutes PRN For ANY of the following changes in patient status:  A. RR LESS THAN 10 breaths per minute, B. Oxygen saturation LESS THAN 90%, C. Sedation score of 6  ondansetron Injectable 4 milliGRAM(s) IV Push every 6 hours PRN Nausea  oxyCODONE    IR 5 milliGRAM(s) Oral every 4 hours PRN Moderate Pain (4 - 6)  oxyCODONE    IR 10 milliGRAM(s) Oral every 4 hours PRN Severe Pain (7 - 10)

## 2020-09-22 NOTE — DISCHARGE NOTE PROVIDER - NSDCCPCAREPLAN_GEN_ALL_CORE_FT
PRINCIPAL DISCHARGE DIAGNOSIS  Diagnosis: Spinal stenosis, lumbar  Assessment and Plan of Treatment: - You underwent T10 to pelvis instrumentation and fusion with plastics closure on 9/18/2020.  - CT on 9/18 demonstrated post operative changed.   - Please keep your incision site clean and dry. Avoid rubbing or direct shampoo to the incision site. Do not put lotion or cream on the incision site.  - You should have weekly CBC and BMP monitored at rehab.   - Please call to follow up with Dr. Sheppard within 1 week after discharge from rehab.  - Please also follow up with plastic surgeon, Dr. Conde, within 1-2 weeks after discharge from rehab.      SECONDARY DISCHARGE DIAGNOSES  Diagnosis: HTN (hypertension)  Assessment and Plan of Treatment: - Please continue Coreg for your hypertension.  - Please follow up with your primary care physician within 1-2 weeks after discharge from rehab.    Diagnosis: Preventive measure  Assessment and Plan of Treatment: - Please have bowel regimens given at rehab to prevent constipations while on narcotic pain medications.    Diagnosis: DVT (deep venous thrombosis)  Assessment and Plan of Treatment: - You were found to have extensive DVTs on your right lower extremity. The last doppler on 9/28 revealed extensive, occlusive or near occlusive, DVT affecting the right distal external iliac, common femoral and popliteal veins and in the right calf, the posterior tibial peroneal trunk, the posterior tibial, peroneal and soleal veins are thrombosed.  - You had retrievable IVC filter placed by intervention radiology (IR) on 9/21.   - You are currently on anticoagulation dose of SQL for your DVTs.  - You should have bilateral lower extremity dopplers repeated at rehab to monitor for any changes in DVTs.  - Follow up with IR Dr. Fausto Munoz in 4-6 weeks for a follow up regarding IVC filter.  Please call IR booking office at [phone] for an appointment.    Diagnosis: Left knee pain  Assessment and Plan of Treatment: - You had your xray done on left knee and left foot, which showed osteoarthritis.  - Please follow up with your primary care physician within 1-2 weeks after discharge.     PRINCIPAL DISCHARGE DIAGNOSIS  Diagnosis: Spinal stenosis, lumbar  Assessment and Plan of Treatment: - You underwent T10 to pelvis instrumentation and fusion with plastics closure on 9/18/2020.  - CT on 9/18 demonstrated post operative changed.   - Please keep your incision site clean and dry. Avoid rubbing or direct shampoo to the incision site. Do not put lotion or cream on the incision site.  - You should have weekly CBC and BMP monitored at rehab.   - Please call to follow up with Dr. Sheppard within 1 week after discharge from rehab.  - Please also follow up with plastic surgeon, Dr. Conde, within 1-2 weeks after discharge from rehab.      SECONDARY DISCHARGE DIAGNOSES  Diagnosis: HTN (hypertension)  Assessment and Plan of Treatment: - Please continue Coreg for your hypertension.  - You can restart your aspirin 81 mg daily.  - Please follow up with your primary care physician within 1-2 weeks after discharge from rehab.    Diagnosis: Preventive measure  Assessment and Plan of Treatment: - Please have bowel regimens given at rehab to prevent constipations while on narcotic pain medications.    Diagnosis: DVT (deep venous thrombosis)  Assessment and Plan of Treatment: - You were found to have extensive DVTs on your right lower extremity. The last doppler on 9/28 revealed extensive, occlusive or near occlusive, DVT affecting the right distal external iliac, common femoral and popliteal veins and in the right calf, the posterior tibial peroneal trunk, the posterior tibial, peroneal and soleal veins are thrombosed.  - You had retrievable IVC filter placed by intervention radiology (IR) on 9/21.   - You are currently on anticoagulation dose of SQL for your DVTs.  - You should have bilateral lower extremity dopplers repeated at rehab to monitor for any changes in DVTs.  - Follow up with IR Dr. Fausto Munoz in 4-6 weeks for a follow up regarding IVC filter.  Please call IR booking office at [phone] for an appointment.    Diagnosis: Left knee pain  Assessment and Plan of Treatment: - You had your xray done on left knee and left foot, which showed osteoarthritis.  - Please follow up with your primary care physician within 1-2 weeks after discharge.

## 2020-09-22 NOTE — DISCHARGE NOTE PROVIDER - HOSPITAL COURSE
A 65 year old [place]an speaking female with past medical history of obesity, HTN, and lumbar stenosis, s/p laminectomy in 2018 was admitted on 9/18/2020 after T10 to pelvis instrumentation and fusion with plastics closure. Patient received blood transfusion intra-operatively and her post operative serial CBC were stable. CT of lumbar spine done on 9/18 revealed extensive post operative changes. Surgical drains were discontinued on 9/23 and 9/28. Patient had left lower extremity numbness and pain. Left Knee and foot Xray was done, which showed osteoarthritis. Lidocaine patch started for pain control.     Hospital course was complicated by acute DVTs above and below right knee on screening dopplers on 9/20. Patient had retrievable IVC filter placed on 9/21. Repeat lower extremities doppler on 9/28 revealed extensive, occlusive or near occlusive, DVT affecting the right distal external iliac, common femoral and popliteal veins and in the right calf, the posterior tibial peroneal trunk, the posterior tibial, peroneal and soleal veins are thrombosed. Patient was on DVT prophylaxis dose SQ lovenox, which was changed to full anticoagulation dose on 9/29.     Physical therapy, occupational therapy, and rehab medicine evaluations were appreciated and recommended acute rehab. Patient was medically cleared for discharge to [facility] rehab on 9/29/2020.

## 2020-09-22 NOTE — PROGRESS NOTE ADULT - ASSESSMENT
HPI:  65yr old female with spinal stenosis lumbar region.  Pt coming pain unable to walk and numbness in legs.  Pt had laminectomy 2018 now coming for T10-pelvis  instrumentation and fusion. L2-S1 posterior lumbar fusion.  Pt hx sig for obesity and HTN.    Note ;covid test 9/15/20/Hillary (04 Sep 2020 07:43)    PROCEDURE: Closure, surgical wound, back    Muscle flap of back    Lumbar arthrodesis     s/p T10-PELVIS fusion , L3-S1  PLIF - 9/18   POD#  4   PLAN:  1 Out of bed   2 continue pt   3 prn pain meds   4 valium for muscle spasm   5 room air   6 bp stable -on carvedilol and losartan   7 mechanical soft diet   8 continue laxative and stool softener   9 voiding   10 renal function stable   11 afebrile   12 endo stable   13 dvt ppx sql and scds   14 dispo : acute rehab once stable     Assessment:  Please Check When Present   []  GCS  E   V  M     Heart Failure: []Acute, [] acute on chronic , []chronic  Heart Failure:  [] Diastolic (HFpEF), [] Systolic (HFrEF), []Combined (HFpEF and HFrEF), [] RHF, [] Pulm HTN, [] Other    [] PARESH, [] ATN, [] AIN, [] other  [] CKD1, [] CKD2, [] CKD 3, [] CKD 4, [] CKD 5, []ESRD    Encephalopathy: [] Metabolic, [] Hepatic, [] toxic, [] Neurological, [] Other    Abnormal Nurtitional Status: [] malnurtition (see nutrition note), [ ]underweight: BMI < 19, [] morbid obesity: BMI >40, [] Cachexia    [] Sepsis  [] hypovolemic shock,[] cardiogenic shock, [] hemorrhagic shock, [] neuogenic shock  [] Acute Respiratory Failure  []Cerebral edema, [] Brain compression/ herniation,   [] Functional quadriplegia  [] Acute blood loss anemia

## 2020-09-22 NOTE — PROGRESS NOTE ADULT - SUBJECTIVE AND OBJECTIVE BOX
SUBJECTIVE:   Patient was seen and evaluated at bedside. Patient is resting in bed and is in no new acute distress.   OVERNIGHT EVENTS:   none   Vital Signs Last 24 Hrs  T(C): 36.7 (22 Sep 2020 09:31), Max: 37.2 (21 Sep 2020 20:10)  T(F): 98 (22 Sep 2020 09:31), Max: 98.9 (21 Sep 2020 20:10)  HR: 88 (22 Sep 2020 09:31) (79 - 88)  BP: 116/66 (22 Sep 2020 09:31) (98/58 - 126/71)  BP(mean): --  RR: 18 (22 Sep 2020 09:31) (16 - 18)  SpO2: 99% (22 Sep 2020 09:31) (93% - 99%)    PHYSICAL EXAM:    General: No Acute Distress     Neurological: Awake, alert oriented to person, place and time, Following Commands, PERRL, EOMI, Face Symmetrical, Speech Fluent, Moving all extremities, Muscle Strength normal in all four extremities, No Drift, Sensation to Light Touch Intact    Pulmonary: Clear to Auscultation, No Rales, No Rhonchi, No Wheezes     Cardiovascular: S1, S2, Regular Rate and Rhythm     Gastrointestinal: Soft, Nontender, Nondistended     Incision:   clean and dry   LABS:                        8.4    8.32  )-----------( 130      ( 22 Sep 2020 06:44 )             26.1        PT/INR - ( 21 Sep 2020 06:58 )   PT: 12.9 sec;   INR: 1.09 ratio         PTT - ( 21 Sep 2020 06:58 )  PTT:24.9 sec      09-21 @ 07:01  -  09-22 @ 07:00  --------------------------------------------------------  IN: 500 mL / OUT: 2155 mL / NET: -1655 mL      DRAINS:   l hmv 330, r hmv 75   MEDICATIONS:  Antibiotics:    Neuro:  acetaminophen   Tablet .. 650 milliGRAM(s) Oral every 6 hours PRN Temp greater or equal to 38C (100.4F), Mild Pain (1 - 3)  clonazePAM  Tablet 0.5 milliGRAM(s) Oral every 8 hours PRN anxiety  diazepam    Tablet 5 milliGRAM(s) Oral every 6 hours PRN SPASM  gabapentin 300 milliGRAM(s) Oral daily  HYDROmorphone  Injectable 0.5 milliGRAM(s) IV Push daily PRN Breakthrough  ondansetron Injectable 4 milliGRAM(s) IV Push every 6 hours PRN Nausea  oxyCODONE    IR 5 milliGRAM(s) Oral every 4 hours PRN Moderate Pain (4 - 6)  oxyCODONE    IR 10 milliGRAM(s) Oral every 4 hours PRN Severe Pain (7 - 10)    Cardiac:  carvedilol 25 milliGRAM(s) Oral every 12 hours  losartan 50 milliGRAM(s) Oral two times a day    Pulm:    GI/:  polyethylene glycol 3350 17 Gram(s) Oral two times a day  senna 2 Tablet(s) Oral at bedtime    Other:   enoxaparin Injectable 40 milliGRAM(s) SubCutaneous daily  influenza   Vaccine 0.5 milliLiter(s) IntraMuscular once  lidocaine 1% Injectable 0.2 milliLiter(s) Local Injection once  multivitamin 1 Tablet(s) Oral daily  naloxone Injectable 0.1 milliGRAM(s) IV Push every 3 minutes PRN For ANY of the following changes in patient status:  A. RR LESS THAN 10 breaths per minute, B. Oxygen saturation LESS THAN 90%, C. Sedation score of 6  sodium chloride 0.9% lock flush 3 milliLiter(s) IV Push every 8 hours    DIET: [] Regular [] CCD [] Renal [] Puree [] Dysphagia [] Tube Feeds:   mechanical soft   IMAGING:   no new imaging today

## 2020-09-22 NOTE — PROGRESS NOTE ADULT - ASSESSMENT
65F w/ PMHx of lumbar spinal stenosis, HTN and obesity s/p T10-pelvis fusion and L3-S1 PLIF on 9/18. Patient noted with thrombocytopenia, RLE DVT s/p IVC filter.

## 2020-09-22 NOTE — DIETITIAN INITIAL EVALUATION ADULT. - ADD RECOMMEND
change diet to Regular diet. RD provided mighty shakes since pt is consuming <25% of meals at this time.

## 2020-09-22 NOTE — PROGRESS NOTE ADULT - SUBJECTIVE AND OBJECTIVE BOX
Interventional Radiology    S/P retrievable IVC filter placement by IR Dr. Fausto Munoz on 9/21/2020, POD # 1.  Pt is [place]ian speaking.    Vital Signs Last 24 Hrs  T(C): 36.5 (22 Sep 2020 14:02), Max: 37.2 (21 Sep 2020 20:10)  T(F): 97.7 (22 Sep 2020 14:02), Max: 98.9 (21 Sep 2020 20:10)  HR: 78 (22 Sep 2020 14:02) (78 - 88)  BP: 110/75 (22 Sep 2020 14:02) (100/56 - 126/71)  BP(mean): --  RR: 18 (22 Sep 2020 14:02) (16 - 18)  SpO2: 100% (22 Sep 2020 14:02) (93% - 100%)    General Appearance: NAD seen at bedside with daughter present.    Procedure Site (right neck): + tender to palpation, no hematoma, no bleeding.    LABS:                        8.4    8.32  )-----------( 130      ( 22 Sep 2020 06:44 )             26.1     PT/INR - ( 21 Sep 2020 06:58 )   PT: 12.9 sec;   INR: 1.09 ratio    PTT - ( 21 Sep 2020 06:58 )  PTT:24.9 sec

## 2020-09-22 NOTE — DISCHARGE NOTE PROVIDER - NSDCMRMEDTOKEN_GEN_ALL_CORE_FT
Aspir 81 oral delayed release tablet: 1 tab(s) orally once a day  carvedilol 25 mg oral tablet: 1 tab(s) orally every 12 hours  clonazePAM 0.5 mg oral tablet: 1 tab(s) orally 3 times a day, As Needed  eplerenone 25 mg oral tablet: 1 tab(s) orally once a day, As Needed  gabapentin 300 mg oral capsule: orally once a day  losartan 50 mg oral tablet: orally 2 times a day   acetaminophen 325 mg oral tablet: 2 tab(s) orally every 6 hours, As needed, Temp greater or equal to 38C (100.4F), Mild Pain (1 - 3)  aluminum hydroxide-magnesium hydroxide 200 mg-200 mg/5 mL oral suspension: 30 milliliter(s) orally every 4 hours, As needed, Dyspepsia  carvedilol 25 mg oral tablet: 1 tab(s) orally every 12 hours  diazePAM 5 mg oral tablet: 1 tab(s) orally every 8 hours, As needed, muscle spasm  enoxaparin: 100 milligram(s) subcutaneous 2 times a day  gabapentin 300 mg oral capsule: orally once a day  lidocaine 5% topical film: Apply topically to affected area once a day  Multiple Vitamins oral tablet: 1 tab(s) orally once a day  oxyCODONE 10 mg oral tablet: 1 tab(s) orally every 4 hours, As needed, Severe Pain (7 - 10)  oxyCODONE 5 mg oral tablet: 1 tab(s) orally every 4 hours, As needed, Moderate Pain (4 - 6)  pantoprazole 40 mg oral granule, delayed release: 1 cap(s) orally once a day  polyethylene glycol 3350 oral powder for reconstitution: 17 gram(s) orally 2 times a day  senna oral tablet: 2 tab(s) orally once a day (at bedtime)   acetaminophen 325 mg oral tablet: 2 tab(s) orally every 6 hours, As needed, Temp greater or equal to 38C (100.4F), Mild Pain (1 - 3)  aluminum hydroxide-magnesium hydroxide 200 mg-200 mg/5 mL oral suspension: 30 milliliter(s) orally every 4 hours, As needed, Dyspepsia  aspirin 81 mg oral tablet, chewable: 1 tab(s) orally once a day  carvedilol 25 mg oral tablet: 1 tab(s) orally every 12 hours  diazePAM 5 mg oral tablet: 1 tab(s) orally every 8 hours, As needed, muscle spasm  enoxaparin: 100 milligram(s) subcutaneous 2 times a day  gabapentin 300 mg oral capsule: orally once a day  lidocaine 5% topical film: Apply topically to affected area once a day  Multiple Vitamins oral tablet: 1 tab(s) orally once a day  oxyCODONE 10 mg oral tablet: 1 tab(s) orally every 4 hours, As needed, Severe Pain (7 - 10)  oxyCODONE 5 mg oral tablet: 1 tab(s) orally every 4 hours, As needed, Moderate Pain (4 - 6)  pantoprazole 40 mg oral granule, delayed release: 1 cap(s) orally once a day  polyethylene glycol 3350 oral powder for reconstitution: 17 gram(s) orally 2 times a day  senna oral tablet: 2 tab(s) orally once a day (at bedtime)

## 2020-09-22 NOTE — DISCHARGE NOTE PROVIDER - PROVIDER TOKENS
PROVIDER:[TOKEN:[[id number]],FOLLOWUP:[1 week]],PROVIDER:[TOKEN:[[id number]],FOLLOWUP:[2 weeks]],PROVIDER:[TOKEN:[[id number]],FOLLOWUP:[1 month]]

## 2020-09-22 NOTE — PROGRESS NOTE ADULT - ASSESSMENT
ASSESSMENT AND PLAN:     65F PMHx HTN, lumbar stenosis, obesity s/p T10-pelvis fusion, L3-S1 PLIF 9/18 with back closure with PRS, recovering in NSCU.    Plan:    - Continue to monitor Aquacel dressing, Change 9/27 or prior to discharge   - Monitor HVC output, management per NSGY  - Care per primary/NSCU team    Plastic Surgery Resident  [facility]: [phone]

## 2020-09-22 NOTE — DISCHARGE NOTE PROVIDER - NSDCFUADDINST_GEN_ALL_CORE_FT
Follow up with IR Dr. Fausto Munoz in 4-6 weeks for a follow up regarding IVC filter.  Please call IR booking office at [phone] for an appointment. - Return to Emergency Department or contact your Neurosurgeon if any changes in mental status, severe headache, weakness, gait instability, seizures, numbness or tingling of extremities; difficulty swallowing; drainage or redness of wound, high fever, unrelenting vomiting; pain in legs; difficulty urinating or constipation.  - Do not take Aspirin or any Motrin/NSAIDS until checking with your Neurosurgeon.   - Return to Emergency Department or contact your Neurosurgeon if any changes in mental status, severe headache, weakness, gait instability, seizures, numbness or tingling of extremities; difficulty swallowing; drainage or redness of wound, high fever, unrelenting vomiting; pain in legs; difficulty urinating or constipation.  - Do not take any Motrin/NSAIDS until checking with your Neurosurgeon.

## 2020-09-22 NOTE — PROGRESS NOTE ADULT - SUBJECTIVE AND OBJECTIVE BOX
Plastic Surgery Progress Note (pg [facility]: [id number], [facility]: [phone])    SUBJECTIVE:  Patient seen and examined at bedside this AM. No acute events overnight. AF/VSS. Pain well controlled. Dressing changed this AM, tolerated well     OBJECTIVE:     ** VITAL SIGNS / I&O's **    Vital Signs Last 24 Hrs  T(C): 36.9 (22 Sep 2020 04:04), Max: 37.2 (21 Sep 2020 20:10)  T(F): 98.4 (22 Sep 2020 04:04), Max: 98.9 (21 Sep 2020 20:10)  HR: 85 (22 Sep 2020 04:04) (78 - 88)  BP: 104/60 (22 Sep 2020 06:00) (98/58 - 126/71)  BP(mean): --  RR: 18 (22 Sep 2020 04:04) (16 - 18)  SpO2: 93% (22 Sep 2020 04:04) (93% - 99%)      21 Sep 2020 07:01  -  22 Sep 2020 07:00  --------------------------------------------------------  IN:    IV PiggyBack: 100 mL    Oral Fluid: 400 mL  Total IN: 500 mL    OUT:    Accordian (mL): 75 mL    Accordian (mL): 330 mL    Voided (mL): 1750 mL  Total OUT: 2155 mL    Total NET: -1655 mL      General: NAD, Lying in bed comfortably  Neuro: Awake and alert  Back: soft, Aqaucel removed and replaced, incision c/d/i, no fluid collections, HVC x 2 with SSF  GI/Abd: Soft, NT/ND    **MEDS**  acetaminophen   Tablet .. 650 milliGRAM(s) Oral every 6 hours PRN  carvedilol 25 milliGRAM(s) Oral every 12 hours  clonazePAM  Tablet 0.5 milliGRAM(s) Oral every 8 hours PRN  diazepam    Tablet 5 milliGRAM(s) Oral every 6 hours PRN  enoxaparin Injectable 40 milliGRAM(s) SubCutaneous daily  gabapentin 300 milliGRAM(s) Oral daily  HYDROmorphone  Injectable 0.5 milliGRAM(s) IV Push daily PRN  influenza   Vaccine 0.5 milliLiter(s) IntraMuscular once  lidocaine 1% Injectable 0.2 milliLiter(s) Local Injection once  losartan 50 milliGRAM(s) Oral two times a day  magnesium citrate Oral Solution 1 Bottle Oral once  multivitamin 1 Tablet(s) Oral daily  naloxone Injectable 0.1 milliGRAM(s) IV Push every 3 minutes PRN  ondansetron Injectable 4 milliGRAM(s) IV Push every 6 hours PRN  oxyCODONE    IR 5 milliGRAM(s) Oral every 4 hours PRN  oxyCODONE    IR 10 milliGRAM(s) Oral every 4 hours PRN  polyethylene glycol 3350 17 Gram(s) Oral two times a day  senna 2 Tablet(s) Oral at bedtime  sodium chloride 0.9% lock flush 3 milliLiter(s) IV Push every 8 hours      ** LABS **                          8.4    8.32  )-----------( 130      ( 22 Sep 2020 06:44 )             26.1           PT/INR - ( 21 Sep 2020 06:58 )   PT: 12.9 sec;   INR: 1.09 ratio         PTT - ( 21 Sep 2020 06:58 )  PTT:24.9 sec  CAPILLARY BLOOD GLUCOSE

## 2020-09-23 LAB
HCT VFR BLD CALC: 27.7 % — LOW (ref 34.5–45)
HGB BLD-MCNC: 9 G/DL — LOW (ref 11.5–15.5)
MCHC RBC-ENTMCNC: 29.2 PG — SIGNIFICANT CHANGE UP (ref 27–34)
MCHC RBC-ENTMCNC: 32.5 GM/DL — SIGNIFICANT CHANGE UP (ref 32–36)
MCV RBC AUTO: 89.9 FL — SIGNIFICANT CHANGE UP (ref 80–100)
NRBC # BLD: 0 /100 WBCS — SIGNIFICANT CHANGE UP (ref 0–0)
PLATELET # BLD AUTO: 162 K/UL — SIGNIFICANT CHANGE UP (ref 150–400)
RBC # BLD: 3.08 M/UL — LOW (ref 3.8–5.2)
RBC # FLD: 15.8 % — HIGH (ref 10.3–14.5)
WBC # BLD: 8.92 K/UL — SIGNIFICANT CHANGE UP (ref 3.8–10.5)
WBC # FLD AUTO: 8.92 K/UL — SIGNIFICANT CHANGE UP (ref 3.8–10.5)

## 2020-09-23 PROCEDURE — 99232 SBSQ HOSP IP/OBS MODERATE 35: CPT

## 2020-09-23 RX ORDER — PANTOPRAZOLE SODIUM 20 MG/1
40 TABLET, DELAYED RELEASE ORAL DAILY
Refills: 0 | Status: DISCONTINUED | OUTPATIENT
Start: 2020-09-23 | End: 2020-09-29

## 2020-09-23 RX ORDER — LOSARTAN POTASSIUM 100 MG/1
25 TABLET, FILM COATED ORAL DAILY
Refills: 0 | Status: DISCONTINUED | OUTPATIENT
Start: 2020-09-24 | End: 2020-09-24

## 2020-09-23 RX ADMIN — SODIUM CHLORIDE 3 MILLILITER(S): 9 INJECTION INTRAMUSCULAR; INTRAVENOUS; SUBCUTANEOUS at 06:14

## 2020-09-23 RX ADMIN — Medication 650 MILLIGRAM(S): at 12:33

## 2020-09-23 RX ADMIN — OXYCODONE HYDROCHLORIDE 10 MILLIGRAM(S): 5 TABLET ORAL at 13:39

## 2020-09-23 RX ADMIN — OXYCODONE HYDROCHLORIDE 10 MILLIGRAM(S): 5 TABLET ORAL at 00:30

## 2020-09-23 RX ADMIN — Medication 1 APPLICATION(S): at 13:35

## 2020-09-23 RX ADMIN — OXYCODONE HYDROCHLORIDE 10 MILLIGRAM(S): 5 TABLET ORAL at 14:10

## 2020-09-23 RX ADMIN — SODIUM CHLORIDE 3 MILLILITER(S): 9 INJECTION INTRAMUSCULAR; INTRAVENOUS; SUBCUTANEOUS at 13:28

## 2020-09-23 RX ADMIN — OXYCODONE HYDROCHLORIDE 10 MILLIGRAM(S): 5 TABLET ORAL at 08:30

## 2020-09-23 RX ADMIN — Medication 650 MILLIGRAM(S): at 20:36

## 2020-09-23 RX ADMIN — Medication 2 MILLIGRAM(S): at 12:35

## 2020-09-23 RX ADMIN — SODIUM CHLORIDE 3 MILLILITER(S): 9 INJECTION INTRAMUSCULAR; INTRAVENOUS; SUBCUTANEOUS at 21:22

## 2020-09-23 RX ADMIN — Medication 650 MILLIGRAM(S): at 20:43

## 2020-09-23 RX ADMIN — Medication 30 MILLILITER(S): at 20:37

## 2020-09-23 RX ADMIN — Medication 1 APPLICATION(S): at 21:23

## 2020-09-23 RX ADMIN — ENOXAPARIN SODIUM 40 MILLIGRAM(S): 100 INJECTION SUBCUTANEOUS at 12:35

## 2020-09-23 RX ADMIN — GABAPENTIN 300 MILLIGRAM(S): 400 CAPSULE ORAL at 12:36

## 2020-09-23 RX ADMIN — Medication 1 TABLET(S): at 12:37

## 2020-09-23 RX ADMIN — Medication 1 APPLICATION(S): at 05:33

## 2020-09-23 RX ADMIN — OXYCODONE HYDROCHLORIDE 10 MILLIGRAM(S): 5 TABLET ORAL at 08:00

## 2020-09-23 RX ADMIN — Medication 650 MILLIGRAM(S): at 13:00

## 2020-09-23 RX ADMIN — Medication 2 MILLIGRAM(S): at 23:32

## 2020-09-23 RX ADMIN — Medication 2 MILLIGRAM(S): at 05:04

## 2020-09-23 RX ADMIN — OXYCODONE HYDROCHLORIDE 10 MILLIGRAM(S): 5 TABLET ORAL at 21:06

## 2020-09-23 RX ADMIN — OXYCODONE HYDROCHLORIDE 10 MILLIGRAM(S): 5 TABLET ORAL at 20:36

## 2020-09-23 RX ADMIN — Medication 2 MILLIGRAM(S): at 17:33

## 2020-09-23 RX ADMIN — OXYCODONE HYDROCHLORIDE 10 MILLIGRAM(S): 5 TABLET ORAL at 01:01

## 2020-09-23 NOTE — PROGRESS NOTE ADULT - ASSESSMENT
HPI:  65yr old female with spinal stenosis lumbar region.  Pt coming pain unable to walk and numbness in legs.  Pt had laminectomy 2018 now coming for T10-pelvis  instrumentation and fusion. L2-S1 posterior lumbar fusion.  Pt hx sig for obesity and HTN.    Note ;covid test 9/15/20/Hillary (04 Sep 2020 07:43)    PROCEDURE: Closure, surgical wound, back    Muscle flap of back    Lumbar arthrodesis      s/p T10-PELVIS fusion , L3-S1  PLIF - 9/18   POD#  5  PLAN:  1 Out of bed   2 continue pt   3 prn pain meds   4 valium for muscle spasm   5 room air   6 bp stable -on carvedilol and losartan   7 mechanical soft diet   8 continue laxative and stool softener   9 voiding   10 renal function stable   11 afebrile   12 endo stable   13 dvt ppx sql and scds   14 dispo : acute rehab once stable   15 drains per plastic surgery   16 blisters on anterior chest wall and abdomen improving-on silvadene cream fo that   Assessment:  Please Check When Present   []  GCS  E   V  M     Heart Failure: []Acute, [] acute on chronic , []chronic  Heart Failure:  [] Diastolic (HFpEF), [] Systolic (HFrEF), []Combined (HFpEF and HFrEF), [] RHF, [] Pulm HTN, [] Other    [] PARESH, [] ATN, [] AIN, [] other  [] CKD1, [] CKD2, [] CKD 3, [] CKD 4, [] CKD 5, []ESRD    Encephalopathy: [] Metabolic, [] Hepatic, [] toxic, [] Neurological, [] Other    Abnormal Nurtitional Status: [] malnurtition (see nutrition note), [ ]underweight: BMI < 19, [] morbid obesity: BMI >40, [] Cachexia    [] Sepsis  [] hypovolemic shock,[] cardiogenic shock, [] hemorrhagic shock, [] neuogenic shock  [] Acute Respiratory Failure  []Cerebral edema, [] Brain compression/ herniation,   [] Functional quadriplegia  [] Acute blood loss anemia

## 2020-09-23 NOTE — PROGRESS NOTE ADULT - ASSESSMENT
65F w/ PMHx of lumbar spinal stenosis, HTN and obesity s/p T10-pelvis fusion and L3-S1 PLIF on 9/18. Patient noted with thrombocytopenia, RLE DVT s/p IVC filter on 9/21.

## 2020-09-23 NOTE — PROGRESS NOTE ADULT - SUBJECTIVE AND OBJECTIVE BOX
PLASTIC SURGERY DAILY PROGRESS NOTE:     INTERVAL: no acute events overnight    SUBJECTIVE/ROS: Patient feels well. Seen and examined by team at bedside on AM rounds. Pain well controlled. AVSS.      MEDICATIONS  (STANDING):  acetaminophen   Tablet .. 650 milliGRAM(s) Oral every 8 hours  carvedilol 25 milliGRAM(s) Oral every 12 hours  diazepam    Tablet 2 milliGRAM(s) Oral every 6 hours  enoxaparin Injectable 40 milliGRAM(s) SubCutaneous daily  gabapentin 300 milliGRAM(s) Oral daily  influenza   Vaccine 0.5 milliLiter(s) IntraMuscular once  lidocaine 1% Injectable 0.2 milliLiter(s) Local Injection once  losartan 50 milliGRAM(s) Oral daily  multivitamin 1 Tablet(s) Oral daily  polyethylene glycol 3350 17 Gram(s) Oral two times a day  senna 2 Tablet(s) Oral at bedtime  silver sulfADIAZINE 1% Cream 1 Application(s) Topical every 8 hours  sodium chloride 0.9% lock flush 3 milliLiter(s) IV Push every 8 hours    MEDICATIONS  (PRN):  acetaminophen   Tablet .. 650 milliGRAM(s) Oral every 6 hours PRN Temp greater or equal to 38C (100.4F), Mild Pain (1 - 3)  HYDROmorphone  Injectable 0.5 milliGRAM(s) IV Push daily PRN Breakthrough  naloxone Injectable 0.1 milliGRAM(s) IV Push every 3 minutes PRN For ANY of the following changes in patient status:  A. RR LESS THAN 10 breaths per minute, B. Oxygen saturation LESS THAN 90%, C. Sedation score of 6  ondansetron Injectable 4 milliGRAM(s) IV Push every 6 hours PRN Nausea  oxyCODONE    IR 5 milliGRAM(s) Oral every 4 hours PRN Moderate Pain (4 - 6)  oxyCODONE    IR 10 milliGRAM(s) Oral every 4 hours PRN Severe Pain (7 - 10)      OBJECTIVE:    Vital Signs Last 24 Hrs  T(C): 36.9 (23 Sep 2020 04:23), Max: 36.9 (23 Sep 2020 00:03)  T(F): 98.4 (23 Sep 2020 04:23), Max: 98.4 (23 Sep 2020 00:03)  HR: 82 (23 Sep 2020 04:23) (74 - 88)  BP: 117/61 (23 Sep 2020 04:23) (110/59 - 119/62)  BP(mean): --  RR: 18 (23 Sep 2020 04:23) (18 - 18)  SpO2: 94% (23 Sep 2020 04:23) (94% - 100%)    I&O's Detail    22 Sep 2020 07:01  -  23 Sep 2020 07:00  --------------------------------------------------------  IN:    Oral Fluid: 1450 mL  Total IN: 1450 mL    OUT:    Accordian (mL): 150 mL    Accordian (mL): 140 mL  Total OUT: 290 mL    Total NET: 1160 mL          Daily     Daily Weight in k.8 (22 Sep 2020 14:50)    LABS:                        8.4    8.32  )-----------( 130      ( 22 Sep 2020 06:44 )             26.1     PHYSICAL EXAM    General: NAD, Lying in bed comfortably  Neuro: Awake and alert  Back: soft, Aqaucel removed and replaced, incision c/d/i, no fluid collections, HVC x 2 with SSF  GI/Abd: Soft, NT/ND      ASSESSMENT AND PLAN:     65F PMHx HTN, lumbar stenosis, obesity s/p T10-pelvis fusion, L3-S1 PLIF  with back closure with PRS. Recovering well on floor.     Plan:    - Continue to monitor Aquacel dressing, Change  or prior to discharge   - Monitor HVC output, management per NSGY  - keep deep drain until dc from hospital, superficial drain comes out only if less than 30 cc output or less than 50cc x2  - Care per primary/NSCU team    Plastic Surgery Resident  [facility]: [phone]

## 2020-09-23 NOTE — PROGRESS NOTE ADULT - SUBJECTIVE AND OBJECTIVE BOX
[facility] Division of Hospital Medicine  Saskia Bauer MD  spectra: [phone]    Patient is a 65y old  Female who presents with a chief complaint of Patient was admitted with history of lower extremity radiculopathy. Patient has previous hx of spinal laminectomies. (22 Sep 2020 12:22)      SUBJECTIVE / OVERNIGHT EVENTS: no acute events.  ADDITIONAL REVIEW OF SYSTEMS: + BM. patient reports her pain is improved compared to yesterday. no difficulty with urination.     MEDICATIONS  (STANDING):  acetaminophen   Tablet .. 650 milliGRAM(s) Oral every 8 hours  carvedilol 25 milliGRAM(s) Oral every 12 hours  diazepam    Tablet 2 milliGRAM(s) Oral every 6 hours  enoxaparin Injectable 40 milliGRAM(s) SubCutaneous daily  gabapentin 300 milliGRAM(s) Oral daily  influenza   Vaccine 0.5 milliLiter(s) IntraMuscular once  lidocaine 1% Injectable 0.2 milliLiter(s) Local Injection once  multivitamin 1 Tablet(s) Oral daily  polyethylene glycol 3350 17 Gram(s) Oral two times a day  senna 2 Tablet(s) Oral at bedtime  silver sulfADIAZINE 1% Cream 1 Application(s) Topical every 8 hours  sodium chloride 0.9% lock flush 3 milliLiter(s) IV Push every 8 hours    MEDICATIONS  (PRN):  acetaminophen   Tablet .. 650 milliGRAM(s) Oral every 6 hours PRN Temp greater or equal to 38C (100.4F), Mild Pain (1 - 3)  HYDROmorphone  Injectable 0.5 milliGRAM(s) IV Push daily PRN Breakthrough  naloxone Injectable 0.1 milliGRAM(s) IV Push every 3 minutes PRN For ANY of the following changes in patient status:  A. RR LESS THAN 10 breaths per minute, B. Oxygen saturation LESS THAN 90%, C. Sedation score of 6  ondansetron Injectable 4 milliGRAM(s) IV Push every 6 hours PRN Nausea  oxyCODONE    IR 5 milliGRAM(s) Oral every 4 hours PRN Moderate Pain (4 - 6)  oxyCODONE    IR 10 milliGRAM(s) Oral every 4 hours PRN Severe Pain (7 - 10)      CAPILLARY BLOOD GLUCOSE        I&O's Summary    22 Sep 2020 07:01  -  23 Sep 2020 07:00  --------------------------------------------------------  IN: 1450 mL / OUT: 290 mL / NET: 1160 mL    23 Sep 2020 07:01  -  23 Sep 2020 11:35  --------------------------------------------------------  IN: 240 mL / OUT: 300 mL / NET: -60 mL        PHYSICAL EXAM:  Vital Signs Last 24 Hrs  T(C): 36.9 (23 Sep 2020 09:16), Max: 36.9 (23 Sep 2020 00:03)  T(F): 98.5 (23 Sep 2020 09:16), Max: 98.5 (23 Sep 2020 09:16)  HR: 88 (23 Sep 2020 09:16) (74 - 88)  BP: 105/74 (23 Sep 2020 09:16) (105/74 - 119/62)  BP(mean): --  RR: 18 (23 Sep 2020 09:16) (18 - 18)  SpO2: 94% (23 Sep 2020 09:16) (94% - 100%)    CONSTITUTIONAL: NAD, well-developed, well-groomed, obese  NECK: Supple, no palpable masses; no thyromegaly  RESPIRATORY: Normal respiratory effort; lungs are clear to auscultation bilaterally  CARDIOVASCULAR: normal S1 and S2; No lower extremity edema  ABDOMEN: Nontender to palpation, normoactive bowel sounds, no rebound/guarding; No hepatosplenomegaly  MUSCULOSKELETAL:  no clubbing or cyanosis of digits; no joint swelling or tenderness to palpation, moves all extremities  PSYCH: A+O to person, place, and time; affect appropriate   SKIN: No rashes; no palpable lesions, + drains in place    LABS:                        9.0    8.92  )-----------( 162      ( 23 Sep 2020 07:24 )             27.7                       RADIOLOGY & ADDITIONAL TESTS:  Results Reviewed:   Imaging Personally Reviewed:  Electrocardiogram Personally Reviewed:    COORDINATION OF CARE:  Care Discussed with Consultants/Other Providers [Y/N]: neurosurgery PA(Larry)  Prior or Outpatient Records Reviewed [Y/N]:

## 2020-09-23 NOTE — PROGRESS NOTE ADULT - SUBJECTIVE AND OBJECTIVE BOX
SUBJECTIVE:   Patient was seen and evaluated at bedside. Patient is resting in bed and is in no new acute distress.   OVERNIGHT EVENTS:   none   Vital Signs Last 24 Hrs  T(C): 36.9 (23 Sep 2020 09:16), Max: 36.9 (23 Sep 2020 00:03)  T(F): 98.5 (23 Sep 2020 09:16), Max: 98.5 (23 Sep 2020 09:16)  HR: 88 (23 Sep 2020 09:16) (74 - 88)  BP: 105/74 (23 Sep 2020 09:16) (105/74 - 119/62)  BP(mean): --  RR: 18 (23 Sep 2020 09:16) (18 - 18)  SpO2: 94% (23 Sep 2020 09:16) (94% - 100%)    PHYSICAL EXAM:    General: No Acute Distress     Neurological:   Awake, alert oriented to person, place and time, Following Commands, PERRL, EOMI, Face Symmetrical, Speech Fluent, Moving all extremities, Muscle Strength normal in all four extremities, No Drift, Sensation to Light Touch Intact    Pulmonary: Clear to Auscultation, No Rales, No Rhonchi, No Wheezes     Cardiovascular: S1, S2, Regular Rate and Rhythm     Gastrointestinal: Soft, Nontender, Nondistended     Incision: clean and dry     LABS:                        9.0    8.92  )-----------( 162      ( 23 Sep 2020 07:24 )             27.7              09-22 @ 07:01 - 09-23 @ 07:00  --------------------------------------------------------  IN: 1450 mL / OUT: 290 mL / NET: 1160 mL    09-23 @ 07:01 - 09-23 @ 11:32  --------------------------------------------------------  IN: 240 mL / OUT: 300 mL / NET: -60 mL      DRAINS:   LHMV 140, RHMV 150   MEDICATIONS:  Antibiotics:    Neuro:  acetaminophen   Tablet .. 650 milliGRAM(s) Oral every 6 hours PRN Temp greater or equal to 38C (100.4F), Mild Pain (1 - 3)  acetaminophen   Tablet .. 650 milliGRAM(s) Oral every 8 hours  diazepam    Tablet 2 milliGRAM(s) Oral every 6 hours  gabapentin 300 milliGRAM(s) Oral daily  HYDROmorphone  Injectable 0.5 milliGRAM(s) IV Push daily PRN Breakthrough  ondansetron Injectable 4 milliGRAM(s) IV Push every 6 hours PRN Nausea  oxyCODONE    IR 5 milliGRAM(s) Oral every 4 hours PRN Moderate Pain (4 - 6)  oxyCODONE    IR 10 milliGRAM(s) Oral every 4 hours PRN Severe Pain (7 - 10)    Cardiac:  carvedilol 25 milliGRAM(s) Oral every 12 hours  losartan 50 milliGRAM(s) Oral daily    Pulm:    GI/:  polyethylene glycol 3350 17 Gram(s) Oral two times a day  senna 2 Tablet(s) Oral at bedtime    Other:   enoxaparin Injectable 40 milliGRAM(s) SubCutaneous daily  influenza   Vaccine 0.5 milliLiter(s) IntraMuscular once  lidocaine 1% Injectable 0.2 milliLiter(s) Local Injection once  multivitamin 1 Tablet(s) Oral daily  naloxone Injectable 0.1 milliGRAM(s) IV Push every 3 minutes PRN For ANY of the following changes in patient status:  A. RR LESS THAN 10 breaths per minute, B. Oxygen saturation LESS THAN 90%, C. Sedation score of 6  silver sulfADIAZINE 1% Cream 1 Application(s) Topical every 8 hours  sodium chloride 0.9% lock flush 3 milliLiter(s) IV Push every 8 hours    DIET: [] Regular [] CCD [] Renal [] Puree [] Dysphagia [] Tube Feeds:   regular   IMAGING:   no new imaging today

## 2020-09-23 NOTE — PROGRESS NOTE ADULT - SUBJECTIVE AND OBJECTIVE BOX
patient continues to have pain     REVIEW OF SYSTEMS  Constitutional - No fever,  No fatigue  HEENT - No vertigo, No neck pain  Neurological - No headaches, No memory loss, No loss of strength, No numbness, No tremors  Skin - No rashes, No lesions   Musculoskeletal - No joint pain, No joint swelling, No muscle pain  Psychiatric - No depression, No anxiety    FUNCTIONAL PROGRESS  9/23 PT    Progress Summary: Attempted to see pt for PT f/u. Pt sitting EOB w/ RN present. Pt declining OOB activity at this time 2/2 low back and L LE pain. Describes pain as shooting 8/10 pain with movement in L LE. Educated in importance of frequent ambulation and benefits for pain management. Continued to insist upon resting. Pt requested PT return in a.m. PT will cont to follow as appropriate.     9/22 PT      Bed Mobility  Bed Mobility Training Sit-to-Supine: contact guard;  1 person assist;  nonverbal cues (demo/gestures);  verbal cues  Bed Mobility Training Limitations: impaired balance;  decreased strength    Sit-Stand Transfer Training  Transfer Training Sit-to-Stand Transfer: contact guard;  1 person assist;  nonverbal cues (demo/gestures);  verbal cues;  full weight-bearing   rolling walker  Transfer Training Stand-to-Sit Transfer: contact guard;  1 person assist;  nonverbal cues (demo/gestures);  verbal cues;  full weight-bearing   rolling walker  Sit-to-Stand Transfer Training Transfer Safety Analysis: decreased strength;  impaired balance    Gait Training  Gait Training: contact guard;  1 person assist;  nonverbal cues (demo/gestures);  verbal cues;  full weight-bearing   rolling walker;  5 feet  Gait Analysis: decreased strength;  impaired balance          VITALS  T(C): 36.9 (09-23-20 @ 09:16), Max: 36.9 (09-23-20 @ 00:03)  HR: 88 (09-23-20 @ 09:16) (74 - 88)  BP: 105/74 (09-23-20 @ 09:16) (105/74 - 119/62)  RR: 18 (09-23-20 @ 09:16) (18 - 18)  SpO2: 94% (09-23-20 @ 09:16) (94% - 99%)  Wt(kg): --    MEDICATIONS   acetaminophen   Tablet .. 650 milliGRAM(s) every 6 hours PRN  acetaminophen   Tablet .. 650 milliGRAM(s) every 8 hours  carvedilol 25 milliGRAM(s) every 12 hours  diazepam    Tablet 2 milliGRAM(s) every 6 hours  enoxaparin Injectable 40 milliGRAM(s) daily  gabapentin 300 milliGRAM(s) daily  HYDROmorphone  Injectable 0.5 milliGRAM(s) daily PRN  influenza   Vaccine 0.5 milliLiter(s) once  lidocaine 1% Injectable 0.2 milliLiter(s) once  multivitamin 1 Tablet(s) daily  naloxone Injectable 0.1 milliGRAM(s) every 3 minutes PRN  ondansetron Injectable 4 milliGRAM(s) every 6 hours PRN  oxyCODONE    IR 5 milliGRAM(s) every 4 hours PRN  oxyCODONE    IR 10 milliGRAM(s) every 4 hours PRN  polyethylene glycol 3350 17 Gram(s) two times a day  senna 2 Tablet(s) at bedtime  silver sulfADIAZINE 1% Cream 1 Application(s) every 8 hours  sodium chloride 0.9% lock flush 3 milliLiter(s) every 8 hours      RECENT LABS - Reviewed                        9.0    8.92  )-----------( 162      ( 23 Sep 2020 07:24 )             27.7       --------------------------------------------------------------------------------  PHYSICAL EXAM  Constitutional - NAD, Comfortable, sitting at side of bed   skin-incisions covered, +drain   Neck - Supple, No limited ROM  Chest - Breathing comfortably, on room air   Cardiovascular - S1S2   Abdomen - Soft   Extremities - No C/C/E, No calf tenderness   Neurologic Exam -                    Cognitive - Awake, Alert, AAO to self, place, date, year, situation     Communication - Fluent, No dysarthria     Cranial Nerves - CN 2-12 intact     Motor - No focal deficits                    LEFT    UE - ShAB 5/5, EF 5/5, EE 5/5, WE 5/5,  5/5                    RIGHT UE - ShAB 5/5, EF 5/5, EE 5/5, WE 5/5,  5/5                    LEFT    LE - HF 5/5, KE 5/5, DF 5/5, PF 5/5                    RIGHT LE - HF 5/5, KE 5/5, DF 5/5, PF 5/5        Sensory - Intact to LT     Reflexes - DTR Intact, No primitive reflexes     Psychiatric - Mood stable, Affect WNL  ----------------------------------------------------------------------------------------  ASSESSMENT/PLAN  65 year old woman h/o HTN, obesity, lumbar laminectomy in 2018 with functional deficits after T10-pelvis fusion, L3-S1 PLIF  DVT RLE, s/p IVC filter 9/21  Pain - Tylenol, oxycodone, dilaudid IV Prn (patient needs to be off IV pain meds for acute inpatient rehab), valium for spasm  DVT PPX - SCDs, lovenox   Rehab - Will continue to follow for ongoing rehab needs and recommendations.   continue bedside PT/OT  out of bed to chair daily   min assist with transfers, contact guard with ambulation   Recommend ACUTE inpatient rehabilitation for the functional deficits consisting of 3 hours of therapy/day & 24 hour RN/daily PMR physician for comorbid medical management. Patient will be able to tolerate 3 hours a day.

## 2020-09-24 LAB
ANION GAP SERPL CALC-SCNC: 9 MMOL/L — SIGNIFICANT CHANGE UP (ref 5–17)
BUN SERPL-MCNC: 11 MG/DL — SIGNIFICANT CHANGE UP (ref 7–23)
CALCIUM SERPL-MCNC: 9 MG/DL — SIGNIFICANT CHANGE UP (ref 8.4–10.5)
CHLORIDE SERPL-SCNC: 103 MMOL/L — SIGNIFICANT CHANGE UP (ref 96–108)
CO2 SERPL-SCNC: 27 MMOL/L — SIGNIFICANT CHANGE UP (ref 22–31)
CREAT SERPL-MCNC: 0.87 MG/DL — SIGNIFICANT CHANGE UP (ref 0.5–1.3)
GLUCOSE SERPL-MCNC: 104 MG/DL — HIGH (ref 70–99)
HCT VFR BLD CALC: 28.4 % — LOW (ref 34.5–45)
HGB BLD-MCNC: 9 G/DL — LOW (ref 11.5–15.5)
MCHC RBC-ENTMCNC: 28.9 PG — SIGNIFICANT CHANGE UP (ref 27–34)
MCHC RBC-ENTMCNC: 31.7 GM/DL — LOW (ref 32–36)
MCV RBC AUTO: 91.3 FL — SIGNIFICANT CHANGE UP (ref 80–100)
NRBC # BLD: 0 /100 WBCS — SIGNIFICANT CHANGE UP (ref 0–0)
PLATELET # BLD AUTO: 180 K/UL — SIGNIFICANT CHANGE UP (ref 150–400)
POTASSIUM SERPL-MCNC: 4.3 MMOL/L — SIGNIFICANT CHANGE UP (ref 3.5–5.3)
POTASSIUM SERPL-SCNC: 4.3 MMOL/L — SIGNIFICANT CHANGE UP (ref 3.5–5.3)
RBC # BLD: 3.11 M/UL — LOW (ref 3.8–5.2)
RBC # FLD: 15.5 % — HIGH (ref 10.3–14.5)
SODIUM SERPL-SCNC: 139 MMOL/L — SIGNIFICANT CHANGE UP (ref 135–145)
WBC # BLD: 6.88 K/UL — SIGNIFICANT CHANGE UP (ref 3.8–10.5)
WBC # FLD AUTO: 6.88 K/UL — SIGNIFICANT CHANGE UP (ref 3.8–10.5)

## 2020-09-24 PROCEDURE — 99232 SBSQ HOSP IP/OBS MODERATE 35: CPT

## 2020-09-24 PROCEDURE — 93010 ELECTROCARDIOGRAM REPORT: CPT

## 2020-09-24 RX ADMIN — OXYCODONE HYDROCHLORIDE 10 MILLIGRAM(S): 5 TABLET ORAL at 13:40

## 2020-09-24 RX ADMIN — GABAPENTIN 300 MILLIGRAM(S): 400 CAPSULE ORAL at 12:04

## 2020-09-24 RX ADMIN — Medication 650 MILLIGRAM(S): at 12:30

## 2020-09-24 RX ADMIN — PANTOPRAZOLE SODIUM 40 MILLIGRAM(S): 20 TABLET, DELAYED RELEASE ORAL at 12:03

## 2020-09-24 RX ADMIN — CARVEDILOL PHOSPHATE 25 MILLIGRAM(S): 80 CAPSULE, EXTENDED RELEASE ORAL at 06:04

## 2020-09-24 RX ADMIN — OXYCODONE HYDROCHLORIDE 10 MILLIGRAM(S): 5 TABLET ORAL at 06:05

## 2020-09-24 RX ADMIN — Medication 650 MILLIGRAM(S): at 06:07

## 2020-09-24 RX ADMIN — OXYCODONE HYDROCHLORIDE 10 MILLIGRAM(S): 5 TABLET ORAL at 22:50

## 2020-09-24 RX ADMIN — Medication 650 MILLIGRAM(S): at 06:04

## 2020-09-24 RX ADMIN — OXYCODONE HYDROCHLORIDE 10 MILLIGRAM(S): 5 TABLET ORAL at 13:10

## 2020-09-24 RX ADMIN — Medication 1 APPLICATION(S): at 06:05

## 2020-09-24 RX ADMIN — Medication 650 MILLIGRAM(S): at 12:03

## 2020-09-24 RX ADMIN — Medication 1 APPLICATION(S): at 22:10

## 2020-09-24 RX ADMIN — ENOXAPARIN SODIUM 40 MILLIGRAM(S): 100 INJECTION SUBCUTANEOUS at 12:03

## 2020-09-24 RX ADMIN — SODIUM CHLORIDE 3 MILLILITER(S): 9 INJECTION INTRAMUSCULAR; INTRAVENOUS; SUBCUTANEOUS at 06:04

## 2020-09-24 RX ADMIN — Medication 2 MILLIGRAM(S): at 12:05

## 2020-09-24 RX ADMIN — SODIUM CHLORIDE 3 MILLILITER(S): 9 INJECTION INTRAMUSCULAR; INTRAVENOUS; SUBCUTANEOUS at 21:53

## 2020-09-24 RX ADMIN — Medication 1 TABLET(S): at 12:03

## 2020-09-24 RX ADMIN — OXYCODONE HYDROCHLORIDE 10 MILLIGRAM(S): 5 TABLET ORAL at 22:10

## 2020-09-24 RX ADMIN — POLYETHYLENE GLYCOL 3350 17 GRAM(S): 17 POWDER, FOR SOLUTION ORAL at 17:53

## 2020-09-24 RX ADMIN — Medication 1 APPLICATION(S): at 13:10

## 2020-09-24 RX ADMIN — Medication 2 MILLIGRAM(S): at 06:48

## 2020-09-24 RX ADMIN — OXYCODONE HYDROCHLORIDE 10 MILLIGRAM(S): 5 TABLET ORAL at 06:35

## 2020-09-24 RX ADMIN — SODIUM CHLORIDE 3 MILLILITER(S): 9 INJECTION INTRAMUSCULAR; INTRAVENOUS; SUBCUTANEOUS at 13:10

## 2020-09-24 RX ADMIN — Medication 2 MILLIGRAM(S): at 23:38

## 2020-09-24 RX ADMIN — SENNA PLUS 2 TABLET(S): 8.6 TABLET ORAL at 22:10

## 2020-09-24 RX ADMIN — Medication 2 MILLIGRAM(S): at 18:03

## 2020-09-24 NOTE — PROGRESS NOTE ADULT - SUBJECTIVE AND OBJECTIVE BOX
PLASTIC SURGERY DAILY PROGRESS NOTE:     INTERVAL: no acute events overnight. L HMV removed at bedside yesterday PM.    SUBJECTIVE/ROS: Patient feels well. Seen and examined at bedside. Denies nausea, vomiting, chest pain, shortness of breath. Pain well controlled. AVSS.      MEDICATIONS  (STANDING):  acetaminophen   Tablet .. 650 milliGRAM(s) Oral every 8 hours  carvedilol 25 milliGRAM(s) Oral every 12 hours  diazepam    Tablet 2 milliGRAM(s) Oral every 6 hours  enoxaparin Injectable 40 milliGRAM(s) SubCutaneous daily  gabapentin 300 milliGRAM(s) Oral daily  influenza   Vaccine 0.5 milliLiter(s) IntraMuscular once  lidocaine 1% Injectable 0.2 milliLiter(s) Local Injection once  losartan 25 milliGRAM(s) Oral daily  multivitamin 1 Tablet(s) Oral daily  pantoprazole   Suspension 40 milliGRAM(s) Oral daily  polyethylene glycol 3350 17 Gram(s) Oral two times a day  senna 2 Tablet(s) Oral at bedtime  silver sulfADIAZINE 1% Cream 1 Application(s) Topical every 8 hours  sodium chloride 0.9% lock flush 3 milliLiter(s) IV Push every 8 hours    MEDICATIONS  (PRN):  acetaminophen   Tablet .. 650 milliGRAM(s) Oral every 6 hours PRN Temp greater or equal to 38C (100.4F), Mild Pain (1 - 3)  aluminum hydroxide/magnesium hydroxide/simethicone Suspension 30 milliLiter(s) Oral every 4 hours PRN Dyspepsia  HYDROmorphone  Injectable 0.5 milliGRAM(s) IV Push daily PRN Breakthrough  naloxone Injectable 0.1 milliGRAM(s) IV Push every 3 minutes PRN For ANY of the following changes in patient status:  A. RR LESS THAN 10 breaths per minute, B. Oxygen saturation LESS THAN 90%, C. Sedation score of 6  ondansetron Injectable 4 milliGRAM(s) IV Push every 6 hours PRN Nausea  oxyCODONE    IR 10 milliGRAM(s) Oral every 4 hours PRN Severe Pain (7 - 10)  oxyCODONE    IR 5 milliGRAM(s) Oral every 4 hours PRN Moderate Pain (4 - 6)      OBJECTIVE:    Vital Signs Last 24 Hrs  T(C): 36.9 (24 Sep 2020 04:17), Max: 37.1 (23 Sep 2020 14:40)  T(F): 98.4 (24 Sep 2020 04:17), Max: 98.7 (23 Sep 2020 14:40)  HR: 84 (24 Sep 2020 04:17) (76 - 88)  BP: 115/75 (24 Sep 2020 04:17) (101/59 - 125/79)  BP(mean): --  RR: 18 (24 Sep 2020 04:17) (18 - 18)  SpO2: 95% (24 Sep 2020 04:17) (94% - 98%)    I&O's Detail    23 Sep 2020 07:01  -  24 Sep 2020 07:00  --------------------------------------------------------  IN:    Oral Fluid: 720 mL  Total IN: 720 mL    OUT:    Accordian (mL): 20 mL    Accordian (mL): 200 mL    Voided (mL): 800 mL  Total OUT: 1020 mL    Total NET: -300 mL          Daily     Daily     LABS:                        9.0    6.88  )-----------( 180      ( 24 Sep 2020 06:38 )             28.4     09-24    139  |  103  |  11  ----------------------------<  104<H>  4.3   |  27  |  0.87    Ca    9.0      24 Sep 2020 06:37      PHYSICAL EXAM    General: NAD, Lying in bed comfortably  Neuro: Awake and alert  Back: soft, Acquacel c/d/i, no fluid collections, HVC x 1 with SSF  GI/Abd: Soft, NT/ND    ASSESSMENT AND PLAN    - Continue to monitor Aquacel dressing, Change 9/27 or prior to discharge   - Monitor HVC output, management per NSGY  - Care per primary/NSCU team    Plastic Surgery Resident  [facility]: [phone]

## 2020-09-24 NOTE — PROGRESS NOTE ADULT - SUBJECTIVE AND OBJECTIVE BOX
SUBJECTIVE: HPI:  65yr old female with spinal stenosis lumbar region.  Pt coming pain unable to walk and numbness in legs.  Pt had laminectomy 2018 now coming for T10-pelvis  instrumentation and fusion. L2-S1 posterior lumbar fusion.  Pt hx sig for obesity and HTN.    Note ;covid test 9/15/20/[place] (04 Sep 2020 07:43)      OVERNIGHT EVENTS: No acute events overnight, patient seen and evaluated at bedside this morning as well as seen working with PT. Doing well, pain controlled with medications.     Vital Signs Last 24 Hrs  T(C): 36.8 (24 Sep 2020 14:53), Max: 36.9 (23 Sep 2020 16:06)  T(F): 98.3 (24 Sep 2020 14:53), Max: 98.4 (23 Sep 2020 16:06)  HR: 79 (24 Sep 2020 14:53) (76 - 84)  BP: 119/70 (24 Sep 2020 14:53) (101/59 - 125/79)  BP(mean): --  RR: 18 (24 Sep 2020 14:53) (18 - 18)  SpO2: 97% (24 Sep 2020 14:53) (94% - 99%)    DRAINS: RT HMV per plastics    PHYSICAL EXAM:    Constitutional: No Acute Distress     Neurological: AOx3, Following Commands, Moving all Extremities     Motor exam:          Upper extremity                         Delt     Bicep     Tricep    HG                                                 R         5/5        5/5        5/5       5/5                                               L          5/5        5/5        5/5       5/5          Lower extremity                        HF         KF        KE       DF         PF                                                  R        5/5        5/5        5/5       5/5         5/5                                               L         5/5        5/5       5/5       5/5          5/5                                                 Sensation: [] intact to light touch  [x] decreased: in her left thigh    Pulmonary: Clear to Auscultation, No rales, No rhonchi, No wheezes     Cardiovascular: S1, S2, Regular rate and rhythm     Gastrointestinal: Soft, Non-tender, Non-distended     Extremities: No calf tenderness     Incision: Dressing C/D/I    LABS:                        9.0    6.88  )-----------( 180      ( 24 Sep 2020 06:38 )             28.4    09-24    139  |  103  |  11  ----------------------------<  104<H>  4.3   |  27  |  0.87    Ca    9.0      24 Sep 2020 06:37        MEDICATIONS:  Antibiotics:    Neuro:  acetaminophen   Tablet .. 650 milliGRAM(s) Oral every 6 hours PRN Temp greater or equal to 38C (100.4F), Mild Pain (1 - 3)  diazepam    Tablet 2 milliGRAM(s) Oral every 6 hours  gabapentin 300 milliGRAM(s) Oral daily  HYDROmorphone  Injectable 0.5 milliGRAM(s) IV Push daily PRN Breakthrough  ondansetron Injectable 4 milliGRAM(s) IV Push every 6 hours PRN Nausea  oxyCODONE    IR 5 milliGRAM(s) Oral every 4 hours PRN Moderate Pain (4 - 6)  oxyCODONE    IR 10 milliGRAM(s) Oral every 4 hours PRN Severe Pain (7 - 10)    Cardiac:  carvedilol 25 milliGRAM(s) Oral every 12 hours    Pulm:    GI/:  aluminum hydroxide/magnesium hydroxide/simethicone Suspension 30 milliLiter(s) Oral every 4 hours PRN Dyspepsia  pantoprazole   Suspension 40 milliGRAM(s) Oral daily  polyethylene glycol 3350 17 Gram(s) Oral two times a day  senna 2 Tablet(s) Oral at bedtime    Other:   enoxaparin Injectable 40 milliGRAM(s) SubCutaneous daily  influenza   Vaccine 0.5 milliLiter(s) IntraMuscular once  lidocaine 1% Injectable 0.2 milliLiter(s) Local Injection once  multivitamin 1 Tablet(s) Oral daily  naloxone Injectable 0.1 milliGRAM(s) IV Push every 3 minutes PRN For ANY of the following changes in patient status:  A. RR LESS THAN 10 breaths per minute, B. Oxygen saturation LESS THAN 90%, C. Sedation score of 6  silver sulfADIAZINE 1% Cream 1 Application(s) Topical every 8 hours  sodium chloride 0.9% lock flush 3 milliLiter(s) IV Push every 8 hours    DIET: [x] Regular [] CCD [] Renal [] Puree [] Dysphagia [] Tube Feeds:     IMAGING:   < from: CT Lumbar Spine No Cont (09.18.20 @ 22:39) >  Postop changes compatible with bilateral laminectomy is seen extending from L2 to S1 with discectomy is identified at the L3-4 L4-5 and L5-S1 levels. There is also evidence of posterior paraspinal fusion extending from T10 to S1 with fixation screws seen involving both iliac bones as well.    The tip of the left pedicle screw at T10 appears to be in the the left parasagittal region. The distal aspect of the right pedicle screw at the T12 level is also in the right parasagittal region. The rest of the metallic hardware appears be adequately placed.    Demineralization of the bones is seen.    Reversal of the normal lumbar lordosis seen.    Mild compression deformity is seen involving the T11 vertebral body. No abnormal sclerotic or blastic changes are seen to suggest underlying pathologic compression fracture.    Vacuum disc changes are seen involving the L2-3 level which is secondary to degenerative change    No acute fractures or dislocations are seen.    Postop changes involving the posterior paraspinal soft tissue region is seen.    Evaluation of paraspinal soft tissues appear grossly unremarkable    There isevidence of abnormal soft tissue with associated calcification partially demonstrated in the right buttock region. This appears confined to subcutaneous region. This could be compatible injection granulomas though please correlate clinically.    Asymmetry of the kidneys are seen. The right kidney is more prominent than left. This could be compatible with atrophic changes involving the left kidney. Please correlate clinically.    IMPRESSION: Extensive postop changes as described above.      SHAWNEE IBRAHIM M.D., ATTENDING RADIOLOGIST  This document has been electronically signed. Sep 19 2020  9:40AM    < end of copied text >

## 2020-09-24 NOTE — PROGRESS NOTE ADULT - ASSESSMENT
ASSESSMENT AND PLAN: 65F, PMHx HTN, kidney stones, lumbar stenosis, obesity, vertigo, laminectomy 3/2018, cataract sx b/l 2017, carpal tunnel syndrome 2013, p/w inability to walk secondary to pain and numbness in legs, s/p T10-Pelvis, L3-S1 PLIF w/ plastics closure POD 6, found to have acute above and below the knee DVT on right leg 9/20, s/p retrievable IVCF placed 9/21    NEURO:   - Continue neuro checks q 4  - Continue pain control w/ tylenol prn and oxycodone prn, Valium for muscle spasms, discontinued Dilaudid 0.5 IVP prn for breakthrough as she has not needed for 48hrs now  - Continue Neurontin for neuropathic pain  - Monitor HMV output 200cc/hr - managed by Plastics  - Standing Xray pending    PULM:   - Encourage incentive spirometry  - On room air, O2Sat> 94%    CV:  - Losartan discontinued by Medicine team   - Will continue Coreg for HTN  - VSS    ENDO:   - Goal euglycemia 120-180s    HEME/ONC:             CBC stable         DVT ppx: SQL, has IVCF for above and below the knee DVT on RT leg    RENAL:   - IVL  - BMP stable    ID:   - Afebrile  - COVID neg 9/16    GI:    - Continue oral regular diet  - Continue protonix for GERD  - Continue Maalox for dyspepsia  - Continue senna and miralax for bowel regimen     DISCHARGE PLANNING:   PT/OT/ PMR - Acute Rehab once drain is out    Assessment:  Please Check When Present   []  GCS  E   V  M     Heart Failure: []Acute, [] acute on chronic , []chronic  Heart Failure:  [] Diastolic (HFpEF), [] Systolic (HFrEF), []Combined (HFpEF and HFrEF), [] RHF, [] Pulm HTN, [] Other    [] PARESH, [] ATN, [] AIN, [] other  [] CKD1, [] CKD2, [] CKD 3, [] CKD 4, [] CKD 5, []ESRD    Encephalopathy: [] Metabolic, [] Hepatic, [] toxic, [] Neurological, [] Other    Abnormal Nurtitional Status: [] malnutrition (see nutrition note), [ ]underweight: BMI < 19, [] morbid obesity: BMI >40, [] Cachexia    [] Sepsis  [] hypovolemic shock,[] cardiogenic shock, [] hemorrhagic shock, [] neuogenic shock  [] Acute Respiratory Failure  []Cerebral edema, [] Brain compression/ herniation,   [] Functional quadriplegia  [] Acute blood loss anemia    To be discussed w/ Dr. Sheppard  [id number]   ASSESSMENT AND PLAN: 65F, PMHx HTN, kidney stones, lumbar stenosis, obesity, vertigo, laminectomy 3/2018, cataract sx b/l 2017, carpal tunnel syndrome 2013, p/w inability to walk secondary to pain and numbness in legs, s/p T10-Pelvis, L3-S1 PLIF w/ plastics closure POD 6, found to have acute above and below the knee DVT on right leg 9/20, s/p retrievable IVCF placed 9/21    NEURO:   - Continue neuro checks q 4  - Continue pain control w/ tylenol prn and oxycodone prn, Valium for muscle spasms, discontinued Dilaudid 0.5 IVP prn for breakthrough as she has not needed for 48hrs now  - Continue Neurontin for neuropathic pain  - Monitor HMV output 200cc/hr - managed by Plastics  - Standing Xray pending    PULM:   - Encourage incentive spirometry  - On room air, O2Sat> 94%    CV:  - Losartan discontinued by Medicine team   - Will continue Coreg for HTN  - VSS    ENDO:   - Goal euglycemia 120-180s    HEME/ONC:             CBC stable         DVT ppx: SQL, has IVCF for above and below the knee DVT on RT leg    RENAL:   - IVL  - BMP stable    ID:   - Afebrile  - COVID neg 9/16    GI:    - Continue oral regular diet  - Continue protonix for GERD  - Continue Maalox for dyspepsia  - Continue senna and miralax for bowel regimen     MISC:  - Continue Silvadene for chest and abdomen rash / skin tear    DISCHARGE PLANNING:   PT/OT/ PMR - Acute Rehab once drain is out    Assessment:  Please Check When Present   []  GCS  E   V  M     Heart Failure: []Acute, [] acute on chronic , []chronic  Heart Failure:  [] Diastolic (HFpEF), [] Systolic (HFrEF), []Combined (HFpEF and HFrEF), [] RHF, [] Pulm HTN, [] Other    [] PARESH, [] ATN, [] AIN, [] other  [] CKD1, [] CKD2, [] CKD 3, [] CKD 4, [] CKD 5, []ESRD    Encephalopathy: [] Metabolic, [] Hepatic, [] toxic, [] Neurological, [] Other    Abnormal Nurtitional Status: [] malnutrition (see nutrition note), [ ]underweight: BMI < 19, [] morbid obesity: BMI >40, [] Cachexia    [] Sepsis  [] hypovolemic shock,[] cardiogenic shock, [] hemorrhagic shock, [] neuogenic shock  [] Acute Respiratory Failure  []Cerebral edema, [] Brain compression/ herniation,   [] Functional quadriplegia  [] Acute blood loss anemia    To be discussed w/ Dr. Sheppard  [id number]

## 2020-09-25 DIAGNOSIS — M25.562 PAIN IN LEFT KNEE: ICD-10-CM

## 2020-09-25 PROCEDURE — 73562 X-RAY EXAM OF KNEE 3: CPT | Mod: 26,LT

## 2020-09-25 PROCEDURE — 72082 X-RAY EXAM ENTIRE SPI 2/3 VW: CPT | Mod: 26

## 2020-09-25 PROCEDURE — 99232 SBSQ HOSP IP/OBS MODERATE 35: CPT

## 2020-09-25 RX ORDER — CARVEDILOL PHOSPHATE 80 MG/1
12.5 CAPSULE, EXTENDED RELEASE ORAL EVERY 12 HOURS
Refills: 0 | Status: DISCONTINUED | OUTPATIENT
Start: 2020-09-26 | End: 2020-09-29

## 2020-09-25 RX ORDER — DIAZEPAM 5 MG
2 TABLET ORAL EVERY 8 HOURS
Refills: 0 | Status: DISCONTINUED | OUTPATIENT
Start: 2020-09-25 | End: 2020-09-27

## 2020-09-25 RX ORDER — LIDOCAINE 4 G/100G
1 CREAM TOPICAL DAILY
Refills: 0 | Status: DISCONTINUED | OUTPATIENT
Start: 2020-09-25 | End: 2020-09-29

## 2020-09-25 RX ADMIN — CARVEDILOL PHOSPHATE 25 MILLIGRAM(S): 80 CAPSULE, EXTENDED RELEASE ORAL at 06:11

## 2020-09-25 RX ADMIN — Medication 1 TABLET(S): at 12:12

## 2020-09-25 RX ADMIN — PANTOPRAZOLE SODIUM 40 MILLIGRAM(S): 20 TABLET, DELAYED RELEASE ORAL at 12:12

## 2020-09-25 RX ADMIN — SODIUM CHLORIDE 3 MILLILITER(S): 9 INJECTION INTRAMUSCULAR; INTRAVENOUS; SUBCUTANEOUS at 06:10

## 2020-09-25 RX ADMIN — Medication 1 APPLICATION(S): at 15:14

## 2020-09-25 RX ADMIN — OXYCODONE HYDROCHLORIDE 10 MILLIGRAM(S): 5 TABLET ORAL at 06:43

## 2020-09-25 RX ADMIN — Medication 1 APPLICATION(S): at 06:13

## 2020-09-25 RX ADMIN — Medication 1 APPLICATION(S): at 21:39

## 2020-09-25 RX ADMIN — OXYCODONE HYDROCHLORIDE 10 MILLIGRAM(S): 5 TABLET ORAL at 12:02

## 2020-09-25 RX ADMIN — SODIUM CHLORIDE 3 MILLILITER(S): 9 INJECTION INTRAMUSCULAR; INTRAVENOUS; SUBCUTANEOUS at 15:14

## 2020-09-25 RX ADMIN — ENOXAPARIN SODIUM 40 MILLIGRAM(S): 100 INJECTION SUBCUTANEOUS at 12:12

## 2020-09-25 RX ADMIN — OXYCODONE HYDROCHLORIDE 10 MILLIGRAM(S): 5 TABLET ORAL at 12:32

## 2020-09-25 RX ADMIN — OXYCODONE HYDROCHLORIDE 10 MILLIGRAM(S): 5 TABLET ORAL at 06:13

## 2020-09-25 RX ADMIN — LIDOCAINE 1 PATCH: 4 CREAM TOPICAL at 13:00

## 2020-09-25 RX ADMIN — OXYCODONE HYDROCHLORIDE 10 MILLIGRAM(S): 5 TABLET ORAL at 20:20

## 2020-09-25 RX ADMIN — POLYETHYLENE GLYCOL 3350 17 GRAM(S): 17 POWDER, FOR SOLUTION ORAL at 06:11

## 2020-09-25 RX ADMIN — GABAPENTIN 300 MILLIGRAM(S): 400 CAPSULE ORAL at 12:08

## 2020-09-25 RX ADMIN — SENNA PLUS 2 TABLET(S): 8.6 TABLET ORAL at 21:40

## 2020-09-25 RX ADMIN — POLYETHYLENE GLYCOL 3350 17 GRAM(S): 17 POWDER, FOR SOLUTION ORAL at 17:39

## 2020-09-25 RX ADMIN — SODIUM CHLORIDE 3 MILLILITER(S): 9 INJECTION INTRAMUSCULAR; INTRAVENOUS; SUBCUTANEOUS at 21:38

## 2020-09-25 RX ADMIN — Medication 2 MILLIGRAM(S): at 08:00

## 2020-09-25 RX ADMIN — OXYCODONE HYDROCHLORIDE 10 MILLIGRAM(S): 5 TABLET ORAL at 19:50

## 2020-09-25 NOTE — PROGRESS NOTE ADULT - SUBJECTIVE AND OBJECTIVE BOX
SUBJECTIVE: HPI:  65yr old female with spinal stenosis lumbar region.  Pt coming pain unable to walk and numbness in legs.  Pt had laminectomy 2018 now coming for T10-pelvis  instrumentation and fusion. L2-S1 posterior lumbar fusion.  Pt hx sig for obesity and HTN.    Note ;covid test 9/15/20/[place] (04 Sep 2020 07:43)      OVERNIGHT EVENTS: No acute events overnight, patient seen and evaluated sitting in chair this morning. Pain well controlled by pain medications, however c/o left knee pain - no bruising or swelling seen.     Vital Signs Last 24 Hrs  T(C): 36.9 (25 Sep 2020 09:29), Max: 37.3 (25 Sep 2020 04:20)  T(F): 98.5 (25 Sep 2020 09:29), Max: 99.1 (25 Sep 2020 04:20)  HR: 93 (25 Sep 2020 09:29) (69 - 98)  BP: 114/70 (25 Sep 2020 10:43) (93/57 - 124/66)  BP(mean): --  RR: 18 (25 Sep 2020 09:29) (17 - 18)  SpO2: 97% (25 Sep 2020 09:29) (94% - 98%)    DRAINS: Remains to have RT HVM, managed by Plastics     PHYSICAL EXAM:    Constitutional: No Acute Distress     Neurological: AOx3, Following Commands, Moving all Extremities     Motor exam:          Upper extremity                         Delt     Bicep     Tricep    HG                                                 R         5/5        5/5        5/5       5/5                                               L          5/5        5/5        5/5       5/5          Lower extremity                        HF         KF        KE       DF         PF                                                  R        5/5        5/5        5/5       5/5         5/5                                               L         5/5        5/5       5/5       5/5          5/5 (c/o left knee pain but no obstruction to strength)                                                 Sensation: [x] intact to light touch  [] decreased:     Pulmonary: Clear to Auscultation, No rales, No rhonchi, No wheezes     Cardiovascular: S1, S2, Regular rate and rhythm     Gastrointestinal: Soft, Non-tender, Non-distended     Extremities: No calf tenderness     Incision: Dressing in place C/D/I    LABS:                        9.0    6.88  )-----------( 180      ( 24 Sep 2020 06:38 )             28.4    09-24    139  |  103  |  11  ----------------------------<  104<H>  4.3   |  27  |  0.87    Ca    9.0      24 Sep 2020 06:37        MEDICATIONS:  Antibiotics:    Neuro:  acetaminophen   Tablet .. 650 milliGRAM(s) Oral every 6 hours PRN Temp greater or equal to 38C (100.4F), Mild Pain (1 - 3)  diazepam    Tablet 2 milliGRAM(s) Oral every 8 hours PRN muscle spasm  gabapentin 300 milliGRAM(s) Oral daily  ondansetron Injectable 4 milliGRAM(s) IV Push every 6 hours PRN Nausea  oxyCODONE    IR 5 milliGRAM(s) Oral every 4 hours PRN Moderate Pain (4 - 6)  oxyCODONE    IR 10 milliGRAM(s) Oral every 4 hours PRN Severe Pain (7 - 10)    Cardiac:    Pulm:    GI/:  aluminum hydroxide/magnesium hydroxide/simethicone Suspension 30 milliLiter(s) Oral every 4 hours PRN Dyspepsia  pantoprazole   Suspension 40 milliGRAM(s) Oral daily  polyethylene glycol 3350 17 Gram(s) Oral two times a day  senna 2 Tablet(s) Oral at bedtime    Other:   enoxaparin Injectable 40 milliGRAM(s) SubCutaneous daily  influenza   Vaccine 0.5 milliLiter(s) IntraMuscular once  lidocaine   Patch 1 Patch Transdermal daily  lidocaine 1% Injectable 0.2 milliLiter(s) Local Injection once  multivitamin 1 Tablet(s) Oral daily  naloxone Injectable 0.1 milliGRAM(s) IV Push every 3 minutes PRN For ANY of the following changes in patient status:  A. RR LESS THAN 10 breaths per minute, B. Oxygen saturation LESS THAN 90%, C. Sedation score of 6  silver sulfADIAZINE 1% Cream 1 Application(s) Topical every 8 hours  sodium chloride 0.9% lock flush 3 milliLiter(s) IV Push every 8 hours    DIET: [X] Regular [] CCD [] Renal [] Puree [] Dysphagia [] Tube Feeds:     IMAGING:   < from: CT Lumbar Spine No Cont (09.18.20 @ 22:39) >  Postop changes compatible with bilateral laminectomy is seen extending from L2 to S1 with discectomy is identified at the L3-4 L4-5 and L5-S1 levels. There is also evidence of posterior paraspinal fusion extending from T10 to S1 with fixation screws seen involving both iliac bones as well.    The tip of the left pedicle screw at T10 appears to be in the the left parasagittal region. The distal aspect of the right pedicle screw at the T12 level is also in the right parasagittal region. The rest of the metallic hardware appears be adequately placed.    Demineralization of the bones is seen.    Reversal of the normal lumbar lordosis seen.    Mild compression deformity is seen involving the T11 vertebral body. No abnormal sclerotic or blastic changes are seen to suggest underlying pathologic compression fracture.    Vacuum disc changes are seen involving the L2-3 level which is secondary to degenerative change    No acute fractures or dislocations are seen.    Postop changes involving the posterior paraspinal soft tissue region is seen.    Evaluation of paraspinal soft tissues appear grossly unremarkable    There isevidence of abnormal soft tissue with associated calcification partially demonstrated in the right buttock region. This appears confined to subcutaneous region. This could be compatible injection granulomas though please correlate clinically.    Asymmetry of the kidneys are seen. The right kidney is more prominent than left. This could be compatible with atrophic changes involving the left kidney. Please correlate clinically.    IMPRESSION: Extensive postop changes as described above.    SHAWNEE IBRAHIM M.D., ATTENDING RADIOLOGIST  This document has been electronically signed. Sep 19 2020  9:40AM    < end of copied text >

## 2020-09-25 NOTE — PROGRESS NOTE ADULT - ASSESSMENT
ASSESSMENT AND PLAN: 65F, PMHx HTN, kidney stones, lumbar stenosis, obesity, vertigo, laminectomy 3/2018, cataract sx b/l 2017, carpal tunnel syndrome 2013, p/w inability to walk secondary to pain and numbness in legs, s/p T10-Pelvis, L3-S1 PLIF w/ plastics closure POD 7, found to have acute above and below the knee DVT on right leg 9/20, s/p retrievable IVCF placed 9/21    NEURO:   - Continue neuro checks q 4  - Continue pain control w/ tylenol prn and oxycodone prn, Valium changed to prn muscle spasms  - Continue Neurontin for neuropathic pain  - Monitor HMV output 180cc/hr - managed by Plastics  - Standing Xray pending  - Left Knee Xray - due to pain, Lidocaine patch started by Medicine team    PULM:   - Encourage incentive spirometry  - On room air, O2Sat> 94%    CV:  - Losartan discontinued yesterday by Medicine team  - Coreg decreased dose due to low SBP  - VSS    ENDO:   - Goal euglycemia 120-180s    HEME/ONC:             CBC stable         DVT ppx: SQL, has IVCF for above and below the knee DVT on RT leg    RENAL:   - IVL  - BMP stable    ID:   - Afebrile  - COVID neg 9/16    GI:    - Continue oral regular diet  - Continue protonix for GERD  - Continue Maalox for dyspepsia  - Continue senna and miralax for bowel regimen     MISC:  - Continue Silvadene for chest and abdomen rash / skin tear    DISCHARGE PLANNING:   PT/OT/ PMR - Acute Rehab once drain is out    Assessment:  Please Check When Present   []  GCS  E   V  M     Heart Failure: []Acute, [] acute on chronic , []chronic  Heart Failure:  [] Diastolic (HFpEF), [] Systolic (HFrEF), []Combined (HFpEF and HFrEF), [] RHF, [] Pulm HTN, [] Other    [] PARESH, [] ATN, [] AIN, [] other  [] CKD1, [] CKD2, [] CKD 3, [] CKD 4, [] CKD 5, []ESRD    Encephalopathy: [] Metabolic, [] Hepatic, [] toxic, [] Neurological, [] Other    Abnormal Nurtitional Status: [] malnutrition (see nutrition note), [ ]underweight: BMI < 19, [] morbid obesity: BMI >40, [] Cachexia    [] Sepsis  [] hypovolemic shock,[] cardiogenic shock, [] hemorrhagic shock, [] neuogenic shock  [] Acute Respiratory Failure  []Cerebral edema, [] Brain compression/ herniation,   [] Functional quadriplegia  [] Acute blood loss anemia    To be discussed w/ Dr. Sheppard  [id number]

## 2020-09-25 NOTE — PROGRESS NOTE ADULT - SUBJECTIVE AND OBJECTIVE BOX
[facility] Division of Hospital Medicine  Saskia Bauer MD  spectra: [phone]    Patient is a 65y old  Female who presents with a chief complaint of Patient was admitted with history of lower extremity radiculopathy. Patient has previous hx of spinal laminectomies. (23 Sep 2020 11:31)      SUBJECTIVE / OVERNIGHT EVENTS: no acute events  ADDITIONAL REVIEW OF SYSTEMS: patient is overall better, still with back pain and also c/o left knee pain (not radiating from back) which she has had before but worse now.     MEDICATIONS  (STANDING):  enoxaparin Injectable 40 milliGRAM(s) SubCutaneous daily  gabapentin 300 milliGRAM(s) Oral daily  influenza   Vaccine 0.5 milliLiter(s) IntraMuscular once  lidocaine   Patch 1 Patch Transdermal daily  lidocaine 1% Injectable 0.2 milliLiter(s) Local Injection once  multivitamin 1 Tablet(s) Oral daily  pantoprazole   Suspension 40 milliGRAM(s) Oral daily  polyethylene glycol 3350 17 Gram(s) Oral two times a day  senna 2 Tablet(s) Oral at bedtime  silver sulfADIAZINE 1% Cream 1 Application(s) Topical every 8 hours  sodium chloride 0.9% lock flush 3 milliLiter(s) IV Push every 8 hours    MEDICATIONS  (PRN):  acetaminophen   Tablet .. 650 milliGRAM(s) Oral every 6 hours PRN Temp greater or equal to 38C (100.4F), Mild Pain (1 - 3)  aluminum hydroxide/magnesium hydroxide/simethicone Suspension 30 milliLiter(s) Oral every 4 hours PRN Dyspepsia  diazepam    Tablet 2 milliGRAM(s) Oral every 8 hours PRN muscle spasm  naloxone Injectable 0.1 milliGRAM(s) IV Push every 3 minutes PRN For ANY of the following changes in patient status:  A. RR LESS THAN 10 breaths per minute, B. Oxygen saturation LESS THAN 90%, C. Sedation score of 6  ondansetron Injectable 4 milliGRAM(s) IV Push every 6 hours PRN Nausea  oxyCODONE    IR 5 milliGRAM(s) Oral every 4 hours PRN Moderate Pain (4 - 6)  oxyCODONE    IR 10 milliGRAM(s) Oral every 4 hours PRN Severe Pain (7 - 10)      CAPILLARY BLOOD GLUCOSE        I&O's Summary    24 Sep 2020 07:01  -  25 Sep 2020 07:00  --------------------------------------------------------  IN: 880 mL / OUT: 580 mL / NET: 300 mL    25 Sep 2020 07:01  -  25 Sep 2020 12:57  --------------------------------------------------------  IN: 240 mL / OUT: 0 mL / NET: 240 mL        PHYSICAL EXAM:  Vital Signs Last 24 Hrs  T(C): 36.9 (25 Sep 2020 09:29), Max: 37.3 (25 Sep 2020 04:20)  T(F): 98.5 (25 Sep 2020 09:29), Max: 99.1 (25 Sep 2020 04:20)  HR: 93 (25 Sep 2020 09:29) (69 - 98)  BP: 114/70 (25 Sep 2020 10:43) (93/57 - 124/66)  BP(mean): --  RR: 18 (25 Sep 2020 09:29) (17 - 18)  SpO2: 97% (25 Sep 2020 09:29) (94% - 98%)    CONSTITUTIONAL: NAD, well-developed, well-groomed, obese  NECK: Supple, no palpable masses; no thyromegaly  RESPIRATORY: Normal respiratory effort; lungs are clear to auscultation bilaterally  CARDIOVASCULAR: normal S1 and S2; No lower extremity edema  ABDOMEN: Nontender to palpation, normoactive bowel sounds, no rebound/guarding; No hepatosplenomegaly  MUSCULOSKELETAL:  no clubbing or cyanosis of digits; no joint swelling or tenderness to palpation, moves all extremities, FROM of left knee  PSYCH: A+O to person, place, and time; affect appropriate   SKIN: no palpable lesions, + dressing with drain in place    LABS:                        9.0    6.88  )-----------( 180      ( 24 Sep 2020 06:38 )             28.4     09-24    139  |  103  |  11  ----------------------------<  104<H>  4.3   |  27  |  0.87    Ca    9.0      24 Sep 2020 06:37                  RADIOLOGY & ADDITIONAL TESTS:  Results Reviewed:   Imaging Personally Reviewed:  Electrocardiogram Personally Reviewed:    COORDINATION OF CARE:  Care Discussed with Consultants/Other Providers [Y/N]: neurosurgery PA(Krysta)  Prior or Outpatient Records Reviewed [Y/N]:

## 2020-09-25 NOTE — PROGRESS NOTE ADULT - SUBJECTIVE AND OBJECTIVE BOX
PLASTIC SURGERY DAILY PROGRESS NOTE:     SUBJECTIVE/ROS: Patient feels well. No acute events overnight. Pain well controlled. Denies nausea, vomiting, chest pain, shortness of breath.     MEDICATIONS  (STANDING):  carvedilol 25 milliGRAM(s) Oral every 12 hours  diazepam    Tablet 2 milliGRAM(s) Oral every 6 hours  enoxaparin Injectable 40 milliGRAM(s) SubCutaneous daily  gabapentin 300 milliGRAM(s) Oral daily  influenza   Vaccine 0.5 milliLiter(s) IntraMuscular once  lidocaine 1% Injectable 0.2 milliLiter(s) Local Injection once  multivitamin 1 Tablet(s) Oral daily  pantoprazole   Suspension 40 milliGRAM(s) Oral daily  polyethylene glycol 3350 17 Gram(s) Oral two times a day  senna 2 Tablet(s) Oral at bedtime  silver sulfADIAZINE 1% Cream 1 Application(s) Topical every 8 hours  sodium chloride 0.9% lock flush 3 milliLiter(s) IV Push every 8 hours    MEDICATIONS  (PRN):  acetaminophen   Tablet .. 650 milliGRAM(s) Oral every 6 hours PRN Temp greater or equal to 38C (100.4F), Mild Pain (1 - 3)  aluminum hydroxide/magnesium hydroxide/simethicone Suspension 30 milliLiter(s) Oral every 4 hours PRN Dyspepsia  naloxone Injectable 0.1 milliGRAM(s) IV Push every 3 minutes PRN For ANY of the following changes in patient status:  A. RR LESS THAN 10 breaths per minute, B. Oxygen saturation LESS THAN 90%, C. Sedation score of 6  ondansetron Injectable 4 milliGRAM(s) IV Push every 6 hours PRN Nausea  oxyCODONE    IR 5 milliGRAM(s) Oral every 4 hours PRN Moderate Pain (4 - 6)  oxyCODONE    IR 10 milliGRAM(s) Oral every 4 hours PRN Severe Pain (7 - 10)      OBJECTIVE:    Vital Signs Last 24 Hrs  T(C): 37.3 (25 Sep 2020 04:20), Max: 37.3 (25 Sep 2020 04:20)  T(F): 99.1 (25 Sep 2020 04:20), Max: 99.1 (25 Sep 2020 04:20)  HR: 98 (25 Sep 2020 04:20) (69 - 98)  BP: 124/66 (25 Sep 2020 04:20) (104/62 - 124/66)  BP(mean): --  RR: 18 (25 Sep 2020 04:20) (18 - 18)  SpO2: 96% (25 Sep 2020 04:20) (94% - 99%)    I&O's Detail    24 Sep 2020 07:01  -  25 Sep 2020 07:00  --------------------------------------------------------  IN:    Oral Fluid: 880 mL  Total IN: 880 mL    OUT:    Accordian (mL): 180 mL    Voided (mL): 400 mL  Total OUT: 580 mL    Total NET: 300 mL          Daily     Daily     LABS:                        9.0    6.88  )-----------( 180      ( 24 Sep 2020 06:38 )             28.4     09-24    139  |  103  |  11  ----------------------------<  104<H>  4.3   |  27  |  0.87    Ca    9.0      24 Sep 2020 06:37    PHYSICAL EXAM    General: NAD, Lying in bed comfortably  Neuro: Awake and alert  Back: soft, Acquacel c/d/i, no fluid collections, HVC x 1 with SSF  GI/Abd: Soft, NT/ND    ASSESSMENT AND PLAN    - Continue to monitor Aquacel dressing, Change 9/27 or prior to discharge   - Monitor HVC output, management per NSGY  - Care per primary/NSCU team    Plastic Surgery Resident  [facility]: [phone]

## 2020-09-26 LAB
ANION GAP SERPL CALC-SCNC: 10 MMOL/L — SIGNIFICANT CHANGE UP (ref 5–17)
BUN SERPL-MCNC: 10 MG/DL — SIGNIFICANT CHANGE UP (ref 7–23)
CALCIUM SERPL-MCNC: 8.7 MG/DL — SIGNIFICANT CHANGE UP (ref 8.4–10.5)
CHLORIDE SERPL-SCNC: 100 MMOL/L — SIGNIFICANT CHANGE UP (ref 96–108)
CO2 SERPL-SCNC: 25 MMOL/L — SIGNIFICANT CHANGE UP (ref 22–31)
CREAT SERPL-MCNC: 0.81 MG/DL — SIGNIFICANT CHANGE UP (ref 0.5–1.3)
GLUCOSE SERPL-MCNC: 118 MG/DL — HIGH (ref 70–99)
HCT VFR BLD CALC: 29.1 % — LOW (ref 34.5–45)
HGB BLD-MCNC: 9.4 G/DL — LOW (ref 11.5–15.5)
MCHC RBC-ENTMCNC: 29.1 PG — SIGNIFICANT CHANGE UP (ref 27–34)
MCHC RBC-ENTMCNC: 32.3 GM/DL — SIGNIFICANT CHANGE UP (ref 32–36)
MCV RBC AUTO: 90.1 FL — SIGNIFICANT CHANGE UP (ref 80–100)
NRBC # BLD: 0 /100 WBCS — SIGNIFICANT CHANGE UP (ref 0–0)
PLATELET # BLD AUTO: 232 K/UL — SIGNIFICANT CHANGE UP (ref 150–400)
POTASSIUM SERPL-MCNC: 3.9 MMOL/L — SIGNIFICANT CHANGE UP (ref 3.5–5.3)
POTASSIUM SERPL-SCNC: 3.9 MMOL/L — SIGNIFICANT CHANGE UP (ref 3.5–5.3)
RBC # BLD: 3.23 M/UL — LOW (ref 3.8–5.2)
RBC # FLD: 15.3 % — HIGH (ref 10.3–14.5)
SODIUM SERPL-SCNC: 135 MMOL/L — SIGNIFICANT CHANGE UP (ref 135–145)
WBC # BLD: 8.08 K/UL — SIGNIFICANT CHANGE UP (ref 3.8–10.5)
WBC # FLD AUTO: 8.08 K/UL — SIGNIFICANT CHANGE UP (ref 3.8–10.5)

## 2020-09-26 PROCEDURE — 99233 SBSQ HOSP IP/OBS HIGH 50: CPT

## 2020-09-26 RX ORDER — MULTIVIT WITH MIN/MFOLATE/K2 340-15/3 G
1 POWDER (GRAM) ORAL ONCE
Refills: 0 | Status: COMPLETED | OUTPATIENT
Start: 2020-09-26 | End: 2020-09-26

## 2020-09-26 RX ADMIN — POLYETHYLENE GLYCOL 3350 17 GRAM(S): 17 POWDER, FOR SOLUTION ORAL at 06:36

## 2020-09-26 RX ADMIN — OXYCODONE HYDROCHLORIDE 10 MILLIGRAM(S): 5 TABLET ORAL at 14:11

## 2020-09-26 RX ADMIN — SODIUM CHLORIDE 3 MILLILITER(S): 9 INJECTION INTRAMUSCULAR; INTRAVENOUS; SUBCUTANEOUS at 21:10

## 2020-09-26 RX ADMIN — OXYCODONE HYDROCHLORIDE 10 MILLIGRAM(S): 5 TABLET ORAL at 06:45

## 2020-09-26 RX ADMIN — Medication 1 TABLET(S): at 11:42

## 2020-09-26 RX ADMIN — OXYCODONE HYDROCHLORIDE 10 MILLIGRAM(S): 5 TABLET ORAL at 22:59

## 2020-09-26 RX ADMIN — SODIUM CHLORIDE 3 MILLILITER(S): 9 INJECTION INTRAMUSCULAR; INTRAVENOUS; SUBCUTANEOUS at 12:53

## 2020-09-26 RX ADMIN — PANTOPRAZOLE SODIUM 40 MILLIGRAM(S): 20 TABLET, DELAYED RELEASE ORAL at 11:42

## 2020-09-26 RX ADMIN — GABAPENTIN 300 MILLIGRAM(S): 400 CAPSULE ORAL at 11:42

## 2020-09-26 RX ADMIN — Medication 1 BOTTLE: at 11:42

## 2020-09-26 RX ADMIN — ENOXAPARIN SODIUM 40 MILLIGRAM(S): 100 INJECTION SUBCUTANEOUS at 11:42

## 2020-09-26 RX ADMIN — ONDANSETRON 4 MILLIGRAM(S): 8 TABLET, FILM COATED ORAL at 12:55

## 2020-09-26 RX ADMIN — OXYCODONE HYDROCHLORIDE 10 MILLIGRAM(S): 5 TABLET ORAL at 18:10

## 2020-09-26 RX ADMIN — POLYETHYLENE GLYCOL 3350 17 GRAM(S): 17 POWDER, FOR SOLUTION ORAL at 17:41

## 2020-09-26 RX ADMIN — SODIUM CHLORIDE 3 MILLILITER(S): 9 INJECTION INTRAMUSCULAR; INTRAVENOUS; SUBCUTANEOUS at 06:30

## 2020-09-26 RX ADMIN — LIDOCAINE 1 PATCH: 4 CREAM TOPICAL at 11:42

## 2020-09-26 RX ADMIN — OXYCODONE HYDROCHLORIDE 10 MILLIGRAM(S): 5 TABLET ORAL at 14:41

## 2020-09-26 RX ADMIN — Medication 1 APPLICATION(S): at 11:43

## 2020-09-26 RX ADMIN — OXYCODONE HYDROCHLORIDE 10 MILLIGRAM(S): 5 TABLET ORAL at 23:30

## 2020-09-26 RX ADMIN — Medication 1 APPLICATION(S): at 21:10

## 2020-09-26 RX ADMIN — Medication 1 APPLICATION(S): at 06:36

## 2020-09-26 RX ADMIN — CARVEDILOL PHOSPHATE 12.5 MILLIGRAM(S): 80 CAPSULE, EXTENDED RELEASE ORAL at 17:41

## 2020-09-26 NOTE — PROGRESS NOTE ADULT - ASSESSMENT
65F s/p T10-Pelvis, L3-S1 PLIF w/ PRS closure on 9/18.  Doing well.    - Continue to monitor Aquacel dressing, Change 9/27 or prior to discharge   - Monitor HVC output, management per NSGY  - Care per primary/NSCU team    Plastic Surgery Resident  [facility]: [phone]

## 2020-09-26 NOTE — PROGRESS NOTE ADULT - SUBJECTIVE AND OBJECTIVE BOX
PLASTIC SURGERY DAILY PROGRESS NOTE:     SUBJECTIVE/ROS:  - Pt seen and examined on Am rounds  - No events overnight  - Pain well controlled      OBJECTIVE:      PHYSICAL EXAM    General: NAD, Lying in bed comfortably  Neuro: Awake and alert  Back: soft, Acquacel c/d/i, no fluid collections, HVC x 1 with SSF  GI/Abd: Soft, NT/ND    Vital Signs  T(C): 36.8 (09-26-20 @ 08:31), Max: 37.5 (09-26-20 @ 00:33)  T(F): 98.3 (09-26-20 @ 08:31), Max: 99.5 (09-26-20 @ 00:33)  HR: 91 (09-26-20 @ 08:31) (81 - 99)  BP: 109/64 (09-26-20 @ 08:31) (93/57 - 122/81)  RR: 18 (09-26-20 @ 08:31) (18 - 18)  SpO2: 98% (09-26-20 @ 08:31) (94% - 98%)      25 Sep 2020 07:01  -  26 Sep 2020 07:00  --------------------------------------------------------  IN:    Oral Fluid: 1210 mL  Total IN: 1210 mL    OUT:    Accordian (mL): 150 mL    Voided (mL): 1900 mL  Total OUT: 2050 mL    Total NET: -840 mL

## 2020-09-26 NOTE — PROGRESS NOTE ADULT - SUBJECTIVE AND OBJECTIVE BOX
Patient is a 65y old  Female who presents with a chief complaint of Patient was admitted with history of lower extremity radiculopathy. Patient has previous hx of spinal laminectomies. (23 Sep 2020 11:31)    SUBJECTIVE / OVERNIGHT EVENTS: Patient seen and examined. No acute overnight events, no subjective complaints.    OBJECTIVE:  Vital Signs Last 24 Hrs  T(F): 98 (26 Sep 2020 12:36), Max: 99.5 (26 Sep 2020 00:33)  HR: 90 (26 Sep 2020 12:36) (81 - 99)  BP: 125/82 (26 Sep 2020 12:36) (104/78 - 125/82)  RR: 18 (26 Sep 2020 12:36) (18 - 18)  SpO2: 99% (26 Sep 2020 12:36) (94% - 99%)    I&O's Summary  25 Sep 2020 07:01  -  26 Sep 2020 07:00  --------------------------------------------------------  IN: 1210 mL / OUT: 2050 mL / NET: -840 mL    26 Sep 2020 07:01  -  26 Sep 2020 15:06  --------------------------------------------------------  IN: 400 mL / OUT: 650 mL / NET: -250 mL    Physical Examination:  GEN: middle aged woman, laying in bed in NAD  PSYCH: A&Ox3, mood and affect appear appropriate   NEURO: no focal neurologic deficits appreciated  NECK: supple  RESPI: no accessory muscle use, B/L air entry, CTAB   CARDIO: regular rate/rhythm, no LE edema B/L  ABD: soft, NT, ND, +BS  EXT: patient able to move all extremities spontaneously  VASC: peripheral pulses palpated    Labs:                      9.4    8.08  )-----------( 232      ( 26 Sep 2020 05:39 )             29.1     09-26  135  |  100  |  10  ----------------------------<  118<H>  3.9   |  25  |  0.81    Ca    8.7      26 Sep 2020 05:39    Imaging Personally Reviewed:  - Xray Spine 3 Views as reported: 1. T10 through sacral/ilial spinal fusion with L2-L5 laminectomies. 2. There is borderline mild residual dextroscoliosis of the lumbar spine measuring 10 degrees that is improved from the prior. 3. Mild thoracic dextrocurvature is noted measuring 8 degrees that does not satisfy a 10 degree definition of scoliosis.  - Xray Left Knee 2 Views as reported: Moderate to severe osteoarthritis.    Consultant(s) Notes Reviewed: Plastic Surgery    MEDICATIONS  (STANDING):  carvedilol 12.5 milliGRAM(s) Oral every 12 hours  enoxaparin Injectable 40 milliGRAM(s) SubCutaneous daily  gabapentin 300 milliGRAM(s) Oral daily  influenza   Vaccine 0.5 milliLiter(s) IntraMuscular once  lidocaine   Patch 1 Patch Transdermal daily  lidocaine 1% Injectable 0.2 milliLiter(s) Local Injection once  multivitamin 1 Tablet(s) Oral daily  pantoprazole   Suspension 40 milliGRAM(s) Oral daily  polyethylene glycol 3350 17 Gram(s) Oral two times a day  senna 2 Tablet(s) Oral at bedtime  silver sulfADIAZINE 1% Cream 1 Application(s) Topical every 8 hours  sodium chloride 0.9% lock flush 3 milliLiter(s) IV Push every 8 hours    MEDICATIONS  (PRN):  acetaminophen   Tablet .. 650 milliGRAM(s) Oral every 6 hours PRN Temp greater or equal to 38C (100.4F), Mild Pain (1 - 3)  aluminum hydroxide/magnesium hydroxide/simethicone Suspension 30 milliLiter(s) Oral every 4 hours PRN Dyspepsia  diazepam    Tablet 2 milliGRAM(s) Oral every 8 hours PRN muscle spasm  naloxone Injectable 0.1 milliGRAM(s) IV Push every 3 minutes PRN For ANY of the following changes in patient status:  A. RR LESS THAN 10 breaths per minute, B. Oxygen saturation LESS THAN 90%, C. Sedation score of 6  ondansetron Injectable 4 milliGRAM(s) IV Push every 6 hours PRN Nausea  oxyCODONE    IR 5 milliGRAM(s) Oral every 4 hours PRN Moderate Pain (4 - 6)  oxyCODONE    IR 10 milliGRAM(s) Oral every 4 hours PRN Severe Pain (7 - 10)

## 2020-09-26 NOTE — PROGRESS NOTE ADULT - ASSESSMENT
65yoF w/ PMHx significant for lumbar spinal stenosis, HTN and obesity s/p T10-pelvis fusion and L3-S1 PLIF on 9/18. Patient noted with thrombocytopenia, RLE DVT s/p IVC filter on 9/21.

## 2020-09-26 NOTE — PROGRESS NOTE ADULT - ASSESSMENT
ASSESSMENT AND PLAN: 65F, PMHx HTN, kidney stones, lumbar stenosis, obesity, vertigo, laminectomy 3/2018, cataract sx b/l 2017, carpal tunnel syndrome 2013, p/w inability to walk secondary to pain and numbness in legs, s/p T10-Pelvis, L3-S1 PLIF w/ plastics closure POD 7, found to have acute above and below the knee DVT on right leg 9/20, s/p retrievable IVCF placed 9/21     - Standing Xray done  - Left Knee Xray showing OA , Lidocaine patch started by Medicine team  - . WIll keep in  - Continue Silvadene for chest and abdomen rash / skin tear  DISCHARGE PLANNING:   PT/OT/ PMR - Acute Rehab once drain is out

## 2020-09-27 PROCEDURE — 99233 SBSQ HOSP IP/OBS HIGH 50: CPT

## 2020-09-27 PROCEDURE — 73630 X-RAY EXAM OF FOOT: CPT | Mod: 26,LT

## 2020-09-27 RX ORDER — DIAZEPAM 5 MG
5 TABLET ORAL EVERY 8 HOURS
Refills: 0 | Status: DISCONTINUED | OUTPATIENT
Start: 2020-09-27 | End: 2020-09-29

## 2020-09-27 RX ORDER — OXYCODONE HYDROCHLORIDE 5 MG/1
5 TABLET ORAL EVERY 4 HOURS
Refills: 0 | Status: DISCONTINUED | OUTPATIENT
Start: 2020-09-28 | End: 2020-09-29

## 2020-09-27 RX ADMIN — SODIUM CHLORIDE 3 MILLILITER(S): 9 INJECTION INTRAMUSCULAR; INTRAVENOUS; SUBCUTANEOUS at 20:47

## 2020-09-27 RX ADMIN — OXYCODONE HYDROCHLORIDE 5 MILLIGRAM(S): 5 TABLET ORAL at 07:54

## 2020-09-27 RX ADMIN — PANTOPRAZOLE SODIUM 40 MILLIGRAM(S): 20 TABLET, DELAYED RELEASE ORAL at 12:23

## 2020-09-27 RX ADMIN — Medication 1 APPLICATION(S): at 05:16

## 2020-09-27 RX ADMIN — OXYCODONE HYDROCHLORIDE 10 MILLIGRAM(S): 5 TABLET ORAL at 20:27

## 2020-09-27 RX ADMIN — LIDOCAINE 1 PATCH: 4 CREAM TOPICAL at 17:17

## 2020-09-27 RX ADMIN — OXYCODONE HYDROCHLORIDE 5 MILLIGRAM(S): 5 TABLET ORAL at 17:35

## 2020-09-27 RX ADMIN — CARVEDILOL PHOSPHATE 12.5 MILLIGRAM(S): 80 CAPSULE, EXTENDED RELEASE ORAL at 05:16

## 2020-09-27 RX ADMIN — OXYCODONE HYDROCHLORIDE 10 MILLIGRAM(S): 5 TABLET ORAL at 21:15

## 2020-09-27 RX ADMIN — SODIUM CHLORIDE 3 MILLILITER(S): 9 INJECTION INTRAMUSCULAR; INTRAVENOUS; SUBCUTANEOUS at 14:21

## 2020-09-27 RX ADMIN — OXYCODONE HYDROCHLORIDE 5 MILLIGRAM(S): 5 TABLET ORAL at 09:26

## 2020-09-27 RX ADMIN — Medication 1 APPLICATION(S): at 14:29

## 2020-09-27 RX ADMIN — GABAPENTIN 300 MILLIGRAM(S): 400 CAPSULE ORAL at 12:24

## 2020-09-27 RX ADMIN — SODIUM CHLORIDE 3 MILLILITER(S): 9 INJECTION INTRAMUSCULAR; INTRAVENOUS; SUBCUTANEOUS at 05:14

## 2020-09-27 RX ADMIN — OXYCODONE HYDROCHLORIDE 5 MILLIGRAM(S): 5 TABLET ORAL at 15:27

## 2020-09-27 RX ADMIN — LIDOCAINE 1 PATCH: 4 CREAM TOPICAL at 12:23

## 2020-09-27 RX ADMIN — ENOXAPARIN SODIUM 40 MILLIGRAM(S): 100 INJECTION SUBCUTANEOUS at 12:22

## 2020-09-27 RX ADMIN — Medication 1 TABLET(S): at 12:24

## 2020-09-27 NOTE — PROGRESS NOTE ADULT - ASSESSMENT
HPI:  Patient is a 65 year old female with difficulty ambulating and numbness in legs.  Found to have lumbar stenosis.  Now presented for surgery.    PROCEDURE: s/p t10-pelvis instrumentation and fusion w/ plastics closure on 9/18/2020   POD#9    PLAN:  Neuro:   -q4 hour neuro checks  -oxycodone and lidoderm patch for pain control  -gabapentin for neuropathic pain  -valium for muscle spasms, will increase to 5q8  -continue hemovac, monitor output  -out of bed with assistance  -pt - likely home pt upon discharge pending final recs    Respiratory:   -satting well on room air  -incentive spirometer for lung expansion    CV:  -keep sbp 100-140  -coreg for bp control    Endocrine:   -maintain euglycemia    Heme/Onc:    -s/p ivc filter on 9/21/2020  -lovenox for dvt prophylaxis    Renal:   -voiding    ID:   -afebrile    GI:   -regular diet  -protonix for gi prophylaxis  -senna and miralax for bowel regimen      Spectra #[phone]   HPI:  Patient is a 65 year old female with difficulty ambulating and numbness in legs.  Found to have lumbar stenosis.  Now presented for surgery.    PROCEDURE: s/p t10-pelvis instrumentation and fusion w/ plastics closure on 9/18/2020   POD#9    PLAN:  Neuro:   -q4 hour neuro checks  -oxycodone and lidoderm patch for pain control  -gabapentin for neuropathic pain  -valium for muscle spasms, will increase to 5q8  -continue hemovac, monitor output  -dressing changed today by plastics  -out of bed with assistance  -pt - likely home pt upon discharge pending final recs    Respiratory:   -satting well on room air  -incentive spirometer for lung expansion    CV:  -keep sbp 100-140  -coreg for bp control    Musculoskeletal:  -left foot xray for heel pain    Endocrine:   -maintain euglycemia    Heme/Onc:    -s/p ivc filter on 9/21/2020  -lovenox for dvt prophylaxis    Renal:   -voiding    ID:   -afebrile    GI:   -regular diet  -protonix for gi prophylaxis  -senna and miralax for bowel regimen      Spectra #[phone]

## 2020-09-27 NOTE — PROGRESS NOTE ADULT - SUBJECTIVE AND OBJECTIVE BOX
PLASTIC SURGERY DAILY PROGRESS NOTE:     SUBJECTIVE/ROS: Patient seen and examined at bedside on AM rounds. AVSS overnight. Pain controlled.      MEDICATIONS  (STANDING):  carvedilol 12.5 milliGRAM(s) Oral every 12 hours  enoxaparin Injectable 40 milliGRAM(s) SubCutaneous daily  gabapentin 300 milliGRAM(s) Oral daily  influenza   Vaccine 0.5 milliLiter(s) IntraMuscular once  lidocaine   Patch 1 Patch Transdermal daily  lidocaine 1% Injectable 0.2 milliLiter(s) Local Injection once  multivitamin 1 Tablet(s) Oral daily  pantoprazole   Suspension 40 milliGRAM(s) Oral daily  polyethylene glycol 3350 17 Gram(s) Oral two times a day  senna 2 Tablet(s) Oral at bedtime  silver sulfADIAZINE 1% Cream 1 Application(s) Topical every 8 hours  sodium chloride 0.9% lock flush 3 milliLiter(s) IV Push every 8 hours    MEDICATIONS  (PRN):  acetaminophen   Tablet .. 650 milliGRAM(s) Oral every 6 hours PRN Temp greater or equal to 38C (100.4F), Mild Pain (1 - 3)  aluminum hydroxide/magnesium hydroxide/simethicone Suspension 30 milliLiter(s) Oral every 4 hours PRN Dyspepsia  diazepam    Tablet 2 milliGRAM(s) Oral every 8 hours PRN muscle spasm  naloxone Injectable 0.1 milliGRAM(s) IV Push every 3 minutes PRN For ANY of the following changes in patient status:  A. RR LESS THAN 10 breaths per minute, B. Oxygen saturation LESS THAN 90%, C. Sedation score of 6  ondansetron Injectable 4 milliGRAM(s) IV Push every 6 hours PRN Nausea  oxyCODONE    IR 5 milliGRAM(s) Oral every 4 hours PRN Moderate Pain (4 - 6)  oxyCODONE    IR 10 milliGRAM(s) Oral every 4 hours PRN Severe Pain (7 - 10)      OBJECTIVE:    Vital Signs Last 24 Hrs  T(C): 36.7 (27 Sep 2020 04:31), Max: 37.1 (27 Sep 2020 00:14)  T(F): 98 (27 Sep 2020 04:31), Max: 98.7 (27 Sep 2020 00:14)  HR: 82 (27 Sep 2020 04:31) (82 - 92)  BP: 137/79 (27 Sep 2020 04:31) (103/69 - 137/79)  BP(mean): --  RR: 18 (27 Sep 2020 04:31) (18 - 18)  SpO2: 97% (27 Sep 2020 04:31) (92% - 99%)    I&O's Detail    26 Sep 2020 07:01  -  27 Sep 2020 07:00  --------------------------------------------------------  IN:    Oral Fluid: 1420 mL  Total IN: 1420 mL    OUT:    Accordian (mL): 125 mL    Voided (mL): 600 mL  Total OUT: 725 mL    Total NET: 695 mL          Daily     Daily     LABS:                        9.4    8.08  )-----------( 232      ( 26 Sep 2020 05:39 )             29.1     09-26    135  |  100  |  10  ----------------------------<  118<H>  3.9   |  25  |  0.81    Ca    8.7      26 Sep 2020 05:39    PHYSICAL EXAM:    General: NAD, Lying in bed comfortably  Neuro: Awake and alert  Back: soft, Acquacel c/d/i, no fluid collections, HVC x 1 with SSF  GI/Abd: Soft, NT/ND    ASSESSMENT AND PLAN    65F s/p T10-Pelvis, L3-S1 PLIF w/ PRS closure on 9/18.    - Continue to monitor Aquacel dressing  - Monitor HVC output, management per NSGY  - Care per primary/NSCU team    Plastic Surgery Resident  [facility]: [phone] PLASTIC SURGERY DAILY PROGRESS NOTE:     SUBJECTIVE/ROS: Patient seen and examined at bedside on AM rounds. AVSS overnight. Pain controlled. Dressing changed at bedside today (9/27)     MEDICATIONS  (STANDING):  carvedilol 12.5 milliGRAM(s) Oral every 12 hours  enoxaparin Injectable 40 milliGRAM(s) SubCutaneous daily  gabapentin 300 milliGRAM(s) Oral daily  influenza   Vaccine 0.5 milliLiter(s) IntraMuscular once  lidocaine   Patch 1 Patch Transdermal daily  lidocaine 1% Injectable 0.2 milliLiter(s) Local Injection once  multivitamin 1 Tablet(s) Oral daily  pantoprazole   Suspension 40 milliGRAM(s) Oral daily  polyethylene glycol 3350 17 Gram(s) Oral two times a day  senna 2 Tablet(s) Oral at bedtime  silver sulfADIAZINE 1% Cream 1 Application(s) Topical every 8 hours  sodium chloride 0.9% lock flush 3 milliLiter(s) IV Push every 8 hours    MEDICATIONS  (PRN):  acetaminophen   Tablet .. 650 milliGRAM(s) Oral every 6 hours PRN Temp greater or equal to 38C (100.4F), Mild Pain (1 - 3)  aluminum hydroxide/magnesium hydroxide/simethicone Suspension 30 milliLiter(s) Oral every 4 hours PRN Dyspepsia  diazepam    Tablet 2 milliGRAM(s) Oral every 8 hours PRN muscle spasm  naloxone Injectable 0.1 milliGRAM(s) IV Push every 3 minutes PRN For ANY of the following changes in patient status:  A. RR LESS THAN 10 breaths per minute, B. Oxygen saturation LESS THAN 90%, C. Sedation score of 6  ondansetron Injectable 4 milliGRAM(s) IV Push every 6 hours PRN Nausea  oxyCODONE    IR 5 milliGRAM(s) Oral every 4 hours PRN Moderate Pain (4 - 6)  oxyCODONE    IR 10 milliGRAM(s) Oral every 4 hours PRN Severe Pain (7 - 10)      OBJECTIVE:    Vital Signs Last 24 Hrs  T(C): 36.7 (27 Sep 2020 04:31), Max: 37.1 (27 Sep 2020 00:14)  T(F): 98 (27 Sep 2020 04:31), Max: 98.7 (27 Sep 2020 00:14)  HR: 82 (27 Sep 2020 04:31) (82 - 92)  BP: 137/79 (27 Sep 2020 04:31) (103/69 - 137/79)  BP(mean): --  RR: 18 (27 Sep 2020 04:31) (18 - 18)  SpO2: 97% (27 Sep 2020 04:31) (92% - 99%)    I&O's Detail    26 Sep 2020 07:01  -  27 Sep 2020 07:00  --------------------------------------------------------  IN:    Oral Fluid: 1420 mL  Total IN: 1420 mL    OUT:    Accordian (mL): 125 mL    Voided (mL): 600 mL  Total OUT: 725 mL    Total NET: 695 mL          Daily     Daily     LABS:                        9.4    8.08  )-----------( 232      ( 26 Sep 2020 05:39 )             29.1     09-26    135  |  100  |  10  ----------------------------<  118<H>  3.9   |  25  |  0.81    Ca    8.7      26 Sep 2020 05:39    PHYSICAL EXAM:    General: NAD, Lying in bed comfortably  Neuro: Awake and alert  Back: soft, Acquacel c/d/i, no fluid collections, HVC x 1 with SSF  GI/Abd: Soft, NT/ND    ASSESSMENT AND PLAN    65F s/p T10-Pelvis, L3-S1 PLIF w/ PRS closure on 9/18.      - Continue to monitor Aquacel dressing; changed on 9/27  - Monitor HVC output, management per NSGY  - Care per primary/NSCU team    Plastic Surgery Resident  [facility]: [phone]

## 2020-09-27 NOTE — PROGRESS NOTE ADULT - SUBJECTIVE AND OBJECTIVE BOX
Patient is a 65y old  Female who presents with a chief complaint of Patient was admitted with history of lower extremity radiculopathy. Patient has previous hx of spinal laminectomies. (23 Sep 2020 11:31)    SUBJECTIVE / OVERNIGHT EVENTS: Patient seen and examined. No acute overnight events, no subjective complaints.    OBJECTIVE:  Vital Signs Last 24 Hrs  T(F): 98.1 (27 Sep 2020 12:19), Max: 98.7 (27 Sep 2020 00:14)  HR: 81 (27 Sep 2020 12:19) (81 - 92)  BP: 100/61 (27 Sep 2020 12:19) (99/61 - 137/79)  RR: 18 (27 Sep 2020 12:19) (18 - 18)  SpO2: 97% (27 Sep 2020 12:19) (92% - 98%)    I&O's Summary  26 Sep 2020 07:01  -  27 Sep 2020 07:00  --------------------------------------------------------  IN: 1420 mL / OUT: 725 mL / NET: 695 mL    27 Sep 2020 07:01  -  27 Sep 2020 14:30  --------------------------------------------------------  IN: 0 mL / OUT: 25 mL / NET: -25 mL    Physical Examination:  GEN: middle aged woman, laying in bed in NAD  PSYCH: A&Ox3, mood and affect appear appropriate   NEURO: no focal neurologic deficits appreciated  NECK: supple  RESPI: no accessory muscle use, B/L air entry, CTAB   CARDIO: regular rate/rhythm, no LE edema B/L  ABD: soft, NT, ND, +BS  EXT: patient able to move all extremities spontaneously  VASC: peripheral pulses palpated    Labs:                      9.4    8.08  )-----------( 232      ( 26 Sep 2020 05:39 )             29.1     09-26    135  |  100  |  10  ----------------------------<  118<H>  3.9   |  25  |  0.81    Ca    8.7      26 Sep 2020 05:39    Consultant(s) Notes Reviewed: Plastic Surgery  Care Discussed with Consultants/Other Providers: MARIIA Souza    MEDICATIONS  (STANDING):  carvedilol 12.5 milliGRAM(s) Oral every 12 hours  enoxaparin Injectable 40 milliGRAM(s) SubCutaneous daily  gabapentin 300 milliGRAM(s) Oral daily  influenza   Vaccine 0.5 milliLiter(s) IntraMuscular once  lidocaine   Patch 1 Patch Transdermal daily  lidocaine 1% Injectable 0.2 milliLiter(s) Local Injection once  multivitamin 1 Tablet(s) Oral daily  pantoprazole   Suspension 40 milliGRAM(s) Oral daily  polyethylene glycol 3350 17 Gram(s) Oral two times a day  senna 2 Tablet(s) Oral at bedtime  sodium chloride 0.9% lock flush 3 milliLiter(s) IV Push every 8 hours    MEDICATIONS  (PRN):  acetaminophen   Tablet .. 650 milliGRAM(s) Oral every 6 hours PRN Temp greater or equal to 38C (100.4F), Mild Pain (1 - 3)  aluminum hydroxide/magnesium hydroxide/simethicone Suspension 30 milliLiter(s) Oral every 4 hours PRN Dyspepsia  diazepam    Tablet 5 milliGRAM(s) Oral every 8 hours PRN muscle spasm  naloxone Injectable 0.1 milliGRAM(s) IV Push every 3 minutes PRN For ANY of the following changes in patient status:  A. RR LESS THAN 10 breaths per minute, B. Oxygen saturation LESS THAN 90%, C. Sedation score of 6  ondansetron Injectable 4 milliGRAM(s) IV Push every 6 hours PRN Nausea  oxyCODONE    IR 5 milliGRAM(s) Oral every 4 hours PRN Moderate Pain (4 - 6)  oxyCODONE    IR 10 milliGRAM(s) Oral every 4 hours PRN Severe Pain (7 - 10)

## 2020-09-27 NOTE — PROGRESS NOTE ADULT - SUBJECTIVE AND OBJECTIVE BOX
HPI:  Patient is a 65 year old female with difficulty ambulating and numbness in legs.  Found to have lumbar stenosis.  Now presented for surgery.    OVERNIGHT EVENTS:  No acute events overnight.  Complaining of left anterior thigh numbness.  Also noted to have some left heel pain (unclear as to whether new or old).  Having incisional pain, slightly improved with pain medications.  Has been out of bed.    Vital Signs Last 24 Hrs  T(C): 36.8 (27 Sep 2020 08:38), Max: 37.1 (27 Sep 2020 00:14)  T(F): 98.3 (27 Sep 2020 08:38), Max: 98.7 (27 Sep 2020 00:14)  HR: 83 (27 Sep 2020 08:38) (82 - 92)  BP: 99/61 (27 Sep 2020 08:38) (99/61 - 137/79)  BP(mean): --  RR: 18 (27 Sep 2020 08:38) (18 - 18)  SpO2: 97% (27 Sep 2020 08:38) (92% - 99%)    I&O's Detail    26 Sep 2020 07:01  -  27 Sep 2020 07:00  --------------------------------------------------------  IN:    Oral Fluid: 1420 mL  Total IN: 1420 mL    OUT:    Accordian (mL): 125 mL    Voided (mL): 600 mL  Total OUT: 725 mL    Total NET: 695 mL      27 Sep 2020 07:01  -  27 Sep 2020 09:29  --------------------------------------------------------  IN:  Total IN: 0 mL    OUT:    Accordian (mL): 25 mL  Total OUT: 25 mL    Total NET: -25 mL        I&O's Summary    26 Sep 2020 07:01  -  27 Sep 2020 07:00  --------------------------------------------------------  IN: 1420 mL / OUT: 725 mL / NET: 695 mL    27 Sep 2020 07:01  -  27 Sep 2020 09:29  --------------------------------------------------------  IN: 0 mL / OUT: 25 mL / NET: -25 mL        PHYSICAL EXAM:  Neurological: awake, alert, oriented x3, follows commands, speech clear and fluent, moves all extremities x4 w/ 5/5 strength throughout, sensation present, intact, equal throughout    Cardiovascular: +s1, s2  Respiratory: clear to auscultation b/l  Gastrointestinal: soft, non-distended, non-tender  Genitourinary: +voiding  Extremities: +dp/pt pulses palpable b/l  Incision/Wound: posterior vertical midline incision dressing c/d/i w/ aquacel, hemovac x1    TUBES/LINES:  [x] hemovac x1 (125cc serosanguinous over 24 hours)    DIET:  [x] regular    LABS:                        9.4    8.08  )-----------( 232      ( 26 Sep 2020 05:39 )             29.1     09-26    135  |  100  |  10  ----------------------------<  118<H>  3.9   |  25  |  0.81    Ca    8.7      26 Sep 2020 05:39        Allergies    No Known Allergies          MEDICATIONS:  Antibiotics:  none    Neuro:  acetaminophen   Tablet .. 650 milliGRAM(s) Oral every 6 hours PRN  diazepam    Tablet 2 milliGRAM(s) Oral every 8 hours PRN  gabapentin 300 milliGRAM(s) Oral daily  ondansetron Injectable 4 milliGRAM(s) IV Push every 6 hours PRN  oxyCODONE    IR 5 milliGRAM(s) Oral every 4 hours PRN  oxyCODONE    IR 10 milliGRAM(s) Oral every 4 hours PRN    Anticoagulation:  enoxaparin Injectable 40 milliGRAM(s) SubCutaneous daily    OTHER:  aluminum hydroxide/magnesium hydroxide/simethicone Suspension 30 milliLiter(s) Oral every 4 hours PRN  carvedilol 12.5 milliGRAM(s) Oral every 12 hours  influenza   Vaccine 0.5 milliLiter(s) IntraMuscular once  lidocaine   Patch 1 Patch Transdermal daily  lidocaine 1% Injectable 0.2 milliLiter(s) Local Injection once  naloxone Injectable 0.1 milliGRAM(s) IV Push every 3 minutes PRN  pantoprazole   Suspension 40 milliGRAM(s) Oral daily  polyethylene glycol 3350 17 Gram(s) Oral two times a day  senna 2 Tablet(s) Oral at bedtime  silver sulfADIAZINE 1% Cream 1 Application(s) Topical every 8 hours    IVF:  multivitamin 1 Tablet(s) Oral daily  sodium chloride 0.9% lock flush 3 milliLiter(s) IV Push every 8 hours    CULTURES:  none    RADIOLOGY & ADDITIONAL TESTS:  no new imaging     HPI:  Patient is a 65 year old female with difficulty ambulating and numbness in legs.  Found to have lumbar stenosis.  Now presented for surgery.    OVERNIGHT EVENTS:  No acute events overnight.  Complaining of left anterior thigh numbness.  Also noted to have some left heel pain (unclear as to whether new or old).  Having incisional pain, slightly improved with pain medications.  Has been out of bed.  Communicated with patient via video , Syriac (Luaryn), #[id number].      Vital Signs Last 24 Hrs  T(C): 36.8 (27 Sep 2020 08:38), Max: 37.1 (27 Sep 2020 00:14)  T(F): 98.3 (27 Sep 2020 08:38), Max: 98.7 (27 Sep 2020 00:14)  HR: 83 (27 Sep 2020 08:38) (82 - 92)  BP: 99/61 (27 Sep 2020 08:38) (99/61 - 137/79)  BP(mean): --  RR: 18 (27 Sep 2020 08:38) (18 - 18)  SpO2: 97% (27 Sep 2020 08:38) (92% - 99%)    I&O's Detail    26 Sep 2020 07:01  -  27 Sep 2020 07:00  --------------------------------------------------------  IN:    Oral Fluid: 1420 mL  Total IN: 1420 mL    OUT:    Accordian (mL): 125 mL    Voided (mL): 600 mL  Total OUT: 725 mL    Total NET: 695 mL      27 Sep 2020 07:01  -  27 Sep 2020 09:29  --------------------------------------------------------  IN:  Total IN: 0 mL    OUT:    Accordian (mL): 25 mL  Total OUT: 25 mL    Total NET: -25 mL        I&O's Summary    26 Sep 2020 07:01  -  27 Sep 2020 07:00  --------------------------------------------------------  IN: 1420 mL / OUT: 725 mL / NET: 695 mL    27 Sep 2020 07:01  -  27 Sep 2020 09:29  --------------------------------------------------------  IN: 0 mL / OUT: 25 mL / NET: -25 mL        PHYSICAL EXAM:  Neurological: awake, alert, oriented x3, follows commands, speech clear and fluent, moves all extremities x4 w/ 5/5 strength throughout, sensation present, intact, equal throughout    Cardiovascular: +s1, s2  Respiratory: clear to auscultation b/l  Gastrointestinal: soft, non-distended, non-tender  Genitourinary: +voiding  Extremities: +dp/pt pulses palpable b/l  Incision/Wound: posterior vertical midline incision dressing c/d/i w/ aquacel, hemovac x1    TUBES/LINES:  [x] hemovac x1 (125cc serosanguinous over 24 hours)    DIET:  [x] regular    LABS:                        9.4    8.08  )-----------( 232      ( 26 Sep 2020 05:39 )             29.1     09-26    135  |  100  |  10  ----------------------------<  118<H>  3.9   |  25  |  0.81    Ca    8.7      26 Sep 2020 05:39        Allergies    No Known Allergies          MEDICATIONS:  Antibiotics:  none    Neuro:  acetaminophen   Tablet .. 650 milliGRAM(s) Oral every 6 hours PRN  diazepam    Tablet 2 milliGRAM(s) Oral every 8 hours PRN  gabapentin 300 milliGRAM(s) Oral daily  ondansetron Injectable 4 milliGRAM(s) IV Push every 6 hours PRN  oxyCODONE    IR 5 milliGRAM(s) Oral every 4 hours PRN  oxyCODONE    IR 10 milliGRAM(s) Oral every 4 hours PRN    Anticoagulation:  enoxaparin Injectable 40 milliGRAM(s) SubCutaneous daily    OTHER:  aluminum hydroxide/magnesium hydroxide/simethicone Suspension 30 milliLiter(s) Oral every 4 hours PRN  carvedilol 12.5 milliGRAM(s) Oral every 12 hours  influenza   Vaccine 0.5 milliLiter(s) IntraMuscular once  lidocaine   Patch 1 Patch Transdermal daily  lidocaine 1% Injectable 0.2 milliLiter(s) Local Injection once  naloxone Injectable 0.1 milliGRAM(s) IV Push every 3 minutes PRN  pantoprazole   Suspension 40 milliGRAM(s) Oral daily  polyethylene glycol 3350 17 Gram(s) Oral two times a day  senna 2 Tablet(s) Oral at bedtime  silver sulfADIAZINE 1% Cream 1 Application(s) Topical every 8 hours    IVF:  multivitamin 1 Tablet(s) Oral daily  sodium chloride 0.9% lock flush 3 milliLiter(s) IV Push every 8 hours    CULTURES:  none    RADIOLOGY & ADDITIONAL TESTS:  no new imaging     HPI:  Patient is a 65 year old female with difficulty ambulating and numbness in legs.  Found to have lumbar stenosis.  Now presented for surgery.    OVERNIGHT EVENTS:  No acute events overnight.  Complaining of left anterior thigh numbness.  Also noted to have some left heel pain (unclear as to whether new or old).  Having incisional pain, slightly improved with pain medications.  Has been out of bed.  Communicated with patient via video , Bulgarian (Lauryn), #[id number].      Vital Signs Last 24 Hrs  T(C): 36.8 (27 Sep 2020 08:38), Max: 37.1 (27 Sep 2020 00:14)  T(F): 98.3 (27 Sep 2020 08:38), Max: 98.7 (27 Sep 2020 00:14)  HR: 83 (27 Sep 2020 08:38) (82 - 92)  BP: 99/61 (27 Sep 2020 08:38) (99/61 - 137/79)  BP(mean): --  RR: 18 (27 Sep 2020 08:38) (18 - 18)  SpO2: 97% (27 Sep 2020 08:38) (92% - 99%)    I&O's Detail    26 Sep 2020 07:01  -  27 Sep 2020 07:00  --------------------------------------------------------  IN:    Oral Fluid: 1420 mL  Total IN: 1420 mL    OUT:    Accordian (mL): 125 mL    Voided (mL): 600 mL  Total OUT: 725 mL    Total NET: 695 mL      27 Sep 2020 07:01  -  27 Sep 2020 09:29  --------------------------------------------------------  IN:  Total IN: 0 mL    OUT:    Accordian (mL): 25 mL  Total OUT: 25 mL    Total NET: -25 mL        I&O's Summary    26 Sep 2020 07:01  -  27 Sep 2020 07:00  --------------------------------------------------------  IN: 1420 mL / OUT: 725 mL / NET: 695 mL    27 Sep 2020 07:01  -  27 Sep 2020 09:29  --------------------------------------------------------  IN: 0 mL / OUT: 25 mL / NET: -25 mL        PHYSICAL EXAM:  Neurological: awake, alert, oriented x3, follows commands, speech clear and fluent, moves all extremities x4 w/ 5/5 strength throughout, decreased sensation left anterior thigh, otherwise sensation present, intact, equal throughout    Cardiovascular: +s1, s2  Respiratory: clear to auscultation b/l  Gastrointestinal: soft, non-distended, non-tender  Genitourinary: +voiding  Extremities: +dp/pt pulses palpable b/l  Incision/Wound: posterior vertical midline incision dressing c/d/i w/ aquacel, hemovac x1    TUBES/LINES:  [x] hemovac x1 (125cc serosanguinous over 24 hours)    DIET:  [x] regular    LABS:                        9.4    8.08  )-----------( 232      ( 26 Sep 2020 05:39 )             29.1     09-26    135  |  100  |  10  ----------------------------<  118<H>  3.9   |  25  |  0.81    Ca    8.7      26 Sep 2020 05:39        Allergies    No Known Allergies          MEDICATIONS:  Antibiotics:  none    Neuro:  acetaminophen   Tablet .. 650 milliGRAM(s) Oral every 6 hours PRN  diazepam    Tablet 2 milliGRAM(s) Oral every 8 hours PRN  gabapentin 300 milliGRAM(s) Oral daily  ondansetron Injectable 4 milliGRAM(s) IV Push every 6 hours PRN  oxyCODONE    IR 5 milliGRAM(s) Oral every 4 hours PRN  oxyCODONE    IR 10 milliGRAM(s) Oral every 4 hours PRN    Anticoagulation:  enoxaparin Injectable 40 milliGRAM(s) SubCutaneous daily    OTHER:  aluminum hydroxide/magnesium hydroxide/simethicone Suspension 30 milliLiter(s) Oral every 4 hours PRN  carvedilol 12.5 milliGRAM(s) Oral every 12 hours  influenza   Vaccine 0.5 milliLiter(s) IntraMuscular once  lidocaine   Patch 1 Patch Transdermal daily  lidocaine 1% Injectable 0.2 milliLiter(s) Local Injection once  naloxone Injectable 0.1 milliGRAM(s) IV Push every 3 minutes PRN  pantoprazole   Suspension 40 milliGRAM(s) Oral daily  polyethylene glycol 3350 17 Gram(s) Oral two times a day  senna 2 Tablet(s) Oral at bedtime  silver sulfADIAZINE 1% Cream 1 Application(s) Topical every 8 hours    IVF:  multivitamin 1 Tablet(s) Oral daily  sodium chloride 0.9% lock flush 3 milliLiter(s) IV Push every 8 hours    CULTURES:  none    RADIOLOGY & ADDITIONAL TESTS:  no new imaging

## 2020-09-28 LAB
ANION GAP SERPL CALC-SCNC: 10 MMOL/L — SIGNIFICANT CHANGE UP (ref 5–17)
ANION GAP SERPL CALC-SCNC: 10 MMOL/L — SIGNIFICANT CHANGE UP (ref 5–17)
BASOPHILS # BLD AUTO: 0.03 K/UL — SIGNIFICANT CHANGE UP (ref 0–0.2)
BASOPHILS NFR BLD AUTO: 0.4 % — SIGNIFICANT CHANGE UP (ref 0–2)
BUN SERPL-MCNC: 13 MG/DL — SIGNIFICANT CHANGE UP (ref 7–23)
BUN SERPL-MCNC: 13 MG/DL — SIGNIFICANT CHANGE UP (ref 7–23)
CALCIUM SERPL-MCNC: 9.2 MG/DL — SIGNIFICANT CHANGE UP (ref 8.4–10.5)
CALCIUM SERPL-MCNC: 9.3 MG/DL — SIGNIFICANT CHANGE UP (ref 8.4–10.5)
CHLORIDE SERPL-SCNC: 96 MMOL/L — SIGNIFICANT CHANGE UP (ref 96–108)
CHLORIDE SERPL-SCNC: 96 MMOL/L — SIGNIFICANT CHANGE UP (ref 96–108)
CO2 SERPL-SCNC: 25 MMOL/L — SIGNIFICANT CHANGE UP (ref 22–31)
CO2 SERPL-SCNC: 26 MMOL/L — SIGNIFICANT CHANGE UP (ref 22–31)
CREAT SERPL-MCNC: 0.83 MG/DL — SIGNIFICANT CHANGE UP (ref 0.5–1.3)
CREAT SERPL-MCNC: 0.87 MG/DL — SIGNIFICANT CHANGE UP (ref 0.5–1.3)
EOSINOPHIL # BLD AUTO: 0.08 K/UL — SIGNIFICANT CHANGE UP (ref 0–0.5)
EOSINOPHIL NFR BLD AUTO: 1 % — SIGNIFICANT CHANGE UP (ref 0–6)
GLUCOSE SERPL-MCNC: 115 MG/DL — HIGH (ref 70–99)
GLUCOSE SERPL-MCNC: 95 MG/DL — SIGNIFICANT CHANGE UP (ref 70–99)
HCT VFR BLD CALC: 31.5 % — LOW (ref 34.5–45)
HGB BLD-MCNC: 9.4 G/DL — LOW (ref 11.5–15.5)
IMM GRANULOCYTES NFR BLD AUTO: 0.8 % — SIGNIFICANT CHANGE UP (ref 0–1.5)
LYMPHOCYTES # BLD AUTO: 1.96 K/UL — SIGNIFICANT CHANGE UP (ref 1–3.3)
LYMPHOCYTES # BLD AUTO: 25.7 % — SIGNIFICANT CHANGE UP (ref 13–44)
MCHC RBC-ENTMCNC: 28.7 PG — SIGNIFICANT CHANGE UP (ref 27–34)
MCHC RBC-ENTMCNC: 29.8 GM/DL — LOW (ref 32–36)
MCV RBC AUTO: 96.3 FL — SIGNIFICANT CHANGE UP (ref 80–100)
MONOCYTES # BLD AUTO: 0.96 K/UL — HIGH (ref 0–0.9)
MONOCYTES NFR BLD AUTO: 12.6 % — SIGNIFICANT CHANGE UP (ref 2–14)
NEUTROPHILS # BLD AUTO: 4.55 K/UL — SIGNIFICANT CHANGE UP (ref 1.8–7.4)
NEUTROPHILS NFR BLD AUTO: 59.5 % — SIGNIFICANT CHANGE UP (ref 43–77)
NRBC # BLD: 0 /100 WBCS — SIGNIFICANT CHANGE UP (ref 0–0)
PLATELET # BLD AUTO: 247 K/UL — SIGNIFICANT CHANGE UP (ref 150–400)
POTASSIUM SERPL-MCNC: 4.5 MMOL/L — SIGNIFICANT CHANGE UP (ref 3.5–5.3)
POTASSIUM SERPL-MCNC: 5.8 MMOL/L — HIGH (ref 3.5–5.3)
POTASSIUM SERPL-SCNC: 4.5 MMOL/L — SIGNIFICANT CHANGE UP (ref 3.5–5.3)
POTASSIUM SERPL-SCNC: 5.8 MMOL/L — HIGH (ref 3.5–5.3)
RBC # BLD: 3.27 M/UL — LOW (ref 3.8–5.2)
RBC # FLD: 15.3 % — HIGH (ref 10.3–14.5)
SARS-COV-2 RNA SPEC QL NAA+PROBE: SIGNIFICANT CHANGE UP
SODIUM SERPL-SCNC: 131 MMOL/L — LOW (ref 135–145)
SODIUM SERPL-SCNC: 132 MMOL/L — LOW (ref 135–145)
WBC # BLD: 7.64 K/UL — SIGNIFICANT CHANGE UP (ref 3.8–10.5)
WBC # FLD AUTO: 7.64 K/UL — SIGNIFICANT CHANGE UP (ref 3.8–10.5)

## 2020-09-28 PROCEDURE — 99232 SBSQ HOSP IP/OBS MODERATE 35: CPT

## 2020-09-28 PROCEDURE — 93970 EXTREMITY STUDY: CPT | Mod: 26

## 2020-09-28 RX ORDER — ENOXAPARIN SODIUM 100 MG/ML
100 INJECTION SUBCUTANEOUS
Refills: 0 | Status: DISCONTINUED | OUTPATIENT
Start: 2020-09-29 | End: 2020-09-29

## 2020-09-28 RX ORDER — OXYCODONE HYDROCHLORIDE 5 MG/1
10 TABLET ORAL EVERY 4 HOURS
Refills: 0 | Status: DISCONTINUED | OUTPATIENT
Start: 2020-09-28 | End: 2020-09-29

## 2020-09-28 RX ADMIN — ENOXAPARIN SODIUM 40 MILLIGRAM(S): 100 INJECTION SUBCUTANEOUS at 11:53

## 2020-09-28 RX ADMIN — LIDOCAINE 1 PATCH: 4 CREAM TOPICAL at 11:54

## 2020-09-28 RX ADMIN — OXYCODONE HYDROCHLORIDE 10 MILLIGRAM(S): 5 TABLET ORAL at 21:15

## 2020-09-28 RX ADMIN — CARVEDILOL PHOSPHATE 12.5 MILLIGRAM(S): 80 CAPSULE, EXTENDED RELEASE ORAL at 05:41

## 2020-09-28 RX ADMIN — PANTOPRAZOLE SODIUM 40 MILLIGRAM(S): 20 TABLET, DELAYED RELEASE ORAL at 11:54

## 2020-09-28 RX ADMIN — OXYCODONE HYDROCHLORIDE 5 MILLIGRAM(S): 5 TABLET ORAL at 05:41

## 2020-09-28 RX ADMIN — GABAPENTIN 300 MILLIGRAM(S): 400 CAPSULE ORAL at 11:53

## 2020-09-28 RX ADMIN — CARVEDILOL PHOSPHATE 12.5 MILLIGRAM(S): 80 CAPSULE, EXTENDED RELEASE ORAL at 17:48

## 2020-09-28 RX ADMIN — OXYCODONE HYDROCHLORIDE 5 MILLIGRAM(S): 5 TABLET ORAL at 11:48

## 2020-09-28 RX ADMIN — OXYCODONE HYDROCHLORIDE 5 MILLIGRAM(S): 5 TABLET ORAL at 06:30

## 2020-09-28 RX ADMIN — SODIUM CHLORIDE 3 MILLILITER(S): 9 INJECTION INTRAMUSCULAR; INTRAVENOUS; SUBCUTANEOUS at 05:36

## 2020-09-28 RX ADMIN — OXYCODONE HYDROCHLORIDE 5 MILLIGRAM(S): 5 TABLET ORAL at 12:18

## 2020-09-28 RX ADMIN — LIDOCAINE 1 PATCH: 4 CREAM TOPICAL at 20:16

## 2020-09-28 RX ADMIN — SENNA PLUS 2 TABLET(S): 8.6 TABLET ORAL at 20:33

## 2020-09-28 RX ADMIN — Medication 1 TABLET(S): at 11:53

## 2020-09-28 RX ADMIN — LIDOCAINE 1 PATCH: 4 CREAM TOPICAL at 00:39

## 2020-09-28 RX ADMIN — OXYCODONE HYDROCHLORIDE 10 MILLIGRAM(S): 5 TABLET ORAL at 15:10

## 2020-09-28 RX ADMIN — OXYCODONE HYDROCHLORIDE 10 MILLIGRAM(S): 5 TABLET ORAL at 20:33

## 2020-09-28 RX ADMIN — SODIUM CHLORIDE 3 MILLILITER(S): 9 INJECTION INTRAMUSCULAR; INTRAVENOUS; SUBCUTANEOUS at 20:32

## 2020-09-28 RX ADMIN — OXYCODONE HYDROCHLORIDE 10 MILLIGRAM(S): 5 TABLET ORAL at 14:39

## 2020-09-28 NOTE — PROGRESS NOTE ADULT - SUBJECTIVE AND OBJECTIVE BOX
[facility] Division of Hospital Medicine  Saskia Bauer MD  spectra [id number]    Patient is a 65y old  Female who presents with a chief complaint of Patient was admitted with history of lower extremity radiculopathy. Patient has previous hx of spinal laminectomies. (23 Sep 2020 11:31)      SUBJECTIVE / OVERNIGHT EVENTS: no acute events  ADDITIONAL REVIEW OF SYSTEMS: patient c/o left lower extremities pain from groin down. also has LLE numbness which is new, though, she did have left heel numbness prior to surgery. continues to have back pain. last documented BM 9/26.     MEDICATIONS  (STANDING):  carvedilol 12.5 milliGRAM(s) Oral every 12 hours  enoxaparin Injectable 40 milliGRAM(s) SubCutaneous daily  gabapentin 300 milliGRAM(s) Oral daily  influenza   Vaccine 0.5 milliLiter(s) IntraMuscular once  lidocaine   Patch 1 Patch Transdermal daily  lidocaine 1% Injectable 0.2 milliLiter(s) Local Injection once  multivitamin 1 Tablet(s) Oral daily  pantoprazole   Suspension 40 milliGRAM(s) Oral daily  polyethylene glycol 3350 17 Gram(s) Oral two times a day  senna 2 Tablet(s) Oral at bedtime  sodium chloride 0.9% lock flush 3 milliLiter(s) IV Push every 8 hours    MEDICATIONS  (PRN):  acetaminophen   Tablet .. 650 milliGRAM(s) Oral every 6 hours PRN Temp greater or equal to 38C (100.4F), Mild Pain (1 - 3)  aluminum hydroxide/magnesium hydroxide/simethicone Suspension 30 milliLiter(s) Oral every 4 hours PRN Dyspepsia  diazepam    Tablet 5 milliGRAM(s) Oral every 8 hours PRN muscle spasm  naloxone Injectable 0.1 milliGRAM(s) IV Push every 3 minutes PRN For ANY of the following changes in patient status:  A. RR LESS THAN 10 breaths per minute, B. Oxygen saturation LESS THAN 90%, C. Sedation score of 6  ondansetron Injectable 4 milliGRAM(s) IV Push every 6 hours PRN Nausea  oxyCODONE    IR 5 milliGRAM(s) Oral every 4 hours PRN Moderate Pain (4 - 6)      CAPILLARY BLOOD GLUCOSE        I&O's Summary    27 Sep 2020 07:01  -  28 Sep 2020 07:00  --------------------------------------------------------  IN: 1360 mL / OUT: 140 mL / NET: 1220 mL        PHYSICAL EXAM:  Vital Signs Last 24 Hrs  T(C): 37.2 (28 Sep 2020 09:50), Max: 37.2 (28 Sep 2020 09:50)  T(F): 99 (28 Sep 2020 09:50), Max: 99 (28 Sep 2020 09:50)  HR: 80 (28 Sep 2020 09:50) (67 - 91)  BP: 112/62 (28 Sep 2020 09:50) (103/66 - 115/75)  BP(mean): --  RR: 18 (28 Sep 2020 09:50) (17 - 18)  SpO2: 98% (28 Sep 2020 09:50) (93% - 100%)    CONSTITUTIONAL: NAD, well-developed, well-groomed, obese  NECK: Supple, no palpable masses; no thyromegaly  RESPIRATORY: Normal respiratory effort; lungs are clear to auscultation bilaterally  CARDIOVASCULAR: normal S1 and S2; No lower extremity edema  ABDOMEN: Nontender to palpation, normoactive bowel sounds, no rebound/guarding; No hepatosplenomegaly  MUSCULOSKELETAL:  no clubbing or cyanosis of digits; no joint swelling or tenderness to palpation, moves all extremities, FROM of left knee  PSYCH: A+O to person, place, and time; affect appropriate   SKIN: no palpable lesions, + dressing with drain in place      LABS:                      RADIOLOGY & ADDITIONAL TESTS:  Results Reviewed:     < from: VA Duplex Lower Ext Vein Scan, Bilat (09.28.20 @ 11:22) >  IMPRESSION:    There is extensive, occlusive or near occlusive, DVT affecting the right distal external iliac, common femoral and popliteal veins.    In the right calf, the posterior tibial peroneal trunk, the posterior tibial, peroneal and soleal veins are thrombosed.    < end of copied text >    < from: Xray Knee 3 Views, Left (09.25.20 @ 14:54) >  IMPRESSION:  Moderate to severe osteoarthritis.    < end of copied text >    Imaging Personally Reviewed:  Electrocardiogram Personally Reviewed:    COORDINATION OF CARE:  Care Discussed with Consultants/Other Providers [Y/N]: neurosurgery PA(Kamala)  Prior or Outpatient Records Reviewed [Y/N]:

## 2020-09-28 NOTE — PROGRESS NOTE ADULT - SUBJECTIVE AND OBJECTIVE BOX
SUBJECTIVE:   c/o Left groin pain radiating down below knee. Left leg numbness. Left foot numbness persists. Right leg pain resolved . Used  services   OVERNIGHT EVENTS: none    Vital Signs Last 24 Hrs  T(C): 37.2 (28 Sep 2020 09:50), Max: 37.2 (28 Sep 2020 09:50)  T(F): 99 (28 Sep 2020 09:50), Max: 99 (28 Sep 2020 09:50)  HR: 80 (28 Sep 2020 09:50) (67 - 91)  BP: 112/62 (28 Sep 2020 09:50) (103/66 - 115/75)  BP(mean): --  RR: 18 (28 Sep 2020 09:50) (17 - 18)  SpO2: 98% (28 Sep 2020 09:50) (93% - 100%)    PHYSICAL EXAM:    Neurological: awake, alert, oriented x3, follows commands, speech clear and fluent, moves all extremities x4 w/ 5/5 strength throughout, decreased sensation left anterior thigh, otherwise sensation present, intact, equal throughout. HMV 140cc/24 hrs   Cardiovascular: +s1, s2  Respiratory: clear to auscultation b/l  Gastrointestinal: soft, non-distended, non-tender  Genitourinary: +voiding  Extremities: +dp/pt pulses palpable b/l  Incision/Wound: posterior vertical midline incision dressing c/d/i w/ aquacel, hemovac x1    LABS:           IMAGING:     LE duplex-  extensive, occlusive or near occlusive, DVT affecting the right distal external iliac, common femoral and popliteal veins.  the posterior tibial peroneal trunk, the posterior tibial, peroneal and soleal veins are thrombosed.      MEDICATIONS:    acetaminophen   Tablet .. 650 milliGRAM(s) Oral every 6 hours PRN Temp greater or equal to 38C (100.4F), Mild Pain (1 - 3)  diazepam    Tablet 5 milliGRAM(s) Oral every 8 hours PRN muscle spasm  gabapentin 300 milliGRAM(s) Oral daily  ondansetron Injectable 4 milliGRAM(s) IV Push every 6 hours PRN Nausea  oxyCODONE    IR 5 milliGRAM(s) Oral every 4 hours PRN Moderate Pain (4 - 6)  oxyCODONE    IR 10 milliGRAM(s) Oral every 4 hours PRN Severe Pain (7 - 10)  carvedilol 12.5 milliGRAM(s) Oral every 12 hours  aluminum hydroxide/magnesium hydroxide/simethicone Suspension 30 milliLiter(s) Oral every 4 hours PRN Dyspepsia  pantoprazole   Suspension 40 milliGRAM(s) Oral daily  polyethylene glycol 3350 17 Gram(s) Oral two times a day  senna 2 Tablet(s) Oral at bedtime  enoxaparin Injectable 40 milliGRAM(s) SubCutaneous daily  influenza   Vaccine 0.5 milliLiter(s) IntraMuscular once  lidocaine   Patch 1 Patch Transdermal daily  lidocaine 1% Injectable 0.2 milliLiter(s) Local Injection once  multivitamin 1 Tablet(s) Oral daily        DIET:

## 2020-09-28 NOTE — PROGRESS NOTE ADULT - ASSESSMENT
A/P: 65F s/p T10-Pelvis, L3-S1 PLIF w/ PRS closure on 9/18.      - Continue to monitor Aquacel dressing; changed on 9/27  - Monitor HVC output, management per NSGY  - Care per primary/NSCU team    Plastic Surgery Resident  [facility]: [phone]

## 2020-09-28 NOTE — PROGRESS NOTE ADULT - SUBJECTIVE AND OBJECTIVE BOX
PLASTIC SURGERY DAILY PROGRESS NOTE:     SUBJECTIVE/ROS: Patient seen and examined at bedside on AM rounds. AVSS overnight. Pain controlled.      MEDICATIONS  (STANDING):  carvedilol 12.5 milliGRAM(s) Oral every 12 hours  enoxaparin Injectable 40 milliGRAM(s) SubCutaneous daily  gabapentin 300 milliGRAM(s) Oral daily  influenza   Vaccine 0.5 milliLiter(s) IntraMuscular once  lidocaine   Patch 1 Patch Transdermal daily  lidocaine 1% Injectable 0.2 milliLiter(s) Local Injection once  multivitamin 1 Tablet(s) Oral daily  pantoprazole   Suspension 40 milliGRAM(s) Oral daily  polyethylene glycol 3350 17 Gram(s) Oral two times a day  senna 2 Tablet(s) Oral at bedtime  silver sulfADIAZINE 1% Cream 1 Application(s) Topical every 8 hours  sodium chloride 0.9% lock flush 3 milliLiter(s) IV Push every 8 hours    MEDICATIONS  (PRN):  acetaminophen   Tablet .. 650 milliGRAM(s) Oral every 6 hours PRN Temp greater or equal to 38C (100.4F), Mild Pain (1 - 3)  aluminum hydroxide/magnesium hydroxide/simethicone Suspension 30 milliLiter(s) Oral every 4 hours PRN Dyspepsia  diazepam    Tablet 2 milliGRAM(s) Oral every 8 hours PRN muscle spasm  naloxone Injectable 0.1 milliGRAM(s) IV Push every 3 minutes PRN For ANY of the following changes in patient status:  A. RR LESS THAN 10 breaths per minute, B. Oxygen saturation LESS THAN 90%, C. Sedation score of 6  ondansetron Injectable 4 milliGRAM(s) IV Push every 6 hours PRN Nausea  oxyCODONE    IR 5 milliGRAM(s) Oral every 4 hours PRN Moderate Pain (4 - 6)  oxyCODONE    IR 10 milliGRAM(s) Oral every 4 hours PRN Severe Pain (7 - 10)      OBJECTIVE:      T(C): 36.9 (09-28-20 @ 05:33), Max: 37.1 (09-28-20 @ 00:17)  T(F): 98.5 (09-28-20 @ 05:33), Max: 98.7 (09-28-20 @ 00:17)  HR: 79 (09-28-20 @ 05:33) (67 - 91)  BP: 108/61 (09-28-20 @ 05:33) (99/61 - 115/75)  RR: 18 (09-28-20 @ 05:33) (17 - 18)  SpO2: 95% (09-28-20 @ 05:33) (93% - 100%)      26 Sep 2020 07:01  -  27 Sep 2020 07:00  --------------------------------------------------------  IN:    Oral Fluid: 1420 mL  Total IN: 1420 mL    OUT:    Accordian (mL): 125 mL    Voided (mL): 600 mL  Total OUT: 725 mL    Total NET: 695 mL      27 Sep 2020 07:01  -  28 Sep 2020 06:49  --------------------------------------------------------  IN:    Oral Fluid: 1360 mL  Total IN: 1360 mL    OUT:    Accordian (mL): 140 mL  Total OUT: 140 mL    Total NET: 1220 mL    PHYSICAL EXAM:    General: NAD, Lying in bed comfortably  Neuro: Awake and alert  Back: soft, Acquacel c/d/i, no fluid collections, HVC x 1 with SSF  GI/Abd: Soft, NT/ND

## 2020-09-28 NOTE — PROGRESS NOTE ADULT - ASSESSMENT
65yoF w/ PMHx significant for lumbar spinal stenosis, HTN and obesity s/p T10-pelvis fusion and L3-S1 PLIF on 9/18. pod # 10. Post op CT acceptable post op changes Patient noted with thrombocytopenia, RLE extensive DVT s/p IVC filter on 9/21.    Plan    Neuro- D/w [facility] resident dr Velasco. CT T/L spine.   Vitals stable  DVT - Extensive RLE DVT . s/p IVCF. Will discuss with Dr Sheppard timing for anticoagulation   PT/OT- acute rehab. Awaiting placement   labs

## 2020-09-28 NOTE — CHART NOTE - NSCHARTNOTEFT_GEN_A_CORE
Nutrition Follow Up Note    Patient seen for length of stay follow up    Source: pt, RN, EMR    Chart reviewed, events noted. Per chart, pt is a 65yoF w/ PMHx significant for lumbar spinal stenosis, HTN and obesity s/p T10-pelvis fusion and L3-S1 PLIF on . pod # 10.     Diet : Regular    Patient primarily Hungarian speaking per RN/chart,  services utilized,  ID #[id number]    Pt denies any N/V, last BM .    PO intake: RN reports pt with poor PO intake and appetite. Pt states appetite has remained poor throughout admission 2/2 pain and pain medication. States the food tastes good but she just does not want to eat. Offered to obtain food preferences from pt but pt with none to offer, continues to state food is fine. Pt tearful at visit - emotional support provided.    Source for PO intake: pt, RN, EMR    Enteral/Parenteral Nutrition:     Daily Weight in k.8 (09-22)    Pertinent Medications: MEDICATIONS  (STANDING):  carvedilol 12.5 milliGRAM(s) Oral every 12 hours  gabapentin 300 milliGRAM(s) Oral daily  influenza   Vaccine 0.5 milliLiter(s) IntraMuscular once  lidocaine   Patch 1 Patch Transdermal daily  lidocaine 1% Injectable 0.2 milliLiter(s) Local Injection once  multivitamin 1 Tablet(s) Oral daily  pantoprazole   Suspension 40 milliGRAM(s) Oral daily  polyethylene glycol 3350 17 Gram(s) Oral two times a day  senna 2 Tablet(s) Oral at bedtime  sodium chloride 0.9% lock flush 3 milliLiter(s) IV Push every 8 hours    MEDICATIONS  (PRN):  acetaminophen   Tablet .. 650 milliGRAM(s) Oral every 6 hours PRN Temp greater or equal to 38C (100.4F), Mild Pain (1 - 3)  aluminum hydroxide/magnesium hydroxide/simethicone Suspension 30 milliLiter(s) Oral every 4 hours PRN Dyspepsia  diazepam    Tablet 5 milliGRAM(s) Oral every 8 hours PRN muscle spasm  naloxone Injectable 0.1 milliGRAM(s) IV Push every 3 minutes PRN For ANY of the following changes in patient status:  A. RR LESS THAN 10 breaths per minute, B. Oxygen saturation LESS THAN 90%, C. Sedation score of 6  ondansetron Injectable 4 milliGRAM(s) IV Push every 6 hours PRN Nausea  oxyCODONE    IR 5 milliGRAM(s) Oral every 4 hours PRN Moderate Pain (4 - 6)  oxyCODONE    IR 10 milliGRAM(s) Oral every 4 hours PRN Severe Pain (7 - 10)    Pertinent Labs:  @ 13:47: Na 132<L>, BUN 13, Cr 0.87, BG 95, K+ 4.5, Phos --, Mg --, Alk Phos --, ALT/SGPT --, AST/SGOT --, HbA1c --   @ 12:29: Na 131<L>, BUN 13, Cr 0.83, <H>, K+ 5.8<H>, Phos --, Mg --, Alk Phos --, ALT/SGPT --, AST/SGOT --, HbA1c --    Finger Sticks: n/a    Skin per nursing documentation: no pressure injuries  Edema per nursing documentation: none noted    Estimated Needs:   [x] no change since previous assessment  [ ] recalculated:     Previous Nutrition Diagnosis: Inadequate protein energy intake  Nutrition Diagnosis is: ongoing, being addressed with PO encouragement. Pt declining oral nutrition supplements.    New Nutrition Diagnosis: n/a    Recommend  1. Continue regular diet.  2. Provide PO encouragement and assistance at meals as needed.  3. Continue multivitamin.  4. RD remains available to obtain and update food preferences as needed.    Monitoring and Evaluation:     Continue to monitor nutritional intake, tolerance to diet prescription, weights, labs, skin integrity    RD remains available upon request and will follow up per protocol    Mari Montalvo RD, Pager # [phone]

## 2020-09-29 ENCOUNTER — TRANSCRIPTION ENCOUNTER (OUTPATIENT)
Age: 65
End: 2020-09-29

## 2020-09-29 VITALS
DIASTOLIC BLOOD PRESSURE: 61 MMHG | OXYGEN SATURATION: 95 % | TEMPERATURE: 98 F | HEART RATE: 76 BPM | RESPIRATION RATE: 18 BRPM | SYSTOLIC BLOOD PRESSURE: 121 MMHG

## 2020-09-29 DIAGNOSIS — M79.672 PAIN IN LEFT FOOT: ICD-10-CM

## 2020-09-29 PROCEDURE — C1713: CPT

## 2020-09-29 PROCEDURE — 85018 HEMOGLOBIN: CPT

## 2020-09-29 PROCEDURE — U0003: CPT

## 2020-09-29 PROCEDURE — 72082 X-RAY EXAM ENTIRE SPI 2/3 VW: CPT

## 2020-09-29 PROCEDURE — 72131 CT LUMBAR SPINE W/O DYE: CPT

## 2020-09-29 PROCEDURE — 97116 GAIT TRAINING THERAPY: CPT

## 2020-09-29 PROCEDURE — P9041: CPT

## 2020-09-29 PROCEDURE — 99232 SBSQ HOSP IP/OBS MODERATE 35: CPT

## 2020-09-29 PROCEDURE — 90662 IIV NO PRSV INCREASED AG IM: CPT

## 2020-09-29 PROCEDURE — P9045: CPT

## 2020-09-29 PROCEDURE — 82330 ASSAY OF CALCIUM: CPT

## 2020-09-29 PROCEDURE — 80048 BASIC METABOLIC PNL TOTAL CA: CPT

## 2020-09-29 PROCEDURE — 97530 THERAPEUTIC ACTIVITIES: CPT

## 2020-09-29 PROCEDURE — 83605 ASSAY OF LACTIC ACID: CPT

## 2020-09-29 PROCEDURE — 85730 THROMBOPLASTIN TIME PARTIAL: CPT

## 2020-09-29 PROCEDURE — 97110 THERAPEUTIC EXERCISES: CPT

## 2020-09-29 PROCEDURE — 93970 EXTREMITY STUDY: CPT

## 2020-09-29 PROCEDURE — 82803 BLOOD GASES ANY COMBINATION: CPT

## 2020-09-29 PROCEDURE — C9399: CPT

## 2020-09-29 PROCEDURE — 84132 ASSAY OF SERUM POTASSIUM: CPT

## 2020-09-29 PROCEDURE — 36430 TRANSFUSION BLD/BLD COMPNT: CPT

## 2020-09-29 PROCEDURE — 86850 RBC ANTIBODY SCREEN: CPT

## 2020-09-29 PROCEDURE — P9016: CPT

## 2020-09-29 PROCEDURE — 85027 COMPLETE CBC AUTOMATED: CPT

## 2020-09-29 PROCEDURE — 97166 OT EVAL MOD COMPLEX 45 MIN: CPT

## 2020-09-29 PROCEDURE — C1769: CPT

## 2020-09-29 PROCEDURE — C1887: CPT

## 2020-09-29 PROCEDURE — 82947 ASSAY GLUCOSE BLOOD QUANT: CPT

## 2020-09-29 PROCEDURE — 82435 ASSAY OF BLOOD CHLORIDE: CPT

## 2020-09-29 PROCEDURE — 93005 ELECTROCARDIOGRAM TRACING: CPT

## 2020-09-29 PROCEDURE — C1889: CPT

## 2020-09-29 PROCEDURE — 85396 CLOTTING ASSAY WHOLE BLOOD: CPT

## 2020-09-29 PROCEDURE — 85025 COMPLETE CBC W/AUTO DIFF WBC: CPT

## 2020-09-29 PROCEDURE — 97162 PT EVAL MOD COMPLEX 30 MIN: CPT

## 2020-09-29 PROCEDURE — 85610 PROTHROMBIN TIME: CPT

## 2020-09-29 PROCEDURE — 86923 COMPATIBILITY TEST ELECTRIC: CPT

## 2020-09-29 PROCEDURE — C1894: CPT

## 2020-09-29 PROCEDURE — 76000 FLUOROSCOPY <1 HR PHYS/QHP: CPT

## 2020-09-29 PROCEDURE — C1880: CPT

## 2020-09-29 PROCEDURE — 73630 X-RAY EXAM OF FOOT: CPT

## 2020-09-29 PROCEDURE — 85014 HEMATOCRIT: CPT

## 2020-09-29 PROCEDURE — 73562 X-RAY EXAM OF KNEE 3: CPT

## 2020-09-29 PROCEDURE — 86900 BLOOD TYPING SEROLOGIC ABO: CPT

## 2020-09-29 PROCEDURE — 37191 INS ENDOVAS VENA CAVA FILTR: CPT

## 2020-09-29 PROCEDURE — 86901 BLOOD TYPING SEROLOGIC RH(D): CPT

## 2020-09-29 PROCEDURE — 82565 ASSAY OF CREATININE: CPT

## 2020-09-29 PROCEDURE — 84295 ASSAY OF SERUM SODIUM: CPT

## 2020-09-29 RX ORDER — ASPIRIN/CALCIUM CARB/MAGNESIUM 324 MG
1 TABLET ORAL
Qty: 0 | Refills: 0 | DISCHARGE
Start: 2020-09-29

## 2020-09-29 RX ORDER — ACETAMINOPHEN 500 MG
2 TABLET ORAL
Qty: 0 | Refills: 0 | DISCHARGE
Start: 2020-09-29

## 2020-09-29 RX ORDER — EPLERENONE 50 MG/1
1 TABLET, FILM COATED ORAL
Qty: 0 | Refills: 0 | DISCHARGE

## 2020-09-29 RX ORDER — OXYCODONE HYDROCHLORIDE 5 MG/1
1 TABLET ORAL
Qty: 0 | Refills: 0 | DISCHARGE
Start: 2020-09-29

## 2020-09-29 RX ORDER — CLONAZEPAM 1 MG
1 TABLET ORAL
Qty: 0 | Refills: 0 | DISCHARGE

## 2020-09-29 RX ORDER — INFLUENZA VIRUS VACCINE 15; 15; 15; 15 UG/.5ML; UG/.5ML; UG/.5ML; UG/.5ML
0.7 SUSPENSION INTRAMUSCULAR ONCE
Refills: 0 | Status: COMPLETED | OUTPATIENT
Start: 2020-09-29 | End: 2020-09-29

## 2020-09-29 RX ORDER — ASPIRIN/CALCIUM CARB/MAGNESIUM 324 MG
1 TABLET ORAL
Qty: 0 | Refills: 0 | DISCHARGE

## 2020-09-29 RX ORDER — LOSARTAN POTASSIUM 100 MG/1
0 TABLET, FILM COATED ORAL
Qty: 0 | Refills: 0 | DISCHARGE

## 2020-09-29 RX ORDER — ENOXAPARIN SODIUM 100 MG/ML
100 INJECTION SUBCUTANEOUS
Qty: 0 | Refills: 0 | DISCHARGE
Start: 2020-09-29

## 2020-09-29 RX ORDER — SENNA PLUS 8.6 MG/1
2 TABLET ORAL
Qty: 0 | Refills: 0 | DISCHARGE
Start: 2020-09-29

## 2020-09-29 RX ORDER — ASPIRIN/CALCIUM CARB/MAGNESIUM 324 MG
81 TABLET ORAL DAILY
Refills: 0 | Status: DISCONTINUED | OUTPATIENT
Start: 2020-09-30 | End: 2020-09-29

## 2020-09-29 RX ORDER — POLYETHYLENE GLYCOL 3350 17 G/17G
17 POWDER, FOR SOLUTION ORAL
Qty: 0 | Refills: 0 | DISCHARGE
Start: 2020-09-29

## 2020-09-29 RX ORDER — LIDOCAINE 4 G/100G
1 CREAM TOPICAL
Qty: 0 | Refills: 0 | DISCHARGE
Start: 2020-09-29

## 2020-09-29 RX ORDER — DIAZEPAM 5 MG
1 TABLET ORAL
Qty: 0 | Refills: 0 | DISCHARGE
Start: 2020-09-29

## 2020-09-29 RX ORDER — PANTOPRAZOLE SODIUM 20 MG/1
1 TABLET, DELAYED RELEASE ORAL
Qty: 0 | Refills: 0 | DISCHARGE
Start: 2020-09-29

## 2020-09-29 RX ADMIN — OXYCODONE HYDROCHLORIDE 5 MILLIGRAM(S): 5 TABLET ORAL at 06:29

## 2020-09-29 RX ADMIN — GABAPENTIN 300 MILLIGRAM(S): 400 CAPSULE ORAL at 10:53

## 2020-09-29 RX ADMIN — POLYETHYLENE GLYCOL 3350 17 GRAM(S): 17 POWDER, FOR SOLUTION ORAL at 06:28

## 2020-09-29 RX ADMIN — OXYCODONE HYDROCHLORIDE 10 MILLIGRAM(S): 5 TABLET ORAL at 11:24

## 2020-09-29 RX ADMIN — OXYCODONE HYDROCHLORIDE 5 MILLIGRAM(S): 5 TABLET ORAL at 06:56

## 2020-09-29 RX ADMIN — PANTOPRAZOLE SODIUM 40 MILLIGRAM(S): 20 TABLET, DELAYED RELEASE ORAL at 10:53

## 2020-09-29 RX ADMIN — SODIUM CHLORIDE 3 MILLILITER(S): 9 INJECTION INTRAMUSCULAR; INTRAVENOUS; SUBCUTANEOUS at 06:30

## 2020-09-29 RX ADMIN — LIDOCAINE 1 PATCH: 4 CREAM TOPICAL at 10:54

## 2020-09-29 RX ADMIN — ENOXAPARIN SODIUM 100 MILLIGRAM(S): 100 INJECTION SUBCUTANEOUS at 06:29

## 2020-09-29 RX ADMIN — LIDOCAINE 1 PATCH: 4 CREAM TOPICAL at 00:29

## 2020-09-29 RX ADMIN — Medication 1 TABLET(S): at 10:53

## 2020-09-29 RX ADMIN — INFLUENZA VIRUS VACCINE 0.7 MILLILITER(S): 15; 15; 15; 15 SUSPENSION INTRAMUSCULAR at 12:00

## 2020-09-29 RX ADMIN — OXYCODONE HYDROCHLORIDE 10 MILLIGRAM(S): 5 TABLET ORAL at 10:54

## 2020-09-29 RX ADMIN — CARVEDILOL PHOSPHATE 12.5 MILLIGRAM(S): 80 CAPSULE, EXTENDED RELEASE ORAL at 06:28

## 2020-09-29 NOTE — PROGRESS NOTE ADULT - PROBLEM SELECTOR PLAN 2
postop management per neurosurgery  monitor hemovac drain output (required multiple PRBC transfusions)  pain control with tylenol (s/p 650mg q8h x 2 days ATC), oxycodone and dilaudid prn (BP meds adjusted to allow room for BP), valium 2mg q6h
postop management per neurosurgery  drain removed  pain control with tylenol (s/p 650mg q8h x 2 days ATC), oxycodone 10/5mg prn (BP meds adjusted to allow room for BP), valium 2mg q6h--> changed to q8h prn on 9/25 due to marginal BPs  patient with LLE numbness since postop but outpatient record noted b/l LE numbness prior to OR. no need for further imaging per neurosurgery at this time  continue gabapentin.
postop management per neurosurgery  monitor hemovac drain output (required multiple PRBC transfusions)  pain control with tylenol (s/p 650mg q8h x 2 days ATC), oxycodone 10/5mg prn (BP meds adjusted to allow room for BP), valium 2mg q6h--> changed to q8h prn on 9/25 due to marginal BPs  consider resuming oxycodone 10mg q4h prn  patient with ? new LLE numbness. neurosurgery follow up regarding possible imaging of the spine/pelvis. already on gabapentin.
postop management per neurosurgery  monitor hemovac drain output (required multiple PRBC transfusions)  pain control with tylenol (s/p 650mg q8h x 2 days ATC), oxycodone prn (BP meds adjusted to allow room for BP), valium 2mg q6h--> changed to q8h prn on 9/25 due to marginal BP
postop management per neurosurgery  monitor hemovac drain output (required multiple PRBC transfusions)  pain control with tylenol (s/p 650mg q8h x 2 days ATC), oxycodone prn (BP meds adjusted to allow room for BP), valium 2mg q6h--> changed to q8h prn on 9/25 due to marginal BPs
postop management per neurosurgery  monitor hemovac drain output (required multiple PRBC transfusions)  pain control with tylenol (start 650mg q8h x 2 days ATC) and oxycodone prn (BP meds adjusted to allow room for BP)
postop management per neurosurgery  monitor hemovac drain output (required multiple PRBC transfusions)  pain control with tylenol and oxycodone prn
postop management per neurosurgery  monitor hemovac drain output (required multiple PRBC transfusions)  pain control with tylenol (started 650mg q8h x 2 days ATC on 9/22), oxycodone and dilaudid prn (BP meds adjusted to allow room for BP), valium 2mg q6h
postop management per neurosurgery  monitor hemovac drain output (required multiple PRBC transfusions)  pain control with tylenol (s/p 650mg q8h x 2 days ATC), oxycodone prn (BP meds adjusted to allow room for BP), valium 2mg q6h--> changed to q8h prn on 9/25 due to marginal BP

## 2020-09-29 NOTE — PROGRESS NOTE ADULT - ASSESSMENT
65yoF w/ PMHx significant for lumbar spinal stenosis, b/l renal cysts, HTN and obesity s/p T10-pelvis fusion and L3-S1 PLIF on 9/18. Patient noted with thrombocytopenia, RLE DVT s/p IVC filter on 9/21.    PMD: Dr. Rachel Raymond [phone]  Gilberto (Son) [phone]

## 2020-09-29 NOTE — PROGRESS NOTE ADULT - PROBLEM SELECTOR PROBLEM 3
Left knee pain
HTN (hypertension)
Left knee pain
HTN (hypertension)

## 2020-09-29 NOTE — PROGRESS NOTE ADULT - PROBLEM SELECTOR PLAN 6
lovenox for DVT ppx  continue bowel regimen (miralax, senna). + BM after mineral oil enema 9/22
klonopin 0.5mg q8h prn on hold as patient now on valium
lovenox for DVT ppx  continue bowel regimen (miralax, senna). + multiple BM after mineral oil enema 9/22
klonopin 0.5mg q8h prn on hold as patient now on valium
lovenox on hold in anticipation of IVC filter placement  continue bowel regimen (miralax, senna). trial of dulcolax 10mg suppository x1 and mg citrate PO x 1.
lovenox resumed  continue bowel regimen (miralax, senna). unsuccessful trial of dulcolax 10mg suppository and mg citrate yesterday. trial of mineral oil enema today (ordered)
mild thrombocytopenia likely in setting of recent PRBC transfusions, resolved  monitor CBC periodically

## 2020-09-29 NOTE — PROGRESS NOTE ADULT - PROVIDER SPECIALTY LIST ADULT
Hospitalist
Hospitalist
Internal Medicine
Intervent Radiology
Intervent Radiology
NSICU
NSICU
Neurosurgery
Plastic Surgery
Rehab Medicine
Rehab Medicine
Neurosurgery
Internal Medicine
Internal Medicine

## 2020-09-29 NOTE — PROGRESS NOTE ADULT - PROBLEM SELECTOR PROBLEM 4
HTN (hypertension)
Thrombocytopenia
Foot pain, left
HTN (hypertension)
Thrombocytopenia

## 2020-09-29 NOTE — DISCHARGE NOTE NURSING/CASE MANAGEMENT/SOCIAL WORK - PATIENT PORTAL LINK FT
You can access the FollowMyHealth Patient Portal offered by [facility] by registering at the following website: [email or web address]. By joining [facility]’s FollowMyHealth portal, you will also be able to view your health information using other applications (apps) compatible with our system.

## 2020-09-29 NOTE — PROGRESS NOTE ADULT - PROBLEM SELECTOR PLAN 9
d/c planning to rehab.  left message for Gilberto(son) and attempted to contact Dr. Rachel Raymond [phone] but unable to be reached.

## 2020-09-29 NOTE — PROGRESS NOTE ADULT - SUBJECTIVE AND OBJECTIVE BOX
[facility] Division of Hospital Medicine  Saskia Bauer MD  spectra: [phone]    Patient is a 65y old  Female who presents with a chief complaint of admitted after T10-pelvis instrumentation (29 Sep 2020 10:31)      SUBJECTIVE / OVERNIGHT EVENTS: no acute events  ADDITIONAL REVIEW OF SYSTEMS: patient reports her left lower extremity pain is improved from yesterday, numbness is still present from left hip down to below left knee.     MEDICATIONS  (STANDING):  carvedilol 12.5 milliGRAM(s) Oral every 12 hours  enoxaparin Injectable 100 milliGRAM(s) SubCutaneous two times a day  gabapentin 300 milliGRAM(s) Oral daily  influenza  Vaccine (HIGH DOSE) 0.7 milliLiter(s) IntraMuscular once  lidocaine   Patch 1 Patch Transdermal daily  lidocaine 1% Injectable 0.2 milliLiter(s) Local Injection once  multivitamin 1 Tablet(s) Oral daily  pantoprazole   Suspension 40 milliGRAM(s) Oral daily  polyethylene glycol 3350 17 Gram(s) Oral two times a day  senna 2 Tablet(s) Oral at bedtime  sodium chloride 0.9% lock flush 3 milliLiter(s) IV Push every 8 hours    MEDICATIONS  (PRN):  acetaminophen   Tablet .. 650 milliGRAM(s) Oral every 6 hours PRN Temp greater or equal to 38C (100.4F), Mild Pain (1 - 3)  aluminum hydroxide/magnesium hydroxide/simethicone Suspension 30 milliLiter(s) Oral every 4 hours PRN Dyspepsia  diazepam    Tablet 5 milliGRAM(s) Oral every 8 hours PRN muscle spasm  naloxone Injectable 0.1 milliGRAM(s) IV Push every 3 minutes PRN For ANY of the following changes in patient status:  A. RR LESS THAN 10 breaths per minute, B. Oxygen saturation LESS THAN 90%, C. Sedation score of 6  ondansetron Injectable 4 milliGRAM(s) IV Push every 6 hours PRN Nausea  oxyCODONE    IR 5 milliGRAM(s) Oral every 4 hours PRN Moderate Pain (4 - 6)  oxyCODONE    IR 10 milliGRAM(s) Oral every 4 hours PRN Severe Pain (7 - 10)      CAPILLARY BLOOD GLUCOSE        I&O's Summary    28 Sep 2020 07:01  -  29 Sep 2020 07:00  --------------------------------------------------------  IN: 1280 mL / OUT: 2300 mL / NET: -1020 mL    29 Sep 2020 07:01  -  29 Sep 2020 11:32  --------------------------------------------------------  IN: 240 mL / OUT: 300 mL / NET: -60 mL        PHYSICAL EXAM:  Vital Signs Last 24 Hrs  T(C): 36.6 (29 Sep 2020 09:41), Max: 37.1 (28 Sep 2020 20:53)  T(F): 97.9 (29 Sep 2020 09:41), Max: 98.8 (28 Sep 2020 20:53)  HR: 76 (29 Sep 2020 09:41) (76 - 91)  BP: 121/61 (29 Sep 2020 09:41) (100/62 - 133/68)  BP(mean): 90 (28 Sep 2020 17:45) (90 - 90)  RR: 18 (29 Sep 2020 09:41) (18 - 18)  SpO2: 95% (29 Sep 2020 09:41) (95% - 100%)    CONSTITUTIONAL: NAD, well-developed, well-groomed, obese  NECK: Supple, no palpable masses; no thyromegaly  RESPIRATORY: Normal respiratory effort; lungs are clear to auscultation bilaterally  CARDIOVASCULAR: normal S1 and S2; No lower extremity edema  ABDOMEN: Nontender to palpation, normoactive bowel sounds, no rebound/guarding; No hepatosplenomegaly  MUSCULOSKELETAL:  no clubbing or cyanosis of digits; no joint swelling or tenderness to palpation, moves all extremities  PSYCH: A+O to person, place, and time; affect appropriate   SKIN: no palpable lesions, + dressing in place    LABS:                        9.4    7.64  )-----------( 247      ( 28 Sep 2020 13:47 )             31.5     09-28    132<L>  |  96  |  13  ----------------------------<  95  4.5   |  26  |  0.87    Ca    9.3      28 Sep 2020 13:47                  RADIOLOGY & ADDITIONAL TESTS:  Results Reviewed:     < from: Xray Foot AP + Lateral + Oblique, Left (09.27.20 @ 20:45) >  IMPRESSION:    No dislocations or acute appearing fractures.    Redemonstrated bony fragmentation and hypertrophy in the distal fibular region and  adjacent to medial malleolar tip.    Redemonstrated dense appearing talar dome raising  suspicion for AVN. There are also redemonstrated possible small  osteochondral lesions and possible subchondral collapse at the talar dome. CT  or MRI may be helpful to further assess.    The other joint space are preserved.    Plantar calcaneal enthesophyte.    < end of copied text >    Imaging Personally Reviewed:  Electrocardiogram Personally Reviewed:    COORDINATION OF CARE:  Care Discussed with Consultants/Other Providers [Y/N]: neurosurgery PA(Bhanu Drummond)  Prior or Outpatient Records Reviewed [Y/N]:

## 2020-09-29 NOTE — PROGRESS NOTE ADULT - PROBLEM SELECTOR PROBLEM 8
Discharge planning issues
Preventive measure

## 2020-09-29 NOTE — PROGRESS NOTE ADULT - SUBJECTIVE AND OBJECTIVE BOX
patient continues to have pain, feels uncomfortable     REVIEW OF SYSTEMS  Constitutional - No fever,  No fatigue  HEENT - No vertigo, No neck pain  Neurological - No headaches, No memory loss, No loss of strength, No numbness, No tremors  Skin - No rashes, No lesions   Musculoskeletal - No joint pain, No joint swelling, No muscle pain  Psychiatric - No depression, No anxiety    FUNCTIONAL PROGRESS  9/28 PT    Sit-Stand Transfer Training  Transfer Training Sit-to-Stand Transfer: contact guard;  1 person assist;  nonverbal cues (demo/gestures);  verbal cues;  full weight-bearing   rolling walker  Transfer Training Stand-to-Sit Transfer: contact guard;  1 person assist;  nonverbal cues (demo/gestures);  verbal cues;  full weight-bearing   rolling walker  Sit-to-Stand Transfer Training Transfer Safety Analysis: decreased strength;  impaired balance    Gait Training  Gait Training: contact guard;  1 person assist;  nonverbal cues (demo/gestures);  verbal cues;  full weight-bearing   rolling walker;  30 feetx2  Gait Analysis: decreased strength;  impaired balance          VITALS  T(C): 36.8 (09-29-20 @ 04:46), Max: 37.1 (09-28-20 @ 20:53)  HR: 86 (09-29-20 @ 04:46) (76 - 91)  BP: 125/74 (09-29-20 @ 04:46) (100/62 - 133/68)  RR: 18 (09-29-20 @ 04:46) (18 - 18)  SpO2: 97% (09-29-20 @ 04:46) (95% - 100%)  Wt(kg): --    MEDICATIONS   acetaminophen   Tablet .. 650 milliGRAM(s) every 6 hours PRN  aluminum hydroxide/magnesium hydroxide/simethicone Suspension 30 milliLiter(s) every 4 hours PRN  carvedilol 12.5 milliGRAM(s) every 12 hours  diazepam    Tablet 5 milliGRAM(s) every 8 hours PRN  enoxaparin Injectable 100 milliGRAM(s) two times a day  gabapentin 300 milliGRAM(s) daily  influenza  Vaccine (HIGH DOSE) 0.7 milliLiter(s) once  lidocaine   Patch 1 Patch daily  lidocaine 1% Injectable 0.2 milliLiter(s) once  multivitamin 1 Tablet(s) daily  naloxone Injectable 0.1 milliGRAM(s) every 3 minutes PRN  ondansetron Injectable 4 milliGRAM(s) every 6 hours PRN  oxyCODONE    IR 5 milliGRAM(s) every 4 hours PRN  oxyCODONE    IR 10 milliGRAM(s) every 4 hours PRN  pantoprazole   Suspension 40 milliGRAM(s) daily  polyethylene glycol 3350 17 Gram(s) two times a day  senna 2 Tablet(s) at bedtime  sodium chloride 0.9% lock flush 3 milliLiter(s) every 8 hours      RECENT LABS - Reviewed                        9.4    7.64  )-----------( 247      ( 28 Sep 2020 13:47 )             31.5     09-28    132<L>  |  96  |  13  ----------------------------<  95  4.5   |  26  |  0.87    Ca    9.3      28 Sep 2020 13:47    < from: VA Duplex Lower Ext Vein Scan, Bilat (09.28.20 @ 11:22) >    IMPRESSION:    There is extensive, occlusive or near occlusive, DVT affecting the right distal external iliac, common femoral and popliteal veins.    In the right calf, the posterior tibial peroneal trunk, the posterior tibial, peroneal and soleal veins are thrombosed.        < end of copied text >    < from: Xray Foot AP + Lateral + Oblique, Left (09.27.20 @ 20:45) >    IMPRESSION:    No dislocations or acute appearing fractures.    Redemonstrated bony fragmentation and hypertrophy in the distal fibular region and  adjacent to medial malleolar tip.    Redemonstrated dense appearing talar dome raising  suspicion for AVN. There are also redemonstrated possible small  osteochondral lesions and possible subchondral collapse at the talar dome. CT  or MRI may be helpful to further assess.    The other joint space are preserved.    Plantar calcaneal enthesophyte.      < end of copied text >  < from: Xray Knee 3 Views, Left (09.25.20 @ 14:54) >    IMPRESSION:  Moderate to severe osteoarthritis.        < end of copied text >    --------------------------------------------------------------------------------  PHYSICAL EXAM  Constitutional - NAD, Comfortable, in bed   skin-incisions covered  Neck - Supple, No limited ROM  Chest - Breathing comfortably, on room air   Cardiovascular - S1S2   Abdomen - Soft   Extremities - No C/C/E, No calf tenderness   Neurologic Exam -                    Cognitive - Awake, Alert, AAO to self, place, date, year, situation     Communication - Fluent, No dysarthria     Cranial Nerves - CN 2-12 intact     Motor - No focal deficits                    LEFT    UE - ShAB 5/5, EF 5/5, EE 5/5, WE 5/5,  5/5                    RIGHT UE - ShAB 5/5, EF 5/5, EE 5/5, WE 5/5,  5/5                    LEFT    LE - HF 5/5, KE 5/5, DF 5/5, PF 5/5                    RIGHT LE - HF 5/5, KE 5/5, DF 5/5, PF 5/5        Sensory - Intact to LT     Reflexes - DTR Intact, No primitive reflexes     Psychiatric - Mood stable, Affect WNL  ----------------------------------------------------------------------------------------  ASSESSMENT/PLAN  65 year old woman h/o HTN, obesity, lumbar laminectomy in 2018 with functional deficits after T10-pelvis fusion, L3-S1 PLIF  DVT RLE, s/p IVC filter 9/21, follow up LED 9/28 +extensive RLE DVT   Left leg pain, xrays left foot and knee negative   Pain - Tylenol, oxycodone, lidoderm, gabapentin, valium  DVT - lovenox   Rehab - Will continue to follow for ongoing rehab needs and recommendations.   continue bedside PT/OT  out of bed to chair daily   contact guard with transfers, ambulation   Recommend ACUTE inpatient rehabilitation for the functional deficits consisting of 3 hours of therapy/day & 24 hour RN/daily PMR physician for comorbid medical management. Patient will be able to tolerate 3 hours a day.

## 2020-09-29 NOTE — PROGRESS NOTE ADULT - PROBLEM SELECTOR PLAN 3
suspect underlying OA  check xray of left knee and start lidoderm patch
continue coreg 25mg BID with hold parameter  d/c losartan   monitor BP
continue coreg 25mg BID and losartan 50mg BID  monitor BP
continue coreg 25mg BID with hold parameter  change losartan 50mg BID to daily  monitor BP
suspect due to underlying OA as seen on xray of left knee   continue lidoderm patch
suspect due to underlying OA as seen on xray of left knee   continue lidoderm patch
suspect underlying OA  check xray of left knee and start lidoderm patch
suspect underlying OA  check xray of left knee and start lidoderm patch
continue coreg 25mg BID with hold parameter  decrease losartan to 25mg daily with hold parameter  monitor BP

## 2020-09-29 NOTE — PROGRESS NOTE ADULT - PROBLEM SELECTOR PLAN 1
right external iliac vein, common femoral/deep femoral vein as well as posterior tibial and peroneal DVT noted on LE duplex  patient is not a candidate for full anticoagulation at this time. plan for IVC filter placement.
right external iliac vein, common femoral/deep femoral vein as well as posterior tibial and peroneal DVT noted on LE duplex  patient is not a candidate for full anticoagulation at this time. s/p IVC filter on 9/21
right external iliac vein, common femoral/deep femoral vein as well as posterior tibial and peroneal DVT noted on LE duplex  patient is not a candidate for full anticoagulation at this time. s/p IVC filter on 9/21
right external iliac vein, common femoral/deep femoral vein as well as posterior tibial and peroneal DVT noted on LE duplex  patient is not a candidate for full anticoagulation at this time. s/p IVC filter placement on 9/21
right external iliac vein, common femoral/deep femoral vein as well as posterior tibial and peroneal DVT noted on LE duplex  patient was deemed not a candidate for full anticoagulation at this time. s/p IVC filter on 9/21  neurosurgery follow up regarding timing of starting full AC. if no contraindication, can start eliquis.
right external iliac vein, common femoral/deep femoral vein as well as posterior tibial and peroneal DVT noted on LE duplex  patient was deemed not a candidate for full anticoagulation at this time. s/p IVC filter on 9/21  started on therapeutic lovenox 100mg SQ BID per neurosurgery 9/29  will need to have IVC filter removed in the near future (follow up with IR- Dr. Fausto Munoz). d/w patient.
right external iliac vein, common femoral/deep femoral vein as well as posterior tibial and peroneal DVT noted on LE duplex  patient is not a candidate for full anticoagulation at this time. s/p IVC filter placement on 9/21
right external iliac vein, common femoral/deep femoral vein as well as posterior tibial and peroneal DVT noted on LE duplex  patient is not a candidate for full anticoagulation at this time. s/p IVC filter placement on 9/21
right external iliac vein, common femoral/deep femoral vein as well as posterior tibial and peroneal DVT noted on LE duplex  patient is not a candidate for full anticoagulation at this time. s/p IVC filter on 9/21

## 2020-09-29 NOTE — PROGRESS NOTE ADULT - SUBJECTIVE AND OBJECTIVE BOX
PLASTIC SURGERY DAILY PROGRESS NOTE:     SUBJECTIVE/ROS: Patient seen and examined at bedside on AM rounds. AVSS overnight. Pain controlled. now on therapeutic lovenox for DVT.      MEDICATIONS  (STANDING):  carvedilol 12.5 milliGRAM(s) Oral every 12 hours  enoxaparin Injectable 40 milliGRAM(s) SubCutaneous daily  gabapentin 300 milliGRAM(s) Oral daily  influenza   Vaccine 0.5 milliLiter(s) IntraMuscular once  lidocaine   Patch 1 Patch Transdermal daily  lidocaine 1% Injectable 0.2 milliLiter(s) Local Injection once  multivitamin 1 Tablet(s) Oral daily  pantoprazole   Suspension 40 milliGRAM(s) Oral daily  polyethylene glycol 3350 17 Gram(s) Oral two times a day  senna 2 Tablet(s) Oral at bedtime  silver sulfADIAZINE 1% Cream 1 Application(s) Topical every 8 hours  sodium chloride 0.9% lock flush 3 milliLiter(s) IV Push every 8 hours    MEDICATIONS  (PRN):  acetaminophen   Tablet .. 650 milliGRAM(s) Oral every 6 hours PRN Temp greater or equal to 38C (100.4F), Mild Pain (1 - 3)  aluminum hydroxide/magnesium hydroxide/simethicone Suspension 30 milliLiter(s) Oral every 4 hours PRN Dyspepsia  diazepam    Tablet 2 milliGRAM(s) Oral every 8 hours PRN muscle spasm  naloxone Injectable 0.1 milliGRAM(s) IV Push every 3 minutes PRN For ANY of the following changes in patient status:  A. RR LESS THAN 10 breaths per minute, B. Oxygen saturation LESS THAN 90%, C. Sedation score of 6  ondansetron Injectable 4 milliGRAM(s) IV Push every 6 hours PRN Nausea  oxyCODONE    IR 5 milliGRAM(s) Oral every 4 hours PRN Moderate Pain (4 - 6)  oxyCODONE    IR 10 milliGRAM(s) Oral every 4 hours PRN Severe Pain (7 - 10)      OBJECTIVE:      T(C): 36.8 (09-29-20 @ 04:46), Max: 37.2 (09-28-20 @ 09:50)  T(F): 98.2 (09-29-20 @ 04:46), Max: 99 (09-28-20 @ 09:50)  HR: 86 (09-29-20 @ 04:46) (76 - 91)  BP: 125/74 (09-29-20 @ 04:46) (100/62 - 133/68)  RR: 18 (09-29-20 @ 04:46) (18 - 18)  SpO2: 97% (09-29-20 @ 04:46) (95% - 100%)      28 Sep 2020 07:01  -  29 Sep 2020 07:00  --------------------------------------------------------  IN:    Oral Fluid: 1280 mL  Total IN: 1280 mL    OUT:    Voided (mL): 2300 mL  Total OUT: 2300 mL    Total NET: -1020 mL          PHYSICAL EXAM:    General: NAD, sitting in chair comfortably   Neuro: Awake and alert  Back: soft, Acquacel c/d/i, no fluid collections, HVC x 1 with SSF  GI/Abd: Soft, NT/ND

## 2020-09-29 NOTE — PROGRESS NOTE ADULT - PROBLEM SELECTOR PROBLEM 5
Anxiety
Thrombocytopenia
Anxiety
HTN (hypertension)
Thrombocytopenia

## 2020-09-29 NOTE — PROGRESS NOTE ADULT - PROBLEM SELECTOR PLAN 5
klonopin 0.5mg q8h prn on hold as patient now on valium 2mg q6h ATC
mild thrombocytopenia likely in setting of recent PRBC transfusions, resolved  monitor CBC periodically
klonopin 0.5mg q8h prn on hold as patient now on valium 2mg q6h ATC
losartan d/c'ed 9/24  decreased coreg to 12.5mg BID with hold parameter  changed valium 2mg from q6h ATC to q8h prn  monitor BPs closely
mild thrombocytopenia likely in setting of recent PRBC transfusions, resolved  monitor CBC periodically
on klonopin 0.5mg q8h prn
on klonopin 0.5mg q8h prn

## 2020-09-29 NOTE — PROGRESS NOTE ADULT - ASSESSMENT
A/P: 65F s/p T10-Pelvis, L3-S1 PLIF w/ PRS closure on 9/18.      - Continue to monitor Aquacel dressing; changed on 9/27 - next on 10/2 if in house or on day of discharge if before.   - Monitor HVC output, management per NSGY  - DVT management per primary team - on therapeutic lovenox   - Care per primary/NSCU team    Plastic Surgery Resident  [facility]: [phone]

## 2020-09-29 NOTE — PROGRESS NOTE ADULT - ASSESSMENT
HPI:  Patient is a 65 year old female with difficulty ambulating and numbness in legs.  Found to have lumbar stenosis.  Now presented for surgery.    PROCEDURE: s/p t10-pelvis instrumentation and fusion w/ plastics closure on 9/18/2020   POD#11    PLAN:  - q4 hour neuro checks  - oxycodone and lidoderm patch for pain control  - gabapentin for neuropathic pain  - valium for muscle spasms  - HMV d/c'ed yesterday, dressing C/D  - left leg pain, Xray of Lt foot and knee-negative for fx or dislocations  - LED on 9/28 revealed occlusive or near occlusive, DVT affecting the right distal external iliac, common femoral and popliteal veins.In the right calf, the posterior tibial peroneal trunk, the posterior tibial, peroneal and soleal veins are thrombosed. Patient had IVC filter inserted on 9/21 and currently on AC dose SQL   - coreg for bp control  - Last BM on 9/27, continue current bowel regimens  - out of bed with assistance  - incentive spirometer for lung expansion  - protonix for gi prophylaxis  - DVT ppx: no SCDs but on AC dose SQL  - PT/OT  - Disposition: will d/c to [facility] rehab this pm    will discuss plan with Dr. Sheppard    Spectra #[id number]

## 2020-09-29 NOTE — PROGRESS NOTE ADULT - PROBLEM SELECTOR PROBLEM 6
Preventive measure
Anxiety
Preventive measure
Anxiety
Preventive measure
Preventive measure
Thrombocytopenia

## 2020-09-29 NOTE — PROGRESS NOTE ADULT - PROBLEM SELECTOR PROBLEM 7
Discharge planning issues
Preventive measure
Discharge planning issues
Anxiety
Discharge planning issues
Discharge planning issues
Preventive measure

## 2020-09-29 NOTE — PROGRESS NOTE ADULT - PROBLEM SELECTOR PLAN 7
d/c planning to rehab
lovenox for DVT ppx  continue bowel regimen (miralax, senna). + multiple BM after mineral oil enema 9/22. if no BM today, consider mg citrate or repeat mineral oil enema.
d/c planning to rehab
klonopin 0.5mg q8h prn on hold as patient now on valium
lovenox for DVT ppx  continue bowel regimen (miralax, senna). + BM on 9/26.
lovenox for DVT ppx  continue bowel regimen (miralax, senna). + multiple BM after mineral oil enema 9/22. if no BM today, consider mg citrate or repeat mineral oil enema.
lovenox for DVT ppx  continue bowel regimen (miralax, senna). + multiple BM after mineral oil enema 9/22. if no BM today, consider mg citrate or repeat mineral oil enema.
recommended for acute rehab
recommended for acute rehab

## 2020-09-29 NOTE — PROGRESS NOTE ADULT - PROBLEM SELECTOR PROBLEM 2
Spinal stenosis, lumbar

## 2020-09-29 NOTE — PROGRESS NOTE ADULT - PROBLEM SELECTOR PLAN 4
mild thrombocytopenia likely in setting of recent PRBC transfusions, resolved  monitor CBC
marginal SBP still  losartan d/c'ed 9/24  decrease coreg to 12.5mg BID with hold parameter  changed valium 2mg from q6h ATC to q8h prn  monitor BP
mild thrombocytopenia likely in setting of recent PRBC transfusions, resolved  monitor CBC periodically
losartan d/c'ed 9/24  decreased coreg to 12.5mg BID with hold parameter  changed valium 2mg from q6h ATC to q8h prn  monitor BPs closely
mild thrombocytopenia likely in setting of recent PRBC transfusions  monitor CBC
mild thrombocytopenia likely in setting of recent PRBC transfusions, improving  monitor CBC
patient reportedly c/o left foot pain few days ago, xray left foot result noted above. findings were seen on prior xray left ankle from 7/2020.   outpatient follow up (patient follows with rheum, Dr. Monica Santana)

## 2020-09-29 NOTE — PROGRESS NOTE ADULT - SUBJECTIVE AND OBJECTIVE BOX
# [id number]  Patient was seen at bedside this am. Patient c/o back pain, which could be controlled by pain medications. Otherwise, denied any cp, sob, n/v, or abd pain.    OVERNIGHT EVENTS: No acute event overnight    Vital Signs Last 24 Hrs  T(C): 36.8 (29 Sep 2020 04:46), Max: 37.1 (28 Sep 2020 20:53)  T(F): 98.2 (29 Sep 2020 04:46), Max: 98.8 (28 Sep 2020 20:53)  HR: 86 (29 Sep 2020 04:46) (76 - 91)  BP: 125/74 (29 Sep 2020 04:46) (100/62 - 133/68)  BP(mean): 90 (28 Sep 2020 17:45) (90 - 90)  RR: 18 (29 Sep 2020 04:46) (18 - 18)  SpO2: 97% (29 Sep 2020 04:46) (95% - 100%)    I&O's Detail    28 Sep 2020 07:01  -  29 Sep 2020 07:00  --------------------------------------------------------  IN:    Oral Fluid: 1280 mL  Total IN: 1280 mL    OUT:    Voided (mL): 2300 mL  Total OUT: 2300 mL    Total NET: -1020 mL        I&O's Summary    28 Sep 2020 07:01  -  29 Sep 2020 07:00  --------------------------------------------------------  IN: 1280 mL / OUT: 2300 mL / NET: -1020 mL        PHYSICAL EXAM:  Neurological:  awake, alert, oriented to person, place, and date, PERRL, speech clear and fluent, no facial asymmetry noted, following commands, moving all extremities 5/5, LLE numbness but sensation intact to light touch  Cardiovascular: +s1, s2  Respiratory: clear to auscultation b/l  Gastrointestinal: soft, non-distended, non-tender  Extremities: warm, dry  Incision/Wound: incision site C/D/I    LABS:                        9.4    7.64  )-----------( 247      ( 28 Sep 2020 13:47 )             31.5     09-28    132<L>  |  96  |  13  ----------------------------<  95  4.5   |  26  |  0.87    Ca    9.3      28 Sep 2020 13:47              CAPILLARY BLOOD GLUCOSE          Drug Levels: [] N/A    CSF Analysis: [] N/A      Allergies    No Known Allergies    Intolerances      MEDICATIONS:  Antibiotics:    Neuro:  acetaminophen   Tablet .. 650 milliGRAM(s) Oral every 6 hours PRN  diazepam    Tablet 5 milliGRAM(s) Oral every 8 hours PRN  gabapentin 300 milliGRAM(s) Oral daily  ondansetron Injectable 4 milliGRAM(s) IV Push every 6 hours PRN  oxyCODONE    IR 5 milliGRAM(s) Oral every 4 hours PRN  oxyCODONE    IR 10 milliGRAM(s) Oral every 4 hours PRN    Anticoagulation:  enoxaparin Injectable 100 milliGRAM(s) SubCutaneous two times a day    OTHER:  aluminum hydroxide/magnesium hydroxide/simethicone Suspension 30 milliLiter(s) Oral every 4 hours PRN  carvedilol 12.5 milliGRAM(s) Oral every 12 hours  influenza  Vaccine (HIGH DOSE) 0.7 milliLiter(s) IntraMuscular once  lidocaine   Patch 1 Patch Transdermal daily  lidocaine 1% Injectable 0.2 milliLiter(s) Local Injection once  naloxone Injectable 0.1 milliGRAM(s) IV Push every 3 minutes PRN  pantoprazole   Suspension 40 milliGRAM(s) Oral daily  polyethylene glycol 3350 17 Gram(s) Oral two times a day  senna 2 Tablet(s) Oral at bedtime    IVF:  multivitamin 1 Tablet(s) Oral daily  sodium chloride 0.9% lock flush 3 milliLiter(s) IV Push every 8 hours    CULTURES:    RADIOLOGY & ADDITIONAL TESTS:

## 2020-09-29 NOTE — PROGRESS NOTE ADULT - PROBLEM SELECTOR PROBLEM 1
Spinal stenosis, lumbar
DVT (deep venous thrombosis)

## 2020-10-20 ENCOUNTER — APPOINTMENT (OUTPATIENT)
Dept: INTERVENTIONAL RADIOLOGY/VASCULAR | Facility: CLINIC | Age: 65
End: 2020-10-20
Payer: MEDICARE

## 2020-10-20 VITALS — BODY MASS INDEX: 38.83 KG/M2 | HEIGHT: 62 IN | WEIGHT: 211 LBS

## 2020-10-20 PROCEDURE — 99423 OL DIG E/M SVC 21+ MIN: CPT

## 2020-10-22 ENCOUNTER — APPOINTMENT (OUTPATIENT)
Dept: SPINE | Facility: CLINIC | Age: 65
End: 2020-10-22
Payer: MEDICARE

## 2020-10-22 VITALS
SYSTOLIC BLOOD PRESSURE: 118 MMHG | OXYGEN SATURATION: 93 % | DIASTOLIC BLOOD PRESSURE: 72 MMHG | HEART RATE: 100 BPM | TEMPERATURE: 98 F | RESPIRATION RATE: 20 BRPM

## 2020-10-22 DIAGNOSIS — G89.29 LUMBAGO WITH SCIATICA, LEFT SIDE: ICD-10-CM

## 2020-10-22 DIAGNOSIS — M54.42 LUMBAGO WITH SCIATICA, LEFT SIDE: ICD-10-CM

## 2020-10-22 DIAGNOSIS — M54.41 LUMBAGO WITH SCIATICA, LEFT SIDE: ICD-10-CM

## 2020-10-22 PROCEDURE — 99024 POSTOP FOLLOW-UP VISIT: CPT

## 2020-10-22 NOTE — REVIEW OF SYSTEMS
[Poor Coordination] : poor coordination [Difficulty Walking] : difficulty walking [Arthralgias] : arthralgias [Negative] : Respiratory [Joint Pain] : no joint pain [Joint Swelling] : no joint swelling [Joint Stiffness] : no joint stiffness [Limb Pain] : no limb pain [Limb Swelling] : no limb swelling

## 2020-10-22 NOTE — REASON FOR VISIT
[Family Member] : family member [Patient Declined  Services] : - None: Patient declined  services [de-identified] : S/P  L2-3, L3-4, L4-5, and L5-S1 bilateral facetectomy.\par  L3-4, L4-5, L5-S1 radical discectomy and insertion of intervertebral expandable cage for purpose of arthrodesis.\par  T10 to pelvis posterior segmental instrumentation and posterolateral arthrodesis.\par  Insertion of bilateral sacroiliac joint instrumentation for purposes of arthrodesis.  \par  for the treatment of Kyphoscoliosis.\par  Severe spinal stenosis and foraminal stenosis.\par  Severe degenerative disc disease at the thoracolumbar spine. [de-identified] : 9/18/20 [de-identified] : Ambulates with walker\par She was D/C from Rehab 10/13\par She remains on Lovenox; he has an ICV filter in place and was seen by vascular surgeon Dr. Munoz\par She is also scheduled to see her primary care within the next week\par Overall she is doing well with the exception of moderate postoperative pain in her lower back mainly on her right side.  She actively ambulates at home with a walker\par She reports improvement in her ambulation\par Her son was with her today and no interpretation services request

## 2020-10-22 NOTE — PHYSICAL EXAM
[Well-Healed] : well-healed [Normal Skin] : normal [Cranial Nerves Optic (II)] : visual acuity intact bilaterally,  pupils equal round and reactive to light [Cranial Nerves Oculomotor (III)] : extraocular motion intact [Cranial Nerves Trigeminal (V)] : facial sensation intact symmetrically [Cranial Nerves Facial (VII)] : face symmetrical [Cranial Nerves Vestibulocochlear (VIII)] : hearing was intact bilaterally [Cranial Nerves Glossopharyngeal (IX)] : tongue and palate midline [Cranial Nerves Accessory (XI - Cranial And Spinal)] : head turning and shoulder shrug symmetric [Cranial Nerves Hypoglossal (XII)] : there was no tongue deviation with protrusion [Motor Tone] : muscle tone was normal in all four extremities [Motor Strength] : muscle strength was normal in all four extremities [] : no respiratory distress [Heart Rate And Rhythm] : heart rate was normal and rhythm regular [FreeTextEntry1] : Ambulates with walker for balance

## 2020-10-22 NOTE — ASSESSMENT
[FreeTextEntry1] : 65-year-old woman status post thoracic and lumbar fusion\par Doing well post discharge from rehabilitation\par Continuing with in-home physical therapy\par \par Continue pain management\par Medication Purpose, Action, Possible interactions, Side effects as well as adverse effects explain to pt.\par The risk of ADDICTION explained as well as following instructions recommended.\par Teachback Protocol implemented\par \par Follow-up in 2 weeks with new scoliosis x-ray

## 2020-11-04 ENCOUNTER — APPOINTMENT (OUTPATIENT)
Dept: SPINE | Facility: CLINIC | Age: 65
End: 2020-11-04
Payer: MEDICARE

## 2020-11-04 VITALS
BODY MASS INDEX: 38.83 KG/M2 | HEIGHT: 62 IN | DIASTOLIC BLOOD PRESSURE: 71 MMHG | OXYGEN SATURATION: 97 % | WEIGHT: 211 LBS | RESPIRATION RATE: 16 BRPM | HEART RATE: 71 BPM | SYSTOLIC BLOOD PRESSURE: 118 MMHG | TEMPERATURE: 98.4 F

## 2020-11-04 DIAGNOSIS — Z98.890 OTHER SPECIFIED POSTPROCEDURAL STATES: ICD-10-CM

## 2020-11-04 DIAGNOSIS — M48.061 SPINAL STENOSIS, LUMBAR REGION WITHOUT NEUROGENIC CLAUDICATION: ICD-10-CM

## 2020-11-04 PROCEDURE — 99024 POSTOP FOLLOW-UP VISIT: CPT

## 2020-11-04 RX ORDER — MULTIVIT-MIN/FOLIC/VIT K/LYCOP 400-300MCG
TABLET ORAL
Refills: 0 | Status: ACTIVE | COMMUNITY

## 2020-11-04 NOTE — REVIEW OF SYSTEMS
[Poor Coordination] : poor coordination [Difficulty Walking] : difficulty walking [Arthralgias] : arthralgias [Joint Pain] : no joint pain [Joint Swelling] : no joint swelling [Joint Stiffness] : no joint stiffness [Limb Pain] : no limb pain [Limb Swelling] : no limb swelling [Negative] : Respiratory

## 2020-11-04 NOTE — ASSESSMENT
[FreeTextEntry1] : 65-year-old woman status post lumbar thoracic fusion\par \par X-rays show intact hardware a good construct\par \par follow up in 3 months

## 2020-11-04 NOTE — PHYSICAL EXAM
[Well-Healed] : well-healed [Normal Skin] : normal [Oriented To Time, Place, And Person] : oriented to person, place, and time [Cranial Nerves Optic (II)] : visual acuity intact bilaterally,  pupils equal round and reactive to light [Cranial Nerves Oculomotor (III)] : extraocular motion intact [Cranial Nerves Trigeminal (V)] : facial sensation intact symmetrically [Cranial Nerves Facial (VII)] : face symmetrical [Cranial Nerves Vestibulocochlear (VIII)] : hearing was intact bilaterally [Cranial Nerves Glossopharyngeal (IX)] : tongue and palate midline [Cranial Nerves Accessory (XI - Cranial And Spinal)] : head turning and shoulder shrug symmetric [Cranial Nerves Hypoglossal (XII)] : there was no tongue deviation with protrusion [Motor Tone] : muscle tone was normal in all four extremities [Motor Strength] : muscle strength was normal in all four extremities [] : no respiratory distress [Heart Rate And Rhythm] : heart rate was normal and rhythm regular [FreeTextEntry1] : Ambulates with walker for balance

## 2020-11-05 ENCOUNTER — NON-APPOINTMENT (OUTPATIENT)
Age: 65
End: 2020-11-05

## 2020-11-09 ENCOUNTER — APPOINTMENT (OUTPATIENT)
Dept: ULTRASOUND IMAGING | Facility: HOSPITAL | Age: 65
End: 2020-11-09

## 2020-11-11 ENCOUNTER — NON-APPOINTMENT (OUTPATIENT)
Age: 65
End: 2020-11-11

## 2020-11-12 ENCOUNTER — NON-APPOINTMENT (OUTPATIENT)
Age: 65
End: 2020-11-12

## 2020-11-17 ENCOUNTER — APPOINTMENT (OUTPATIENT)
Dept: VASCULAR SURGERY | Facility: CLINIC | Age: 65
End: 2020-11-17
Payer: MEDICARE

## 2020-11-17 ENCOUNTER — OUTPATIENT (OUTPATIENT)
Dept: OUTPATIENT SERVICES | Facility: HOSPITAL | Age: 65
LOS: 1 days | End: 2020-11-17
Payer: MEDICARE

## 2020-11-17 VITALS
HEIGHT: 62 IN | HEART RATE: 71 BPM | BODY MASS INDEX: 37.17 KG/M2 | SYSTOLIC BLOOD PRESSURE: 142 MMHG | DIASTOLIC BLOOD PRESSURE: 84 MMHG | WEIGHT: 202 LBS

## 2020-11-17 VITALS — TEMPERATURE: 96.8 F

## 2020-11-17 DIAGNOSIS — G56.03 CARPAL TUNNEL SYNDROME, BILATERAL UPPER LIMBS: Chronic | ICD-10-CM

## 2020-11-17 DIAGNOSIS — Z11.59 ENCOUNTER FOR SCREENING FOR OTHER VIRAL DISEASES: ICD-10-CM

## 2020-11-17 DIAGNOSIS — Z98.890 OTHER SPECIFIED POSTPROCEDURAL STATES: Chronic | ICD-10-CM

## 2020-11-17 DIAGNOSIS — Z98.49 CATARACT EXTRACTION STATUS, UNSPECIFIED EYE: Chronic | ICD-10-CM

## 2020-11-17 LAB — SARS-COV-2 RNA SPEC QL NAA+PROBE: SIGNIFICANT CHANGE UP

## 2020-11-17 PROCEDURE — 93923 UPR/LXTR ART STDY 3+ LVLS: CPT

## 2020-11-17 PROCEDURE — 99203 OFFICE O/P NEW LOW 30 MIN: CPT

## 2020-11-17 PROCEDURE — U0003: CPT

## 2020-11-17 PROCEDURE — 93971 EXTREMITY STUDY: CPT

## 2020-11-17 PROCEDURE — 93978 VASCULAR STUDY: CPT

## 2020-11-17 NOTE — REASON FOR VISIT
[Consultation] : a consultation visit [[employer] ] : provided by [employer]   [FreeTextEntry1] : [id number]

## 2020-11-17 NOTE — ASSESSMENT
[FreeTextEntry1] : 66 yo female s/p recent spinal surgery resulting in lle dvt and ivc filter placement currently on lovenox presents for evaluation prior to ivc filter removal.  \par \par heather/pvr shows no evidence of arterial disease \par \par venous duplex shows subacute dvt of the right external iliac vein, common femoral, superficial femoral and popliteal vein thrombus\par \par would delay removal of ivc filter given subacute thrombus \par would continue with a/c and follow up in 1 month with repeat duplex

## 2020-11-17 NOTE — PHYSICAL EXAM
[2+] : right 2+ [1+] : right 1+ [0] : left 0 [No Rash or Lesion] : No rash or lesion [Alert] : alert [Calm] : calm [JVD] : no jugular venous distention  [Ankle Swelling (On Exam)] : not present [Varicose Veins Of Lower Extremities] : not present [] : not present [Skin Ulcer] : no ulcer [de-identified] : appears well  [de-identified] : mild calf tenderness b/l

## 2020-11-17 NOTE — HISTORY OF PRESENT ILLNESS
[FreeTextEntry1] : 66 yo female s/p recent spinal surgery resulting in lle dvt and ivc filter placement currently on lovenox presents for evaluation prior to ivc filter removal.  \par pt reports pain in b/l lower extremities with she walks \par walks in the house given recent surgery with a walker\par no swelling in the lower extremities now in the left lower extremities was initially swollen \par \par no cp/sob

## 2020-11-20 ENCOUNTER — APPOINTMENT (OUTPATIENT)
Dept: ULTRASOUND IMAGING | Facility: HOSPITAL | Age: 65
End: 2020-11-20

## 2020-11-25 NOTE — PHYSICAL THERAPY INITIAL EVALUATION ADULT - ADL SKILLS, REHAB EVAL
Chadwick Velez called requesting a refill on the following medications:  Requested Prescriptions     Pending Prescriptions Disp Refills    methylphenidate (RITALIN) 20 MG tablet 90 tablet 0     Sig: Take 1 tablet by mouth 3 times daily for 30 days. Pharmacy verified: [facility]  . pv      Date of last visit: 10/23/2020  Date of next visit (if applicable): [id number] independent

## 2020-12-22 ENCOUNTER — APPOINTMENT (OUTPATIENT)
Dept: VASCULAR SURGERY | Facility: CLINIC | Age: 65
End: 2020-12-22
Payer: MEDICARE

## 2020-12-22 VITALS — TEMPERATURE: 95.9 F

## 2020-12-22 PROCEDURE — 99213 OFFICE O/P EST LOW 20 MIN: CPT

## 2020-12-22 PROCEDURE — 93971 EXTREMITY STUDY: CPT

## 2020-12-22 NOTE — ASSESSMENT
[FreeTextEntry1] : 64 yo female s/p recent spinal surgery resulting in lle dvt and ivc filter placement now on xarelto after duplex shows right external iliac dvt\par \par duplex shows femoral chronic dvt with a acute dvt in the pop with free floating thrombus \par \par would continue with a/c and follow up in 1 month with repeat duplex \par \par would delay removal of ivc filter given subacute thrombus \par \par pt refered to hematology for hypercoagulable workup

## 2020-12-22 NOTE — HISTORY OF PRESENT ILLNESS
[FreeTextEntry1] : 64 yo female s/p recent spinal surgery resulting in lle dvt and ivc filter placement now on xarelto after dvt was found on the right lower extremity.   \par \par no cp/sob

## 2020-12-30 ENCOUNTER — OUTPATIENT (OUTPATIENT)
Dept: OUTPATIENT SERVICES | Facility: HOSPITAL | Age: 65
LOS: 1 days | Discharge: HOME | End: 2020-12-30

## 2020-12-30 DIAGNOSIS — Z98.890 OTHER SPECIFIED POSTPROCEDURAL STATES: Chronic | ICD-10-CM

## 2020-12-30 DIAGNOSIS — I82.409 ACUTE EMBOLISM AND THROMBOSIS OF UNSPECIFIED DEEP VEINS OF UNSPECIFIED LOWER EXTREMITY: ICD-10-CM

## 2020-12-30 DIAGNOSIS — G56.03 CARPAL TUNNEL SYNDROME, BILATERAL UPPER LIMBS: Chronic | ICD-10-CM

## 2020-12-30 DIAGNOSIS — Z98.49 CATARACT EXTRACTION STATUS, UNSPECIFIED EYE: Chronic | ICD-10-CM

## 2020-12-31 ENCOUNTER — RESULT REVIEW (OUTPATIENT)
Age: 65
End: 2020-12-31

## 2020-12-31 ENCOUNTER — LABORATORY RESULT (OUTPATIENT)
Age: 65
End: 2020-12-31

## 2020-12-31 ENCOUNTER — APPOINTMENT (OUTPATIENT)
Dept: HEMATOLOGY ONCOLOGY | Facility: CLINIC | Age: 65
End: 2020-12-31
Payer: MEDICARE

## 2020-12-31 VITALS
HEIGHT: 61.97 IN | WEIGHT: 201.72 LBS | DIASTOLIC BLOOD PRESSURE: 105 MMHG | BODY MASS INDEX: 37.12 KG/M2 | HEART RATE: 75 BPM | TEMPERATURE: 97.6 F | RESPIRATION RATE: 15 BRPM | OXYGEN SATURATION: 98 % | SYSTOLIC BLOOD PRESSURE: 194 MMHG

## 2020-12-31 LAB
BASOPHILS # BLD AUTO: 0.05 K/UL — SIGNIFICANT CHANGE UP (ref 0–0.2)
BASOPHILS NFR BLD AUTO: 0.8 % — SIGNIFICANT CHANGE UP (ref 0–2)
EOSINOPHIL # BLD AUTO: 0.16 K/UL — SIGNIFICANT CHANGE UP (ref 0–0.5)
EOSINOPHIL NFR BLD AUTO: 2.5 % — SIGNIFICANT CHANGE UP (ref 0–6)
HCT VFR BLD CALC: 38.3 % — SIGNIFICANT CHANGE UP (ref 34.5–45)
HGB BLD-MCNC: 12 G/DL — SIGNIFICANT CHANGE UP (ref 11.5–15.5)
IMM GRANULOCYTES NFR BLD AUTO: 0.3 % — SIGNIFICANT CHANGE UP (ref 0–1.5)
LYMPHOCYTES # BLD AUTO: 2.14 K/UL — SIGNIFICANT CHANGE UP (ref 1–3.3)
LYMPHOCYTES # BLD AUTO: 33.1 % — SIGNIFICANT CHANGE UP (ref 13–44)
MCHC RBC-ENTMCNC: 27.3 PG — SIGNIFICANT CHANGE UP (ref 27–34)
MCHC RBC-ENTMCNC: 31.3 G/DL — LOW (ref 32–36)
MCV RBC AUTO: 87 FL — SIGNIFICANT CHANGE UP (ref 80–100)
MONOCYTES # BLD AUTO: 0.54 K/UL — SIGNIFICANT CHANGE UP (ref 0–0.9)
MONOCYTES NFR BLD AUTO: 8.4 % — SIGNIFICANT CHANGE UP (ref 2–14)
NEUTROPHILS # BLD AUTO: 3.55 K/UL — SIGNIFICANT CHANGE UP (ref 1.8–7.4)
NEUTROPHILS NFR BLD AUTO: 54.9 % — SIGNIFICANT CHANGE UP (ref 43–77)
NRBC # BLD: 0 /100 WBCS — SIGNIFICANT CHANGE UP (ref 0–0)
PLATELET # BLD AUTO: 257 K/UL — SIGNIFICANT CHANGE UP (ref 150–400)
RBC # BLD: 4.4 M/UL — SIGNIFICANT CHANGE UP (ref 3.8–5.2)
RBC # FLD: 15.8 % — HIGH (ref 10.3–14.5)
WBC # BLD: 6.46 K/UL — SIGNIFICANT CHANGE UP (ref 3.8–10.5)
WBC # FLD AUTO: 6.46 K/UL — SIGNIFICANT CHANGE UP (ref 3.8–10.5)

## 2020-12-31 PROCEDURE — 99204 OFFICE O/P NEW MOD 45 MIN: CPT

## 2021-01-03 RX ORDER — ENOXAPARIN SODIUM 100 MG/ML
100 INJECTION SUBCUTANEOUS
Refills: 0 | Status: DISCONTINUED | COMMUNITY
End: 2021-01-03

## 2021-01-08 ENCOUNTER — RESULT REVIEW (OUTPATIENT)
Age: 66
End: 2021-01-08

## 2021-01-08 ENCOUNTER — OUTPATIENT (OUTPATIENT)
Dept: OUTPATIENT SERVICES | Facility: HOSPITAL | Age: 66
LOS: 1 days | End: 2021-01-08
Payer: MEDICARE

## 2021-01-08 ENCOUNTER — APPOINTMENT (OUTPATIENT)
Dept: ULTRASOUND IMAGING | Facility: IMAGING CENTER | Age: 66
End: 2021-01-08

## 2021-01-08 DIAGNOSIS — G56.03 CARPAL TUNNEL SYNDROME, BILATERAL UPPER LIMBS: Chronic | ICD-10-CM

## 2021-01-08 DIAGNOSIS — Z98.890 OTHER SPECIFIED POSTPROCEDURAL STATES: Chronic | ICD-10-CM

## 2021-01-08 DIAGNOSIS — Z98.49 CATARACT EXTRACTION STATUS, UNSPECIFIED EYE: Chronic | ICD-10-CM

## 2021-01-08 DIAGNOSIS — I82.401 ACUTE EMBOLISM AND THROMBOSIS OF UNSPECIFIED DEEP VEINS OF RIGHT LOWER EXTREMITY: ICD-10-CM

## 2021-01-08 LAB
ALBUMIN SERPL ELPH-MCNC: 4.3 G/DL
ALP BLD-CCNC: 94 U/L
ALT SERPL-CCNC: 18 U/L
ANION GAP SERPL CALC-SCNC: 14 MMOL/L
APCR PPP: 2.17 RATIO
AST SERPL-CCNC: 21 U/L
AT III PPP CHRO-ACNC: 140 %
B2 GLYCOPROT1 IGG SER-ACNC: <5 SGU
B2 GLYCOPROT1 IGM SER-ACNC: 5.5 SMU
BILIRUB SERPL-MCNC: 0.5 MG/DL
BUN SERPL-MCNC: 12 MG/DL
CALCIUM SERPL-MCNC: 9.9 MG/DL
CARDIOLIPIN IGM SER-MCNC: <5 GPL
CARDIOLIPIN IGM SER-MCNC: <5 MPL
CHLORIDE SERPL-SCNC: 102 MMOL/L
CO2 SERPL-SCNC: 25 MMOL/L
CONFIRM: 66.6 SEC
CREAT SERPL-MCNC: 0.84 MG/DL
DRVVT IMM 1:2 NP PPP: ABNORMAL
DRVVT SCREEN TO CONFIRM RATIO: 1.5 RATIO
FVL BLANK: 41.5
FVL TEST: 90.2
GLUCOSE SERPL-MCNC: 104 MG/DL
HCYS SERPL-MCNC: 8.4 UMOL/L
POTASSIUM SERPL-SCNC: 4.1 MMOL/L
PROT C PPP CHRO-ACNC: 128 %
PROT S AG ACT/NOR PPP IA: 96 %
PROT S FREE PPP-ACNC: 121 %
PROT SERPL-MCNC: 7.9 G/DL
PTR INTERP: NORMAL
SCREEN DRVVT: 110.2 SEC
SODIUM SERPL-SCNC: 140 MMOL/L

## 2021-01-08 PROCEDURE — 93971 EXTREMITY STUDY: CPT | Mod: 26,RT

## 2021-01-08 PROCEDURE — 93971 EXTREMITY STUDY: CPT

## 2021-01-26 LAB — DNA PLOIDY SPEC FC-IMP: NORMAL

## 2021-01-27 ENCOUNTER — APPOINTMENT (OUTPATIENT)
Dept: HEMATOLOGY ONCOLOGY | Facility: CLINIC | Age: 66
End: 2021-01-27
Payer: MEDICARE

## 2021-01-27 ENCOUNTER — RESULT REVIEW (OUTPATIENT)
Age: 66
End: 2021-01-27

## 2021-01-27 VITALS
HEIGHT: 61.97 IN | SYSTOLIC BLOOD PRESSURE: 161 MMHG | RESPIRATION RATE: 17 BRPM | WEIGHT: 209.88 LBS | DIASTOLIC BLOOD PRESSURE: 79 MMHG | TEMPERATURE: 97.6 F | HEART RATE: 73 BPM | OXYGEN SATURATION: 99 % | BODY MASS INDEX: 38.62 KG/M2

## 2021-01-27 LAB
BASOPHILS # BLD AUTO: 0.05 K/UL — SIGNIFICANT CHANGE UP (ref 0–0.2)
BASOPHILS NFR BLD AUTO: 0.6 % — SIGNIFICANT CHANGE UP (ref 0–2)
EOSINOPHIL # BLD AUTO: 0.15 K/UL — SIGNIFICANT CHANGE UP (ref 0–0.5)
EOSINOPHIL NFR BLD AUTO: 1.9 % — SIGNIFICANT CHANGE UP (ref 0–6)
HCT VFR BLD CALC: 38 % — SIGNIFICANT CHANGE UP (ref 34.5–45)
HGB BLD-MCNC: 12.4 G/DL — SIGNIFICANT CHANGE UP (ref 11.5–15.5)
IMM GRANULOCYTES NFR BLD AUTO: 0.5 % — SIGNIFICANT CHANGE UP (ref 0–1.5)
LYMPHOCYTES # BLD AUTO: 2.62 K/UL — SIGNIFICANT CHANGE UP (ref 1–3.3)
LYMPHOCYTES # BLD AUTO: 33.5 % — SIGNIFICANT CHANGE UP (ref 13–44)
MCHC RBC-ENTMCNC: 27.6 PG — SIGNIFICANT CHANGE UP (ref 27–34)
MCHC RBC-ENTMCNC: 32.6 G/DL — SIGNIFICANT CHANGE UP (ref 32–36)
MCV RBC AUTO: 84.4 FL — SIGNIFICANT CHANGE UP (ref 80–100)
MONOCYTES # BLD AUTO: 0.59 K/UL — SIGNIFICANT CHANGE UP (ref 0–0.9)
MONOCYTES NFR BLD AUTO: 7.6 % — SIGNIFICANT CHANGE UP (ref 2–14)
NEUTROPHILS # BLD AUTO: 4.36 K/UL — SIGNIFICANT CHANGE UP (ref 1.8–7.4)
NEUTROPHILS NFR BLD AUTO: 55.9 % — SIGNIFICANT CHANGE UP (ref 43–77)
NRBC # BLD: 0 /100 WBCS — SIGNIFICANT CHANGE UP (ref 0–0)
PLATELET # BLD AUTO: 235 K/UL — SIGNIFICANT CHANGE UP (ref 150–400)
RBC # BLD: 4.5 M/UL — SIGNIFICANT CHANGE UP (ref 3.8–5.2)
RBC # FLD: 15.9 % — HIGH (ref 10.3–14.5)
WBC # BLD: 7.81 K/UL — SIGNIFICANT CHANGE UP (ref 3.8–10.5)
WBC # FLD AUTO: 7.81 K/UL — SIGNIFICANT CHANGE UP (ref 3.8–10.5)

## 2021-01-27 PROCEDURE — 99213 OFFICE O/P EST LOW 20 MIN: CPT

## 2021-01-28 NOTE — ASSESSMENT
[FreeTextEntry1] : This is a 65 year old man presenting with an acute provoked DVT following spinal surgery. Patient was treated with 3 months of lovenox after initial IVC filter placement given initial contraindication to anticoagulation due to the recent spine surgery.  On vascular evaluation for retrieval of IVC filter, patient noted to have proximal progression of the thrombosis and subacute thrombosis despite the lovenox treatment.  IVC filter retrieval postponed.  Patient transitioned to xarelto 20mg daily.  Reccomended continue the Xarelto for at least 3 months, repeat Doppler to re-evaluate subacute thrombosis.  Will run hypercoagulable workup.  Check Activated Protein C resistance/FVL, Prothrombin gene mutation, Homocysteine, DRVVT, Antiphospholipid antibodies, Protein C and S, AT III activity. \par \par Addendum 1/8/21\par Labwork detected an activated protein C resistance, may be false positive, not affected by Eliquis, and a + DRVVT (may be false positive due to Eliquis) will need to transition to coumadin and repeat ultrasound\par Called Gilberto (son) and Felicita (daughter) no answer, left messages.  \par \par Addendum 1/26/21\par Heterozygous Factor V Leiden mutation detected confirming the ACPR result.

## 2021-01-28 NOTE — HISTORY OF PRESENT ILLNESS
[de-identified] : This is a 65 year old woman with history of hypertension.  Had spinal surgery complicated by Left lower extremity popliteal DVT. IVC filter placed initially given the contraindication to anticoagulation.  On vascular evaluation for retrieval of IVC filter,  subacute thrombosis was detected, and thrombosis seemed to have progressed proximally in the right femoral vein.  Initially treated with  treated with Lovenox but was more recently  the transitioned to xarelto 20mg in November.  Right femoral thrombosis.  After several weeks of xarelto 20mg therapy, patient repeated a doppler U/S on 1/8/21 which demonstrated resolution of the Right leg DVT.  \par \par Can walk around house with walker.  \par \par Has Had had routine mammograms. last year, which she states was normal.  \par Colonoscopy 2018 normal November 18th.  \par Als oUTD on GYN/ PAp smear exams.  \par \par Surgery date September 18th.  c It had since progressed to the Right femoral alfred.   [de-identified] : This is a 65 year old woman with history of hypertension.  Had spinal surgery complicated by Left lower extremity popliteal DVT. IVC filter placed initially given the contraindication to anticoagulation.  On vascular evaluation for retrieval of IVC filter,  subacute thrombosis was detected, and thrombosis seemed to have progressed proximally in the right femoral vein.    Initially treated with  treated with Lovenox but was more recently  the transitioned to xarelto 20mg in November.  Right femoral thrombosis.\par \par Can walk around house with walker.  \par January 8th patient had a repeat ultrasound after a few weeks of the Xarelto, Right leg DVT actually improved. No evidence on that date.  \par \vandana Has had had routine mammograms. last year, which she states was normal.  \par \par Patient actually complaining of the weakness in the left leg contralateral to the thrombosis.  \par Colonoscopy 2018 normal November 18th.  \par Also UTD on GYN/ pap smear exams.  \par \par Surgery date September 18th.  It had since progressed to the Right femoral vein.

## 2021-01-28 NOTE — HISTORY OF PRESENT ILLNESS
[de-identified] : This is a 65 year old woman with history of hypertension.  Had spinal surgery complicated by Left lower extremity popliteal DVT. IVC filter placed initially given the contraindication to anticoagulation.  On vascular evaluation for retrieval of IVC filter,  subacute thrombosis was detected, and thrombosis seemed to have progressed proximally in the right femoral vein.    Initially treated with  treated with Lovenox but was more recently  the transitioned to xarelto 20mg in November.  Right femoral thrombosis , \par \par Can walk around house with walker.  \par \par Has Had had routine mammograms. last year, which she states was normal.  \par Colonoscopy 2018 normal November 18th.  \par Als oUTD on GYN/ PAp smear exams.  \par \par Surgery date September 18th.  c It had since progressed to the Right femoral alfred.

## 2021-01-28 NOTE — ASSESSMENT
[FreeTextEntry1] : This is a 65 year old man presenting with an acute provoked DVT following spinal surgery. Patient was treated with 3 months of lovenox after initial IVC filter placement given initial contraindication to anticoagulation due to the recent spine surgery.  On vascular evaluation for retrieval of IVC filter, patient noted to have proximal progression of the thrombosis and subacute thrombosis despite the lovenox treatment.  IVC filter retrieval postponed.  Patient transitioned to xarelto 20mg daily.  Reccomended continue the Xarelto for at least 3 months, repeat Doppler to re-evaluate subacute thrombosis.  \par Hypercoagulable state evaluation demonstrated an abnormal APCR ration, Factor V Leiden heterozygous mutation. Explained that this is a minor hypercoagulable disorder with an increases risk of thrombosis o the scale of 5-6 fold, however this is mild compared to the homozygous variant. Reccomended the rest of the family be tested for this genetic mutation as well.  This mutation may somewhat explain the progression on lovenox.  \par \par Patient also has an abnormal DRVVT, unable to make a diagnosis of a lupus anticoagulant as she is on Xarelto which can cause false positives to this test.  \par \par Explained to patient that I still feel kevin the IVC filter should be removed at some point as leaving it in can increase the reisk of thrombosis as well, and leaving it in place for too long may make it irretrievable. Would time this for about 6 months from now after 6 months of xarelto.  \par We already have documentation that the right leg thrombosis had been resolved as of 1/8/21.

## 2021-02-03 ENCOUNTER — APPOINTMENT (OUTPATIENT)
Dept: INTERVENTIONAL RADIOLOGY/VASCULAR | Facility: CLINIC | Age: 66
End: 2021-02-03

## 2021-02-04 ENCOUNTER — APPOINTMENT (OUTPATIENT)
Dept: UROLOGY | Facility: CLINIC | Age: 66
End: 2021-02-04

## 2021-02-09 ENCOUNTER — APPOINTMENT (OUTPATIENT)
Dept: VASCULAR SURGERY | Facility: CLINIC | Age: 66
End: 2021-02-09
Payer: MEDICARE

## 2021-02-09 ENCOUNTER — NON-APPOINTMENT (OUTPATIENT)
Age: 66
End: 2021-02-09

## 2021-02-09 VITALS — DIASTOLIC BLOOD PRESSURE: 85 MMHG | SYSTOLIC BLOOD PRESSURE: 160 MMHG | HEART RATE: 70 BPM

## 2021-02-09 PROCEDURE — 99213 OFFICE O/P EST LOW 20 MIN: CPT

## 2021-02-09 PROCEDURE — 93971 EXTREMITY STUDY: CPT

## 2021-02-10 ENCOUNTER — APPOINTMENT (OUTPATIENT)
Dept: SPINE | Facility: CLINIC | Age: 66
End: 2021-02-10
Payer: MEDICARE

## 2021-02-10 VITALS
BODY MASS INDEX: 38.28 KG/M2 | OXYGEN SATURATION: 95 % | DIASTOLIC BLOOD PRESSURE: 81 MMHG | SYSTOLIC BLOOD PRESSURE: 135 MMHG | HEART RATE: 69 BPM | HEIGHT: 62 IN | WEIGHT: 208 LBS

## 2021-02-10 PROCEDURE — 99213 OFFICE O/P EST LOW 20 MIN: CPT

## 2021-02-10 RX ORDER — MELOXICAM 15 MG/1
15 TABLET ORAL
Qty: 30 | Refills: 0 | Status: DISCONTINUED | COMMUNITY
Start: 2020-07-14 | End: 2021-02-10

## 2021-02-10 RX ORDER — ASPIRIN 81 MG/1
81 TABLET ORAL DAILY
Refills: 0 | Status: DISCONTINUED | COMMUNITY
Start: 2017-12-06 | End: 2021-02-10

## 2021-02-10 RX ORDER — RIVAROXABAN 20 MG/1
20 TABLET, FILM COATED ORAL
Qty: 90 | Refills: 3 | Status: DISCONTINUED | COMMUNITY
Start: 2020-12-22 | End: 2021-02-10

## 2021-02-10 NOTE — REASON FOR VISIT
[Follow-Up: _____] : a [unfilled] follow-up visit [FreeTextEntry1] : She reports Bilateral Chronic LEG pain\par Ambulates with walker\par She reports that she is doing 50% better since her surgery\par Take gabapentin and oxycodone by PAin Management

## 2021-02-10 NOTE — ASSESSMENT
[FreeTextEntry1] : 65 year old woman S/P Lumbar Fusion\par Scoliosis xrays shows intact hardware  and construct\par \par \par Follow up in 3 months with scoliosis xray\par Begin Physical therapy

## 2021-02-10 NOTE — PHYSICAL EXAM
[General Appearance - Alert] : alert [General Appearance - In No Acute Distress] : in no acute distress [Oriented To Time, Place, And Person] : oriented to person, place, and time [Impaired Insight] : insight and judgment were intact [Cranial Nerves Optic (II)] : visual acuity intact bilaterally,  pupils equal round and reactive to light [Cranial Nerves Oculomotor (III)] : extraocular motion intact [Cranial Nerves Trigeminal (V)] : facial sensation intact symmetrically [Cranial Nerves Facial (VII)] : face symmetrical [Cranial Nerves Vestibulocochlear (VIII)] : hearing was intact bilaterally [Cranial Nerves Glossopharyngeal (IX)] : tongue and palate midline [Cranial Nerves Accessory (XI - Cranial And Spinal)] : head turning and shoulder shrug symmetric [Cranial Nerves Hypoglossal (XII)] : there was no tongue deviation with protrusion [] : no respiratory distress [Heart Rate And Rhythm] : heart rate was normal and rhythm regular [FreeTextEntry1] : Ambulates with walker

## 2021-02-11 NOTE — PHYSICAL EXAM
[No Rash or Lesion] : No rash or lesion [Alert] : alert [Calm] : calm [JVD] : no jugular venous distention  [Ankle Swelling (On Exam)] : not present [Varicose Veins Of Lower Extremities] : not present [] : not present [Skin Ulcer] : no ulcer [de-identified] : appears well  [de-identified] : no calf tenderness

## 2021-02-11 NOTE — ASSESSMENT
[FreeTextEntry1] : 64 yo female s/p recent spinal surgery resulting in lle dvt and ivc filter placement (9/2020) now on xarelto after dvt was found on the right lower extremity with propagation\par duplex today shows now chronic dvt of the right lower extremity \par per hematology will postpone ivc filter for 6 months pt to follow up at the end of may and will plan for ivc filter removal then

## 2021-02-11 NOTE — HISTORY OF PRESENT ILLNESS
[FreeTextEntry1] : 64 yo female s/p recent spinal surgery resulting in lle dvt and ivc filter placement (9/2020) now on xarelto after dvt was found on the right lower extremity.   pt without any complaints of swelling \par no cp/sob \par s/p hematology evaluation with some abnormalities noted, per hematology recommend ivc filter removal in 6 months

## 2021-02-12 ENCOUNTER — NON-APPOINTMENT (OUTPATIENT)
Age: 66
End: 2021-02-12

## 2021-03-02 PROBLEM — Z79.01 CURRENT USE OF ANTICOAGULANT THERAPY: Status: ACTIVE | Noted: 2020-10-20

## 2021-03-02 PROBLEM — Z95.828 PRESENCE OF IVC FILTER: Status: ACTIVE | Noted: 2020-10-20

## 2021-03-02 PROBLEM — I82.401 RIGHT LEG DVT: Status: ACTIVE | Noted: 2020-10-20

## 2021-03-03 ENCOUNTER — APPOINTMENT (OUTPATIENT)
Dept: INTERVENTIONAL RADIOLOGY/VASCULAR | Facility: CLINIC | Age: 66
End: 2021-03-03
Payer: MEDICARE

## 2021-03-03 VITALS — HEIGHT: 62 IN | BODY MASS INDEX: 37.73 KG/M2 | WEIGHT: 205 LBS

## 2021-03-03 DIAGNOSIS — I82.401 ACUTE EMBOLISM AND THROMBOSIS OF UNSPECIFIED DEEP VEINS OF RIGHT LOWER EXTREMITY: ICD-10-CM

## 2021-03-03 DIAGNOSIS — D64.9 ANEMIA, UNSPECIFIED: ICD-10-CM

## 2021-03-03 DIAGNOSIS — Z95.828 PRESENCE OF OTHER VASCULAR IMPLANTS AND GRAFTS: ICD-10-CM

## 2021-03-03 DIAGNOSIS — Z79.01 LONG TERM (CURRENT) USE OF ANTICOAGULANTS: ICD-10-CM

## 2021-03-03 DIAGNOSIS — Z01.818 ENCOUNTER FOR OTHER PREPROCEDURAL EXAMINATION: ICD-10-CM

## 2021-03-03 PROCEDURE — 99443: CPT | Mod: 95

## 2021-03-03 RX ORDER — ERGOCALCIFEROL 1.25 MG/1
1.25 MG CAPSULE, LIQUID FILLED ORAL
Refills: 0 | Status: ACTIVE | COMMUNITY
Start: 2017-08-08

## 2021-03-03 RX ORDER — OXYCODONE 5 MG/1
5 TABLET ORAL
Refills: 0 | Status: DISCONTINUED | COMMUNITY
End: 2021-03-03

## 2021-03-03 RX ORDER — OXYCODONE AND ACETAMINOPHEN 5; 325 MG/1; MG/1
5-325 TABLET ORAL
Qty: 21 | Refills: 0 | Status: DISCONTINUED | COMMUNITY
Start: 2020-10-22 | End: 2021-03-03

## 2021-03-03 RX ORDER — IBUPROFEN 200 MG/1
200 TABLET ORAL
Refills: 0 | Status: ACTIVE | COMMUNITY

## 2021-03-03 RX ORDER — CHLORHEXIDINE GLUCONATE 4 %
325 (65 FE) LIQUID (ML) TOPICAL
Refills: 0 | Status: ACTIVE | COMMUNITY

## 2021-03-03 RX ORDER — SUMATRIPTAN SUCCINATE 100 MG/1
100 TABLET, FILM COATED ORAL
Refills: 0 | Status: DISCONTINUED | COMMUNITY
End: 2021-03-03

## 2021-03-09 ENCOUNTER — LABORATORY RESULT (OUTPATIENT)
Age: 66
End: 2021-03-09

## 2021-03-09 LAB
ANION GAP SERPL CALC-SCNC: 11 MMOL/L
BASOPHILS # BLD AUTO: 0.04 K/UL
BASOPHILS NFR BLD AUTO: 0.6 %
BUN SERPL-MCNC: 22 MG/DL
CALCIUM SERPL-MCNC: 9.7 MG/DL
CHLORIDE SERPL-SCNC: 105 MMOL/L
CO2 SERPL-SCNC: 25 MMOL/L
CREAT SERPL-MCNC: 0.76 MG/DL
EOSINOPHIL # BLD AUTO: 0.1 K/UL
EOSINOPHIL NFR BLD AUTO: 1.5 %
GLUCOSE SERPL-MCNC: 105 MG/DL
HCT VFR BLD CALC: 38.9 %
HGB BLD-MCNC: 12.2 G/DL
IMM GRANULOCYTES NFR BLD AUTO: 0.3 %
INR PPP: 1.98 RATIO
LYMPHOCYTES # BLD AUTO: 2.43 K/UL
LYMPHOCYTES NFR BLD AUTO: 37.3 %
MAN DIFF?: NORMAL
MCHC RBC-ENTMCNC: 28.7 PG
MCHC RBC-ENTMCNC: 31.4 GM/DL
MCV RBC AUTO: 91.5 FL
MONOCYTES # BLD AUTO: 0.54 K/UL
MONOCYTES NFR BLD AUTO: 8.3 %
NEUTROPHILS # BLD AUTO: 3.39 K/UL
NEUTROPHILS NFR BLD AUTO: 52 %
PLATELET # BLD AUTO: 252 K/UL
POTASSIUM SERPL-SCNC: 4.3 MMOL/L
PT BLD: 22.6 SEC
RBC # BLD: 4.25 M/UL
RBC # FLD: 15.6 %
SODIUM SERPL-SCNC: 142 MMOL/L
WBC # FLD AUTO: 6.52 K/UL

## 2021-03-12 ENCOUNTER — RESULT REVIEW (OUTPATIENT)
Age: 66
End: 2021-03-12

## 2021-03-12 ENCOUNTER — APPOINTMENT (OUTPATIENT)
Dept: ULTRASOUND IMAGING | Facility: HOSPITAL | Age: 66
End: 2021-03-12

## 2021-03-12 ENCOUNTER — OUTPATIENT (OUTPATIENT)
Dept: OUTPATIENT SERVICES | Facility: HOSPITAL | Age: 66
LOS: 1 days | End: 2021-03-12
Payer: MEDICARE

## 2021-03-12 VITALS
SYSTOLIC BLOOD PRESSURE: 150 MMHG | HEART RATE: 65 BPM | OXYGEN SATURATION: 100 % | RESPIRATION RATE: 14 BRPM | TEMPERATURE: 99 F | DIASTOLIC BLOOD PRESSURE: 72 MMHG

## 2021-03-12 VITALS
RESPIRATION RATE: 16 BRPM | SYSTOLIC BLOOD PRESSURE: 113 MMHG | OXYGEN SATURATION: 100 % | HEART RATE: 66 BPM | DIASTOLIC BLOOD PRESSURE: 71 MMHG

## 2021-03-12 DIAGNOSIS — I82.401 ACUTE EMBOLISM AND THROMBOSIS OF UNSPECIFIED DEEP VEINS OF RIGHT LOWER EXTREMITY: ICD-10-CM

## 2021-03-12 DIAGNOSIS — Z95.828 PRESENCE OF OTHER VASCULAR IMPLANTS AND GRAFTS: ICD-10-CM

## 2021-03-12 DIAGNOSIS — Z98.49 CATARACT EXTRACTION STATUS, UNSPECIFIED EYE: Chronic | ICD-10-CM

## 2021-03-12 DIAGNOSIS — G56.03 CARPAL TUNNEL SYNDROME, BILATERAL UPPER LIMBS: Chronic | ICD-10-CM

## 2021-03-12 DIAGNOSIS — Z98.890 OTHER SPECIFIED POSTPROCEDURAL STATES: Chronic | ICD-10-CM

## 2021-03-12 PROCEDURE — 37193 REM ENDOVAS VENA CAVA FILTER: CPT

## 2021-03-12 PROCEDURE — 93970 EXTREMITY STUDY: CPT | Mod: 26

## 2021-03-12 PROCEDURE — 93970 EXTREMITY STUDY: CPT

## 2021-03-12 PROCEDURE — C1894: CPT

## 2021-03-12 PROCEDURE — C1773: CPT

## 2021-03-12 PROCEDURE — 88300 SURGICAL PATH GROSS: CPT | Mod: 26

## 2021-03-12 PROCEDURE — C1769: CPT

## 2021-03-12 PROCEDURE — 88300 SURGICAL PATH GROSS: CPT

## 2021-03-12 PROCEDURE — C1887: CPT

## 2021-03-12 RX ORDER — CARVEDILOL PHOSPHATE 80 MG/1
1 CAPSULE, EXTENDED RELEASE ORAL
Qty: 0 | Refills: 0 | DISCHARGE

## 2021-03-12 NOTE — PRE PROCEDURE NOTE - PRE PROCEDURE EVALUATION
------------------------------------------------------------  Interventional Radiology Pre-Procedure Note  ------------------------------------------------------------    Indication: 66y Female with IVC filter that was placed following spine surgery when patient could not be anticoagulated, now without further contraindication to anticoagulation, referred for IVC filter retrieval.    Past Medical History:  Lumbar stenosis    Vertigo    Kidney stones    Obesity    HTN (hypertension)    Lumbago with sciatica      Allergies: No Known Allergies    Vital Signs:   T(F): 98.6 (10:53), Max: 98.6 (10:53)  HR: 65 (10:53)  BP: 150/72 (10:53)  RR: 14 (10:53)  SpO2: 100% (10:53)      Imaging: bilateral lower extremity venous duplex ultrasound from this morning reviewed, there are multiple small foci of chronic thrombus/scarring without new acute DVT    Consent: The risks, benefits and alternatives of the procedure were discussed with the patient, who verbalized understanding. Signed consent is available in the paper chart.    Procedure Plan: IVC filter retrieval    Sage Liang MD, RPVI  Chief Resident, Interventional Radiology  [facility]: [phone]  [facility]: [phone]) [phone]

## 2021-03-12 NOTE — ASU PATIENT PROFILE, ADULT - PMH
HTN (hypertension)    Kidney stones    Lumbago with sciatica    Lumbar stenosis    Obesity    Vertigo

## 2021-03-12 NOTE — ASU PATIENT PROFILE, ADULT - PSH
Carpal tunnel syndrome on both sides  sx done 2013  History of cataract surgery  both eyes 2017  History of laminectomy  3/2018  History of lithotripsy

## 2021-03-18 ENCOUNTER — NON-APPOINTMENT (OUTPATIENT)
Age: 66
End: 2021-03-18

## 2021-03-18 DIAGNOSIS — Z45.89 ENCOUNTER FOR ADJUSTMENT AND MANAGEMENT OF OTHER IMPLANTED DEVICES: ICD-10-CM

## 2021-03-22 LAB — SURGICAL PATHOLOGY STUDY: SIGNIFICANT CHANGE UP

## 2021-03-25 ENCOUNTER — TRANSCRIPTION ENCOUNTER (OUTPATIENT)
Age: 66
End: 2021-03-25

## 2021-04-27 ENCOUNTER — OUTPATIENT (OUTPATIENT)
Dept: OUTPATIENT SERVICES | Facility: HOSPITAL | Age: 66
LOS: 1 days | Discharge: HOME | End: 2021-04-27

## 2021-04-27 DIAGNOSIS — Z98.49 CATARACT EXTRACTION STATUS, UNSPECIFIED EYE: Chronic | ICD-10-CM

## 2021-04-27 DIAGNOSIS — I82.409 ACUTE EMBOLISM AND THROMBOSIS OF UNSPECIFIED DEEP VEINS OF UNSPECIFIED LOWER EXTREMITY: ICD-10-CM

## 2021-04-27 DIAGNOSIS — G56.03 CARPAL TUNNEL SYNDROME, BILATERAL UPPER LIMBS: Chronic | ICD-10-CM

## 2021-04-27 DIAGNOSIS — Z98.890 OTHER SPECIFIED POSTPROCEDURAL STATES: Chronic | ICD-10-CM

## 2021-04-28 ENCOUNTER — APPOINTMENT (OUTPATIENT)
Dept: HEMATOLOGY ONCOLOGY | Facility: CLINIC | Age: 66
End: 2021-04-28

## 2021-05-27 ENCOUNTER — APPOINTMENT (OUTPATIENT)
Dept: VASCULAR SURGERY | Facility: CLINIC | Age: 66
End: 2021-05-27
Payer: MEDICARE

## 2021-05-27 VITALS
BODY MASS INDEX: 37.73 KG/M2 | DIASTOLIC BLOOD PRESSURE: 76 MMHG | WEIGHT: 205 LBS | SYSTOLIC BLOOD PRESSURE: 132 MMHG | HEIGHT: 62 IN | HEART RATE: 67 BPM

## 2021-05-27 PROCEDURE — 99213 OFFICE O/P EST LOW 20 MIN: CPT

## 2021-05-27 PROCEDURE — 93971 EXTREMITY STUDY: CPT

## 2021-05-27 NOTE — ASSESSMENT
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

## 2021-05-27 NOTE — PHYSICAL EXAM
[JVD] : no jugular venous distention  [2+] : left 2+ [Ankle Swelling (On Exam)] : not present [Varicose Veins Of Lower Extremities] : not present [] : not present [No Rash or Lesion] : No rash or lesion [Skin Ulcer] : no ulcer [Alert] : alert [Calm] : calm [de-identified] : appears well

## 2021-05-27 NOTE — HISTORY OF PRESENT ILLNESS
[FreeTextEntry1] : 67 yo female s/p recent spinal surgery resulting in lle dvt and ivc filter placement (9/2020) and removal by ir 2 months ago currently on xarelto follows with hematology presents for follow up \par pt without any complaints of swelling \par pt doing well aside from back pain radiating down the legs when she walks

## 2021-06-16 ENCOUNTER — APPOINTMENT (OUTPATIENT)
Dept: SPINE | Facility: CLINIC | Age: 66
End: 2021-06-16
Payer: MEDICARE

## 2021-06-16 VITALS
SYSTOLIC BLOOD PRESSURE: 145 MMHG | TEMPERATURE: 97.5 F | WEIGHT: 200 LBS | HEIGHT: 62 IN | DIASTOLIC BLOOD PRESSURE: 80 MMHG | BODY MASS INDEX: 36.8 KG/M2 | HEART RATE: 79 BPM | RESPIRATION RATE: 16 BRPM | OXYGEN SATURATION: 97 %

## 2021-06-16 DIAGNOSIS — R26.89 OTHER ABNORMALITIES OF GAIT AND MOBILITY: ICD-10-CM

## 2021-06-16 PROCEDURE — 99211 OFF/OP EST MAY X REQ PHY/QHP: CPT

## 2021-06-16 NOTE — END OF VISIT
[FreeTextEntry3] : I, Dr. Scott Sheppard, evaluated the patient with the nurse practitioner Moi Breen and established the plan of care. I personally discuss this patient with the nurse practitioner at the time of the visit. I agree with the assessment and plan as written, unless noted below.\par \par

## 2021-06-16 NOTE — PHYSICAL EXAM
[General Appearance - Alert] : alert [General Appearance - In No Acute Distress] : in no acute distress [Cranial Nerves Optic (II)] : visual acuity intact bilaterally,  pupils equal round and reactive to light [Cranial Nerves Oculomotor (III)] : extraocular motion intact [Cranial Nerves Trigeminal (V)] : facial sensation intact symmetrically [Cranial Nerves Facial (VII)] : face symmetrical [Cranial Nerves Vestibulocochlear (VIII)] : hearing was intact bilaterally [Cranial Nerves Glossopharyngeal (IX)] : tongue and palate midline [Cranial Nerves Accessory (XI - Cranial And Spinal)] : head turning and shoulder shrug symmetric [Cranial Nerves Hypoglossal (XII)] : there was no tongue deviation with protrusion [Motor Tone] : muscle tone was normal in all four extremities [Motor Strength] : muscle strength was normal in all four extremities [] : no respiratory distress [Heart Rate And Rhythm] : heart rate was normal and rhythm regular [Abdomen Soft] : soft [FreeTextEntry1] : Ambulates with walker

## 2021-06-16 NOTE — REASON FOR VISIT
[Follow-Up: _____] : a [unfilled] follow-up visit [FreeTextEntry1] : She reports persistent Lowerback and leg pain\par She is having PT 2 x week \par She reports improved posture but pain continues to be problematic.\par She ambulates with walker  for balance difficulty\par

## 2021-06-16 NOTE — ASSESSMENT
[FreeTextEntry1] : 66 Year old woman S/P Lumbar Spine Fusion presenting with persistent pain\par \par CT THORACIC AND LUMBAR SPINE to assess for pseudoarthrosis and adjacent level disease\par Follow up with Dr. Dixon for JUANA\par \par Follow up in 3 weeks

## 2021-06-21 NOTE — OCCUPATIONAL THERAPY INITIAL EVALUATION ADULT - STANDING BALANCE: DYNAMIC, REHAB EVAL
Patient: Helena Brown  RIGHT MINIMALLY INVASIVE TOTAL KNEE ARTHROPLASTY, LEFT TOTAL KNEE MANIPULATION UNDER ANESTHESIA  Anesthesia type: spinal    Patient location: PACU  Last vitals:   Vitals:    11/08/18 1432   BP: 104/57   Pulse: 86   Resp: 15   Temp: 37.3  C (99.1  F)   SpO2: 95%     Post vital signs: stable  Level of consciousness: awake and responds to simple questions  Post-anesthesia pain: pain controlled  Post-anesthesia nausea and vomiting: no  Pulmonary: unassisted, return to baseline  Cardiovascular: stable and blood pressure at baseline  Hydration: adequate  Anesthetic events: no    QCDR Measures:  ASA# 11 - Delilah-op Cardiac Arrest: ASA11B - Patient did NOT experience unanticipated cardiac arrest  ASA# 12 - Delilah-op Mortality Rate: ASA12B - Patient did NOT die  ASA# 13 - PACU Re-Intubation Rate: ASA13B - Patient did NOT require a new airway mgmt  ASA# 10 - Composite Anes Safety: ASA10A - No serious adverse event    Additional Notes:  
fair minus

## 2021-07-07 ENCOUNTER — APPOINTMENT (OUTPATIENT)
Dept: SPINE | Facility: CLINIC | Age: 66
End: 2021-07-07

## 2021-08-24 ENCOUNTER — OUTPATIENT (OUTPATIENT)
Dept: OUTPATIENT SERVICES | Facility: HOSPITAL | Age: 66
LOS: 1 days | Discharge: HOME | End: 2021-08-24

## 2021-08-24 DIAGNOSIS — Z98.890 OTHER SPECIFIED POSTPROCEDURAL STATES: Chronic | ICD-10-CM

## 2021-08-24 DIAGNOSIS — G56.03 CARPAL TUNNEL SYNDROME, BILATERAL UPPER LIMBS: Chronic | ICD-10-CM

## 2021-08-24 DIAGNOSIS — I82.409 ACUTE EMBOLISM AND THROMBOSIS OF UNSPECIFIED DEEP VEINS OF UNSPECIFIED LOWER EXTREMITY: ICD-10-CM

## 2021-08-24 DIAGNOSIS — Z98.49 CATARACT EXTRACTION STATUS, UNSPECIFIED EYE: Chronic | ICD-10-CM

## 2021-08-25 ENCOUNTER — APPOINTMENT (OUTPATIENT)
Dept: HEMATOLOGY ONCOLOGY | Facility: CLINIC | Age: 66
End: 2021-08-25
Payer: MEDICARE

## 2021-08-25 VITALS
RESPIRATION RATE: 14 BRPM | DIASTOLIC BLOOD PRESSURE: 72 MMHG | SYSTOLIC BLOOD PRESSURE: 112 MMHG | BODY MASS INDEX: 37.5 KG/M2 | WEIGHT: 205.03 LBS | TEMPERATURE: 98 F | HEART RATE: 69 BPM | OXYGEN SATURATION: 100 %

## 2021-08-25 PROCEDURE — 99214 OFFICE O/P EST MOD 30 MIN: CPT

## 2021-08-27 NOTE — HISTORY OF PRESENT ILLNESS
[de-identified] : This is a 66 year old woman with history of hypertension.  Had spinal surgery complicated by Left lower extremity popliteal DVT. IVC filter placed initially given the contraindication to anticoagulation.  On vascular evaluation for retrieval of IVC filter,  subacute thrombosis was detected, and thrombosis seemed to have progressed proximally in the right femoral vein.  Initially treated with  treated with Lovenox but was more recently  the transitioned to xarelto 20mg in November.  Right femoral thrombosis.  After several weeks of xarelto 20mg therapy, patient repeated a doppler U/S on 1/8/21 which demonstrated resolution of the Right leg DVT.  \par \par Can walk around house with walker.  \par \par Has Had had routine mammograms. last year, which she states was normal.  \par Colonoscopy 2018 normal November 18th.  \par Als oUTD on GYN/ PAp smear exams.  \par \par Surgery date September 18th.  c It had since progressed to the Right femoral vein.   [de-identified] : Paitnet returns for follow up of her history of provoked DVT.  Patient remains on xarelto, no bleeding noted.  \par Patient had IVC filter removed in March 2021 with no complications.  \par Patient had a repeat ultrasound in May 2021 demonstrated resolution of the thrombosis.

## 2021-08-27 NOTE — ASSESSMENT
[FreeTextEntry1] : This is a 66 year old man presenting with an acute provoked DVT following spinal surgery. Patient was treated with 3 months of lovenox after initial IVC filter placement given initial contraindication to anticoagulation due to the recent spine surgery.  On vascular evaluation for retrieval of IVC filter, patient noted to have proximal progression of the thrombosis and subacute thrombosis despite the lovenox treatment.  IVC filter retrieval postponed.  Patient transitioned to xarelto 20mg daily.  Reccomended continue the Xarelto for at least 3 months, repeat Doppler to re-evaluate subacute thrombosis.  \par Hypercoagulable state evaluation demonstrated an abnormal APCR ratio, Factor V Leiden heterozygous mutation. Explained that this is a minor hypercoagulable disorder with an increases risk of thrombosis o the scale of 5-6 fold, however this is mild compared to the homozygous variant. \par \par IVC filter now removed in March 2021.  Repeat Dopplers demonstrated no recurrence in May 2021.  Recommended she take the xarelto through the end of september to make 1 year since the provoked DVT, after this point the risk of recurrent thrombosis is lower.  Can stop at that point. Instructed her to report any signs of leg swelling leg pain, shortness of breath or chest pain as these are signs of recurrent VTE.  IF she is found to have another blooc clot and she should be scanned immediately and retreated on full dose anticoagulation if this is discovered.  That would be a indication for long term anticontagion.  \par \par Also discussed the option for long term prophylactic anticoagulation.  After discussion of risks and benefits, we decided not to do this. Will have patient return in the end of  October a month after ending the xarelto therapy to repeat the DRVVT, this may have been a false positive while on the xarelto.

## 2021-09-09 ENCOUNTER — TRANSCRIPTION ENCOUNTER (OUTPATIENT)
Age: 66
End: 2021-09-09

## 2021-10-12 ENCOUNTER — OUTPATIENT (OUTPATIENT)
Dept: OUTPATIENT SERVICES | Facility: HOSPITAL | Age: 66
LOS: 1 days | Discharge: HOME | End: 2021-10-12

## 2021-10-12 DIAGNOSIS — I82.409 ACUTE EMBOLISM AND THROMBOSIS OF UNSPECIFIED DEEP VEINS OF UNSPECIFIED LOWER EXTREMITY: ICD-10-CM

## 2021-10-12 DIAGNOSIS — Z98.890 OTHER SPECIFIED POSTPROCEDURAL STATES: Chronic | ICD-10-CM

## 2021-10-12 DIAGNOSIS — G56.03 CARPAL TUNNEL SYNDROME, BILATERAL UPPER LIMBS: Chronic | ICD-10-CM

## 2021-10-12 DIAGNOSIS — Z98.49 CATARACT EXTRACTION STATUS, UNSPECIFIED EYE: Chronic | ICD-10-CM

## 2021-10-13 ENCOUNTER — APPOINTMENT (OUTPATIENT)
Dept: HEMATOLOGY ONCOLOGY | Facility: CLINIC | Age: 66
End: 2021-10-13
Payer: MEDICARE

## 2021-10-13 ENCOUNTER — RESULT REVIEW (OUTPATIENT)
Age: 66
End: 2021-10-13

## 2021-10-13 VITALS
TEMPERATURE: 97.7 F | OXYGEN SATURATION: 97 % | RESPIRATION RATE: 18 BRPM | SYSTOLIC BLOOD PRESSURE: 137 MMHG | WEIGHT: 205.91 LBS | DIASTOLIC BLOOD PRESSURE: 78 MMHG | HEART RATE: 65 BPM | BODY MASS INDEX: 37.89 KG/M2 | HEIGHT: 61.97 IN

## 2021-10-13 LAB
BASOPHILS # BLD AUTO: 0.03 K/UL — SIGNIFICANT CHANGE UP (ref 0–0.2)
BASOPHILS NFR BLD AUTO: 0.4 % — SIGNIFICANT CHANGE UP (ref 0–2)
EOSINOPHIL # BLD AUTO: 0.05 K/UL — SIGNIFICANT CHANGE UP (ref 0–0.5)
EOSINOPHIL NFR BLD AUTO: 0.7 % — SIGNIFICANT CHANGE UP (ref 0–6)
HCT VFR BLD CALC: 40.2 % — SIGNIFICANT CHANGE UP (ref 34.5–45)
HGB BLD-MCNC: 13.2 G/DL — SIGNIFICANT CHANGE UP (ref 11.5–15.5)
IMM GRANULOCYTES NFR BLD AUTO: 0.4 % — SIGNIFICANT CHANGE UP (ref 0–1.5)
LYMPHOCYTES # BLD AUTO: 1.94 K/UL — SIGNIFICANT CHANGE UP (ref 1–3.3)
LYMPHOCYTES # BLD AUTO: 26.2 % — SIGNIFICANT CHANGE UP (ref 13–44)
MCHC RBC-ENTMCNC: 31.9 PG — SIGNIFICANT CHANGE UP (ref 27–34)
MCHC RBC-ENTMCNC: 32.8 G/DL — SIGNIFICANT CHANGE UP (ref 32–36)
MCV RBC AUTO: 97.1 FL — SIGNIFICANT CHANGE UP (ref 80–100)
MONOCYTES # BLD AUTO: 0.59 K/UL — SIGNIFICANT CHANGE UP (ref 0–0.9)
MONOCYTES NFR BLD AUTO: 8 % — SIGNIFICANT CHANGE UP (ref 2–14)
NEUTROPHILS # BLD AUTO: 4.77 K/UL — SIGNIFICANT CHANGE UP (ref 1.8–7.4)
NEUTROPHILS NFR BLD AUTO: 64.3 % — SIGNIFICANT CHANGE UP (ref 43–77)
NRBC # BLD: 0 /100 WBCS — SIGNIFICANT CHANGE UP (ref 0–0)
PLATELET # BLD AUTO: 209 K/UL — SIGNIFICANT CHANGE UP (ref 150–400)
RBC # BLD: 4.14 M/UL — SIGNIFICANT CHANGE UP (ref 3.8–5.2)
RBC # FLD: 15.7 % — HIGH (ref 10.3–14.5)
RETICS #: 89.8 K/UL — SIGNIFICANT CHANGE UP (ref 25–125)
RETICS/RBC NFR: 2.2 % — SIGNIFICANT CHANGE UP (ref 0.5–2.5)
WBC # BLD: 7.41 K/UL — SIGNIFICANT CHANGE UP (ref 3.8–10.5)
WBC # FLD AUTO: 7.41 K/UL — SIGNIFICANT CHANGE UP (ref 3.8–10.5)

## 2021-10-13 PROCEDURE — 99203 OFFICE O/P NEW LOW 30 MIN: CPT

## 2021-10-13 PROCEDURE — 99213 OFFICE O/P EST LOW 20 MIN: CPT

## 2021-10-13 RX ORDER — RIVAROXABAN 20 MG/1
20 TABLET, FILM COATED ORAL
Qty: 30 | Refills: 5 | Status: DISCONTINUED | COMMUNITY
Start: 2021-01-27 | End: 2021-10-13

## 2021-10-20 ENCOUNTER — NON-APPOINTMENT (OUTPATIENT)
Age: 66
End: 2021-10-20

## 2021-10-20 LAB
ALBUMIN SERPL ELPH-MCNC: 4 G/DL
ALP BLD-CCNC: 76 U/L
ALT SERPL-CCNC: 22 U/L
ANION GAP SERPL CALC-SCNC: 11 MMOL/L
AST SERPL-CCNC: 19 U/L
B2 GLYCOPROT1 IGG SER-ACNC: <5 SGU
B2 GLYCOPROT1 IGM SER-ACNC: <5 SMU
BILIRUB SERPL-MCNC: 0.6 MG/DL
BUN SERPL-MCNC: 24 MG/DL
CALCIUM SERPL-MCNC: 9.4 MG/DL
CARDIOLIPIN IGM SER-MCNC: 6.8 MPL
CARDIOLIPIN IGM SER-MCNC: <5 GPL
CHLORIDE SERPL-SCNC: 105 MMOL/L
CO2 SERPL-SCNC: 25 MMOL/L
CONFIRM: 28 SEC
CREAT SERPL-MCNC: 0.84 MG/DL
DRVVT IMM 1:2 NP PPP: NORMAL
DRVVT SCREEN TO CONFIRM RATIO: 0.91 RATIO
FERRITIN SERPL-MCNC: 70 NG/ML
GLUCOSE SERPL-MCNC: 88 MG/DL
IRON SATN MFR SERPL: 31 %
IRON SERPL-MCNC: 93 UG/DL
POTASSIUM SERPL-SCNC: 4.9 MMOL/L
PROT SERPL-MCNC: 6.6 G/DL
SCREEN DRVVT: 34.2 SEC
SODIUM SERPL-SCNC: 141 MMOL/L
TIBC SERPL-MCNC: 300 UG/DL
UIBC SERPL-MCNC: 207 UG/DL

## 2021-11-17 NOTE — PHYSICAL THERAPY INITIAL EVALUATION ADULT - PRECAUTIONS/LIMITATIONS, REHAB EVAL
spinal precautions/fall precautions
Plan: Apply SPF 30 or greater to sun exposed areas twice daily
Detail Level: Zone

## 2022-03-23 ENCOUNTER — OUTPATIENT (OUTPATIENT)
Dept: OUTPATIENT SERVICES | Facility: HOSPITAL | Age: 67
LOS: 1 days | Discharge: HOME | End: 2022-03-23

## 2022-03-23 DIAGNOSIS — Z98.49 CATARACT EXTRACTION STATUS, UNSPECIFIED EYE: Chronic | ICD-10-CM

## 2022-03-23 DIAGNOSIS — G56.03 CARPAL TUNNEL SYNDROME, BILATERAL UPPER LIMBS: Chronic | ICD-10-CM

## 2022-03-23 DIAGNOSIS — Z98.890 OTHER SPECIFIED POSTPROCEDURAL STATES: Chronic | ICD-10-CM

## 2022-03-23 DIAGNOSIS — I82.409 ACUTE EMBOLISM AND THROMBOSIS OF UNSPECIFIED DEEP VEINS OF UNSPECIFIED LOWER EXTREMITY: ICD-10-CM

## 2022-03-24 ENCOUNTER — APPOINTMENT (OUTPATIENT)
Dept: HEMATOLOGY ONCOLOGY | Facility: CLINIC | Age: 67
End: 2022-03-24
Payer: MEDICARE

## 2022-03-24 VITALS
SYSTOLIC BLOOD PRESSURE: 111 MMHG | OXYGEN SATURATION: 98 % | HEART RATE: 74 BPM | DIASTOLIC BLOOD PRESSURE: 68 MMHG | WEIGHT: 210 LBS | RESPIRATION RATE: 16 BRPM | TEMPERATURE: 97.9 F | BODY MASS INDEX: 38.45 KG/M2

## 2022-03-24 PROCEDURE — 99215 OFFICE O/P EST HI 40 MIN: CPT

## 2022-03-24 PROCEDURE — 99205 OFFICE O/P NEW HI 60 MIN: CPT

## 2022-03-24 NOTE — ASSESSMENT
[FreeTextEntry1] : This is a 66 year old woman presenting with an acute provoked DVT following spinal surgery. Patient was treated with 3 months of lovenox after initial IVC filter placement given initial contraindication to anticoagulation due to the recent spine surgery.  On vascular evaluation for retrieval of IVC filter, patient noted to have proximal progression of the thrombosis and subacute thrombosis despite the lovenox treatment.  IVC filter retrieval postponed.  Patient transitioned to xarelto 20mg daily.  Recommended continue the Xarelto for at least 3 months, repeat Doppler to re-evaluate subacute thrombosis.  \par Hypercoagulable state evaluation demonstrated an abnormal APCR ratio, Factor V Leiden heterozygous mutation. Explained that this is a minor hypercoagulable disorder with an increases risk of thrombosis on the scale of 5-6 fold, however this is mild compared to the homozygous variant. \par \par IVC filter now removed in March 2021.  Repeat Dopplers demonstrated no recurrence in May 2021.  Patient completed the course of xarelto, now off the Anticoagulation for a month. Repeat Anticardiolipin antibodies ,and DRVVT confirm that the positive value was a false positive on DOAC  anticoagulation.

## 2022-03-24 NOTE — HISTORY OF PRESENT ILLNESS
[de-identified] : Patient complains of leg pain, aobut the same as prior.   [de-identified] : As of last visit we had planned to continue the xarelto 20mg for another 2 months, then return today for follow up for the  and she is now off of this for a month allowing us o repeat DRVVT without the influence of the DOAC.  \par DRVVT from 1/8/21 LA pos 1.5 ratio.,  \par In May repeat U/S demonstrated a chronic  Right leg DVT partial obstruction.

## 2022-03-27 NOTE — HISTORY OF PRESENT ILLNESS
[Cardiovascular] : Cardiovascular [Constitutional] : Constitutional [ENT] : ENT [Dermatologic] : Dermatologic [Endocrine] : Endocrine [Gastrointestinal] : Gastrointestinal [Genitourinary] : Genitourinary [Gynecologic] : Gynecologic [Infectious] : Infectious [Musculoskeletal] : Musculoskeletal [Neurologic] : Neurologic [Pain] : Pain [Pulmonary] : Pulmonary [Hematologic] : Hematologic [de-identified] : This is a 67 year old woman with history of hypertension. Had spinal surgery complicated by Left lower extremity popliteal DVT. IVC filter placed initially given the contraindication to anticoagulation. On vascular evaluation for retrieval of IVC filter, subacute thrombosis was detected, and thrombosis seemed to have progressed proximally in the right femoral vein. Initially treated with treated with Lovenox but was more recently the transitioned to xarelto 20mg in November. Right femoral thrombosis. After several weeks of xarelto 20mg therapy, patient repeated a doppler U/S on 1/8/21 which demonstrated resolution of the Right leg DVT.  [de-identified] : Patient had a left ankle arthrodesis with an immobilization cast applied to it.  Patient had a rash over the area, cast was changed with non allergenic material, rash resolved, still remains somewhat edematous at the level of the cast.\par patient as placed on lovenox 40mg SQ daily for prophylaxis. The cast and weight bearing activity is expected to restart about 3 months post surgery, with as been about 3 q weeks so far.  \par \par Gabapentin was decreased to 100mg TID.

## 2022-03-27 NOTE — ASSESSMENT
[FreeTextEntry1] : This is a 66 year old woman presenting with an acute provoked DVT following spinal surgery. Patient was treated with 3 months of lovenox after initial IVC filter placement given initial contraindication to anticoagulation due to the recent spine surgery.  On vascular evaluation for retrieval of IVC filter, patient noted to have proximal progression of the thrombosis and subacute thrombosis despite the lovenox treatment.  IVC filter retrieval postponed.  Patient transitioned to xarelto 20mg daily.  Patient completed therapy and had discontinued the medication followed by the removal of the IVC filter in 2021.  REpeat Doppler negative in May 2021. Since then patient has not had any signs of recurrence.  \par Hypercoagulable state evaluation demonstrated an abnormal APCR ratio, Factor V Leiden heterozygous mutation. Explained that this is a minor hypercoagulable disorder with an increases risk of thrombosis on the scale of 5-6 fold, however this is mild compared to the homozygous variant. \par \par Patient now s/p left foot surgery, has an immobilization boot. Recommend patient continue the SQ lovenox for as long as she is not ambulatory, then can discontinue again.

## 2022-06-07 ENCOUNTER — INPATIENT (INPATIENT)
Facility: HOSPITAL | Age: 67
LOS: 2 days | Discharge: HOME | DRG: 271 | End: 2022-06-10
Attending: INTERNAL MEDICINE | Admitting: INTERNAL MEDICINE
Payer: MEDICARE

## 2022-06-07 VITALS
DIASTOLIC BLOOD PRESSURE: 74 MMHG | HEIGHT: 63 IN | RESPIRATION RATE: 20 BRPM | HEART RATE: 80 BPM | WEIGHT: 222.67 LBS | SYSTOLIC BLOOD PRESSURE: 130 MMHG | TEMPERATURE: 96 F | OXYGEN SATURATION: 95 %

## 2022-06-07 DIAGNOSIS — Z98.49 CATARACT EXTRACTION STATUS, UNSPECIFIED EYE: Chronic | ICD-10-CM

## 2022-06-07 DIAGNOSIS — Z98.890 OTHER SPECIFIED POSTPROCEDURAL STATES: Chronic | ICD-10-CM

## 2022-06-07 DIAGNOSIS — G56.03 CARPAL TUNNEL SYNDROME, BILATERAL UPPER LIMBS: Chronic | ICD-10-CM

## 2022-06-07 DIAGNOSIS — I82.409 ACUTE EMBOLISM AND THROMBOSIS OF UNSPECIFIED DEEP VEINS OF UNSPECIFIED LOWER EXTREMITY: ICD-10-CM

## 2022-06-07 LAB
ALBUMIN SERPL ELPH-MCNC: 3.4 G/DL — SIGNIFICANT CHANGE UP (ref 3.3–5)
ALP SERPL-CCNC: 86 U/L — SIGNIFICANT CHANGE UP (ref 40–120)
ALT FLD-CCNC: 31 U/L — SIGNIFICANT CHANGE UP (ref 12–78)
ANION GAP SERPL CALC-SCNC: 6 MMOL/L — SIGNIFICANT CHANGE UP (ref 5–17)
APTT BLD: 38.8 SEC — HIGH (ref 27.5–35.5)
AST SERPL-CCNC: 26 U/L — SIGNIFICANT CHANGE UP (ref 15–37)
BASOPHILS # BLD AUTO: 0.03 K/UL — SIGNIFICANT CHANGE UP (ref 0–0.2)
BASOPHILS NFR BLD AUTO: 0.4 % — SIGNIFICANT CHANGE UP (ref 0–2)
BILIRUB SERPL-MCNC: 0.4 MG/DL — SIGNIFICANT CHANGE UP (ref 0.2–1.2)
BUN SERPL-MCNC: 12 MG/DL — SIGNIFICANT CHANGE UP (ref 7–23)
CALCIUM SERPL-MCNC: 9.1 MG/DL — SIGNIFICANT CHANGE UP (ref 8.5–10.1)
CHLORIDE SERPL-SCNC: 107 MMOL/L — SIGNIFICANT CHANGE UP (ref 96–108)
CO2 SERPL-SCNC: 29 MMOL/L — SIGNIFICANT CHANGE UP (ref 22–31)
CREAT SERPL-MCNC: 0.9 MG/DL — SIGNIFICANT CHANGE UP (ref 0.5–1.3)
EGFR: 70 ML/MIN/1.73M2 — SIGNIFICANT CHANGE UP
EOSINOPHIL # BLD AUTO: 0.14 K/UL — SIGNIFICANT CHANGE UP (ref 0–0.5)
EOSINOPHIL NFR BLD AUTO: 1.7 % — SIGNIFICANT CHANGE UP (ref 0–6)
GLUCOSE SERPL-MCNC: 136 MG/DL — HIGH (ref 70–99)
HCT VFR BLD CALC: 38.3 % — SIGNIFICANT CHANGE UP (ref 34.5–45)
HGB BLD-MCNC: 12.6 G/DL — SIGNIFICANT CHANGE UP (ref 11.5–15.5)
IMM GRANULOCYTES NFR BLD AUTO: 0.4 % — SIGNIFICANT CHANGE UP (ref 0–1.5)
INR BLD: 1.06 RATIO — SIGNIFICANT CHANGE UP (ref 0.88–1.16)
INR BLD: 1.08 RATIO — SIGNIFICANT CHANGE UP (ref 0.88–1.16)
LYMPHOCYTES # BLD AUTO: 2.31 K/UL — SIGNIFICANT CHANGE UP (ref 1–3.3)
LYMPHOCYTES # BLD AUTO: 28.8 % — SIGNIFICANT CHANGE UP (ref 13–44)
MCHC RBC-ENTMCNC: 29.8 PG — SIGNIFICANT CHANGE UP (ref 27–34)
MCHC RBC-ENTMCNC: 32.9 GM/DL — SIGNIFICANT CHANGE UP (ref 32–36)
MCV RBC AUTO: 90.5 FL — SIGNIFICANT CHANGE UP (ref 80–100)
MONOCYTES # BLD AUTO: 0.67 K/UL — SIGNIFICANT CHANGE UP (ref 0–0.9)
MONOCYTES NFR BLD AUTO: 8.3 % — SIGNIFICANT CHANGE UP (ref 2–14)
NEUTROPHILS # BLD AUTO: 4.85 K/UL — SIGNIFICANT CHANGE UP (ref 1.8–7.4)
NEUTROPHILS NFR BLD AUTO: 60.4 % — SIGNIFICANT CHANGE UP (ref 43–77)
NRBC # BLD: 0 /100 WBCS — SIGNIFICANT CHANGE UP (ref 0–0)
PLATELET # BLD AUTO: 213 K/UL — SIGNIFICANT CHANGE UP (ref 150–400)
POTASSIUM SERPL-MCNC: 4.4 MMOL/L — SIGNIFICANT CHANGE UP (ref 3.5–5.3)
POTASSIUM SERPL-SCNC: 4.4 MMOL/L — SIGNIFICANT CHANGE UP (ref 3.5–5.3)
PROT SERPL-MCNC: 7.6 G/DL — SIGNIFICANT CHANGE UP (ref 6–8.3)
PROTHROM AB SERPL-ACNC: 12.4 SEC — SIGNIFICANT CHANGE UP (ref 10.5–13.4)
PROTHROM AB SERPL-ACNC: 12.7 SEC — SIGNIFICANT CHANGE UP (ref 10.5–13.4)
RBC # BLD: 4.23 M/UL — SIGNIFICANT CHANGE UP (ref 3.8–5.2)
RBC # FLD: 14.2 % — SIGNIFICANT CHANGE UP (ref 10.3–14.5)
SODIUM SERPL-SCNC: 142 MMOL/L — SIGNIFICANT CHANGE UP (ref 135–145)
WBC # BLD: 8.03 K/UL — SIGNIFICANT CHANGE UP (ref 3.8–10.5)
WBC # FLD AUTO: 8.03 K/UL — SIGNIFICANT CHANGE UP (ref 3.8–10.5)

## 2022-06-07 PROCEDURE — 93010 ELECTROCARDIOGRAM REPORT: CPT

## 2022-06-07 PROCEDURE — 71045 X-RAY EXAM CHEST 1 VIEW: CPT | Mod: 26

## 2022-06-07 PROCEDURE — 93971 EXTREMITY STUDY: CPT | Mod: 26,LT

## 2022-06-07 PROCEDURE — 74174 CTA ABD&PLVS W/CONTRAST: CPT | Mod: 26,MA

## 2022-06-07 PROCEDURE — 99285 EMERGENCY DEPT VISIT HI MDM: CPT

## 2022-06-07 RX ORDER — CARVEDILOL PHOSPHATE 80 MG/1
25 CAPSULE, EXTENDED RELEASE ORAL EVERY 12 HOURS
Refills: 0 | Status: DISCONTINUED | OUTPATIENT
Start: 2022-06-07 | End: 2022-06-09

## 2022-06-07 RX ORDER — ONDANSETRON 8 MG/1
4 TABLET, FILM COATED ORAL EVERY 6 HOURS
Refills: 0 | Status: DISCONTINUED | OUTPATIENT
Start: 2022-06-07 | End: 2022-06-09

## 2022-06-07 RX ORDER — HEPARIN SODIUM 5000 [USP'U]/ML
4000 INJECTION INTRAVENOUS; SUBCUTANEOUS EVERY 6 HOURS
Refills: 0 | Status: DISCONTINUED | OUTPATIENT
Start: 2022-06-07 | End: 2022-06-07

## 2022-06-07 RX ORDER — GABAPENTIN 400 MG/1
0 CAPSULE ORAL
Qty: 0 | Refills: 0 | DISCHARGE

## 2022-06-07 RX ORDER — EPLERENONE 50 MG/1
1 TABLET, FILM COATED ORAL
Qty: 0 | Refills: 0 | DISCHARGE

## 2022-06-07 RX ORDER — HEPARIN SODIUM 5000 [USP'U]/ML
8500 INJECTION INTRAVENOUS; SUBCUTANEOUS EVERY 6 HOURS
Refills: 0 | Status: DISCONTINUED | OUTPATIENT
Start: 2022-06-08 | End: 2022-06-09

## 2022-06-07 RX ORDER — HEPARIN SODIUM 5000 [USP'U]/ML
8500 INJECTION INTRAVENOUS; SUBCUTANEOUS EVERY 6 HOURS
Refills: 0 | Status: DISCONTINUED | OUTPATIENT
Start: 2022-06-07 | End: 2022-06-07

## 2022-06-07 RX ORDER — RIVAROXABAN 15 MG-20MG
1 KIT ORAL
Qty: 0 | Refills: 0 | DISCHARGE

## 2022-06-07 RX ORDER — ACETAMINOPHEN 500 MG
650 TABLET ORAL EVERY 6 HOURS
Refills: 0 | Status: DISCONTINUED | OUTPATIENT
Start: 2022-06-07 | End: 2022-06-09

## 2022-06-07 RX ORDER — LANOLIN ALCOHOL/MO/W.PET/CERES
3 CREAM (GRAM) TOPICAL AT BEDTIME
Refills: 0 | Status: DISCONTINUED | OUTPATIENT
Start: 2022-06-07 | End: 2022-06-09

## 2022-06-07 RX ORDER — HEPARIN SODIUM 5000 [USP'U]/ML
INJECTION INTRAVENOUS; SUBCUTANEOUS
Qty: 25000 | Refills: 0 | Status: DISCONTINUED | OUTPATIENT
Start: 2022-06-07 | End: 2022-06-07

## 2022-06-07 RX ORDER — ENOXAPARIN SODIUM 100 MG/ML
100 INJECTION SUBCUTANEOUS ONCE
Refills: 0 | Status: COMPLETED | OUTPATIENT
Start: 2022-06-07 | End: 2022-06-07

## 2022-06-07 RX ORDER — HEPARIN SODIUM 5000 [USP'U]/ML
4000 INJECTION INTRAVENOUS; SUBCUTANEOUS EVERY 6 HOURS
Refills: 0 | Status: DISCONTINUED | OUTPATIENT
Start: 2022-06-08 | End: 2022-06-09

## 2022-06-07 RX ORDER — LOSARTAN POTASSIUM 100 MG/1
1 TABLET, FILM COATED ORAL
Qty: 0 | Refills: 0 | DISCHARGE

## 2022-06-07 RX ORDER — LOSARTAN POTASSIUM 100 MG/1
50 TABLET, FILM COATED ORAL DAILY
Refills: 0 | Status: DISCONTINUED | OUTPATIENT
Start: 2022-06-07 | End: 2022-06-09

## 2022-06-07 RX ORDER — SODIUM CHLORIDE 9 MG/ML
1000 INJECTION INTRAMUSCULAR; INTRAVENOUS; SUBCUTANEOUS
Refills: 0 | Status: DISCONTINUED | OUTPATIENT
Start: 2022-06-07 | End: 2022-06-08

## 2022-06-07 RX ORDER — HEPARIN SODIUM 5000 [USP'U]/ML
INJECTION INTRAVENOUS; SUBCUTANEOUS
Qty: 25000 | Refills: 0 | Status: DISCONTINUED | OUTPATIENT
Start: 2022-06-08 | End: 2022-06-09

## 2022-06-07 RX ADMIN — ENOXAPARIN SODIUM 100 MILLIGRAM(S): 100 INJECTION SUBCUTANEOUS at 16:39

## 2022-06-07 NOTE — ED ADULT NURSE NOTE - NSICDXPASTSURGICALHX_GEN_ALL_CORE_FT
PAST SURGICAL HISTORY:  Carpal tunnel syndrome on both sides sx done 2013    History of ankle surgery     History of cataract surgery both eyes 2017    History of laminectomy 3/2018    History of lithotripsy

## 2022-06-07 NOTE — PHARMACOTHERAPY INTERVENTION NOTE - COMMENTS
Patient with acute DVT received dose of Lovenox 100mg x1 at 16:40. Patient had un timed order for heparin gtt and no aptt. Spoke to Dr. pederson and suggested stat aptt and retiming heparin drip to 12 hours after Lovenox dose, MD agreed. Entered order for stat aptt and heparin drip according to full dose anticoagulation nomogram with no initial bolus. Informed RN of heparin schedule change.

## 2022-06-07 NOTE — ED ADULT NURSE NOTE - CHIEF COMPLAINT QUOTE
I was told to come here by my doctor because he told me I have a blood clot in my left leg.  (report from [facility] / [place] radiology indicated clot in the femoral, popliteal and posterior tibial veins)

## 2022-06-07 NOTE — CONSULT NOTE ADULT - NS ATTEND AMEND GEN_ALL_CORE FT
Patient evaluated at the bedside. CT and US reviewed. Patient states she has had LLE chronic swelling since last DVT. She was evaluated by hematology in the past and she was told to stop AC. She does have  Factor V Leiden def. identified in 2020. Given CFV involvement and inability of evaluating L CIV given artifact on CT (in the setting of 2nd DVT involving upper thigh), I offered her a LLE venogram with venous thrombectomy. Plan discussed with her and her daughter. Cont Hep gtt. Will likely need life long AC. Recommend hematology eval.

## 2022-06-07 NOTE — H&P ADULT - PROBLEM SELECTOR PLAN 1
Admit  Start on full dose AC with iv heparin gtt  Patient may need thrombectomy  Vascular consult  Further work-up/management pending clinical course.

## 2022-06-07 NOTE — ED ADULT NURSE NOTE - OBJECTIVE STATEMENT
Presents to ER with DVT of left leg.  Pt's daughter stating pt had orthopedic surgery at [facility] on March 1st.  States shes been having pain in the surgical leg for a while but never had any imaging done as she thought the leg pain was just from having surgery.  Had an U/S performed today which shows a blood clot in the left leg.  Was on Xarelto post operatively which was discontinued May 9th.

## 2022-06-07 NOTE — ED PROVIDER NOTE - PROGRESS NOTE DETAILS
s/o Dr. Thomas, f/u labs, EKG, CXR, US for admission and further workup. spoke with vascular Dr. Borrero, US results discuused, wants ct venogram of abdomen/pelvis, called surgical PA, will see patient as per surgical PA patient to get heparin dripp, no bolus, admit to medicine, keep NPO after midnight

## 2022-06-07 NOTE — ED PROVIDER NOTE - OBJECTIVE STATEMENT
Patient came into the ED sent after getting US as outpatient showing DVT in left femoral/poplitealo/tibial veins. patient had surgery in her left ankle in march at [facility] and was on xarelto until may 9. patient had been c/o left leg pain since the surgery and thought it was all related to the surgery. had an US today and found out she had DVTs in her left leg. no SOB. no CP. daughter translated in [place].

## 2022-06-07 NOTE — ED ADULT TRIAGE NOTE - CHIEF COMPLAINT QUOTE
I was told to come here by my doctor because he told me I have a blood clot in my left leg.  (report from [facility] / [place] radiology indicated clot in the femoral, popliteal and posterior tibial veins)

## 2022-06-07 NOTE — CONSULT NOTE ADULT - SUBJECTIVE AND OBJECTIVE BOX
Vascular Surgery Consult Note:    CC: Patient is a 67y old  Female who presents with a chief complaint of left leg pain.    HPI From ED: Patient came into the ED sent after getting US as outpatient showing DVT in left femoral/poplitealo/tibial veins. patient had surgery in her left ankle in march at [facility] and was on xarelto until may 9. patient had been c/o left leg pain since the surgery and thought it was all related to the surgery. had an US today and found out she had DVTs in her left leg. no SOB. no CP. daughter translated in Sami.    Interval HPI: Vascular surgery consulted for left leg thrombus.  ID: [id number]. Pt states she had a left ankle procedure on 3/1/22 - after which she was put on Xarelto d/t to her hx of blood clots. She completed her therapy 5/9/22. Pt states she unsure when if the pain & swelling is secondary to the blood clots or to her ankle surgery. When questioning her ability to ambulate, she stated that she has not been able to ambulate very well since her laminectomy procedure (2018). Also admits to LLE paresthesias.   Of note, upon reading through the chart, pt had an IVC filter placed in September of 2020 d/t contraindication for AC (for 6 weeks d/t recent procedure). It was removed 3/12/21.    Past Medical & Surgical History:  Lumbago with sciatica  HTN (hypertension)  Obesity  Kidney stones  Vertigo  Lumbar stenosis  Carpal tunnel syndrome on both sides  sx done 2013  History of cataract surgery  both eyes 2017  History of laminectomy  3/2018  History of lithotripsy  History of ankle surgery    ROS:  General: denies fatigue/weakness, dizziness, fevers/chills, night sweats, weight loss  HEENT: denies auditory or visual changes/disturbances  Respiratory: denies shortness of breath, wheezing, dyspnea  Cardiac: denies chest pain, palpitations  GI: denies abdominal pain, bloating/fullnes  : denies urinary urgency/frequency, denies dysuria or hematuria; no incontinence or urinary retention  Neuro: denies headache, syncope, paraesthesias, paralysis or tremors  Extremities: admits to left lower extremity pain & swelling, arthralgias or weakness; LLE limited ROM, LLE paresthesias  Skin: denies pruritus, rashes    Medications:  Home Medications:  carvedilol 25 mg oral tablet: 1 tab(s) orally every 12 hours (12 Mar 2021 10:53)  diazePAM 5 mg oral tablet: 1 tab(s) orally every 8 hours, As needed, muscle spasm (12 Mar 2021 10:53)  eplerenone 25 mg oral tablet: 1 tab(s) orally once a day (12 Mar 2021 10:53)  gabapentin 300 mg oral capsule: orally once a day (12 Mar 2021 10:53)  losartan 50 mg oral tablet: 1 tab(s) orally once a day (12 Mar 2021 10:53)  Xarelto 20 mg oral tablet: 1 tab(s) orally once a day (in the evening) (12 Mar 2021 10:53)    MEDICATIONS  (STANDING):  MEDICATIONS  (PRN):    Allergies:  ALLERGIES: No Known Allergies  INTOLERANCES: None, unless indicated below    Social History: noncontributory    Family History: noncontributory    Vitals:  Vital Signs Last 24 Hrs  T(C): 36.7 (07 Jun 2022 19:46), Max: 36.7 (07 Jun 2022 18:39)  T(F): 98.1 (07 Jun 2022 19:46), Max: 98.1 (07 Jun 2022 18:39)  HR: 76 (07 Jun 2022 19:46) (74 - 80)  BP: 142/68 (07 Jun 2022 19:46) (120/71 - 142/68)  RR: 16 (07 Jun 2022 19:46) (16 - 20)  SpO2: 97% (07 Jun 2022 19:46) (95% - 97%)    Physical Exam:  General: no acute distress  HEENT: normocephalic/atraumatic, vision grossly intact, hearing grossly intact  Neck: supple  Chest: lungs clear to auscultation bilaterally  Heart: heart rate and rhythm regular  Abdomen: soft, nontender  Extremities: no gross deformities, able to move all four extremities, left leg appears swollen, pt able to move feet/toes/motor grossly intact, sensation intact on right, slightly diminished on left side; distal DP + PT pulses palpable bilaterally; left popliteal pulse faintly palpable; healing incision on left ankle  Neuro: alert & oriented x3  Skin: warm, dry, good turgor    Labs:                        12.6   8.03  )-----------( 213      ( 07 Jun 2022 16:02 )             38.3     06-07    142  |  107  |  12  ----------------------------<  136<H>  4.4   |  29  |  0.90    Ca    9.1      07 Jun 2022 16:02    TPro  7.6  /  Alb  3.4  /  TBili  0.4  /  DBili  x   /  AST  26  /  ALT  31  /  AlkPhos  86  06-07    PT/INR - ( 07 Jun 2022 16:02 )   PT: 12.4 sec;   INR: 1.06 ratio      LIVER FUNCTIONS - ( 07 Jun 2022 16:02 )  Alb: 3.4 g/dL / Pro: 7.6 g/dL / ALK PHOS: 86 U/L / ALT: 31 U/L / AST: 26 U/L / GGT: x           Radiology & Additional Studies:  < from: US Duplex Venous Lower Ext Ltd, Left (06.07.22 @ 18:33) >    ACC: [id number] EXAM:  US DPLX LWR EXT VEINS LTD LT                          PROCEDURE DATE:  06/07/2022    INTERPRETATION:  CLINICAL INFORMATION: Left leg pain.    COMPARISON: None available.  TECHNIQUE: Duplex sonography of the LEFT LOWER extremity veins with color   and spectral Doppler, with and without compression.    FINDINGS:  Partially occlusive acute deep venous thrombosis in the left common   femoral, femoral, and popliteal veins.  The contralateral common femoral vein is patent.    No calf vein thrombosis is detected.  Left popliteal cyst, measuring 5.9 x 1.9 x 1.1 cm.    IMPRESSION:  Acute deep venous thrombosis: above the knee.    Findings were discussed with Dr. Thomas 6/7/2022 at 6:47 PM by Dr. Matias   with read back confirmation.    --- End of Report ---  RANULFO MATIAS MD; Attending Radiologist  This document has been electronically signed. Jun 7 2022  7:04PM    < end of copied text >    < from: CT Angio Abdomen and Pelvis w/ IV Cont (06.07.22 @ 19:55) >  BOWEL: No bowel obstruction.  PERITONEUM: No ascites.  VESSELS: Atherosclerotic changes. Evaluation of the common iliac and   internal iliac veins is suboptimal due to streak artifact from orthopedic   hardware. Thrombus in the left common femoral vein, without extension   into the external iliac vein.  RETROPERITONEUM/LYMPH NODES: No lymphadenopathy.  ABDOMINAL WALL: Within normal limits.  BONES: Degenerative changes. Orthopedic hardware in the thoracolumbar   spine and sacroiliac joints. Calcified granulomas in both gluteal regions.    IMPRESSION:  Thrombus in the left common femoral vein, without extension into the   external iliac vein.    --- End of Report --  RANULFO MATIAS MD; Attending Radiologist  This document has been electronically signed. Jun 7 2022  8:23PM    < end of copied text >

## 2022-06-07 NOTE — H&P ADULT - HISTORY OF PRESENT ILLNESS
This is a 67 F with PMH of HTN who was sent after getting US as outpatient showing DVT in left femoral/poplitealo/tibial veins. Patient had surgery in her left ankle in march at [facility] and was on xarelto until May 9. Patient had been c/o left leg pain since the surgery and thought it was all related to the surgery. She had an US today and found out she had DVTs in her left leg. She denies SOB, CP, fevers, chills.

## 2022-06-08 ENCOUNTER — TRANSCRIPTION ENCOUNTER (OUTPATIENT)
Age: 67
End: 2022-06-08

## 2022-06-08 DIAGNOSIS — I82.402 ACUTE EMBOLISM AND THROMBOSIS OF UNSPECIFIED DEEP VEINS OF LEFT LOWER EXTREMITY: ICD-10-CM

## 2022-06-08 DIAGNOSIS — I10 ESSENTIAL (PRIMARY) HYPERTENSION: ICD-10-CM

## 2022-06-08 DIAGNOSIS — Z86.2 PERSONAL HISTORY OF DISEASES OF THE BLOOD AND BLOOD-FORMING ORGANS AND CERTAIN DISORDERS INVOLVING THE IMMUNE MECHANISM: ICD-10-CM

## 2022-06-08 LAB
ANION GAP SERPL CALC-SCNC: 8 MMOL/L — SIGNIFICANT CHANGE UP (ref 5–17)
APTT BLD: 178.8 SEC — CRITICAL HIGH (ref 27.5–35.5)
APTT BLD: 179.1 SEC — CRITICAL HIGH (ref 27.5–35.5)
BLD GP AB SCN SERPL QL: SIGNIFICANT CHANGE UP
BUN SERPL-MCNC: 10 MG/DL — SIGNIFICANT CHANGE UP (ref 7–23)
CALCIUM SERPL-MCNC: 9.4 MG/DL — SIGNIFICANT CHANGE UP (ref 8.5–10.1)
CHLORIDE SERPL-SCNC: 106 MMOL/L — SIGNIFICANT CHANGE UP (ref 96–108)
CO2 SERPL-SCNC: 28 MMOL/L — SIGNIFICANT CHANGE UP (ref 22–31)
CREAT SERPL-MCNC: 0.72 MG/DL — SIGNIFICANT CHANGE UP (ref 0.5–1.3)
D DIMER BLD IA.RAPID-MCNC: 1038 NG/ML DDU — HIGH
EGFR: 91 ML/MIN/1.73M2 — SIGNIFICANT CHANGE UP
GLUCOSE SERPL-MCNC: 103 MG/DL — HIGH (ref 70–99)
HCT VFR BLD CALC: 36.5 % — SIGNIFICANT CHANGE UP (ref 34.5–45)
HGB BLD-MCNC: 12 G/DL — SIGNIFICANT CHANGE UP (ref 11.5–15.5)
MAGNESIUM SERPL-MCNC: 2.1 MG/DL — SIGNIFICANT CHANGE UP (ref 1.6–2.6)
MCHC RBC-ENTMCNC: 29.4 PG — SIGNIFICANT CHANGE UP (ref 27–34)
MCHC RBC-ENTMCNC: 32.9 GM/DL — SIGNIFICANT CHANGE UP (ref 32–36)
MCV RBC AUTO: 89.5 FL — SIGNIFICANT CHANGE UP (ref 80–100)
NRBC # BLD: 0 /100 WBCS — SIGNIFICANT CHANGE UP (ref 0–0)
PLATELET # BLD AUTO: 210 K/UL — SIGNIFICANT CHANGE UP (ref 150–400)
POTASSIUM SERPL-MCNC: 4.1 MMOL/L — SIGNIFICANT CHANGE UP (ref 3.5–5.3)
POTASSIUM SERPL-SCNC: 4.1 MMOL/L — SIGNIFICANT CHANGE UP (ref 3.5–5.3)
RBC # BLD: 4.08 M/UL — SIGNIFICANT CHANGE UP (ref 3.8–5.2)
RBC # FLD: 14.3 % — SIGNIFICANT CHANGE UP (ref 10.3–14.5)
SARS-COV-2 RNA SPEC QL NAA+PROBE: SIGNIFICANT CHANGE UP
SODIUM SERPL-SCNC: 142 MMOL/L — SIGNIFICANT CHANGE UP (ref 135–145)
WBC # BLD: 6.56 K/UL — SIGNIFICANT CHANGE UP (ref 3.8–10.5)
WBC # FLD AUTO: 6.56 K/UL — SIGNIFICANT CHANGE UP (ref 3.8–10.5)

## 2022-06-08 PROCEDURE — 99222 1ST HOSP IP/OBS MODERATE 55: CPT

## 2022-06-08 PROCEDURE — 93971 EXTREMITY STUDY: CPT | Mod: 26,RT

## 2022-06-08 PROCEDURE — 99232 SBSQ HOSP IP/OBS MODERATE 35: CPT

## 2022-06-08 RX ORDER — SODIUM CHLORIDE 9 MG/ML
1000 INJECTION INTRAMUSCULAR; INTRAVENOUS; SUBCUTANEOUS
Refills: 0 | Status: DISCONTINUED | OUTPATIENT
Start: 2022-06-08 | End: 2022-06-09

## 2022-06-08 RX ADMIN — HEPARIN SODIUM 1200 UNIT(S)/HR: 5000 INJECTION INTRAVENOUS; SUBCUTANEOUS at 23:51

## 2022-06-08 RX ADMIN — HEPARIN SODIUM 0 UNIT(S)/HR: 5000 INJECTION INTRAVENOUS; SUBCUTANEOUS at 13:55

## 2022-06-08 RX ADMIN — CARVEDILOL PHOSPHATE 25 MILLIGRAM(S): 80 CAPSULE, EXTENDED RELEASE ORAL at 17:25

## 2022-06-08 RX ADMIN — Medication 3 MILLIGRAM(S): at 22:04

## 2022-06-08 RX ADMIN — HEPARIN SODIUM 1800 UNIT(S)/HR: 5000 INJECTION INTRAVENOUS; SUBCUTANEOUS at 07:25

## 2022-06-08 RX ADMIN — Medication 650 MILLIGRAM(S): at 22:04

## 2022-06-08 RX ADMIN — HEPARIN SODIUM 1500 UNIT(S)/HR: 5000 INJECTION INTRAVENOUS; SUBCUTANEOUS at 19:25

## 2022-06-08 RX ADMIN — Medication 650 MILLIGRAM(S): at 14:00

## 2022-06-08 RX ADMIN — HEPARIN SODIUM 1500 UNIT(S)/HR: 5000 INJECTION INTRAVENOUS; SUBCUTANEOUS at 21:54

## 2022-06-08 RX ADMIN — Medication 650 MILLIGRAM(S): at 12:56

## 2022-06-08 RX ADMIN — HEPARIN SODIUM 1800 UNIT(S)/HR: 5000 INJECTION INTRAVENOUS; SUBCUTANEOUS at 06:48

## 2022-06-08 RX ADMIN — HEPARIN SODIUM 0 UNIT(S)/HR: 5000 INJECTION INTRAVENOUS; SUBCUTANEOUS at 22:48

## 2022-06-08 RX ADMIN — Medication 650 MILLIGRAM(S): at 23:53

## 2022-06-08 RX ADMIN — CARVEDILOL PHOSPHATE 25 MILLIGRAM(S): 80 CAPSULE, EXTENDED RELEASE ORAL at 06:44

## 2022-06-08 RX ADMIN — SODIUM CHLORIDE 80 MILLILITER(S): 9 INJECTION INTRAMUSCULAR; INTRAVENOUS; SUBCUTANEOUS at 03:34

## 2022-06-08 RX ADMIN — LOSARTAN POTASSIUM 50 MILLIGRAM(S): 100 TABLET, FILM COATED ORAL at 06:44

## 2022-06-08 RX ADMIN — HEPARIN SODIUM 1500 UNIT(S)/HR: 5000 INJECTION INTRAVENOUS; SUBCUTANEOUS at 14:57

## 2022-06-08 NOTE — PROGRESS NOTE ADULT - ASSESSMENT
66 y/o female presenting with a left common femoral & popliteal DVT. No evidence of phlegmasia.    PLAN:  - CT venogram - no extension into external iliac vein  - Full anticoagulation with Heparin initiated  - Elevation, gentle compression and early ambulation once PTT therapeutic  - Dr. Borrero to assess pt to see if she is a candidate for a thrombectomy   - Will continue to follow 66 y/o female presenting with an acute, provoked left common femoral & popliteal DVT. No evidence of phlegmasia.    PLAN:  - CT venogram - no extension into external iliac vein  - Full anticoagulation with Heparin initiated  - Elevation, gentle compression and early ambulation once PTT therapeutic  - Dr. Borrero to assess pt to see if she is a candidate for a thrombectomy   - Will continue to follow 68 y/o female presenting with an acute, provoked left common femoral & popliteal DVT. No evidence of phlegmasia.    PLAN:  - CT venogram - no extension into external iliac vein  - Full anticoagulation with Heparin initiated  - Elevation, gentle compression and early ambulation once PTT therapeutic  - Dr. Borrero to assess pt to see if she is a candidate for a thrombectomy   - Will continue to follow    ADDENDUM  Pt seen and examined by Dr Borrero. Will plan for LLE venogram/poss thrombectomy tomorrow 6/9/22  Will need medical and cardiac risk stratification  NPO p MN  Maintain heparin gtt. Off on call to OR

## 2022-06-08 NOTE — CONSULT NOTE ADULT - CONSULT REASON
HX RLE DVT 9/202000S/P IVC AND REMOVED  NOW LLE DVT--VAS SURGERY IS FOLLOWING
Pre-op clearance
left leg thrombus

## 2022-06-08 NOTE — CONSULT NOTE ADULT - ATTENDING COMMENTS
Pt is a 68 y/o F with PMH HTN, DVT who p/w acute DVT - planned LLE venogram with venous thrombectomy    DVT:  c/w hep gtt  scheduled to have LLE venogram with venous thrombectomy   Pt has no active ischemia, decompensated heart failure, unstable arrhythmia, or severe stenotic valvular disease.  Pt has no modifiable active cardiac risk factors and in the setting of intermediate risk procedure, pt is optimized as best as possible from cardiovascular standpoint to proceed with planned procedure with routine hemodynamic monitoring.     HTN:  c/w coreg and losartan    - Monitor and replete lytes, keep K>4 and Mg >2  - Further cardiac workup will depend on clinical course.   - All other workup per primary team

## 2022-06-08 NOTE — PROGRESS NOTE ADULT - PROBLEM SELECTOR PLAN 1
Continue full dose AC with iv heparin gtt  MAY PROCEED TO O.R. FOR VENOGRAM AND THROMBECTOMY IN A.M.  Cardio consult for clearance called   Vascular consult noted  Further work-up/management pending clinical course.

## 2022-06-08 NOTE — PATIENT PROFILE ADULT - FALL HARM RISK - HARM RISK INTERVENTIONS

## 2022-06-08 NOTE — CONSULT NOTE ADULT - ASSESSMENT
66 y/o female with PMHx of left lower extremity DVT presenting with a left common femoral & popliteal DVT.    PLAN:  - CT venogram - no extension into external iliac vein  - Full anticoagulation with Heparin  - Dr. Borrero to assess pt to see if she is a candidate for a thrombectomy   - Will continue to follow
#LLE DVT   - Patient presenting with LLE pain and outpatient US showing LLE DVT  - Patient underwent L ankle surgery 3/2022 and was on xarelto until 5/9/22  - On admission US LE shows DVT in LLE  - Started on Heparin gtt  - EKG on admission shows NSR, 71bpm   - Vascular following, plan for Venogram and possible thrombectomy tomorrow   - Patient is not complaining of any cardiac symptoms at this time.  - Patient does not follow with cardiology outpatient.  - BP well controlled, monitor routine hemodynamics.  - Continue home medications Carvedilol 25mg BID and Losartan 25mg BID with hold parameters   - Monitor and replete lytes, keep K>4, Mg>2.  - Pt has no active ischemia, decompensated heart failure, unstable arrythmia, or severe stenotic valvular disease. In the setting of low risk procedure she is optimized from cardiovascular standpoint to proceed with planned procedure with routine hemodynamic monitoring.   - Other cardiovascular workup will depend on clinical course.  - All other workup per primary team.  - Will continue to follow.

## 2022-06-08 NOTE — CONSULT NOTE ADULT - SUBJECTIVE AND OBJECTIVE BOX
Hematology/Oncology consult     Patient is seen and examined  notes/labs reviewed  pt family is at bedside and d/w family.  consult dictated,  Job #    contact #  son/daughter----  phone #    IMPRESSION:      RECOMMENDATION:              ,thanks for courtsey of this consult,will follow this pt with you for hem/onc issue while pt is in hospital. /Hematology/Oncology consult     Patient is seen and examined  notes/labs reviewed  pt daughter--Felicita Johnson is at bedside and d/w family.  consult dictated,  Job #      IMPRESSION:  67/F with multiple co morbid medical history was sent by outside radiology after had us legs showing DVT LLE--is on iv heparin, seen by vas surgery at [facility] and is for possible venogram and thrombectomy on 6/9/22, hem was consulted 6/8/22 for dvt leg.  Patient with hx of HTN, obesity, with spinal stenosis lumbar region--s/p surgery 2018 and in 9/2020, hx RLE DVT-- s/p IVC 9/2020, S/P REMOVAL ivc 3/2021.   Patient with Hx of RLE DVT 9/2020 after T10-Pelvis fusion 9/2020 at [facility] --was evaluated by many subspeciality including neurosx, vas surgery and had a hx of IVC filter in 9/2020 as immediate anticoagulation was contraindicated then by her vas surgery and neurosx, per daughter/pt went and followed with dr Olman Lloyd at [facility] area and was placed on full dose xarelto for about 9-12 months as per pt and daughter and then patient was off ac. Patient recently started following with surgery for left ankle pain--was seen by hematology Dr Olman Lloyd in 3/2022 ( as i spoke to dr Lloyd office and per ) as well as daughter at bedside, then patient underwent left ankle surgery at [facility] in around 3/2022 and was placed on xarelto prophylaxis as per daughter for about 2 months/ until 5/9/2022.  pATIENT C/O pain in left calf and was sent us at outside radiology and for dvt lle--was sent to [facility]--evaluated by vas surgery and is undergoing management per vas surgery    Patient  with spinal stenosis lumbar region. Patient had laminectomy 2018  then in 9/2020 had --"T10-pelvis instrumentation and fusion. L2-S1 posterior lumbar fusion. Patient underwent T10-pelvis fusion, L3-S1 PLIF."    I called Dr Marlee Lloyd office at [phone]-- spoke to jerome, my call back/cell number is given and per  dr lloyd/team to call me back, ? patient with hx of factor v leiden    RECOMMENDATION:  HX DVT RLE 9/2020 after L spine surgery-- was seen and managed by dr lloyd, hx ivc filter 9/2020, removal ivc filter 3/2021  now, DVT LLE-- is on iv heparin  follow vas surgery  will check la, cardiolipin , betra 2 microglobulin  will await call from dr lloyd about earlier hematology work up--factor mutation test result/hypercoagulable test results  Patient will need long term ac with recurrent hx dvt as well as possible genetic factor mutation--final decision as per her own hematology dr gabino Colon ,thanks for courtesy of this consult, will follow this pt with you for hem/onc issue while pt is in hospital. /Hematology/Oncology consult     Patient is seen and examined  notes/labs reviewed  pt daughter--Felicita Johnson is at bedside and d/w family.  consult dictated,  Job # [id number]    pt hem/onc--dr aleksandra lloyd  [phone]--called, dr lloyd called back and discussed, dr lloyd to call back tomorrow after chart review    < from: US Duplex Venous Lower Ext Ltd, Left (06.07.22 @ 18:33) >    ACC: [id number] EXAM:  US DPLX LWR EXT VEINS LTD LT                          PROCEDURE DATE:  06/07/2022          INTERPRETATION:  CLINICAL INFORMATION: Left leg pain.    COMPARISON: None available.    TECHNIQUE: Duplex sonography of the LEFT LOWER extremity veins with color   and spectral Doppler, with and without compression.    FINDINGS:    Partially occlusive acute deep venous thrombosis in the left common   femoral, femoral, and popliteal veins.  The contralateral common femoral vein is patent.    No calf vein thrombosis is detected.    Left popliteal cyst, measuring 5.9 x 1.9 x 1.1 cm.    IMPRESSION:  Acute deep venous thrombosis: above the knee.    Findings were discussed with Dr. Thomas 6/7/2022 at 6:47 PM by Dr. Tracy   with read back confirmation.    --- End of Report ---            RANULFO TRACY MD; Attending Radiologist  This document has been electronically signed. Jun 7 2022  7:04PM    < end of copied text >    < from: US Duplex Venous Lower Ext Ltd, Right (01.08.21 @ 15:36) >    EXAM:  US DPLX LWR EXT VEINS LTD RT        PROCEDURE DATE:  01/08/2021           INTERPRETATION:  CLINICAL INFORMATION: Patient with history of right lower extremity deep venous thrombosis on anticoagulant therapy. Evaluation for residual.    COMPARISON: None available.    TECHNIQUE: Duplex sonography of the RIGHT LOWER extremity veins with color and spectral Doppler, with and without compression.    FINDINGS:    There is normal compressibility of the right common femoral, femoral and poplitealveins.  The contralateral common femoral vein is patent.  Doppler examination shows normal spontaneous and phasic flow.    No calf vein thrombosis is detected.    IMPRESSION:  No evidence of right lower extremity deep venous thrombosis.                  GALLO AKERS MD; Attending Radiologist   This document has been electronically signed. Jan 8 2021  3:39PM    < end of copied text >      < from: [facility] Duplex Lower Ext Vein Scan, Bilat (03.12.21 @ 11:03) >    EXAM:  DUPLEX SCAN EXT VEINS LOWER BI                            PROCEDURE DATE:  03/12/2021            INTERPRETATION:  CLINICAL INFORMATION: History of deep vein thrombosis in September.    COMPARISON: Upper extremity duplex 1/8/2021, lower extremity duplex 9/20/2020.    TECHNIQUE: Duplex sonography of the BILATERAL LOWER extremity veins with color and spectral Doppler, with and without compression.    FINDINGS:    RIGHT:  Wall thickening and scarring in the RIGHT proximal femoral, deep femoral, and popliteal veins from prior DVT.  No evidence of RIGHT common femoral, femoral, or popliteal acute deep vein thrombosis.  Doppler examination shows normal spontaneous and phasic flow.  No RIGHT calf vein thrombosis is detected.    LEFT:  Wall thickening and scarring in the LEFT deep femoral vein.  No evidence of LEFT common femoral, femoral, or popliteal acute deep vein thrombosis.  Doppler examination shows normal spontaneous and phasic flow.  No LEFT calf vein thrombosis is detected.    OTHER: Right popliteal cyst measuring 4.4 x 2.1 x 2.9 cm. There is also a left popliteal cyst measuring 6.7 x 1.1 x 4.0 cm.    IMPRESSION:  Wall thickening and scarring in the RIGHT proximal femoral, deep femoral, and popliteal, and LEFT deep femoral veins.  Lower extremity deep veins are patent with no evidence of acute deep vein thrombosis.  Bilateral popliteal cysts.              YIFAN MONZON MD; Resident Interventional Radiology  This document has been electronically signed.  SCAR FLANAGAN M.D., ATTENDING RADIOLOGIST  This document has been electronically signed. Mar 12 2021  2:35PM    < end of copied text >      < from: [facility] Duplex Lower Ext Vein Scan, Lindy (09.28.20 @ 11:22) >    EXAM:  DUPLEX SCAN EXT VEINS LOWER BI                            PROCEDURE DATE:  09/28/2020            INTERPRETATION:  CLINICAL INFORMATION: A prior examination, dated 9/20/2020, showed thrombus within the right external iliac vein, right common femoral and right deep femoral veins, follow-up examination.    TECHNIQUE: Duplex sonography of the BILATERAL LOWER extremity veins with color and spectral Doppler, with and without compression.    FINDINGS: There are right and left sided Bakers cysts.    There remains extensive, occlusive or near occlusive DVT affecting the right distal external iliac, common femoral and popliteal veins.    In the right calf, the posterior tibial peroneal trunk, the posterior tibial, peroneal and soleal veins arethrombosed.    There is normal compressibility of the left common femoral, femoral and popliteal veins.  Doppler examination shows normal spontaneous and phasic flow.    No left calf vein thrombosis is detected.    IMPRESSION:    There is extensive, occlusive or near occlusive, DVT affecting the right distal external iliac, common femoral and popliteal veins.    In the right calf, the posterior tibial peroneal trunk, the posterior tibial, peroneal and soleal veins are thrombosed.                  DEISI HINTON M.D., ATTENDING RADIOLOGIST  This document has been electronically signed. Sep 28 2020 12:15PM    < end of copied text >    < from: CT Angio Abdomen and Pelvis w/ IV Cont (06.07.22 @ 19:55) >    ACC: [id number] EXAM:  CT ANGIO ABD PELV (W)AW IC                          PROCEDURE DATE:  06/07/2022          INTERPRETATION:  CLINICAL INFORMATION: Left leg DVT.    COMPARISON: July 29, 2020.    CONTRAST/COMPLICATIONS:  IV Contrast: Omnipaque [id number] cc administered   25 cc discarded  Oral Contrast: NONE  Complications: None reported at time of study completion    PROCEDURE:  CT venogram of the Abdomen and Pelvis was performed.  Sagittal and coronal reformats were performed.    FINDINGS:  LOWER CHEST: Within normal limits.    LIVER: Within normal limits.  BILE DUCTS: Normal caliber.  GALLBLADDER: Within normal limits.  SPLEEN: Within normal limits.  PANCREAS: Within normal limits.  ADRENALS: Within normal limits.  KIDNEYS/URETERS: No hydronephrosis. Probable bilateral renal cysts.    BLADDER: Within normal limits.  REPRODUCTIVE ORGANS: Uterus and adnexa within normal limits.    BOWEL: No bowel obstruction.  PERITONEUM: No ascites.  VESSELS: Atherosclerotic changes. Evaluation of the common iliac and   internal iliac veins is suboptimal due to streak artifact from orthopedic   hardware. Thrombus in the left common femoral vein, without extension   into the external iliac vein.  RETROPERITONEUM/LYMPH NODES: No lymphadenopathy.  ABDOMINAL WALL: Within normal limits.  BONES: Degenerative changes. Orthopedic hardware in the thoracolumbar   spine and sacroiliac joints. Calcified granulomas in both gluteal regions.    IMPRESSION:  Thrombus in the left common femoral vein, without extension into the   external iliac vein.    --- End of Report ---            RANULFO TRACY MD; Attending Radiologist  This document has been electronically signed. Jun 7 2022  8:23PM    < end of copied text >      IMPRESSION:  67/F with multiple co morbid medical history was sent by outside radiology after had us legs showing DVT LLE--is on iv heparin, seen by vas surgery at [facility] and is for possible venogram and thrombectomy on 6/9/22, hem was consulted 6/8/22 for dvt leg.  Patient with hx of HTN, obesity, with spinal stenosis lumbar region--s/p surgery 2018 and in 9/2020, hx RLE DVT post surgery-- s/p IVC 9/2020, S/P REMOVAL ivc 3/2021.   Patient with Hx of RLE DVT 9/2020 after T10-Pelvis fusion 9/2020 at [facility] --was evaluated by many subspeciality including neurosx, vas surgery and had a hx of IVC filter in 9/2020 as immediate anticoagulation was contraindicated then by her vas surgery and neurosx, per daughter/pt went and followed with dr Aleksandra Lloyd at [facility] area and was placed on full dose xarelto for about 9-12 months as per pt and daughter and then patient was off ac. Patient recently started following with surgery for left ankle pain--was seen by hematology Dr Aleksandra Lloyd in 3/2022 as pre op hematology visit( as i spoke to dr Lloyd office and per ) as well as daughter at bedside, then patient underwent left ankle surgery at [facility] in around 3/2022 and was placed on xarelto prophylaxis as per daughter for about 2 months/ or until 5/9/2022.    Patient C/O pain in left calf past few days--seen surgery 6/6 and was sent for us leg at outside radiology--and then was referred to er for dvt lle on us    Patient  with spinal stenosis lumbar region. Patient had laminectomy 2018  then in 9/2020 had --"T10-pelvis instrumentation and fusion. L2-S1 posterior lumbar fusion. Patient underwent T10-pelvis fusion, L3-S1 PLIF."    I called Dr Marlee Lloyd office at [phone]-- spoke to [employer], my call back/cell number is given and dr lloyd called back, he will review about leiden factor and states had prior hypercoagulable work up with him    RECOMMENDATION:  HX DVT RLE 9/2020 after L spine surgery-- was seen and managed by dr lloyd, hx ivc filter 9/2020, removal ivc filter 3/2021  now, DVT LLE-- is on iv heparin--vas surgery is following  follow vas surgery  will check la, cardiolipin , betra 2 microglobulin  case d/w dr lloyd--he is going to call back tomorrow after chart review but as per dr lloyd pt had hypercoagulable work up including genetic testing possible in 2020 and he recommends at this point only la/cardiolipin ab--await repeat call back from dr lloyd about earlier hematology work up--factor mutation test result/hypercoagulable test results  Patient will likely need long term ac with recurrent hx dvt ( both legs) --final decision as per and with her own hematology dr gabino Colon ,thanks for courtesy of this consult, will follow this pt with you for hem/onc issue while pt is in hospital.

## 2022-06-08 NOTE — CONSULT NOTE ADULT - SUBJECTIVE AND OBJECTIVE BOX
[facility]         Hilario Moscoso, Hitesh, Breonna, Monica, Zachery, Chelle        [phone] (office)    Reason for Consult:    Interval HPI: Patient seen and examined at bedside. No acute events overnight. Patient's daughter present at bedside. Patient denies any acute chest pain, palpitations, sob, headache, dizziness.     HPI:  This is a 67 F with PMH of HTN who was sent after getting US as outpatient showing DVT in left femoral/poplitealo/tibial veins. Patient had surgery in her left ankle in march at [facility] and was on xarelto until May 9. Patient had been c/o left leg pain since the surgery and thought it was all related to the surgery. She had an US today and found out she had DVTs in her left leg. She denies SOB, CP, fevers, chills. (07 Jun 2022 22:34)      PAST MEDICAL & SURGICAL HISTORY:  Lumbago with sciatica      HTN (hypertension)      Obesity      Kidney stones      Vertigo      Lumbar stenosis      Carpal tunnel syndrome on both sides  sx done 2013      History of cataract surgery  both eyes 2017      History of laminectomy  3/2018      History of lithotripsy      History of ankle surgery          SOCIAL HISTORY: No active tobacco, alcohol or illicit drug use    FAMILY HISTORY:      Home Medications:  carvedilol 25 mg oral tablet: 1 tab(s) orally every 12 hours (07 Jun 2022 23:11)  losartan 50 mg oral tablet: 1 tab(s) orally 2 times a day (07 Jun 2022 23:11)      MEDICATIONS  (STANDING):  carvedilol 25 milliGRAM(s) Oral every 12 hours  heparin  Infusion.  Unit(s)/Hr (18 mL/Hr) IV Continuous <Continuous>  losartan 50 milliGRAM(s) Oral daily  sodium chloride 0.9%. 1000 milliLiter(s) (75 mL/Hr) IV Continuous <Continuous>    MEDICATIONS  (PRN):  acetaminophen     Tablet .. 650 milliGRAM(s) Oral every 6 hours PRN Temp greater or equal to 38C (100.4F), Mild Pain (1 - 3)  aluminum hydroxide/magnesium hydroxide/simethicone Suspension 30 milliLiter(s) Oral every 4 hours PRN Dyspepsia  heparin   Injectable 8500 Unit(s) IV Push every 6 hours PRN For aPTT less than 40  heparin   Injectable 4000 Unit(s) IV Push every 6 hours PRN For aPTT between 40 - 57  melatonin 3 milliGRAM(s) Oral at bedtime PRN Insomnia  ondansetron Injectable 4 milliGRAM(s) IV Push every 6 hours PRN Nausea and/or Vomiting      Allergies    No Known Allergies    Intolerances        REVIEW OF SYSTEMS: Negative except as per HPI.    VITAL SIGNS:   Vital Signs Last 24 Hrs  T(C): 37 (08 Jun 2022 11:59), Max: 37 (08 Jun 2022 11:59)  T(F): 98.6 (08 Jun 2022 11:59), Max: 98.6 (08 Jun 2022 11:59)  HR: 71 (08 Jun 2022 11:59) (71 - 81)  BP: 129/80 (08 Jun 2022 11:59) (120/71 - 142/68)  BP(mean): --  RR: 19 (08 Jun 2022 11:59) (16 - 20)  SpO2: 92% (08 Jun 2022 11:59) (92% - 97%)    I&O's Summary      PHYSICAL EXAM:  Constitutional: NAD, well-developed  HEENT NC/AT, moist mucous membranes  Pulmonary: Non-labored, breath sounds are clear bilaterally, no wheezing, rales or rhonchi  Cardiovascular: +S1, S2, RRR, no murmur  Gastrointestinal: Soft, nontender, nondistended, normoactive bowel sounds  Extremities: No peripheral edema   Neurological: Alert, strength and sensitivity are grossly intact  Skin: No obvious lesions/rashes  Psych: Mood & affect appropriate    LABS: All Labs Reviewed:                        12.0   6.56  )-----------( 210      ( 08 Jun 2022 08:01 )             36.5                         12.6   8.03  )-----------( 213      ( 07 Jun 2022 16:02 )             38.3     08 Jun 2022 08:01    142    |  106    |  10     ----------------------------<  103    4.1     |  28     |  0.72   07 Jun 2022 16:02    142    |  107    |  12     ----------------------------<  136    4.4     |  29     |  0.90     Ca    9.4        08 Jun 2022 08:01  Ca    9.1        07 Jun 2022 16:02  Mg     2.1       08 Jun 2022 08:01    TPro  7.6    /  Alb  3.4    /  TBili  0.4    /  DBili  x      /  AST  26     /  ALT  31     /  AlkPhos  86     07 Jun 2022 16:02    PT/INR - ( 07 Jun 2022 21:49 )   PT: 12.7 sec;   INR: 1.08 ratio         PTT - ( 08 Jun 2022 12:48 )  PTT:179.1 sec

## 2022-06-09 ENCOUNTER — TRANSCRIPTION ENCOUNTER (OUTPATIENT)
Age: 67
End: 2022-06-09

## 2022-06-09 ENCOUNTER — RESULT REVIEW (OUTPATIENT)
Age: 67
End: 2022-06-09

## 2022-06-09 LAB
ANION GAP SERPL CALC-SCNC: 7 MMOL/L — SIGNIFICANT CHANGE UP (ref 5–17)
APTT BLD: 88.8 SEC — HIGH (ref 27.5–35.5)
BASOPHILS # BLD AUTO: 0.04 K/UL — SIGNIFICANT CHANGE UP (ref 0–0.2)
BASOPHILS NFR BLD AUTO: 0.6 % — SIGNIFICANT CHANGE UP (ref 0–2)
BUN SERPL-MCNC: 10 MG/DL — SIGNIFICANT CHANGE UP (ref 7–23)
CALCIUM SERPL-MCNC: 9.2 MG/DL — SIGNIFICANT CHANGE UP (ref 8.5–10.1)
CHLORIDE SERPL-SCNC: 107 MMOL/L — SIGNIFICANT CHANGE UP (ref 96–108)
CO2 SERPL-SCNC: 27 MMOL/L — SIGNIFICANT CHANGE UP (ref 22–31)
CREAT SERPL-MCNC: 0.75 MG/DL — SIGNIFICANT CHANGE UP (ref 0.5–1.3)
DRVVT SCREEN TO CONFIRM RATIO: SIGNIFICANT CHANGE UP
EGFR: 87 ML/MIN/1.73M2 — SIGNIFICANT CHANGE UP
EOSINOPHIL # BLD AUTO: 0.19 K/UL — SIGNIFICANT CHANGE UP (ref 0–0.5)
EOSINOPHIL NFR BLD AUTO: 2.6 % — SIGNIFICANT CHANGE UP (ref 0–6)
GLUCOSE SERPL-MCNC: 107 MG/DL — HIGH (ref 70–99)
HCT VFR BLD CALC: 37.9 % — SIGNIFICANT CHANGE UP (ref 34.5–45)
HCV AB S/CO SERPL IA: 0.09 S/CO — SIGNIFICANT CHANGE UP (ref 0–0.99)
HCV AB SERPL-IMP: SIGNIFICANT CHANGE UP
HGB BLD-MCNC: 12.2 G/DL — SIGNIFICANT CHANGE UP (ref 11.5–15.5)
IMM GRANULOCYTES NFR BLD AUTO: 0.1 % — SIGNIFICANT CHANGE UP (ref 0–1.5)
LA NT DPL PPP QL: 31.2 SEC — SIGNIFICANT CHANGE UP
LYMPHOCYTES # BLD AUTO: 3.32 K/UL — HIGH (ref 1–3.3)
LYMPHOCYTES # BLD AUTO: 45.7 % — HIGH (ref 13–44)
MAGNESIUM SERPL-MCNC: 2.1 MG/DL — SIGNIFICANT CHANGE UP (ref 1.6–2.6)
MCHC RBC-ENTMCNC: 29 PG — SIGNIFICANT CHANGE UP (ref 27–34)
MCHC RBC-ENTMCNC: 32.2 GM/DL — SIGNIFICANT CHANGE UP (ref 32–36)
MCV RBC AUTO: 90 FL — SIGNIFICANT CHANGE UP (ref 80–100)
MONOCYTES # BLD AUTO: 0.63 K/UL — SIGNIFICANT CHANGE UP (ref 0–0.9)
MONOCYTES NFR BLD AUTO: 8.7 % — SIGNIFICANT CHANGE UP (ref 2–14)
NEUTROPHILS # BLD AUTO: 3.07 K/UL — SIGNIFICANT CHANGE UP (ref 1.8–7.4)
NEUTROPHILS NFR BLD AUTO: 42.3 % — LOW (ref 43–77)
NORMALIZED SCT PPP-RTO: 0.87 RATIO — SIGNIFICANT CHANGE UP (ref 0–1.16)
NORMALIZED SCT PPP-RTO: SIGNIFICANT CHANGE UP
NRBC # BLD: 0 /100 WBCS — SIGNIFICANT CHANGE UP (ref 0–0)
PLATELET # BLD AUTO: 206 K/UL — SIGNIFICANT CHANGE UP (ref 150–400)
POTASSIUM SERPL-MCNC: 4.2 MMOL/L — SIGNIFICANT CHANGE UP (ref 3.5–5.3)
POTASSIUM SERPL-SCNC: 4.2 MMOL/L — SIGNIFICANT CHANGE UP (ref 3.5–5.3)
RBC # BLD: 4.21 M/UL — SIGNIFICANT CHANGE UP (ref 3.8–5.2)
RBC # FLD: 14.6 % — HIGH (ref 10.3–14.5)
SODIUM SERPL-SCNC: 141 MMOL/L — SIGNIFICANT CHANGE UP (ref 135–145)
WBC # BLD: 7.26 K/UL — SIGNIFICANT CHANGE UP (ref 3.8–10.5)
WBC # FLD AUTO: 7.26 K/UL — SIGNIFICANT CHANGE UP (ref 3.8–10.5)

## 2022-06-09 PROCEDURE — 99232 SBSQ HOSP IP/OBS MODERATE 35: CPT

## 2022-06-09 PROCEDURE — 37248 TRLUML BALO ANGIOP 1ST VEIN: CPT

## 2022-06-09 PROCEDURE — 88304 TISSUE EXAM BY PATHOLOGIST: CPT | Mod: 26

## 2022-06-09 PROCEDURE — 99231 SBSQ HOSP IP/OBS SF/LOW 25: CPT

## 2022-06-09 PROCEDURE — 37187 VENOUS MECH THROMBECTOMY: CPT

## 2022-06-09 DEVICE — CATH CLOT TRIEVER BOLD 16MMX105CM: Type: IMPLANTABLE DEVICE | Site: LEFT | Status: FUNCTIONAL

## 2022-06-09 DEVICE — KIT MICROPUNCTURE 5FR 10CM: Type: IMPLANTABLE DEVICE | Site: LEFT | Status: FUNCTIONAL

## 2022-06-09 DEVICE — IMPLANTABLE DEVICE: Type: IMPLANTABLE DEVICE | Site: LEFT | Status: FUNCTIONAL

## 2022-06-09 DEVICE — SHEATH INTRODUCER TERUMO PINNACLE RADIOPAQUE 6FR X 10CM: Type: IMPLANTABLE DEVICE | Site: LEFT | Status: FUNCTIONAL

## 2022-06-09 DEVICE — SHEATH CLOTTRIEVER: Type: IMPLANTABLE DEVICE | Site: LEFT | Status: FUNCTIONAL

## 2022-06-09 DEVICE — GLDWIRE ADVANTAGE .035X260 CM: Type: IMPLANTABLE DEVICE | Site: LEFT | Status: FUNCTIONAL

## 2022-06-09 DEVICE — CATH BLLN XXL 144MM 75CM 5.8FR: Type: IMPLANTABLE DEVICE | Site: LEFT | Status: FUNCTIONAL

## 2022-06-09 DEVICE — SHEATH INTRODUCER TERUMO PINNACLE RADIOPAQUE 5FR X 10CM: Type: IMPLANTABLE DEVICE | Site: LEFT | Status: FUNCTIONAL

## 2022-06-09 DEVICE — BLLN MUSTANG 7X60MMX75CM: Type: IMPLANTABLE DEVICE | Site: LEFT | Status: FUNCTIONAL

## 2022-06-09 DEVICE — CATH SUPP TRAILBLAZER .035X90CM: Type: IMPLANTABLE DEVICE | Site: LEFT | Status: FUNCTIONAL

## 2022-06-09 RX ORDER — SODIUM CHLORIDE 9 MG/ML
1000 INJECTION, SOLUTION INTRAVENOUS
Refills: 0 | Status: DISCONTINUED | OUTPATIENT
Start: 2022-06-09 | End: 2022-06-09

## 2022-06-09 RX ORDER — LOSARTAN POTASSIUM 100 MG/1
50 TABLET, FILM COATED ORAL DAILY
Refills: 0 | Status: DISCONTINUED | OUTPATIENT
Start: 2022-06-09 | End: 2022-06-10

## 2022-06-09 RX ORDER — ACETAMINOPHEN 500 MG
650 TABLET ORAL EVERY 6 HOURS
Refills: 0 | Status: DISCONTINUED | OUTPATIENT
Start: 2022-06-09 | End: 2022-06-10

## 2022-06-09 RX ORDER — ONDANSETRON 8 MG/1
4 TABLET, FILM COATED ORAL EVERY 6 HOURS
Refills: 0 | Status: DISCONTINUED | OUTPATIENT
Start: 2022-06-09 | End: 2022-06-10

## 2022-06-09 RX ORDER — HYDROMORPHONE HYDROCHLORIDE 2 MG/ML
0.5 INJECTION INTRAMUSCULAR; INTRAVENOUS; SUBCUTANEOUS
Refills: 0 | Status: DISCONTINUED | OUTPATIENT
Start: 2022-06-09 | End: 2022-06-09

## 2022-06-09 RX ORDER — HEPARIN SODIUM 5000 [USP'U]/ML
INJECTION INTRAVENOUS; SUBCUTANEOUS
Qty: 25000 | Refills: 0 | Status: DISCONTINUED | OUTPATIENT
Start: 2022-06-09 | End: 2022-06-09

## 2022-06-09 RX ORDER — HEPARIN SODIUM 5000 [USP'U]/ML
1200 INJECTION INTRAVENOUS; SUBCUTANEOUS
Qty: 25000 | Refills: 0 | Status: DISCONTINUED | OUTPATIENT
Start: 2022-06-09 | End: 2022-06-10

## 2022-06-09 RX ORDER — HEPARIN SODIUM 5000 [USP'U]/ML
4000 INJECTION INTRAVENOUS; SUBCUTANEOUS EVERY 6 HOURS
Refills: 0 | Status: DISCONTINUED | OUTPATIENT
Start: 2022-06-09 | End: 2022-06-10

## 2022-06-09 RX ORDER — ONDANSETRON 8 MG/1
4 TABLET, FILM COATED ORAL ONCE
Refills: 0 | Status: DISCONTINUED | OUTPATIENT
Start: 2022-06-09 | End: 2022-06-09

## 2022-06-09 RX ORDER — LANOLIN ALCOHOL/MO/W.PET/CERES
3 CREAM (GRAM) TOPICAL AT BEDTIME
Refills: 0 | Status: DISCONTINUED | OUTPATIENT
Start: 2022-06-09 | End: 2022-06-10

## 2022-06-09 RX ORDER — HEPARIN SODIUM 5000 [USP'U]/ML
8000 INJECTION INTRAVENOUS; SUBCUTANEOUS EVERY 6 HOURS
Refills: 0 | Status: DISCONTINUED | OUTPATIENT
Start: 2022-06-09 | End: 2022-06-10

## 2022-06-09 RX ORDER — CEFAZOLIN SODIUM 1 G
2000 VIAL (EA) INJECTION ONCE
Refills: 0 | Status: COMPLETED | OUTPATIENT
Start: 2022-06-09 | End: 2022-06-09

## 2022-06-09 RX ADMIN — HEPARIN SODIUM 1200 UNIT(S)/HR: 5000 INJECTION INTRAVENOUS; SUBCUTANEOUS at 07:25

## 2022-06-09 RX ADMIN — SODIUM CHLORIDE 75 MILLILITER(S): 9 INJECTION INTRAMUSCULAR; INTRAVENOUS; SUBCUTANEOUS at 01:36

## 2022-06-09 RX ADMIN — HEPARIN SODIUM 1200 UNIT(S)/HR: 5000 INJECTION INTRAVENOUS; SUBCUTANEOUS at 18:40

## 2022-06-09 RX ADMIN — HYDROMORPHONE HYDROCHLORIDE 0.5 MILLIGRAM(S): 2 INJECTION INTRAMUSCULAR; INTRAVENOUS; SUBCUTANEOUS at 17:59

## 2022-06-09 RX ADMIN — HEPARIN SODIUM 1200 UNIT(S)/HR: 5000 INJECTION INTRAVENOUS; SUBCUTANEOUS at 07:59

## 2022-06-09 RX ADMIN — HEPARIN SODIUM 1200 UNIT(S)/HR: 5000 INJECTION INTRAVENOUS; SUBCUTANEOUS at 19:37

## 2022-06-09 RX ADMIN — CARVEDILOL PHOSPHATE 25 MILLIGRAM(S): 80 CAPSULE, EXTENDED RELEASE ORAL at 06:15

## 2022-06-09 RX ADMIN — Medication 650 MILLIGRAM(S): at 20:50

## 2022-06-09 RX ADMIN — Medication 650 MILLIGRAM(S): at 19:50

## 2022-06-09 RX ADMIN — SODIUM CHLORIDE 75 MILLILITER(S): 9 INJECTION, SOLUTION INTRAVENOUS at 17:42

## 2022-06-09 RX ADMIN — HYDROMORPHONE HYDROCHLORIDE 0.5 MILLIGRAM(S): 2 INJECTION INTRAMUSCULAR; INTRAVENOUS; SUBCUTANEOUS at 17:44

## 2022-06-09 RX ADMIN — LOSARTAN POTASSIUM 50 MILLIGRAM(S): 100 TABLET, FILM COATED ORAL at 06:15

## 2022-06-09 NOTE — BRIEF OPERATIVE NOTE - NSICDXBRIEFPROCEDURE_GEN_ALL_CORE_FT
PROCEDURES:  Thrombectomy, vessel, femoral 09-Jun-2022 18:47:03 Left Maria Luisa Almanza  Venogram extremity lower left 09-Jun-2022 18:47:24  Maria Luisa Almanza  Venous angioplasty 09-Jun-2022 18:47:59 L femoral vein Maria Luisa Almanza

## 2022-06-09 NOTE — PROGRESS NOTE ADULT - PROBLEM SELECTOR PLAN 1
Continue full dose AC with iv heparin gtt until prior to surgery  MAY PROCEED TO O.R. FOR VENOGRAM AND THROMBECTOMY   Cardio consult for clearance noted  Vascular consult noted  Will need lifelong AC  Further work-up/management pending clinical course.

## 2022-06-09 NOTE — PROGRESS NOTE ADULT - ASSESSMENT
68 y/o female presenting with an acute, provoked left common femoral & popliteal DVT. No evidence of phlegmasia. She has h/o Factor V Leiden from testing 12/31/20.     PLAN:  - CT venogram - no extension into external iliac vein  - Full anticoagulation with Heparin initiated. Will likely need lifelong AC.  - Elevation, gentle compression and early ambulation once PTT therapeutic  - Given CFV involvement and inability of evaluating L CIV given artifact on CT (in the setting of 2nd DVT involving upper thigh),she has been scheduled for a LLE venogram with venous thrombectomy today  -NPO p MN confirmed  -Appreciate medical and cardiac risk assessment

## 2022-06-09 NOTE — BRIEF OPERATIVE NOTE - NSICDXBRIEFPOSTOP_GEN_ALL_CORE_FT
POST-OP DIAGNOSIS:  Deep vein thrombosis (DVT) of left lower extremity, unspecified chronicity, unspecified vein 09-Jun-2022 18:48:34  Maria Luisa Almanza

## 2022-06-09 NOTE — BRIEF OPERATIVE NOTE - NSICDXBRIEFPREOP_GEN_ALL_CORE_FT
PRE-OP DIAGNOSIS:  Deep vein thrombosis (DVT) of left lower extremity, unspecified chronicity, unspecified vein 09-Jun-2022 18:48:24  Maria Luisa Almanza

## 2022-06-09 NOTE — PROGRESS NOTE ADULT - ASSESSMENT
67F HTN presenting with an acute, provoked left common femoral & popliteal DVT.      HTN, DVT  - Patient presenting with LLE pain and outpatient US showing LLE DVT. CT venogram - no extension into external iliac vein  - Patient underwent L ankle surgery 3/2022 and was on Xarelto until 5/9/22  - Continue Heparin gtt  - Vascular following, plan for Venogram and possible thrombectomy tomorrow   - Pt has no active ischemia, decompensated heart failure, unstable arrythmia, or severe stenotic valvular disease. In the setting of low risk procedure she is optimized from cardiovascular standpoint to proceed with planned procedure with routine hemodynamic monitoring.     - EKG on admission shows NSR, 71bpm   - No evidence of any active ischemia     - BP well controlled, monitor routine hemodynamics.  - Continue home Carvedilol 25mg BID and Losartan 25mg BID with hold parameters     - No evidence of significant arrhythmia   - No evidence of any meaningful volume overload     - Monitor and replete lytes, keep K>4, Mg>2.  - Will continue to follow.    Valentina Robertson, MS FNP, AGAP  Nurse Practitioner- Cardiology   Spectra #[id number] /[phone]

## 2022-06-09 NOTE — PROGRESS NOTE ADULT - ASSESSMENT
pt hem/onc--dr aleksandra lloyd  [phone]--case discussed with dr lloyd on 6/8 and 6/9, as per pt with heterozygous leiden mutation on earlier hypercoagulable work up    IMPRESSION:  67/F with multiple co morbid medical history was sent by outside radiology after had  legs ultrasound showing DVT LLE--seen by vas surgery and is on  iv heparin, patient is for possible venogram and thrombectomy today/ 6/9/22, hem was consulted 6/8/22 for dvt leg and patient was seen.  Patient with hx of HTN, obesity, with spinal stenosis lumbar region--s/p surgery 2018 and in 9/2020, hx RLE DVT post surgery-- s/p IVC 9/2020, S/P REMOVAL IVC filter in 3/2021., s/p anticoagulation with xarelto then per her hematology.  Patient with Hx of RLE DVT 9/2020 after T10-Pelvis fusion 9/2020 at [facility] --was evaluated by many subspeciality including neurosx, vas surgery and had a hx of IVC filter in 9/2020 as immediate anticoagulation was contraindicated then by her vas surgery and neurosx, per daughter/pt went and followed with dr Aleksandra Lloyd at [facility] area and was placed on full dose xarelto for about 9-12 months as per pt and daughter and then patient was off ac. Patient recently started following with surgery for left ankle pain--was seen by hematology Dr Aleksandra Lloyd in 3/2022, patient underwent left ankle surgery at [facility] in around 3/2022 and was placed on xarelto prophylaxis for about 2 months as per daughter and last dose was or/around 5/9/2022.    Patient C/O pain in left calf past few days--seen surgery 6/6 and was sent for us leg at outside radiology--and then was referred to er for dvt lle on us--seen by vas surgery and undergoing further management per vas surgery    Patient  with spinal stenosis lumbar region. Patient had laminectomy 2018  then in 9/2020 had --"T10-pelvis instrumentation and fusion. L2-S1 posterior lumbar fusion. Patient underwent T10-pelvis fusion, L3-S1 PLIF."    I called Dr Marlee Lloyd office at [phone]-- case d/w dr lloyd on 6/8 and 6/9, as per pt with heterozygous leiden mutation on earlier hypercoagulable work up ( other hypercoagulable work up was negative--verbal report), also pt with drvvt positive on prior dvt episode- was subsequently repeat at office and was negative, pt with lupus ac negative this admission    RECOMMENDATION:  HX DVT RLE 9/2020 after L spine surgery-- was seen and managed by dr lloyd, hx ivc filter 9/2020, removal ivc filter 3/2021  now, DVT LLE-- is on iv heparin--vas surgery is following and for possible venogram/thrombectomy/further management as per vas surgery  lupus ac-- negative  follow cardiolipin , betra 2 microglobulin  anticoagulation management as per vas surgery  Patient will likely need long term ac with recurrent/second episode of dvt --final decision as per and with her own hematology dr lloyd  d/w pt at bedside, was d/w daughter on 6/8, both in agreement of above   yes

## 2022-06-10 ENCOUNTER — TRANSCRIPTION ENCOUNTER (OUTPATIENT)
Age: 67
End: 2022-06-10

## 2022-06-10 ENCOUNTER — OUTPATIENT (OUTPATIENT)
Dept: OUTPATIENT SERVICES | Facility: HOSPITAL | Age: 67
LOS: 1 days | Discharge: HOME | End: 2022-06-10

## 2022-06-10 VITALS — DIASTOLIC BLOOD PRESSURE: 76 MMHG | HEART RATE: 71 BPM | SYSTOLIC BLOOD PRESSURE: 176 MMHG

## 2022-06-10 DIAGNOSIS — Z98.890 OTHER SPECIFIED POSTPROCEDURAL STATES: Chronic | ICD-10-CM

## 2022-06-10 DIAGNOSIS — I82.409 ACUTE EMBOLISM AND THROMBOSIS OF UNSPECIFIED DEEP VEINS OF UNSPECIFIED LOWER EXTREMITY: ICD-10-CM

## 2022-06-10 DIAGNOSIS — G56.03 CARPAL TUNNEL SYNDROME, BILATERAL UPPER LIMBS: Chronic | ICD-10-CM

## 2022-06-10 DIAGNOSIS — Z98.49 CATARACT EXTRACTION STATUS, UNSPECIFIED EYE: Chronic | ICD-10-CM

## 2022-06-10 LAB
ANION GAP SERPL CALC-SCNC: 8 MMOL/L — SIGNIFICANT CHANGE UP (ref 5–17)
APTT BLD: 141.5 SEC — CRITICAL HIGH (ref 27.5–35.5)
APTT BLD: 25 SEC — LOW (ref 27.5–35.5)
APTT BLD: 68.7 SEC — HIGH (ref 27.5–35.5)
BUN SERPL-MCNC: 10 MG/DL — SIGNIFICANT CHANGE UP (ref 7–23)
CALCIUM SERPL-MCNC: 9.1 MG/DL — SIGNIFICANT CHANGE UP (ref 8.5–10.1)
CHLORIDE SERPL-SCNC: 106 MMOL/L — SIGNIFICANT CHANGE UP (ref 96–108)
CO2 SERPL-SCNC: 26 MMOL/L — SIGNIFICANT CHANGE UP (ref 22–31)
CREAT SERPL-MCNC: 0.76 MG/DL — SIGNIFICANT CHANGE UP (ref 0.5–1.3)
EGFR: 85 ML/MIN/1.73M2 — SIGNIFICANT CHANGE UP
GLUCOSE SERPL-MCNC: 156 MG/DL — HIGH (ref 70–99)
HCT VFR BLD CALC: 37.5 % — SIGNIFICANT CHANGE UP (ref 34.5–45)
HCT VFR BLD CALC: 39.5 % — SIGNIFICANT CHANGE UP (ref 34.5–45)
HGB BLD-MCNC: 12.4 G/DL — SIGNIFICANT CHANGE UP (ref 11.5–15.5)
HGB BLD-MCNC: 12.9 G/DL — SIGNIFICANT CHANGE UP (ref 11.5–15.5)
MAGNESIUM SERPL-MCNC: 1.7 MG/DL — SIGNIFICANT CHANGE UP (ref 1.6–2.6)
MCHC RBC-ENTMCNC: 29 PG — SIGNIFICANT CHANGE UP (ref 27–34)
MCHC RBC-ENTMCNC: 29.5 PG — SIGNIFICANT CHANGE UP (ref 27–34)
MCHC RBC-ENTMCNC: 32.7 GM/DL — SIGNIFICANT CHANGE UP (ref 32–36)
MCHC RBC-ENTMCNC: 33.1 GM/DL — SIGNIFICANT CHANGE UP (ref 32–36)
MCV RBC AUTO: 88.8 FL — SIGNIFICANT CHANGE UP (ref 80–100)
MCV RBC AUTO: 89.1 FL — SIGNIFICANT CHANGE UP (ref 80–100)
NRBC # BLD: 0 /100 WBCS — SIGNIFICANT CHANGE UP (ref 0–0)
NRBC # BLD: 0 /100 WBCS — SIGNIFICANT CHANGE UP (ref 0–0)
PLATELET # BLD AUTO: 207 K/UL — SIGNIFICANT CHANGE UP (ref 150–400)
PLATELET # BLD AUTO: 207 K/UL — SIGNIFICANT CHANGE UP (ref 150–400)
POTASSIUM SERPL-MCNC: 3.9 MMOL/L — SIGNIFICANT CHANGE UP (ref 3.5–5.3)
POTASSIUM SERPL-SCNC: 3.9 MMOL/L — SIGNIFICANT CHANGE UP (ref 3.5–5.3)
RBC # BLD: 4.21 M/UL — SIGNIFICANT CHANGE UP (ref 3.8–5.2)
RBC # BLD: 4.45 M/UL — SIGNIFICANT CHANGE UP (ref 3.8–5.2)
RBC # FLD: 14.2 % — SIGNIFICANT CHANGE UP (ref 10.3–14.5)
RBC # FLD: 14.3 % — SIGNIFICANT CHANGE UP (ref 10.3–14.5)
SODIUM SERPL-SCNC: 140 MMOL/L — SIGNIFICANT CHANGE UP (ref 135–145)
WBC # BLD: 9.5 K/UL — SIGNIFICANT CHANGE UP (ref 3.8–10.5)
WBC # BLD: 9.55 K/UL — SIGNIFICANT CHANGE UP (ref 3.8–10.5)
WBC # FLD AUTO: 9.5 K/UL — SIGNIFICANT CHANGE UP (ref 3.8–10.5)
WBC # FLD AUTO: 9.55 K/UL — SIGNIFICANT CHANGE UP (ref 3.8–10.5)

## 2022-06-10 PROCEDURE — 36415 COLL VENOUS BLD VENIPUNCTURE: CPT

## 2022-06-10 PROCEDURE — 86803 HEPATITIS C AB TEST: CPT

## 2022-06-10 PROCEDURE — C1769: CPT

## 2022-06-10 PROCEDURE — 80053 COMPREHEN METABOLIC PANEL: CPT

## 2022-06-10 PROCEDURE — 86146 BETA-2 GLYCOPROTEIN ANTIBODY: CPT

## 2022-06-10 PROCEDURE — 86900 BLOOD TYPING SEROLOGIC ABO: CPT

## 2022-06-10 PROCEDURE — 86147 CARDIOLIPIN ANTIBODY EA IG: CPT

## 2022-06-10 PROCEDURE — 86850 RBC ANTIBODY SCREEN: CPT

## 2022-06-10 PROCEDURE — U0005: CPT

## 2022-06-10 PROCEDURE — 93005 ELECTROCARDIOGRAM TRACING: CPT

## 2022-06-10 PROCEDURE — 88304 TISSUE EXAM BY PATHOLOGIST: CPT

## 2022-06-10 PROCEDURE — 93971 EXTREMITY STUDY: CPT

## 2022-06-10 PROCEDURE — 85610 PROTHROMBIN TIME: CPT

## 2022-06-10 PROCEDURE — 76000 FLUOROSCOPY <1 HR PHYS/QHP: CPT

## 2022-06-10 PROCEDURE — 85379 FIBRIN DEGRADATION QUANT: CPT

## 2022-06-10 PROCEDURE — U0003: CPT

## 2022-06-10 PROCEDURE — 83735 ASSAY OF MAGNESIUM: CPT

## 2022-06-10 PROCEDURE — 97163 PT EVAL HIGH COMPLEX 45 MIN: CPT

## 2022-06-10 PROCEDURE — 80048 BASIC METABOLIC PNL TOTAL CA: CPT

## 2022-06-10 PROCEDURE — 71045 X-RAY EXAM CHEST 1 VIEW: CPT

## 2022-06-10 PROCEDURE — 99285 EMERGENCY DEPT VISIT HI MDM: CPT | Mod: 25

## 2022-06-10 PROCEDURE — C1894: CPT

## 2022-06-10 PROCEDURE — 85027 COMPLETE CBC AUTOMATED: CPT

## 2022-06-10 PROCEDURE — C1887: CPT

## 2022-06-10 PROCEDURE — 85025 COMPLETE CBC W/AUTO DIFF WBC: CPT

## 2022-06-10 PROCEDURE — C1757: CPT

## 2022-06-10 PROCEDURE — 99232 SBSQ HOSP IP/OBS MODERATE 35: CPT

## 2022-06-10 PROCEDURE — 85730 THROMBOPLASTIN TIME PARTIAL: CPT

## 2022-06-10 PROCEDURE — 86901 BLOOD TYPING SEROLOGIC RH(D): CPT

## 2022-06-10 PROCEDURE — 74174 CTA ABD&PLVS W/CONTRAST: CPT | Mod: MA

## 2022-06-10 PROCEDURE — C1725: CPT

## 2022-06-10 RX ORDER — RIVAROXABAN 15 MG-20MG
1 KIT ORAL
Qty: 30 | Refills: 0
Start: 2022-06-10 | End: 2022-07-09

## 2022-06-10 RX ORDER — RIVAROXABAN 15 MG-20MG
20 KIT ORAL
Refills: 0 | Status: DISCONTINUED | OUTPATIENT
Start: 2022-06-10 | End: 2022-06-10

## 2022-06-10 RX ORDER — IBUPROFEN 200 MG
600 TABLET ORAL ONCE
Refills: 0 | Status: COMPLETED | OUTPATIENT
Start: 2022-06-10 | End: 2022-06-10

## 2022-06-10 RX ADMIN — RIVAROXABAN 20 MILLIGRAM(S): KIT at 17:22

## 2022-06-10 RX ADMIN — HEPARIN SODIUM 900 UNIT(S)/HR: 5000 INJECTION INTRAVENOUS; SUBCUTANEOUS at 09:40

## 2022-06-10 RX ADMIN — LOSARTAN POTASSIUM 50 MILLIGRAM(S): 100 TABLET, FILM COATED ORAL at 05:41

## 2022-06-10 RX ADMIN — Medication 600 MILLIGRAM(S): at 00:11

## 2022-06-10 RX ADMIN — HEPARIN SODIUM 900 UNIT(S)/HR: 5000 INJECTION INTRAVENOUS; SUBCUTANEOUS at 02:04

## 2022-06-10 RX ADMIN — Medication 600 MILLIGRAM(S): at 01:00

## 2022-06-10 RX ADMIN — Medication 3 MILLIGRAM(S): at 01:05

## 2022-06-10 RX ADMIN — Medication 650 MILLIGRAM(S): at 12:15

## 2022-06-10 RX ADMIN — HEPARIN SODIUM 0 UNIT(S)/HR: 5000 INJECTION INTRAVENOUS; SUBCUTANEOUS at 01:02

## 2022-06-10 RX ADMIN — Medication 650 MILLIGRAM(S): at 13:16

## 2022-06-10 RX ADMIN — HEPARIN SODIUM 900 UNIT(S)/HR: 5000 INJECTION INTRAVENOUS; SUBCUTANEOUS at 07:23

## 2022-06-10 NOTE — DISCHARGE NOTE PROVIDER - CARE PROVIDERS DIRECT ADDRESSES
,DirectAddress_Unknown,DirectAddress_Unknown,[email or web address]

## 2022-06-10 NOTE — PROGRESS NOTE ADULT - NS ATTEND AMEND GEN_ALL_CORE FT
No signs of significant ischemia or volume overload. cont current care. Further cardiac workup will depend on clinical course. cont ac. pain control. fu vascular transition to DOAC
Optimized for the OR from a cardiac point of view with no evidence of active ischemic heart disease, decompensated heart failure, severe obstructive valvular disease, or uncontrolled arrhythmia.
Patient evaluated at the bedside. CT and US reviewed. Patient states she has had LLE chronic swelling since last DVT. She was evaluated by hematology in the past and she was told to stop AC. She does have  Factor V Leiden def. identified in 2020. Given CFV involvement and inability of evaluating L CIV given artifact on CT (in the setting of 2nd DVT involving upper thigh), I offered her a LLE venogram with venous thrombectomy. Plan discussed with her and her daughter. Cont Hep gtt. Will likely need life long AC. Recommend hematology eval.

## 2022-06-10 NOTE — PROGRESS NOTE ADULT - PROBLEM SELECTOR PLAN 1
S/P Thrombectomy, vessel, femoral; venogram extremity lower left; Venous angioplasty femoral vein -- POD#1  Case d/w vascular -- ok to ambulate, stop iv heparin gtt and restart xarelto at 20mg qhs today  Cleared for d/c home from vascular standpoint  Will need lifelong AC S/P Thrombectomy, vessel, femoral; venogram extremity lower left; Venous angioplasty femoral vein -- POD#1  Case d/w vascular -- ok to ambulate, stop iv heparin gtt and restart xarelto at 20mg qhs today  Cleared for d/c home today from vascular standpoint  Will need lifelong AC

## 2022-06-10 NOTE — DISCHARGE NOTE PROVIDER - NSDCFUADDINST_GEN_ALL_CORE_FT
Follow up as outpt in 2 weeks with Dr Salgado  Wound care as follows upon discharge:  No heavy lifting, driving, sex, tub baths, swimming, or any activity that submerges the lower half of the body in water for 48 hours.  Ambulate as tolerated. May wash LLE with soap and water and leave open to air. If small amount of bleeding noted can place bandaid. Change the bandaid after 24 hours and every 24 hours after that.  Keep the puncture site covered with a bandaid until a scab forms.    Observe the site frequently.  If bleeding or a large lump (the size of a golf ball or bigger) occurs apply continuous direct pressure just above the puncture site for at least 10 minutes, and notify your physician immediately.  If the bleeding cannot be controlled, call 911 immediately for assistance.  Notify your physician of pain, swelling or any drainage.    Notify your physician immediately if coldness, numbness, discoloration or pain in your foot occurs.

## 2022-06-10 NOTE — PROGRESS NOTE ADULT - PROBLEM SELECTOR PROBLEM 1
Deep vein thrombosis (DVT) of left lower extremity, unspecified chronicity, unspecified vein

## 2022-06-10 NOTE — PROGRESS NOTE ADULT - ASSESSMENT
67F HTN presenting with an acute, provoked left common femoral & popliteal DVT.      HTN, DVT  - Patient presenting with LLE pain and outpatient US showing LLE DVT. CT venogram - no extension into external iliac vein  - Patient underwent L ankle surgery 3/2022 and was on Xarelto until 5/9/22  - She is now s/p LLE venogram w/femoral vein thrombectomy and angioplasty 6/9   - Continue Heparin gtt  - Vascular following    - EKG on admission shows NSR, 71bpm   - No evidence of any active ischemia     - BP well controlled, monitor routine hemodynamics.  - Continue Losartan 25mg BID with hold parameters   - Resume Coreg     - No evidence of significant arrhythmia   - No evidence of any meaningful volume overload     - Monitor and replete lytes, keep K>4, Mg>2.  - Will continue to follow.    Valentina Robertson MS FNP, [facility]  Nurse Practitioner- Cardiology   Spectra #[id number] /[phone]

## 2022-06-10 NOTE — PHYSICAL THERAPY INITIAL EVALUATION ADULT - PERTINENT HX OF CURRENT PROBLEM, REHAB EVAL
This is a 67 F with PMH of HTN who was sent after getting US as outpatient showing DVT in left femoral/poplitealo/tibial veins. Patient had surgery in her left ankle in march at [facility] and was on xarelto until May 9. Patient had been c/o left leg pain since the surgery and thought it was all related to the surgery. She had an US today and found out she had DVTs in her left leg. She denies SOB, CP, fevers, chills.

## 2022-06-10 NOTE — DISCHARGE NOTE PROVIDER - CARE PROVIDER_API CALL
MEGHA ORTEGA  Internal Medicine  [address] [place] [address]  Phone: [phone]  Fax: [phone]  Established Patient  Follow Up Time: 1 week    Qi Horton)  Vascular Surgery  [address] [place] [address]  Phone: [phone]  Fax: [phone]  Established Patient  Follow Up Time: 2 weeks    Olman Lyles)  Hematology; Internal Medicine; Oncology  [address] [place] [id number]  Phone: [phone]  Fax: [phone]  Established Patient  Follow Up Time: 2 weeks

## 2022-06-10 NOTE — PROGRESS NOTE ADULT - PROBLEM SELECTOR PLAN 3
Start Xarelto tonight  Will need lifelong AC  Hematology consult noted
Continue iv heparin gtt for now  Will need lifelong AC  Hematology consult called
Continue iv heparin gtt for now  Will need lifelong AC  Hematology consult noted

## 2022-06-10 NOTE — DISCHARGE NOTE PROVIDER - NSDCFUSCHEDAPPT_GEN_ALL_CORE_FT
Olman Lyles Physician [facility]  [facility]  Scheduled Appointment: 06/16/2022    Olman Lyles  [facility]  [facility]  Scheduled Appointment: 06/29/2022

## 2022-06-10 NOTE — DISCHARGE NOTE NURSING/CASE MANAGEMENT/SOCIAL WORK - PATIENT PORTAL LINK FT
You can access the FollowMyHealth Patient Portal offered by [facility] by registering at the following website: [email or web address]. By joining [employer]’s FollowMyHealth portal, you will also be able to view your health information using other applications (apps) compatible with our system.

## 2022-06-10 NOTE — DISCHARGE NOTE PROVIDER - NSDCCPCAREPLAN_GEN_ALL_CORE_FT
PRINCIPAL DISCHARGE DIAGNOSIS  Diagnosis: DVT, lower extremity  Assessment and Plan of Treatment: Continue Xarelto  Follow-up with Dr. Lyles and Dr. Salgado in 2 weeks

## 2022-06-10 NOTE — DISCHARGE NOTE PROVIDER - PROVIDER TOKENS
PROVIDER:[TOKEN:[[id number]],FOLLOWUP:[1 week],ESTABLISHEDPATIENT:[T]],PROVIDER:[TOKEN:[[id number]],FOLLOWUP:[2 weeks],ESTABLISHEDPATIENT:[T]],PROVIDER:[TOKEN:[[id number]],FOLLOWUP:[2 weeks],ESTABLISHEDPATIENT:[T]]

## 2022-06-10 NOTE — DISCHARGE NOTE NURSING/CASE MANAGEMENT/SOCIAL WORK - NSDCVIVACCINE_GEN_ALL_CORE_FT
influenza, high-dose, quadrivalent; 29-Sep-2020 12:00; Cara Ybarra (JING); [employer]; [id number] (Exp. Date: 30-Jun-2021); IntraMuscular; Deltoid Left.; 0.7 milliLiter(s); VIS (VIS Published: 15-Aug-2019, VIS Presented: 29-Sep-2020);

## 2022-06-10 NOTE — PROGRESS NOTE ADULT - PROVIDER SPECIALTY LIST ADULT
Cardiology
Vascular Surgery
Heme/Onc
Internal Medicine
Vascular Surgery
Vascular Surgery
Cardiology
Vascular Surgery
Internal Medicine
Internal Medicine

## 2022-06-10 NOTE — PHYSICAL THERAPY INITIAL EVALUATION ADULT - RANGE OF MOTION EXAMINATION, REHAB EVAL
LLE ace bandage dry and intact/bilateral upper extremity ROM was WFL (within functional limits)/bilateral lower extremity ROM was WFL (within functional limits)

## 2022-06-10 NOTE — DISCHARGE NOTE PROVIDER - HOSPITAL COURSE
Patient came into the ED sent after getting US as outpatient showing DVT in left femoral/poplitealo/tibial veins. patient had surgery in her left ankle in march at [facility] and was on xarelto until may 9. patient had been c/o left leg pain since the surgery and thought it was all related to the surgery. had an US today and found out she had DVTs in her left leg. no SOB. no CP. daughter translated in Estonian.    Patient S/P Thrombectomy, vessel, femoral; venogram extremity lower left; Venous angioplasty femoral vein -- POD#1  Case d/w vascular -- ok to ambulate, stop iv heparin gtt and restart xarelto at 20mg qhs today  Cleared for d/c home today from vascular standpoint  Will need lifelong AC.    >35 minutes spent on discharge

## 2022-06-10 NOTE — PROGRESS NOTE ADULT - SUBJECTIVE AND OBJECTIVE BOX
[facility] -- Hilario Moscoso, Hitesh, Breonna, Zachery Anderson, Ga Corbett: Office # [phone]    Follow Up: Cardiac clearance, HTN    Subjective/Observations: Patient seen and examined. Patient awake, alert, resting in bed. No complaints of chest pain, dyspnea, palpitations or dizziness. No signs of orthopnea or PND. Tolerating O2 via nasal cannula. Continues on heparin gtt.     REVIEW OF SYSTEMS: All other review of systems are negative unless indicated above    PAST MEDICAL & SURGICAL HISTORY:  Lumbago with sciatica      HTN (hypertension)      Obesity      Kidney stones      Vertigo      Lumbar stenosis      Carpal tunnel syndrome on both sides  sx done 2013      History of cataract surgery  both eyes 2017      History of laminectomy  3/2018      History of lithotripsy      History of ankle surgery    MEDICATIONS  (STANDING):  heparin  Infusion. 1200 Unit(s)/Hr (12 mL/Hr) IV Continuous <Continuous>  losartan 50 milliGRAM(s) Oral daily    MEDICATIONS  (PRN):  acetaminophen     Tablet .. 650 milliGRAM(s) Oral every 6 hours PRN Temp greater or equal to 38C (100.4F), Mild Pain (1 - 3)  aluminum hydroxide/magnesium hydroxide/simethicone Suspension 30 milliLiter(s) Oral every 4 hours PRN Dyspepsia  heparin   Injectable 8000 Unit(s) IV Push every 6 hours PRN For aPTT less than 40  heparin   Injectable 4000 Unit(s) IV Push every 6 hours PRN For aPTT between 40 - 57  melatonin 3 milliGRAM(s) Oral at bedtime PRN Insomnia  ondansetron Injectable 4 milliGRAM(s) IV Push every 6 hours PRN Nausea and/or Vomiting    Allergies  No Known Allergies    Vital Signs Last 24 Hrs  T(C): 36.6 (10 Erickson 2022 05:07), Max: 36.9 (09 Jun 2022 12:37)  T(F): 97.8 (10 Erickson 2022 05:07), Max: 98.4 (09 Jun 2022 12:37)  HR: 87 (10 Erickson 2022 05:07) (54 - 87)  BP: 138/75 (10 Erickson 2022 05:07) (131/77 - 175/79)  BP(mean): --  RR: 18 (10 Erickson 2022 05:07) (10 - 18)  SpO2: 90% (10 Erickson 2022 05:07) (90% - 100%)  I&O's Summary    09 Jun 2022 07:01  -  10 Erickson 2022 07:00  --------------------------------------------------------  IN: 367 mL / OUT: 1100 mL / NET: -733 mL      Weight (kg): 98.9 (06-09 @ 13:25)    TELE:  60-80s   PHYSICAL EXAM:  Constitutional: NAD, awake and alert, Obese   HEENT: Moist Mucous Membranes, Anicteric  Pulmonary: Non-labored, breath sounds are clear bilaterally, Diminished at bases No wheezing, rales or rhonchi  Cardiovascular: Regular, S1 and S2, No murmurs, No rubs, gallops or clicks  Gastrointestinal:  soft, nontender, nondistended   Lymph: No peripheral edema. No lymphadenopathy.   Skin: No visible rashes or ulcers. LLE ACE wrap   Psych:  Mood & affect appropriate    LABS: All Labs Reviewed:                        12.4   9.50  )-----------( 207      ( 10 Erickson 2022 08:08 )             37.5                         12.9   9.55  )-----------( 207      ( 10 Erickson 2022 00:36 )             39.5                         12.2   7.26  )-----------( 206      ( 09 Jun 2022 06:29 )             37.9     10 Erickson 2022 08:08    140    |  106    |  10     ----------------------------<  156    3.9     |  26     |  0.76   09 Jun 2022 06:29    141    |  107    |  10     ----------------------------<  107    4.2     |  27     |  0.75   08 Jun 2022 08:01    142    |  106    |  10     ----------------------------<  103    4.1     |  28     |  0.72     Ca    9.1        10 Erickson 2022 08:08  Ca    9.2        09 Jun 2022 06:29  Ca    9.4        08 Jun 2022 08:01  Mg     1.7       10 Erickson 2022 08:08  Mg     2.1       09 Jun 2022 06:29  Mg     2.1       08 Jun 2022 08:01    TPro  7.6    /  Alb  3.4    /  TBili  0.4    /  DBili  x      /  AST  26     /  ALT  31     /  AlkPhos  86     07 Jun 2022 16:02     PTT - ( 10 Erickson 2022 00:36 )  PTT:141.5 secD-Dimer Assay, Quantitative: 1038 ng/mL DDU (06-08-22 @ 08:01)    12 Lead ECG:   Ventricular Rate 71 BPM    Atrial Rate 71 BPM    P-R Interval 164 ms    QRS Duration 88 ms    Q-T Interval 386 ms    QTC Calculation(Bazett) 419 ms    P Axis 39 degrees    R Axis 12 degrees    T Axis 24 degrees    Diagnosis Line Normal sinus rhythm  Nonspecific T wave abnormality  Abnormal ECG  No previous ECGs available  Confirmed by Deysi Koroma ([id number]) on 6/8/2022 1:59:26 PM (06-07-22 @ 16:47)
Patient is a 67y old  Female who presents with a chief complaint of LLE DVT (10 Erickson 2022 09:19)      INTERVAL HPI/OVERNIGHT EVENTS: Patient seen and examined. NAD. No complaints.    Vital Signs Last 24 Hrs  T(C): 36.6 (10 Erickson 2022 05:07), Max: 36.6 (09 Jun 2022 17:27)  T(F): 97.8 (10 Erickson 2022 05:07), Max: 97.9 (09 Jun 2022 17:27)  HR: 87 (10 Erickson 2022 05:07) (54 - 87)  BP: 138/75 (10 Erickson 2022 05:07) (131/77 - 175/79)  BP(mean): --  RR: 18 (10 Erickson 2022 05:07) (10 - 18)  SpO2: 90% (10 Ercikson 2022 05:07) (90% - 100%)    06-10    140  |  106  |  10  ----------------------------<  156<H>  3.9   |  26  |  0.76    Ca    9.1      10 Erickson 2022 08:08  Mg     1.7     06-10                            12.4   9.50  )-----------( 207      ( 10 Erickson 2022 08:08 )             37.5     PTT - ( 10 Erickson 2022 08:45 )  PTT:68.7 sec  CAPILLARY BLOOD GLUCOSE                  acetaminophen     Tablet .. 650 milliGRAM(s) Oral every 6 hours PRN  aluminum hydroxide/magnesium hydroxide/simethicone Suspension 30 milliLiter(s) Oral every 4 hours PRN  heparin   Injectable 8000 Unit(s) IV Push every 6 hours PRN  heparin   Injectable 4000 Unit(s) IV Push every 6 hours PRN  heparin  Infusion. 1200 Unit(s)/Hr IV Continuous <Continuous>  losartan 50 milliGRAM(s) Oral daily  melatonin 3 milliGRAM(s) Oral at bedtime PRN  ondansetron Injectable 4 milliGRAM(s) IV Push every 6 hours PRN              REVIEW OF SYSTEMS:  CONSTITUTIONAL: No fever, no weight loss, or no fatigue  NECK: No pain, no stiffness  RESPIRATORY: No cough, no wheezing, no chills, no hemoptysis, No shortness of breath  CARDIOVASCULAR: No chest pain, no palpitations, no dizziness, no leg swelling  GASTROINTESTINAL: No abdominal pain. No nausea, no vomiting, no hematemesis; No diarrhea, no constipation. No melena, no hematochezia.  GENITOURINARY: No dysuria, no frequency, no hematuria, no incontinence  NEUROLOGICAL: No headaches, no loss of strength, no numbness, no tremors  SKIN: No itching, no burning  MUSCULOSKELETAL: No joint pain, no swelling; No muscle, no back, no extremity pain  PSYCHIATRIC: No depression, no mood swings,   HEME/LYMPH: No easy bruising, no bleeding gums  ALLERY AND IMMUNOLOGIC: No hives       Consultant(s) Notes Reviewed:  [X] YES  [ ] NO    PHYSICAL EXAM:  GENERAL: NAD  HEAD:  Atraumatic, Normocephalic  EYES: EOMI, PERRLA, conjunctiva and sclera clear  ENMT: No tonsillar erythema, exudates, or enlargement; Moist mucous membranes  NECK: Supple, No JVD  NERVOUS SYSTEM:  Awake & alert  CHEST/LUNG: Clear to auscultation bilaterally; No rales, rhonchi, wheezing,  HEART: Regular rate and rhythm  ABDOMEN: Soft, Nontender, Nondistended; Bowel sounds present  EXTREMITIES:  No clubbing, cyanosis, or edema  LYMPH: No lymphadenopathy noted  SKIN: No rashes      Advanced care planning discussed with patient/family [X] YES   [ ] NO    Advanced care planning discussed with patient/family. Patient's health status was discussed. All appropriate changes have been made regarding patient's end-of-life care. Advanced care planning forms reviewed/discussed/completed.  20 minutes spent.   
Post Operative Note  Patient: ROSALES FELIX 67y (1955) Female   MRN: [id number]  Location: [facility] [address] J3  Visit: 06-07-22 Inpatient  Date: 06-09-22 @ 20:07    PROCEDURE: S/p LLE venogram w/femoral vein thrombectomy and angioplasty    SUBJECTIVE   Patient seen and examined at bedside, reports slight headache but otherwise feeling well.  Denies LLE pain, numbness or tingling.  Patient is [place]-speaking. Daughter at bedside provided translation at patient's request.    OBJECTIVE    VITALS  Vitals: T(F): 97.7 (06-09-22 @ 18:12), Max: 98.6 (06-08-22 @ 20:29)  HR: 60 (06-09-22 @ 18:53)  BP: 168/72 (06-09-22 @ 18:53) (131/77 - 175/79)  RR: 11 (06-09-22 @ 18:53)  SpO2: 98% (06-09-22 @ 18:53)    INTAKE & OUTPUT  IN:   06-08-22 @ 07:01  -  06-09-22 @ 07:00  --------------------------------------------------------  IN: 1186 mL    06-09-22 @ 07:01  -  06-09-22 @ 20:07  --------------------------------------------------------  IN: 250 mL    IV Fluids:     OUT:   06-08-22 @ 07:01  -  06-09-22 @ 07:00  --------------------------------------------------------  OUT: 350 mL    06-09-22 @ 07:01  -  06-09-22 @ 20:07  --------------------------------------------------------  OUT: 0 mL    EBL: 50 mL    Voided Urine:   06-08-22 @ 07:01  -  06-09-22 @ 07:00  --------------------------------------------------------  OUT: 350 mL    06-09-22 @ 07:01  -  06-09-22 @ 20:07  --------------------------------------------------------  OUT: 0 mL     PHYSICAL EXAM  GENERAL:  Obese female lying comfortably in bed in NAD.  HEENT:  Sclera white. Mucous membranes moist.  CARDIO: S1, S2.  RESPIRATORY:  Clear to auscultation bilaterally.  EXTREMITIES: LLE in ACE wrap, clean/dry. Left foot warm, good color, intact sensation/ROM to Left toes. Left DP pulse identifiable via doppler.  SKIN:  No jaundice, pallor, or cyanosis  NEURO:  Alert    MEDICATIONS  [Standing]  acetaminophen     Tablet .. 650 milliGRAM(s) Oral every 6 hours PRN  aluminum hydroxide/magnesium hydroxide/simethicone Suspension 30 milliLiter(s) Oral every 4 hours PRN  heparin   Injectable 8000 Unit(s) IV Push every 6 hours PRN  heparin   Injectable 4000 Unit(s) IV Push every 6 hours PRN  heparin  Infusion. 1200 Unit(s)/Hr IV Continuous <Continuous>  losartan 50 milliGRAM(s) Oral daily  melatonin 3 milliGRAM(s) Oral at bedtime PRN  ondansetron Injectable 4 milliGRAM(s) IV Push every 6 hours PRN    [PRN]  acetaminophen     Tablet .. 650 milliGRAM(s) Oral every 6 hours PRN  aluminum hydroxide/magnesium hydroxide/simethicone Suspension 30 milliLiter(s) Oral every 4 hours PRN  heparin   Injectable 8000 Unit(s) IV Push every 6 hours PRN  heparin   Injectable 4000 Unit(s) IV Push every 6 hours PRN  heparin  Infusion. 1200 Unit(s)/Hr IV Continuous <Continuous>  losartan 50 milliGRAM(s) Oral daily  melatonin 3 milliGRAM(s) Oral at bedtime PRN  ondansetron Injectable 4 milliGRAM(s) IV Push every 6 hours PRN    LABS:                        12.2   7.26  )-----------( 206      ( 09 Jun 2022 06:29 )             37.9     06-09    141  |  107  |  10  ----------------------------<  107<H>  4.2   |  27  |  0.75    Ca    9.2      09 Jun 2022 06:29  Mg     2.1     06-09    PT/INR - ( 07 Jun 2022 21:49 )   PT: 12.7 sec;   INR: 1.08 ratio  PTT - ( 09 Jun 2022 06:29 )  PTT:88.8 sec    IMAGING:  No post-op imaging studies    ASSESSMENT:  67 year old female s/p LLE venogram w/femoral vein thrombectomy and angioplasty.    PLAN:  - LLE elevation  - Continue heparin drip  - Routine neurovascular checks  - Pain control PRN  - Advance diet as tolerated  - Incentive spirometry  - PT/OT  - Follow up AM labs  - Will continue to monitor
Patient is a 67y old  Female who presents with a chief complaint of LLE DVT (07 Jun 2022 22:34)  Overnight, pt started on AC. No c/o CP, palps, SOB, fevers. Reports swelling in LLE improved, however continues to have discomfort particularly in ankle area.    MEDICATIONS  (STANDING):  carvedilol 25 milliGRAM(s) Oral every 12 hours  heparin  Infusion.  Unit(s)/Hr (18 mL/Hr) IV Continuous <Continuous>  losartan 50 milliGRAM(s) Oral daily  sodium chloride 0.9%. 1000 milliLiter(s) (80 mL/Hr) IV Continuous <Continuous>    MEDICATIONS  (PRN):  acetaminophen     Tablet .. 650 milliGRAM(s) Oral every 6 hours PRN Temp greater or equal to 38C (100.4F), Mild Pain (1 - 3)  aluminum hydroxide/magnesium hydroxide/simethicone Suspension 30 milliLiter(s) Oral every 4 hours PRN Dyspepsia  heparin   Injectable 8500 Unit(s) IV Push every 6 hours PRN For aPTT less than 40  heparin   Injectable 4000 Unit(s) IV Push every 6 hours PRN For aPTT between 40 - 57  melatonin 3 milliGRAM(s) Oral at bedtime PRN Insomnia  ondansetron Injectable 4 milliGRAM(s) IV Push every 6 hours PRN Nausea and/or Vomiting    Allergies  No Known Allergies      Vital Signs Last 24 Hrs  T(C): 36.9 (08 Jun 2022 04:15), Max: 36.9 (08 Jun 2022 04:15)  T(F): 98.4 (08 Jun 2022 04:15), Max: 98.4 (08 Jun 2022 04:15)  HR: 81 (08 Jun 2022 06:42) (74 - 81)  BP: 142/61 (08 Jun 2022 06:42) (120/71 - 142/68)  BP(mean): --  RR: 16 (08 Jun 2022 04:15) (16 - 20)  SpO2: 96% (08 Jun 2022 04:15) (95% - 97%)  I&O's Detail      Physical Exam:  General: NAD, resting comfortably in bed  Pulmonary: Nonlabored breathing, no respiratory distress, CTA  Cardiovascular: Normal S1, S2  Abdominal: soft, NT/ND  Extremities: BLE WWP. LLE with trace edema and 1+ ankle edema, no discoloration or skin changes. Neg Homans sign. Sensory and motor at baseline. DP pulses 2+      LABS:                        12.6   8.03  )-----------( 213      ( 07 Jun 2022 16:02 )             38.3     06-07    142  |  107  |  12  ----------------------------<  136<H>  4.4   |  29  |  0.90    Ca    9.1      07 Jun 2022 16:02    TPro  7.6  /  Alb  3.4  /  TBili  0.4  /  DBili  x   /  AST  26  /  ALT  31  /  AlkPhos  86  06-07    PT/INR - ( 07 Jun 2022 21:49 )   PT: 12.7 sec;   INR: 1.08 ratio         PTT - ( 07 Jun 2022 21:49 )  PTT:38.8 sec  CAPILLARY BLOOD GLUCOSE    Radiology and Additional Studies:    < from: US Duplex Venous Lower Ext Ltd, Left (06.07.22 @ 18:33) >  ACC: [id number] EXAM:  US DPLX LWR EXT VEINS LTD LT                          PROCEDURE DATE:  06/07/2022          INTERPRETATION:  CLINICAL INFORMATION: Left leg pain.    COMPARISON: None available.    TECHNIQUE: Duplex sonography of the LEFT LOWER extremity veins with color   and spectral Doppler, with and without compression.    FINDINGS:    Partially occlusive acute deep venous thrombosis in the left common   femoral, femoral, and popliteal veins.  The contralateral common femoral vein is patent.    No calf vein thrombosis is detected.    Left popliteal cyst, measuring 5.9 x 1.9 x 1.1 cm.    IMPRESSION:  Acute deep venous thrombosis: above the knee.    < end of copied text >  < from: CT Angio Abdomen and Pelvis w/ IV Cont (06.07.22 @ 19:55) >  ACC: [id number] EXAM:  CT ANGIO ABD PELV (W)AW IC                          PROCEDURE DATE:  06/07/2022          INTERPRETATION:  CLINICAL INFORMATION: Left leg DVT.    COMPARISON: July 29, 2020.    CONTRAST/COMPLICATIONS:  IV Contrast: Omnipaque [id number] cc administered   25 cc discarded  Oral Contrast: NONE  Complications: None reported at time of study completion    PROCEDURE:  CT venogram of the Abdomen and Pelvis was performed.  Sagittal and coronal reformats were performed.    FINDINGS:  LOWER CHEST: Within normal limits.    LIVER: Within normal limits.  BILE DUCTS: Normal caliber.  GALLBLADDER: Within normal limits.  SPLEEN: Within normal limits.  PANCREAS: Within normal limits.  ADRENALS: Within normal limits.  KIDNEYS/URETERS: No hydronephrosis. Probable bilateral renal cysts.    BLADDER: Within normal limits.  REPRODUCTIVE ORGANS: Uterus and adnexa within normal limits.    BOWEL: No bowel obstruction.  PERITONEUM: No ascites.  VESSELS: Atherosclerotic changes. Evaluation of the common iliac and   internal iliac veins is suboptimal due to streak artifact from orthopedic   hardware. Thrombus in the left common femoral vein, without extension   into the external iliac vein.  RETROPERITONEUM/LYMPH NODES: No lymphadenopathy.  ABDOMINAL WALL: Within normal limits.  BONES: Degenerative changes. Orthopedic hardware in the thoracolumbar   spine and sacroiliac joints. Calcified granulomas in both gluteal regions.    IMPRESSION:  Thrombus in the left common femoral vein, without extension into the   external iliac vein.    < end of copied text >  
Patient is a 67y old  Female who presents with a chief complaint of LLE DVT (08 Jun 2022 07:48)      INTERVAL HPI/OVERNIGHT EVENTS: Patient seen and examined. NAD. No complaints.    Vital Signs Last 24 Hrs  T(C): 37 (08 Jun 2022 11:59), Max: 37 (08 Jun 2022 11:59)  T(F): 98.6 (08 Jun 2022 11:59), Max: 98.6 (08 Jun 2022 11:59)  HR: 71 (08 Jun 2022 11:59) (71 - 81)  BP: 129/80 (08 Jun 2022 11:59) (120/71 - 142/68)  BP(mean): --  RR: 19 (08 Jun 2022 11:59) (16 - 20)  SpO2: 92% (08 Jun 2022 11:59) (92% - 97%)    06-08    142  |  106  |  10  ----------------------------<  103<H>  4.1   |  28  |  0.72    Ca    9.4      08 Jun 2022 08:01  Mg     2.1     06-08    TPro  7.6  /  Alb  3.4  /  TBili  0.4  /  DBili  x   /  AST  26  /  ALT  31  /  AlkPhos  86  06-07                          12.0   6.56  )-----------( 210      ( 08 Jun 2022 08:01 )             36.5     PT/INR - ( 07 Jun 2022 21:49 )   PT: 12.7 sec;   INR: 1.08 ratio         PTT - ( 08 Jun 2022 12:48 )  PTT:179.1 sec  CAPILLARY BLOOD GLUCOSE                  acetaminophen     Tablet .. 650 milliGRAM(s) Oral every 6 hours PRN  aluminum hydroxide/magnesium hydroxide/simethicone Suspension 30 milliLiter(s) Oral every 4 hours PRN  carvedilol 25 milliGRAM(s) Oral every 12 hours  heparin   Injectable 8500 Unit(s) IV Push every 6 hours PRN  heparin   Injectable 4000 Unit(s) IV Push every 6 hours PRN  heparin  Infusion.  Unit(s)/Hr IV Continuous <Continuous>  losartan 50 milliGRAM(s) Oral daily  melatonin 3 milliGRAM(s) Oral at bedtime PRN  ondansetron Injectable 4 milliGRAM(s) IV Push every 6 hours PRN  sodium chloride 0.9%. 1000 milliLiter(s) IV Continuous <Continuous>              REVIEW OF SYSTEMS:  CONSTITUTIONAL: No fever, no weight loss, or no fatigue  NECK: No pain, no stiffness  RESPIRATORY: No cough, no wheezing, no chills, no hemoptysis, No shortness of breath  CARDIOVASCULAR: No chest pain, no palpitations, no dizziness, no leg swelling  GASTROINTESTINAL: No abdominal pain. No nausea, no vomiting, no hematemesis; No diarrhea, no constipation. No melena, no hematochezia.  GENITOURINARY: No dysuria, no frequency, no hematuria, no incontinence  NEUROLOGICAL: No headaches, no loss of strength, no numbness, no tremors  SKIN: No itching, no burning  MUSCULOSKELETAL: No joint pain, no swelling; No muscle, no back, no extremity pain  PSYCHIATRIC: No depression, no mood swings,   HEME/LYMPH: No easy bruising, no bleeding gums  ALLERY AND IMMUNOLOGIC: No hives       Consultant(s) Notes Reviewed:  [X] YES  [ ] NO    PHYSICAL EXAM:  GENERAL: NAD  HEAD:  Atraumatic, Normocephalic  EYES: EOMI, PERRLA, conjunctiva and sclera clear  ENMT: No tonsillar erythema, exudates, or enlargement; Moist mucous membranes  NECK: Supple, No JVD  NERVOUS SYSTEM:  Awake & alert  CHEST/LUNG: Clear to auscultation bilaterally; No rales, rhonchi, wheezing,  HEART: Regular rate and rhythm  ABDOMEN: Soft, Nontender, Nondistended; Bowel sounds present  EXTREMITIES:  No clubbing, cyanosis, or edema  LYMPH: No lymphadenopathy noted  SKIN: No rashes      Advanced care planning discussed with patient/family [X] YES   [ ] NO    Advanced care planning discussed with patient/family. Patient's health status was discussed. All appropriate changes have been made regarding patient's end-of-life care. Advanced care planning forms reviewed/discussed/completed.  20 minutes spent.   
Patient is a 67y old  Female who presents with a chief complaint of LLE DVT (09 Jun 2022 12:26)       Pt is seen and examined in pre-op holding area, is on monitor bed, nurse at bedside, for possible vas procedure today  pt is awake and lying in bed  pt seems comfortable at this time    HPI:  This is a 67 F with PMH of HTN who was sent after getting US as outpatient showing DVT in left femoral/poplitealo/tibial veins. Patient had surgery in her left ankle in march at [facility] and was on xarelto until May 9. Patient had been c/o left leg pain since the surgery and thought it was all related to the surgery. She had an US today and found out she had DVTs in her left leg. She denies SOB, CP, fevers, chills. (07 Jun 2022 22:34)         ROS:  as per hpi    MEDICATIONS  (STANDING):  carvedilol 25 milliGRAM(s) Oral every 12 hours  heparin  Infusion.  Unit(s)/Hr (18 mL/Hr) IV Continuous <Continuous>  losartan 50 milliGRAM(s) Oral daily  sodium chloride 0.9%. 1000 milliLiter(s) (75 mL/Hr) IV Continuous <Continuous>    MEDICATIONS  (PRN):  acetaminophen     Tablet .. 650 milliGRAM(s) Oral every 6 hours PRN Temp greater or equal to 38C (100.4F), Mild Pain (1 - 3)  aluminum hydroxide/magnesium hydroxide/simethicone Suspension 30 milliLiter(s) Oral every 4 hours PRN Dyspepsia  heparin   Injectable 8500 Unit(s) IV Push every 6 hours PRN For aPTT less than 40  heparin   Injectable 4000 Unit(s) IV Push every 6 hours PRN For aPTT between 40 - 57  melatonin 3 milliGRAM(s) Oral at bedtime PRN Insomnia  ondansetron Injectable 4 milliGRAM(s) IV Push every 6 hours PRN Nausea and/or Vomiting      Allergies    No Known Allergies    Intolerances        Vital Signs Last 24 Hrs  T(C): 36.4 (09 Jun 2022 12:48), Max: 37 (08 Jun 2022 20:29)  T(F): 97.5 (09 Jun 2022 12:48), Max: 98.6 (08 Jun 2022 20:29)  HR: 69 (09 Jun 2022 12:48) (65 - 75)  BP: 175/79 (09 Jun 2022 12:48) (137/75 - 175/79)  BP(mean): --  RR: 98 (09 Jun 2022 12:48) (17 - 98)  SpO2: 96% (09 Jun 2022 12:37) (91% - 96%)    PHYSICAL EXAM  General: adult in NAD  HEENT: clear oropharynx, anicteric sclera, pink conjunctiva  Neck: supple  CV: normal S1/S2 with no murmur rubs or gallops  Lungs: positive air movement b/l ant lungs,clear to auscultation, no wheezes, no rales  Abdomen: soft non-tender non-distended, no hepatosplenomegaly  Ext: no clubbing cyanosis or edema  Skin: no rashes and no petechiae  Neuro: alert and oriented X 4, no focal deficits  LABS:                          12.2   7.26  )-----------( 206      ( 09 Jun 2022 06:29 )             37.9         Mean Cell Volume : 90.0 fl  Mean Cell Hemoglobin : 29.0 pg  Mean Cell Hemoglobin Concentration : 32.2 gm/dL  Auto Neutrophil # : 3.07 K/uL  Auto Lymphocyte # : 3.32 K/uL  Auto Monocyte # : 0.63 K/uL  Auto Eosinophil # : 0.19 K/uL  Auto Basophil # : 0.04 K/uL  Auto Neutrophil % : 42.3 %  Auto Lymphocyte % : 45.7 %  Auto Monocyte % : 8.7 %  Auto Eosinophil % : 2.6 %  Auto Basophil % : 0.6 %    Serial CBC's  06-09 @ 06:29  Hct-37.9 / Hgb-12.2 / Plat-206 / RBC-4.21 / WBC-7.26          Serial CBC's  06-08 @ 08:01  Hct-36.5 / Hgb-12.0 / Plat-210 / RBC-4.08 / WBC-6.56          Serial CBC's  06-07 @ 16:02  Hct-38.3 / Hgb-12.6 / Plat-213 / RBC-4.23 / WBC-8.03            06-09    141  |  107  |  10  ----------------------------<  107<H>  4.2   |  27  |  0.75    Ca    9.2      09 Jun 2022 06:29  Mg     2.1     06-09    TPro  7.6  /  Alb  3.4  /  TBili  0.4  /  DBili  x   /  AST  26  /  ALT  31  /  AlkPhos  86  06-07      PT/INR - ( 07 Jun 2022 21:49 )   PT: 12.7 sec;   INR: 1.08 ratio         PTT - ( 09 Jun 2022 06:29 )  PTT:88.8 sec        Lupus Profile (06.09.22 @ 02:15)    DRVVT Inhibitor Screen: 31.2: The presence of direct thrombin inhibitors (such as argatroban,  refludan), or direct  Xa inhibitors (such as fondaparinux)  may cause  false positive results. sec    DRVVT S/C Ratio: LA NEG    Silica Clotting Time S/C Ratio: 0.87 Ratio    Silica Clotting Time Interpretation: LA NEG: NOTE: The presence of direct thrombin inhibitors (such as Argatroban,  Refludan), UF Heparin >0.5 U/ml or LMW Heparin >1.0 U/ml may affect  Results.        
Patient is a 67y old  Female who presents with a chief complaint of LLE DVT (09 Jun 2022 20:06)    Pt is S/P LLE venogram/mechanical thrombectomy                       POD# 1  No acute events overnight. AC with Heparin gtt restarted without access site complications.  Pt reports she has a headache and sore throat and did not sleep well. She denies any discomfort in LLE. She also endorses constipation.    Vital Signs Last 24 Hrs  T(C): 36.6 (10 Erickson 2022 05:07), Max: 36.9 (09 Jun 2022 12:37)  T(F): 97.8 (10 Erickson 2022 05:07), Max: 98.4 (09 Jun 2022 12:37)  HR: 87 (10 Erickson 2022 05:07) (54 - 87)  BP: 138/75 (10 Erickson 2022 05:07) (131/77 - 175/79)  BP(mean): --  RR: 18 (10 Erickson 2022 05:07) (10 - 18)  SpO2: 90% (10 Erickson 2022 05:07) (90% - 100%)  I&O's Detail    09 Jun 2022 07:01  -  10 Erickson 2022 07:00  --------------------------------------------------------  IN:    Heparin Infusion: 117 mL    Lactated Ringers: 150 mL    Oral Fluid: 100 mL  Total IN: 367 mL    OUT:    Voided (mL): 1100 mL  Total OUT: 1100 mL    Total NET: -733 mL    MEDICATIONS  (STANDING):  heparin  Infusion. 1200 Unit(s)/Hr (12 mL/Hr) IV Continuous <Continuous>  losartan 50 milliGRAM(s) Oral daily    MEDICATIONS  (PRN):  acetaminophen     Tablet .. 650 milliGRAM(s) Oral every 6 hours PRN Temp greater or equal to 38C (100.4F), Mild Pain (1 - 3)  aluminum hydroxide/magnesium hydroxide/simethicone Suspension 30 milliLiter(s) Oral every 4 hours PRN Dyspepsia  heparin   Injectable 8000 Unit(s) IV Push every 6 hours PRN For aPTT less than 40  heparin   Injectable 4000 Unit(s) IV Push every 6 hours PRN For aPTT between 40 - 57  melatonin 3 milliGRAM(s) Oral at bedtime PRN Insomnia  ondansetron Injectable 4 milliGRAM(s) IV Push every 6 hours PRN Nausea and/or Vomiting      Physical Exam:  General: NAD, resting comfortably in bed  Pulmonary: Nonlabored breathing, no respiratory distress, CTA   Cardiovascular: Normal S1, S2  Abdominal: soft, NT/ND  Extremities: LLE with ACE wrap intact. No breakthrough bleeding noted. LLE WWP. +DP pulse      LABS:                        12.4   9.50  )-----------( 207      ( 10 Erickson 2022 08:08 )             37.5     06-10    140  |  106  |  10  ----------------------------<  156<H>  3.9   |  26  |  0.76    Ca    9.1      10 Erickson 2022 08:08  Mg     1.7     06-10      PTT - ( 10 Erickson 2022 00:36 )  PTT:141.5 sec  CAPILLARY BLOOD GLUCOSE          Radiology and Additional Studies:    Assessment:   This is a 67 F with PMH of HTN who was sent after getting US as outpatient showing DVT in left femoral/poplitealo/tibial veins. Patient had surgery in her left ankle in march at [facility] and was on xarelto until May 9. Patient had been c/o left leg pain since the surgery and thought it was all related to the surgery.  US with L femoral and pop DVT. She denies SOB, CP, fevers, chills. (07 Jun 2022 22:34)   S/P LLE venogram/mechanical thrombectomy                       POD# 1    Plan:  Cont full AC. Can transition from heparin to oral AC as per heme this PM  OOB/Ambulate/PT later this afternoon  Elevate when OOB  Maintain gentle compression  Will DC post knee suture today  Dispo as per primary team   Follow up as outpt in 2 weeks with Dr Salgado  Wound care as follows upon discharge:  No heavy lifting, driving, sex, tub baths, swimming, or any activity that submerges the lower half of the body in water for 48 hours.  Ambulate as tolerated. May wash LLE with soap and water and leave open to air. If small amount of bleeding noted can place bandaid. Change the bandaid after 24 hours and every 24 hours after that.  Keep the puncture site covered with a bandaid until a scab forms.    Observe the site frequently.  If bleeding or a large lump (the size of a golf ball or bigger) occurs apply continuous direct pressure just above the puncture site for at least 10 minutes, and notify your physician immediately.  If the bleeding cannot be controlled, call 911 immediately for assistance.  Notify your physician of pain, swelling or any drainage.    Notify your physician immediately if coldness, numbness, discoloration or pain in your foot occurs.
[facility] -- Hilario Moscoso Grossman, Breonna, Zachery Anderson, Ga Corbett: Office # [phone]    Follow Up:  Pre-op clearance    Subjective/Observations: Patient seen and examined. Patient awake, alert, resting in bed. No complaints of chest pain, dyspnea, palpitations or dizziness. No signs of orthopnea or PND. Tolerating O2 via nasal cannula. Continues on heparin gtt.     REVIEW OF SYSTEMS: All other review of systems are negative unless indicated above    PAST MEDICAL & SURGICAL HISTORY:  Lumbago with sciatica      HTN (hypertension)      Obesity      Kidney stones      Vertigo      Lumbar stenosis      Carpal tunnel syndrome on both sides  sx done 2013      History of cataract surgery  both eyes 2017      History of laminectomy  3/2018      History of lithotripsy      History of ankle surgery      MEDICATIONS  (STANDING):  carvedilol 25 milliGRAM(s) Oral every 12 hours  heparin  Infusion.  Unit(s)/Hr (18 mL/Hr) IV Continuous <Continuous>  losartan 50 milliGRAM(s) Oral daily  sodium chloride 0.9%. 1000 milliLiter(s) (75 mL/Hr) IV Continuous <Continuous>    MEDICATIONS  (PRN):  acetaminophen     Tablet .. 650 milliGRAM(s) Oral every 6 hours PRN Temp greater or equal to 38C (100.4F), Mild Pain (1 - 3)  aluminum hydroxide/magnesium hydroxide/simethicone Suspension 30 milliLiter(s) Oral every 4 hours PRN Dyspepsia  heparin   Injectable 8500 Unit(s) IV Push every 6 hours PRN For aPTT less than 40  heparin   Injectable 4000 Unit(s) IV Push every 6 hours PRN For aPTT between 40 - 57  melatonin 3 milliGRAM(s) Oral at bedtime PRN Insomnia  ondansetron Injectable 4 milliGRAM(s) IV Push every 6 hours PRN Nausea and/or Vomiting    Allergies  No Known Allergies    Vital Signs Last 24 Hrs  T(C): 36.6 (09 Jun 2022 05:10), Max: 37 (08 Jun 2022 11:59)  T(F): 97.8 (09 Jun 2022 05:10), Max: 98.6 (08 Jun 2022 11:59)  HR: 75 (09 Jun 2022 05:10) (65 - 75)  BP: 159/82 (09 Jun 2022 05:10) (129/80 - 159/82)  BP(mean): --  RR: 18 (09 Jun 2022 05:10) (18 - 19)  SpO2: 93% (09 Jun 2022 05:10) (91% - 93%)  I&O's Summary    08 Jun 2022 07:01  -  09 Jun 2022 07:00  --------------------------------------------------------  IN: 1186 mL / OUT: 350 mL / NET: 836 mL      TELE: Not on telemetry   PHYSICAL EXAM:  Constitutional: NAD, awake and alert, Obese   HEENT: Moist Mucous Membranes, Anicteric  Pulmonary: Non-labored, breath sounds are clear bilaterally, No wheezing, rales or rhonchi  Cardiovascular: Regular, S1 and S2, No murmurs, No rubs, gallops or clicks  Gastrointestinal:  soft, nontender, nondistended   Lymph: No peripheral edema. No lymphadenopathy.   Skin: No visible rashes or ulcers.  Psych:  Mood & affect appropriate      LABS: All Labs Reviewed:                        12.2   7.26  )-----------( 206      ( 09 Jun 2022 06:29 )             37.9                         12.0   6.56  )-----------( 210      ( 08 Jun 2022 08:01 )             36.5                         12.6   8.03  )-----------( 213      ( 07 Jun 2022 16:02 )             38.3     09 Jun 2022 06:29    141    |  107    |  10     ----------------------------<  107    4.2     |  27     |  0.75   08 Jun 2022 08:01    142    |  106    |  10     ----------------------------<  103    4.1     |  28     |  0.72   07 Jun 2022 16:02    142    |  107    |  12     ----------------------------<  136    4.4     |  29     |  0.90     Ca    9.2        09 Jun 2022 06:29  Ca    9.4        08 Jun 2022 08:01  Ca    9.1        07 Jun 2022 16:02  Mg     2.1       09 Jun 2022 06:29  Mg     2.1       08 Jun 2022 08:01    TPro  7.6    /  Alb  3.4    /  TBili  0.4    /  DBili  x      /  AST  26     /  ALT  31     /  AlkPhos  86     07 Jun 2022 16:02   LIVER FUNCTIONS - ( 07 Jun 2022 16:02 )  Alb: 3.4 g/dL / Pro: 7.6 g/dL / ALK PHOS: 86 U/L / ALT: 31 U/L / AST: 26 U/L / GGT: x           PT/INR - ( 07 Jun 2022 21:49 )   PT: 12.7 sec;   INR: 1.08 ratio         PTT - ( 09 Jun 2022 06:29 )  PTT:88.8 secD-Dimer Assay, Quantitative: 1038 ng/mL DDU (06-08-22 @ 08:01)    12 Lead ECG:   Ventricular Rate 71 BPM    Atrial Rate 71 BPM    P-R Interval 164 ms    QRS Duration 88 ms    Q-T Interval 386 ms    QTC Calculation(Bazett) 419 ms    P Axis 39 degrees    R Axis 12 degrees    T Axis 24 degrees    Diagnosis Line Normal sinus rhythm  Nonspecific T wave abnormality  Abnormal ECG  No previous ECGs available  Confirmed by eDysi Koroma ([id number]) on 6/8/2022 1:59:26 PM (06-07-22 @ 16:47)    
Patient is a 67y old  Female who presents with a chief complaint of LLE DVT (08 Jun 2022 15:10)  No acute events overnight  Remains on heparin drip with therapeutic PTT  NPO confirmed; awaiting OR today for venogram and thrombectomy  Denies any sig pain in LLE. Denies CP or SOB    MEDICATIONS  (STANDING):  carvedilol 25 milliGRAM(s) Oral every 12 hours  heparin  Infusion.  Unit(s)/Hr (18 mL/Hr) IV Continuous <Continuous>  losartan 50 milliGRAM(s) Oral daily  sodium chloride 0.9%. 1000 milliLiter(s) (75 mL/Hr) IV Continuous <Continuous>    MEDICATIONS  (PRN):  acetaminophen     Tablet .. 650 milliGRAM(s) Oral every 6 hours PRN Temp greater or equal to 38C (100.4F), Mild Pain (1 - 3)  aluminum hydroxide/magnesium hydroxide/simethicone Suspension 30 milliLiter(s) Oral every 4 hours PRN Dyspepsia  heparin   Injectable 8500 Unit(s) IV Push every 6 hours PRN For aPTT less than 40  heparin   Injectable 4000 Unit(s) IV Push every 6 hours PRN For aPTT between 40 - 57  melatonin 3 milliGRAM(s) Oral at bedtime PRN Insomnia  ondansetron Injectable 4 milliGRAM(s) IV Push every 6 hours PRN Nausea and/or Vomiting      Allergies  No Known Allergies    Vital Signs Last 24 Hrs  T(C): 36.6 (09 Jun 2022 05:10), Max: 37 (08 Jun 2022 11:59)  T(F): 97.8 (09 Jun 2022 05:10), Max: 98.6 (08 Jun 2022 11:59)  HR: 75 (09 Jun 2022 05:10) (65 - 75)  BP: 159/82 (09 Jun 2022 05:10) (129/80 - 159/82)  BP(mean): --  RR: 18 (09 Jun 2022 05:10) (18 - 19)  SpO2: 93% (09 Jun 2022 05:10) (91% - 93%)  I&O's Detail    08 Jun 2022 07:01  -  09 Jun 2022 07:00  --------------------------------------------------------  IN:    Heparin Infusion: 321 mL    sodium chloride 0.9%: 240 mL    sodium chloride 0.9%: 625 mL  Total IN: 1186 mL    OUT:    Voided (mL): 350 mL  Total OUT: 350 mL    Total NET: 836 mL      Physical Exam:  General: NAD, resting comfortably in bed  Pulmonary: Nonlabored breathing, no respiratory distress, CTA  Cardiovascular: Normal S1, S2  Abdominal: soft, NT/ND  Extremities: 1+ LLE edema; LLE WWP  Pulses:   Right:                                                                          Left:  DP [x ]2+ [ ]1+ [ ]doppler                                                DP [ x]2+ [ ]1+ [ ]doppler  PT[x ]2+ [ ]1+ [ ]doppler                                                  PT [x ]2+ [ ]1+ [ ]doppler      LABS:                        12.2   7.26  )-----------( 206      ( 09 Jun 2022 06:29 )             37.9     06-09    141  |  107  |  10  ----------------------------<  107<H>  4.2   |  27  |  0.75    Ca    9.2      09 Jun 2022 06:29  Mg     2.1     06-09    TPro  7.6  /  Alb  3.4  /  TBili  0.4  /  DBili  x   /  AST  26  /  ALT  31  /  AlkPhos  86  06-07    PT/INR - ( 07 Jun 2022 21:49 )   PT: 12.7 sec;   INR: 1.08 ratio    PTT - ( 09 Jun 2022 06:29 )  PTT:88.8 sec    Radiology and Additional Studies:  < from: US Duplex Venous Lower Ext Ltd, Left (06.07.22 @ 18:33) >  ACC: [id number] EXAM:  US DPLX LWR EXT VEINS LTD LT                          PROCEDURE DATE:  06/07/2022          INTERPRETATION:  CLINICAL INFORMATION: Left leg pain.    COMPARISON: None available.    TECHNIQUE: Duplex sonography of the LEFT LOWER extremity veins with color   and spectral Doppler, with and without compression.    FINDINGS:    Partially occlusive acute deep venous thrombosis in the left common   femoral, femoral, and popliteal veins.  The contralateral common femoral vein is patent.    No calf vein thrombosis is detected.    Left popliteal cyst, measuring 5.9 x 1.9 x 1.1 cm.    IMPRESSION:  Acute deep venous thrombosis: above the knee.    < from: US Duplex Venous Lower Ext Ltd, Right (06.08.22 @ 18:17) >  ACC: [id number] EXAM:  US DPLX LWR EXT VEINS LTD RT                          PROCEDURE DATE:  06/08/2022          INTERPRETATION:  CLINICAL INFORMATION: Left lower extremity DVT    COMPARISON: 3/12/2021    TECHNIQUE: Duplex sonography of the RIGHT LOWER extremity veins with   color and spectral Doppler, with and without compression.    FINDINGS:    There is normal compressibility of the right common femoral, femoral and   popliteal veins.  The contralateral common femoral vein is patent.  Dopplerexamination shows normal spontaneous and phasic flow.    No calf vein thrombosis is detected.    Baker's cyst is appreciated measuring 2.0 x 1.7 x 1.3 cm.    IMPRESSION:  No evidence of right lower extremity deep venous thrombosis.    < end of copied text >        
Patient is a 67y old  Female who presents with a chief complaint of LLE DVT (09 Jun 2022 09:38)      INTERVAL HPI/OVERNIGHT EVENTS: Patient seen and examined. NAD. No complaints.    Vital Signs Last 24 Hrs  T(C): 36.6 (09 Jun 2022 11:15), Max: 37 (08 Jun 2022 20:29)  T(F): 97.9 (09 Jun 2022 11:15), Max: 98.6 (08 Jun 2022 20:29)  HR: 65 (09 Jun 2022 11:15) (65 - 75)  BP: 137/75 (09 Jun 2022 11:15) (137/75 - 159/82)  BP(mean): --  RR: 18 (09 Jun 2022 11:15) (18 - 19)  SpO2: 96% (09 Jun 2022 11:15) (91% - 96%)    06-09    141  |  107  |  10  ----------------------------<  107<H>  4.2   |  27  |  0.75    Ca    9.2      09 Jun 2022 06:29  Mg     2.1     06-09    TPro  7.6  /  Alb  3.4  /  TBili  0.4  /  DBili  x   /  AST  26  /  ALT  31  /  AlkPhos  86  06-07                          12.2   7.26  )-----------( 206      ( 09 Jun 2022 06:29 )             37.9     PT/INR - ( 07 Jun 2022 21:49 )   PT: 12.7 sec;   INR: 1.08 ratio         PTT - ( 09 Jun 2022 06:29 )  PTT:88.8 sec  CAPILLARY BLOOD GLUCOSE                  acetaminophen     Tablet .. 650 milliGRAM(s) Oral every 6 hours PRN  aluminum hydroxide/magnesium hydroxide/simethicone Suspension 30 milliLiter(s) Oral every 4 hours PRN  carvedilol 25 milliGRAM(s) Oral every 12 hours  heparin   Injectable 8500 Unit(s) IV Push every 6 hours PRN  heparin   Injectable 4000 Unit(s) IV Push every 6 hours PRN  heparin  Infusion.  Unit(s)/Hr IV Continuous <Continuous>  losartan 50 milliGRAM(s) Oral daily  melatonin 3 milliGRAM(s) Oral at bedtime PRN  ondansetron Injectable 4 milliGRAM(s) IV Push every 6 hours PRN  sodium chloride 0.9%. 1000 milliLiter(s) IV Continuous <Continuous>              REVIEW OF SYSTEMS:  CONSTITUTIONAL: No fever, no weight loss, or no fatigue  NECK: No pain, no stiffness  RESPIRATORY: No cough, no wheezing, no chills, no hemoptysis, No shortness of breath  CARDIOVASCULAR: No chest pain, no palpitations, no dizziness, no leg swelling  GASTROINTESTINAL: No abdominal pain. No nausea, no vomiting, no hematemesis; No diarrhea, no constipation. No melena, no hematochezia.  GENITOURINARY: No dysuria, no frequency, no hematuria, no incontinence  NEUROLOGICAL: No headaches, no loss of strength, no numbness, no tremors  SKIN: No itching, no burning  MUSCULOSKELETAL: No joint pain, no swelling; No muscle, no back, no extremity pain  PSYCHIATRIC: No depression, no mood swings,   HEME/LYMPH: No easy bruising, no bleeding gums  ALLERY AND IMMUNOLOGIC: No hives       Consultant(s) Notes Reviewed:  [X] YES  [ ] NO    PHYSICAL EXAM:  GENERAL: NAD  HEAD:  Atraumatic, Normocephalic  EYES: EOMI, PERRLA, conjunctiva and sclera clear  ENMT: No tonsillar erythema, exudates, or enlargement; Moist mucous membranes  NECK: Supple, No JVD  NERVOUS SYSTEM:  Awake & alert  CHEST/LUNG: Clear to auscultation bilaterally; No rales, rhonchi, wheezing,  HEART: Regular rate and rhythm  ABDOMEN: Soft, Nontender, Nondistended; Bowel sounds present  EXTREMITIES:  No clubbing, cyanosis, or edema  LYMPH: No lymphadenopathy noted  SKIN: No rashes      Advanced care planning discussed with patient/family [X] YES   [ ] NO    Advanced care planning discussed with patient/family. Patient's health status was discussed. All appropriate changes have been made regarding patient's end-of-life care. Advanced care planning forms reviewed/discussed/completed.  20 minutes spent.

## 2022-06-10 NOTE — PHYSICAL THERAPY INITIAL EVALUATION ADULT - ADDITIONAL COMMENTS
Pt resides in private home with son, no stairs to enter or inside +first floor setup. Prior to admit, pt reports ambulating independently with straight cane/RW  (-) driving and no longer works.

## 2022-06-10 NOTE — DISCHARGE NOTE NURSING/CASE MANAGEMENT/SOCIAL WORK - NSDCPEFALRISK_GEN_ALL_CORE
For information on Fall & Injury Prevention, visit: [email or web address] OR  [email or web address] OR  https://www.cdc.gov/steadi/patient.html

## 2022-06-11 LAB — B2 GLYCOPROT1 AB SER QL: NEGATIVE — SIGNIFICANT CHANGE UP

## 2022-06-14 LAB — CARDIOLIPIN AB SER-ACNC: NEGATIVE — SIGNIFICANT CHANGE UP

## 2022-06-15 ENCOUNTER — APPOINTMENT (OUTPATIENT)
Dept: VASCULAR SURGERY | Facility: CLINIC | Age: 67
End: 2022-06-15

## 2022-06-15 VITALS
SYSTOLIC BLOOD PRESSURE: 111 MMHG | BODY MASS INDEX: 39.65 KG/M2 | HEART RATE: 69 BPM | OXYGEN SATURATION: 97 % | DIASTOLIC BLOOD PRESSURE: 72 MMHG | HEIGHT: 61 IN | WEIGHT: 209.99 LBS

## 2022-06-15 DIAGNOSIS — I82.622 ACUTE EMBOLISM AND THROMBOSIS OF DEEP VEINS OF LEFT UPPER EXTREMITY: ICD-10-CM

## 2022-06-15 NOTE — DISCUSSION/SUMMARY
[FreeTextEntry1] : 68 y/o F S/P LLE venogram/thrombectomy and venoplasty presents for post op follow up\par She conts on Xarelto\par She has sig LLE edema and again compression stockings recommended. ACE wrap applied\par Ecouraged ambulation and elevation as tolerated\par Follow up in 2 weeks

## 2022-06-15 NOTE — PHYSICAL EXAM
[Normal Breath Sounds] : Normal breath sounds [Normal Heart Sounds] : normal heart sounds [1+] : left 1+ [Ankle Swelling (On Exam)] : present [Ankle Swelling On The Left] : moderate [JVD] : no jugular venous distention  [Carotid Bruits] : no carotid bruits [Varicose Veins Of Lower Extremities] : not present [] : not present [de-identified] : JESSICA, STACY F in NAD [de-identified] : PERRL [de-identified] : Pop access site dressing removed; no hematoma, mild ecchymosis [de-identified] : Intact

## 2022-06-15 NOTE — REASON FOR VISIT
[Family Member] : family member [de-identified] : S/P LLE venogram/thrombectomy and venoplasty [de-identified] : 66 y/o F with PMH HTN, DVT, Factor V Leiden mutation who presented to [facility] ED with c/o LLE edema and pain and outpt US which revealed L fem/pop/tibial DVT. She underwent thrombectomy and presents today for follow up. She reports she has continued to have LLE edema. She is not ambulating much sec to back pain and ankle discomfort. She also has LLE discomfort likely sec to edema. She is not using compression stockings. She is compliant with her Xarelto [de-identified] : 6/9/22

## 2022-06-16 ENCOUNTER — APPOINTMENT (OUTPATIENT)
Dept: HEMATOLOGY ONCOLOGY | Facility: CLINIC | Age: 67
End: 2022-06-16

## 2022-06-29 ENCOUNTER — APPOINTMENT (OUTPATIENT)
Dept: VASCULAR SURGERY | Facility: CLINIC | Age: 67
End: 2022-06-29

## 2022-06-29 ENCOUNTER — RESULT REVIEW (OUTPATIENT)
Age: 67
End: 2022-06-29

## 2022-06-29 ENCOUNTER — APPOINTMENT (OUTPATIENT)
Dept: HEMATOLOGY ONCOLOGY | Facility: CLINIC | Age: 67
End: 2022-06-29

## 2022-06-29 VITALS
DIASTOLIC BLOOD PRESSURE: 74 MMHG | HEIGHT: 61 IN | TEMPERATURE: 97 F | OXYGEN SATURATION: 97 % | HEART RATE: 67 BPM | WEIGHT: 223.33 LBS | SYSTOLIC BLOOD PRESSURE: 144 MMHG | BODY MASS INDEX: 42.16 KG/M2 | RESPIRATION RATE: 16 BRPM

## 2022-06-29 VITALS
OXYGEN SATURATION: 97 % | HEART RATE: 78 BPM | DIASTOLIC BLOOD PRESSURE: 84 MMHG | HEIGHT: 61 IN | WEIGHT: 209 LBS | BODY MASS INDEX: 39.46 KG/M2 | SYSTOLIC BLOOD PRESSURE: 151 MMHG

## 2022-06-29 DIAGNOSIS — K21.9 GASTRO-ESOPHAGEAL REFLUX DISEASE W/OUT ESOPHAGITIS: ICD-10-CM

## 2022-06-29 LAB
BASOPHILS # BLD AUTO: 0.03 K/UL — SIGNIFICANT CHANGE UP (ref 0–0.2)
BASOPHILS NFR BLD AUTO: 0.4 % — SIGNIFICANT CHANGE UP (ref 0–2)
EOSINOPHIL # BLD AUTO: 0.09 K/UL — SIGNIFICANT CHANGE UP (ref 0–0.5)
EOSINOPHIL NFR BLD AUTO: 1.1 % — SIGNIFICANT CHANGE UP (ref 0–6)
HCT VFR BLD CALC: 37.4 % — SIGNIFICANT CHANGE UP (ref 34.5–45)
HGB BLD-MCNC: 12.3 G/DL — SIGNIFICANT CHANGE UP (ref 11.5–15.5)
IMM GRANULOCYTES NFR BLD AUTO: 0.3 % — SIGNIFICANT CHANGE UP (ref 0–1.5)
LYMPHOCYTES # BLD AUTO: 2.24 K/UL — SIGNIFICANT CHANGE UP (ref 1–3.3)
LYMPHOCYTES # BLD AUTO: 28.4 % — SIGNIFICANT CHANGE UP (ref 13–44)
MCHC RBC-ENTMCNC: 29.6 PG — SIGNIFICANT CHANGE UP (ref 27–34)
MCHC RBC-ENTMCNC: 32.9 G/DL — SIGNIFICANT CHANGE UP (ref 32–36)
MCV RBC AUTO: 90.1 FL — SIGNIFICANT CHANGE UP (ref 80–100)
MONOCYTES # BLD AUTO: 0.65 K/UL — SIGNIFICANT CHANGE UP (ref 0–0.9)
MONOCYTES NFR BLD AUTO: 8.2 % — SIGNIFICANT CHANGE UP (ref 2–14)
NEUTROPHILS # BLD AUTO: 4.86 K/UL — SIGNIFICANT CHANGE UP (ref 1.8–7.4)
NEUTROPHILS NFR BLD AUTO: 61.6 % — SIGNIFICANT CHANGE UP (ref 43–77)
NRBC # BLD: 0 /100 WBCS — SIGNIFICANT CHANGE UP (ref 0–0)
PLATELET # BLD AUTO: 210 K/UL — SIGNIFICANT CHANGE UP (ref 150–400)
RBC # BLD: 4.15 M/UL — SIGNIFICANT CHANGE UP (ref 3.8–5.2)
RBC # FLD: 15 % — HIGH (ref 10.3–14.5)
RETICS #: 87.6 K/UL — SIGNIFICANT CHANGE UP (ref 25–125)
RETICS/RBC NFR: 2.1 % — SIGNIFICANT CHANGE UP (ref 0.5–2.5)
WBC # BLD: 7.89 K/UL — SIGNIFICANT CHANGE UP (ref 3.8–10.5)
WBC # FLD AUTO: 7.89 K/UL — SIGNIFICANT CHANGE UP (ref 3.8–10.5)

## 2022-06-29 PROCEDURE — 99214 OFFICE O/P EST MOD 30 MIN: CPT

## 2022-06-29 PROCEDURE — 99212 OFFICE O/P EST SF 10 MIN: CPT

## 2022-06-29 PROCEDURE — 93971 EXTREMITY STUDY: CPT

## 2022-06-29 RX ORDER — MELOXICAM 7.5 MG/1
7.5 TABLET ORAL DAILY
Qty: 30 | Refills: 1 | Status: ACTIVE | COMMUNITY
Start: 2022-06-29 | End: 1900-01-01

## 2022-06-29 RX ORDER — PANTOPRAZOLE 40 MG/1
40 TABLET, DELAYED RELEASE ORAL DAILY
Qty: 1 | Refills: 0 | Status: ACTIVE | COMMUNITY
Start: 1900-01-01 | End: 1900-01-01

## 2022-06-29 NOTE — ASSESSMENT
[FreeTextEntry1] : 66 y/o F S/P LLE venogram/thrombectomy and venoplasty presents for follow up\par Edema is someone improved and L iliac vein thrombus is no longer present\par She continues to have chronic DVT in femoral and popliteal vein\par

## 2022-06-29 NOTE — DATA REVIEWED
[FreeTextEntry1] : US performed today reviewed and discussed \par Lt EIV without DVT\par LCFV without DVT\par Chronic Lt FV, pop, PT DVT present

## 2022-06-29 NOTE — HISTORY OF PRESENT ILLNESS
[FreeTextEntry1] : 66 y/o F with PMH HTN, DVT, Factor V Leiden mutation who presented to [facility] ED with c/o LLE edema and pain and outpt US which revealed L fem/pop/tibial DVT. She underwent thrombectomy and presents today for follow up. She reports she has continued to have LLE edema. She is not ambulating much sec to back pain and ankle discomfort. She also has LLE discomfort likely sec to edema.\par \par 6/9/22 S/P LLE venogram/thrombectomy and venoplasty  [de-identified] : Since last seen she continues to have LLE edema and discomfort. She is not using compression stockings. She walks with a walker. She c/o dyspepsia in the evening after taking her Xarelto and states she was started on omeprazole with minimal relief.

## 2022-07-01 NOTE — PHYSICAL EXAM
[Ankle Swelling (On Exam)] : present [Ankle Swelling Bilaterally] : bilaterally  [Ankle Swelling On The Left] : moderate [Varicose Veins Of Lower Extremities] : present [Varicose Veins Of The Left Leg] : of the left leg [] : present [Ankle Swelling On The Right] : mild [de-identified] : JESSICA,STACY F in NAD [Normal] : normal appearance, no rash, nodules, vesicles, ulcers, erythema

## 2022-07-01 NOTE — HISTORY OF PRESENT ILLNESS
[de-identified] : This is a 67 year old woman with history of hypertension. Had spinal surgery complicated by Left lower extremity popliteal DVT. IVC filter placed initially given the contraindication to anticoagulation. On vascular evaluation for retrieval of IVC filter, subacute thrombosis was detected, and thrombosis seemed to have progressed proximally in the right femoral vein. Initially treated with treated with Lovenox but was more recently the transitioned to xarelto 20mg in November. Right femoral thrombosis. After several weeks of xarelto 20mg therapy, patient repeated a doppler U/S on 1/8/21 which demonstrated resolution of the Right leg DVT. Followed by a recurrent acute on chronic DVT extensively up to the left femoral and illiac vein.  \par \par 6/10/22 Hg 12.4g/dl in the hospital for the Left leg DVT.  \par \par clarified, patient had a lupus anticoagulant once in December 31st 2020, not lupus.   This was repeated OCtober 2021 and was negative.  \par \par \par Recocemdned meloxicam once a day for pain, take with plenty of water and pantoprazole.   [de-identified] : Patient just had a follow up with Dr. Shanks this morning with vascular.  Had a left LE venogram/thrombectomy due to a major recurrent of the DVT.  \par Edema improved and left iliac vein thrombus was not visualized.  \par Patient remains on the xarelto 20mg daily that was started at the hospital.  \par Still has chronic DVT in femoral and popliteal vein.  \par Was in the hospital after surgery, Xarelto 10mg  was discontinued for the surgery, was not restarted.  Was in the hospital for a left leg fem/polp/tibial recurrent  DVT on ultrasound.

## 2022-07-06 LAB
ALBUMIN SERPL ELPH-MCNC: 4.4 G/DL
ALP BLD-CCNC: 90 U/L
ALT SERPL-CCNC: 41 U/L
ANION GAP SERPL CALC-SCNC: 11 MMOL/L
AST SERPL-CCNC: 34 U/L
BILIRUB SERPL-MCNC: 0.5 MG/DL
BUN SERPL-MCNC: 16 MG/DL
CALCIUM SERPL-MCNC: 9.7 MG/DL
CHLORIDE SERPL-SCNC: 103 MMOL/L
CO2 SERPL-SCNC: 24 MMOL/L
CREAT SERPL-MCNC: 0.79 MG/DL
EGFR: 81 ML/MIN/1.73M2
FERRITIN SERPL-MCNC: 64 NG/ML
FOLATE SERPL-MCNC: 19.2 NG/ML
GLUCOSE SERPL-MCNC: 97 MG/DL
IRON SATN MFR SERPL: 17 %
IRON SERPL-MCNC: 57 UG/DL
LDH SERPL-CCNC: 185 U/L
POTASSIUM SERPL-SCNC: 4.5 MMOL/L
PROT SERPL-MCNC: 7.5 G/DL
SODIUM SERPL-SCNC: 138 MMOL/L
TIBC SERPL-MCNC: 345 UG/DL
UIBC SERPL-MCNC: 288 UG/DL
VIT B12 SERPL-MCNC: 1690 PG/ML

## 2022-07-27 ENCOUNTER — RESULT REVIEW (OUTPATIENT)
Age: 67
End: 2022-07-27

## 2022-07-27 ENCOUNTER — APPOINTMENT (OUTPATIENT)
Dept: HEMATOLOGY ONCOLOGY | Facility: CLINIC | Age: 67
End: 2022-07-27

## 2022-07-27 VITALS
HEIGHT: 60.98 IN | HEART RATE: 67 BPM | TEMPERATURE: 97 F | OXYGEN SATURATION: 99 % | BODY MASS INDEX: 42.29 KG/M2 | RESPIRATION RATE: 18 BRPM | DIASTOLIC BLOOD PRESSURE: 85 MMHG | WEIGHT: 223.99 LBS | SYSTOLIC BLOOD PRESSURE: 169 MMHG

## 2022-07-27 DIAGNOSIS — I82.402 ACUTE EMBOLISM AND THROMBOSIS OF UNSPECIFIED DEEP VEINS OF LEFT LOWER EXTREMITY: ICD-10-CM

## 2022-07-27 DIAGNOSIS — F51.01 PRIMARY INSOMNIA: ICD-10-CM

## 2022-07-27 LAB
BASOPHILS # BLD AUTO: 0.03 K/UL — SIGNIFICANT CHANGE UP (ref 0–0.2)
BASOPHILS NFR BLD AUTO: 0.5 % — SIGNIFICANT CHANGE UP (ref 0–2)
EOSINOPHIL # BLD AUTO: 0.13 K/UL — SIGNIFICANT CHANGE UP (ref 0–0.5)
EOSINOPHIL NFR BLD AUTO: 2.2 % — SIGNIFICANT CHANGE UP (ref 0–6)
HCT VFR BLD CALC: 35.6 % — SIGNIFICANT CHANGE UP (ref 34.5–45)
HGB BLD-MCNC: 11.9 G/DL — SIGNIFICANT CHANGE UP (ref 11.5–15.5)
IMM GRANULOCYTES NFR BLD AUTO: 0.3 % — SIGNIFICANT CHANGE UP (ref 0–1.5)
LYMPHOCYTES # BLD AUTO: 1.89 K/UL — SIGNIFICANT CHANGE UP (ref 1–3.3)
LYMPHOCYTES # BLD AUTO: 31.7 % — SIGNIFICANT CHANGE UP (ref 13–44)
MCHC RBC-ENTMCNC: 29.8 PG — SIGNIFICANT CHANGE UP (ref 27–34)
MCHC RBC-ENTMCNC: 33.4 G/DL — SIGNIFICANT CHANGE UP (ref 32–36)
MCV RBC AUTO: 89 FL — SIGNIFICANT CHANGE UP (ref 80–100)
MONOCYTES # BLD AUTO: 0.55 K/UL — SIGNIFICANT CHANGE UP (ref 0–0.9)
MONOCYTES NFR BLD AUTO: 9.2 % — SIGNIFICANT CHANGE UP (ref 2–14)
NEUTROPHILS # BLD AUTO: 3.35 K/UL — SIGNIFICANT CHANGE UP (ref 1.8–7.4)
NEUTROPHILS NFR BLD AUTO: 56.1 % — SIGNIFICANT CHANGE UP (ref 43–77)
NRBC # BLD: 0 /100 WBCS — SIGNIFICANT CHANGE UP (ref 0–0)
PLATELET # BLD AUTO: 174 K/UL — SIGNIFICANT CHANGE UP (ref 150–400)
RBC # BLD: 4 M/UL — SIGNIFICANT CHANGE UP (ref 3.8–5.2)
RBC # FLD: 14.7 % — HIGH (ref 10.3–14.5)
WBC # BLD: 5.97 K/UL — SIGNIFICANT CHANGE UP (ref 3.8–10.5)
WBC # FLD AUTO: 5.97 K/UL — SIGNIFICANT CHANGE UP (ref 3.8–10.5)

## 2022-07-27 PROCEDURE — 99214 OFFICE O/P EST MOD 30 MIN: CPT

## 2022-07-27 NOTE — HISTORY OF PRESENT ILLNESS
[de-identified] : 67F hx hypertension. Had spinal surgery complicated by Left lower extremity popliteal DVT. IVC filter placed initially given the contraindication to anticoagulation. On vascular evaluation for retrieval of IVC filter, subacute thrombosis was detected, and thrombosis seemed to have progressed proximally in the right femoral vein. Initially treated with treated with Lovenox but was more recently the transitioned to Xarelto 20mg in November. Right femoral thrombosis. After several weeks of Xarelto 20mg therapy, patient repeated a doppler U/S on 1/8/21 which demonstrated resolution of the Right leg DVT. Followed by a recurrent acute on chronic DVT extensively up to the left femoral and illiac vein.  \par \par 6/10/22 Hg 12.4g/dl in the hospital for the Left leg DVT.  \par \par Clarified, patient had a lupus anticoagulant once in December 31st 2020, not lupus.   This was repeated October 2021 and was negative.  \par \par Patient just had a follow up with Dr. Shanks this morning with vascular.  Had a left LE venogram/thrombectomy due to a major recurrent of the DVT.  \par Edema improved and left iliac vein thrombus was not visualized.  \par Patient remains on the xarelto 20mg daily that was started at the hospital.  \par Still has chronic DVT in femoral and popliteal vein.  \par Was in the hospital after surgery, Xarelto 10mg  was discontinued for the surgery, was not restarted.  Was in the hospital for a left leg fem/polp/tibial recurrent  DVT on ultrasound.  \par  [de-identified] : Reporting issues with fatigue and difficulty sleeping. She just does not feel sleepy enough at night. Usually goes to bed around 9PM, sleeps for about 2-3 hrs, then cannot go back to sleep. PMD had been given Klonopin, which she usually takes at around 6PM. Also reporting pain in both knees but no trauma or limitations of range of motion.

## 2022-07-27 NOTE — REVIEW OF SYSTEMS
[Negative] : Neurological [Easy Bleeding] : no tendency for easy bleeding [Easy Bruising] : no tendency for easy bruising

## 2022-07-27 NOTE — REASON FOR VISIT
[Follow-Up Visit] : a follow-up visit for [Time Spent: ____ minutes] : Total time spent using  services: [unfilled] minutes. The patient's primary language is not English thus required  services. [Interpreters_IDNumber] : [id number] [Interpreters_FullName] : Skyler [TWNoteComboBox1] : Monegasque

## 2022-07-27 NOTE — PHYSICAL EXAM
[Normal] : no peripheral adenopathy appreciated [Obese] : obese [de-identified] : Full ROM but pain to palpation of LLE [de-identified] : Chronic venous stasis changes b/l LE

## 2022-07-27 NOTE — ASSESSMENT
[FreeTextEntry1] : 67F hx provoked DVT after spinal surgery, HTN, insomnia following up for the recurrent provoked DVT.\par \par 1. Recurrent Provoked DVT\par Initially treated with 3 months of Lovenox after initial IVC filter placement given initial contraindication to anticoagulation due to the recent spine surgery.  On vascular evaluation for retrieval of IVC filter, patient noted to have proximal progression of the thrombosis and subacute thrombosis despite the Lovenox treatment.  IVC filter retrieval postponed. Transitioned to Xarelto 20mg daily.  Patient completed therapy and had discontinued the medication followed by the removal of the IVC filter in 2021.  Repeat Doppler negative in May 2021. Patient was downgraded to prophylactic dose Xarelto 10mg daily dose leading up to a vascular surgery. Was held for surgery but not restarted afterwards, patient now  noted to have a recurrent DVT in the left iliac to popliteal veins.  \par \par - Given the recurrent DVT will need AC indefinitely\par - Should not be stopping this at any point unless there is significant need for surgery or a severe bleeding noted\par - Rx repeat LE Doppler to be done around November 2022 prior to next appointment\par - Given recent procedure, will require at least 5 more months of Xarelto, at which point could decrease to prophylactic dosing 10mg qd\par \par 2. Insomnia\par Difficulty staying asleep, which pt attributed to anxiety. Occasionally takes clonazepam, only as needed, but this is likely disturbing her sleep quality.\par \par - Recommended to use clonazepam sparingly as it can disturb sleep quality\par - Rx melatonin and counseled on sleep hygiene\par \par RTO December 2022 to consider Xarelto dose change, will need LE Doppler prior to appointment\par \par Wilson Mccarty M.D.\par Hematology/Oncology Fellow PGY-4

## 2022-08-15 ENCOUNTER — NON-APPOINTMENT (OUTPATIENT)
Age: 67
End: 2022-08-15

## 2022-08-19 ENCOUNTER — APPOINTMENT (OUTPATIENT)
Dept: ULTRASOUND IMAGING | Facility: CLINIC | Age: 67
End: 2022-08-19

## 2022-08-19 PROCEDURE — 93971 EXTREMITY STUDY: CPT | Mod: LT

## 2022-08-24 ENCOUNTER — APPOINTMENT (OUTPATIENT)
Dept: VASCULAR SURGERY | Facility: CLINIC | Age: 67
End: 2022-08-24

## 2022-08-24 VITALS
HEIGHT: 60 IN | WEIGHT: 223 LBS | HEART RATE: 70 BPM | DIASTOLIC BLOOD PRESSURE: 85 MMHG | OXYGEN SATURATION: 98 % | SYSTOLIC BLOOD PRESSURE: 146 MMHG | BODY MASS INDEX: 43.78 KG/M2

## 2022-08-24 PROCEDURE — 99212 OFFICE O/P EST SF 10 MIN: CPT

## 2022-08-24 NOTE — DATA REVIEWED
[FreeTextEntry1] : US performed 6/29/22\par Lt EIV without DVT\par LCFV without DVT\par Chronic Lt FV, pop, PT DVT present\par \par Venous duplex 8/19/22 persistent thrombosis of LCF, FV and pop improved as compared to prior exam\par

## 2022-08-24 NOTE — REASON FOR VISIT
[Follow-Up: _____] : a [unfilled] follow-up visit [FreeTextEntry1] : Since last seen in office, pt has continued to have left lower leg pain and swelling. She was seen by orthopedics and received an injection in her knee which she reports did not help much. She also has ankle pain in the area of her prior orthopedic surgery. She is not using compression stockings but does keep leg elevated

## 2022-08-24 NOTE — ASSESSMENT
[FreeTextEntry1] : 68 y/o F S/P LLE venogram/thrombectomy and venoplasty presents for follow up\par Edema is someone improved and L iliac vein thrombus is no longer present\par She continues to have chronic DVT in femoral and popliteal vein\par

## 2022-08-24 NOTE — HISTORY OF PRESENT ILLNESS
[FreeTextEntry1] : 66 y/o F with PMH HTN, DVT, Factor V Leiden mutation who presented to [facility] ED with c/o LLE edema and pain and outpt US which revealed L fem/pop/tibial DVT. She underwent thrombectomy and presents today for follow up. She reports she has continued to have LLE edema. She is not ambulating much sec to back pain and ankle discomfort. She also has LLE discomfort likely sec to edema.\par \par 6/9/22 S/P LLE venogram/thrombectomy and venoplasty

## 2022-08-24 NOTE — PHYSICAL EXAM
[Ankle Swelling (On Exam)] : present [Ankle Swelling Bilaterally] : bilaterally  [Ankle Swelling On The Left] : moderate [Varicose Veins Of Lower Extremities] : present [Varicose Veins Of The Left Leg] : of the left leg [] : present [Ankle Swelling On The Right] : mild [de-identified] : JESSICA,STACY F in NAD

## 2022-10-01 NOTE — H&P PST ADULT - RS GEN PE MLT RESP DETAILS PC
[facility] Patient Status:  Inpatient   Age/Gender 44year old male MRN [id number]   Location [address] [address] Attending Yuridia Werner MD   [address] PCP Sivakumar Weaver MD       Anesthesia Post-op Note    ESOPHAGOGASTRODUODENOSCOPY (EGD) with biopsies    Procedure Summary     Date: 10/01/22 Room / Location: [facility] ENDOSCOPY 02 / [facility] ENDOSCOPY    Anesthesia Start: 3306 Anesthesia Stop: 9301    Procedure: ESOPHAGOGASTRODUODENOSCOPY (EGD) with biopsies (N/A ) Diagnosis: (gastritis, duodenal ulcers, pharyngeal ulcers)    Surgeons: Christ Wiseman MD Anesthesiologist: Rand Herbert MD    Anesthesia Type: MAC ASA Status: 2          Anesthesia Type: MAC    Vitals Value Taken Time   BP 95/60 10/01/22 1140   Temp  10/01/22 1143   Pulse 81 10/01/22 1142   Resp 18 10/01/22 1140   SpO2 99 % 10/01/22 1142   Vitals shown include unvalidated device data. Patient Location: Endoscopy    Anesthesia Type: MAC    Airway Patency: patent    Postop Pain Control: adequate    Mental Status: mildly sedated but able to meaningfully participate in the post-anesthesia evaluation    Nausea/Vomiting: none    Cardiopulmonary/Hydration status: stable euvolemic    Complications: no apparent anesthesia related complications    Postop vital signs: stable    Dental Exam: Unchanged from Preop    Patient to be discharged from PACU when criteria met. respirations non-labored/no wheezes/no rhonchi/clear to auscultation bilaterally/no rales/breath sounds equal

## 2022-11-16 ENCOUNTER — APPOINTMENT (OUTPATIENT)
Dept: VASCULAR SURGERY | Facility: CLINIC | Age: 67
End: 2022-11-16

## 2022-11-16 VITALS
OXYGEN SATURATION: 96 % | RESPIRATION RATE: 18 BRPM | DIASTOLIC BLOOD PRESSURE: 84 MMHG | WEIGHT: 214 LBS | HEIGHT: 60 IN | HEART RATE: 64 BPM | TEMPERATURE: 97.2 F | SYSTOLIC BLOOD PRESSURE: 142 MMHG | BODY MASS INDEX: 42.01 KG/M2

## 2022-11-16 PROCEDURE — 99213 OFFICE O/P EST LOW 20 MIN: CPT

## 2022-11-16 PROCEDURE — 93971 EXTREMITY STUDY: CPT

## 2022-11-16 NOTE — PHYSICAL EXAM
[Ankle Swelling (On Exam)] : present [Ankle Swelling Bilaterally] : bilaterally  [Ankle Swelling On The Left] : moderate [Varicose Veins Of Lower Extremities] : present [Varicose Veins Of The Left Leg] : of the left leg [] : present [Ankle Swelling On The Right] : mild [de-identified] : JESSICA,STACY F in NAD

## 2022-11-16 NOTE — HISTORY OF PRESENT ILLNESS
[FreeTextEntry1] : 68 y/o F with PMH HTN, DVT, Factor V Leiden mutation who presented to [facility] ED with c/o LLE edema and pain and outpt US which revealed L fem/pop/tibial DVT. She underwent thrombectomy and presents today for follow up. She reports she has continued to have LLE edema. She is not ambulating much sec to back pain and ankle discomfort. She also has LLE discomfort likely sec to edema.\par \par 6/9/22 S/P LLE venogram/thrombectomy and venoplasty  [de-identified] : Today for US. She is on Xarelto and  still has episodes of severe L calf swelling. It doesn't happen all the time. Elevation works. She is consistent wearing stockings.

## 2022-11-16 NOTE — DATA REVIEWED
[FreeTextEntry1] : LEV 11/2022: L chronic non occ CFV, occl FV, pop \par US performed 6/29/22\par Lt EIV without DVT\par LCFV without DVT\par Chronic Lt FV, pop, PT DVT present\par \par Venous duplex 8/19/22 persistent thrombosis of LCF, FV and pop improved as compared to prior exam\par

## 2022-12-19 ENCOUNTER — OUTPATIENT (OUTPATIENT)
Dept: OUTPATIENT SERVICES | Facility: HOSPITAL | Age: 67
LOS: 1 days | Discharge: HOME | End: 2022-12-19

## 2022-12-19 DIAGNOSIS — Z98.49 CATARACT EXTRACTION STATUS, UNSPECIFIED EYE: Chronic | ICD-10-CM

## 2022-12-19 DIAGNOSIS — Z98.890 OTHER SPECIFIED POSTPROCEDURAL STATES: Chronic | ICD-10-CM

## 2022-12-19 DIAGNOSIS — G56.03 CARPAL TUNNEL SYNDROME, BILATERAL UPPER LIMBS: Chronic | ICD-10-CM

## 2022-12-19 DIAGNOSIS — I82.409 ACUTE EMBOLISM AND THROMBOSIS OF UNSPECIFIED DEEP VEINS OF UNSPECIFIED LOWER EXTREMITY: ICD-10-CM

## 2022-12-27 ENCOUNTER — RESULT REVIEW (OUTPATIENT)
Age: 67
End: 2022-12-27

## 2022-12-27 ENCOUNTER — APPOINTMENT (OUTPATIENT)
Dept: HEMATOLOGY ONCOLOGY | Facility: CLINIC | Age: 67
End: 2022-12-27

## 2022-12-27 VITALS
OXYGEN SATURATION: 97 % | BODY MASS INDEX: 43.24 KG/M2 | RESPIRATION RATE: 18 BRPM | HEIGHT: 60 IN | DIASTOLIC BLOOD PRESSURE: 75 MMHG | WEIGHT: 220.24 LBS | HEART RATE: 64 BPM | SYSTOLIC BLOOD PRESSURE: 122 MMHG | TEMPERATURE: 97 F

## 2022-12-27 LAB
BASOPHILS # BLD AUTO: 0.05 K/UL — SIGNIFICANT CHANGE UP (ref 0–0.2)
BASOPHILS NFR BLD AUTO: 0.6 % — SIGNIFICANT CHANGE UP (ref 0–2)
EOSINOPHIL # BLD AUTO: 0.11 K/UL — SIGNIFICANT CHANGE UP (ref 0–0.5)
EOSINOPHIL NFR BLD AUTO: 1.4 % — SIGNIFICANT CHANGE UP (ref 0–6)
HCT VFR BLD CALC: 36.3 % — SIGNIFICANT CHANGE UP (ref 34.5–45)
HGB BLD-MCNC: 12 G/DL — SIGNIFICANT CHANGE UP (ref 11.5–15.5)
IMM GRANULOCYTES NFR BLD AUTO: 0.5 % — SIGNIFICANT CHANGE UP (ref 0–0.9)
LYMPHOCYTES # BLD AUTO: 3.72 K/UL — HIGH (ref 1–3.3)
LYMPHOCYTES # BLD AUTO: 46.6 % — HIGH (ref 13–44)
MCHC RBC-ENTMCNC: 28.6 PG — SIGNIFICANT CHANGE UP (ref 27–34)
MCHC RBC-ENTMCNC: 33.1 G/DL — SIGNIFICANT CHANGE UP (ref 32–36)
MCV RBC AUTO: 86.4 FL — SIGNIFICANT CHANGE UP (ref 80–100)
MONOCYTES # BLD AUTO: 0.74 K/UL — SIGNIFICANT CHANGE UP (ref 0–0.9)
MONOCYTES NFR BLD AUTO: 9.3 % — SIGNIFICANT CHANGE UP (ref 2–14)
NEUTROPHILS # BLD AUTO: 3.32 K/UL — SIGNIFICANT CHANGE UP (ref 1.8–7.4)
NEUTROPHILS NFR BLD AUTO: 41.6 % — LOW (ref 43–77)
NRBC # BLD: 0 /100 WBCS — SIGNIFICANT CHANGE UP (ref 0–0)
PLATELET # BLD AUTO: 208 K/UL — SIGNIFICANT CHANGE UP (ref 150–400)
RBC # BLD: 4.2 M/UL — SIGNIFICANT CHANGE UP (ref 3.8–5.2)
RBC # FLD: 14.2 % — SIGNIFICANT CHANGE UP (ref 10.3–14.5)
WBC # BLD: 7.98 K/UL — SIGNIFICANT CHANGE UP (ref 3.8–10.5)
WBC # FLD AUTO: 7.98 K/UL — SIGNIFICANT CHANGE UP (ref 3.8–10.5)

## 2022-12-27 PROCEDURE — 99214 OFFICE O/P EST MOD 30 MIN: CPT

## 2022-12-28 NOTE — ASSESSMENT
[FreeTextEntry1] : This is a 67 year old woman presenting with an acute provoked DVT following spinal surgery. Patient was treated with 3 months of lovenox after initial IVC filter placement given initial contraindication to anticoagulation due to the recent spine surgery.  On vascular evaluation for retrieval of IVC filter, patient noted to have proximal progression of the thrombosis and subacute thrombosis despite the lovenox treatment.  IVC filter retrieval postponed.  Patient transitioned to xarelto 20mg daily.  Patient completed therapy and had discontinued the medication followed by the removal of the IVC filter in 2021.  Repeat Doppler negative in May 2021. Patient was downgraded to the Xarelto 10mg daily dose leading up to a vascular surgery. Was held for surgery but not restarted afterwards, Recurrent DVT was noted. \par \par Shakira was sthen started on another 6 month course of xarelto 20mg daily.  After 6 months it is reasonable to decrease to 10mg daily for prophylaxis again. Informed patient that this time we should not discontinue it. Would only limit it to 2 days off ahead of surgical procedures and discontinue only iff severely bleeding.  \par \par THe LE edema that appears on and off now is likely from chronic changes from the original DVT and does not represent a new one.  Repeat LE Doppler to document this.

## 2022-12-28 NOTE — HISTORY OF PRESENT ILLNESS
[de-identified] : This is a 67 year old woman with history of hypertension. Had spinal surgery complicated by Left lower extremity popliteal DVT. IVC filter placed initially given the contraindication to anticoagulation. On vascular evaluation for retrieval of IVC filter, subacute thrombosis was detected, and thrombosis seemed to have progressed proximally in the right femoral vein. Initially treated with treated with Lovenox but was more recently the transitioned to xarelto 20mg in November. Right femoral thrombosis. After several weeks of xarelto 20mg therapy, patient repeated a doppler U/S on 1/8/21 which demonstrated resolution of the Right leg DVT. Followed by a recurrent acute on chronic DVT extensively up to the left femoral and illiac vein.  \par \par 6/10/22 Hg 12.4g/dl in the hospital for the Left leg DVT.  \par \par clarified, patient had a lupus anticoagulant once in December 31st 2020, not lupus.   This was repeated October 2021 and was negative.  \par  [de-identified] : Patient presents for 6 month follow up after being  restarted on the Xarelto 20mg daily.  Patient was restarted on this due to a recurrence in the DVT.   After 6 months following the recurrent DVT,  Legs have been swelling on and off and remains uncomfortable.

## 2022-12-28 NOTE — PHYSICAL EXAM
[Normal] : full range of motion and no deformities appreciated [de-identified] : mild stasis changes

## 2023-02-08 ENCOUNTER — APPOINTMENT (OUTPATIENT)
Dept: ULTRASOUND IMAGING | Facility: CLINIC | Age: 68
End: 2023-02-08
Payer: MEDICARE

## 2023-02-08 PROCEDURE — 93970 EXTREMITY STUDY: CPT

## 2023-02-13 NOTE — PRE-ANESTHESIA EVALUATION ADULT - NSANTHNECKRD_ENT_A_CORE
no lesions, no deformities, no traumatic injuries, no significant scars are present, chest wall non-tender, no masses present, breathing is unlabored without accessory muscle use,normal breath sounds No

## 2023-02-15 NOTE — REASON FOR VISIT
[de-identified] : S/P  L2-3, L3-4, L4-5, and L5-S1 bilateral facetectomy.\par  L3-4, L4-5, L5-S1 radical discectomy and insertion of intervertebral expandable cage for purpose of arthrodesis.\par  T10 to pelvis posterior segmental instrumentation and posterolateral arthrodesis.\par  Insertion of bilateral sacroiliac joint instrumentation for purposes of arthrodesis.  \par  for the treatment of Kyphoscoliosis.\par  Severe spinal stenosis and foraminal stenosis.\par  Severe degenerative disc disease at the thoracolumbar spine. [de-identified] : 9/18/20 [de-identified] : Ambulates with walker\par Doing well with improved pain syndrome\par  [Family Member] : family member [Patient Declined  Services] : - None: Patient declined  services Abnormal WBC: < 4,000 OR > 12,000

## 2023-06-02 NOTE — PHYSICAL THERAPY INITIAL EVALUATION ADULT - PHYSICAL ASSIST/NONPHYSICAL ASSIST, REHAB EVAL
Patient eloped. MD made aware. IV taken out before patient left the unit.       Dorene Christine RN  06/02/23 4616 nonverbal cues (demo/gestures)/verbal cues/1 person assist

## 2023-06-04 ENCOUNTER — OUTPATIENT (OUTPATIENT)
Dept: OUTPATIENT SERVICES | Facility: HOSPITAL | Age: 68
LOS: 1 days | Discharge: HOME | End: 2023-06-04

## 2023-06-04 DIAGNOSIS — Z98.890 OTHER SPECIFIED POSTPROCEDURAL STATES: Chronic | ICD-10-CM

## 2023-06-04 DIAGNOSIS — Z98.49 CATARACT EXTRACTION STATUS, UNSPECIFIED EYE: Chronic | ICD-10-CM

## 2023-06-04 DIAGNOSIS — G56.03 CARPAL TUNNEL SYNDROME, BILATERAL UPPER LIMBS: Chronic | ICD-10-CM

## 2023-06-04 DIAGNOSIS — I82.409 ACUTE EMBOLISM AND THROMBOSIS OF UNSPECIFIED DEEP VEINS OF UNSPECIFIED LOWER EXTREMITY: ICD-10-CM

## 2023-06-07 ENCOUNTER — APPOINTMENT (OUTPATIENT)
Dept: VASCULAR SURGERY | Facility: CLINIC | Age: 68
End: 2023-06-07
Payer: MEDICARE

## 2023-06-07 VITALS
SYSTOLIC BLOOD PRESSURE: 104 MMHG | DIASTOLIC BLOOD PRESSURE: 63 MMHG | BODY MASS INDEX: 43.19 KG/M2 | HEART RATE: 55 BPM | OXYGEN SATURATION: 96 % | WEIGHT: 220 LBS | HEIGHT: 60 IN

## 2023-06-07 DIAGNOSIS — I82.409 ACUTE EMBOLISM AND THROMBOSIS OF UNSPECIFIED DEEP VEINS OF UNSPECIFIED LOWER EXTREMITY: ICD-10-CM

## 2023-06-07 PROCEDURE — 99213 OFFICE O/P EST LOW 20 MIN: CPT

## 2023-06-07 PROCEDURE — 93971 EXTREMITY STUDY: CPT

## 2023-06-07 NOTE — ASSESSMENT
[FreeTextEntry1] : 67 y/o F S/P LLE venogram/thrombectomy and venoplasty presents for follow up\par Edema is someone improved and L iliac vein thrombus is no longer present\par She continues to have chronic DVT in femoral and popliteal vein. Now FV is patent\par

## 2023-06-07 NOTE — DATA REVIEWED
[FreeTextEntry1] : LEV 6/2023: patent EIV, Patent CFV, chronic FV DVT, pop minimal recan. \par LEV 11/2022: L chronic non occ CFV, occl FV, pop \par US performed 6/29/22\par Lt EIV without DVT\par LCFV without DVT\par Chronic Lt FV, pop, PT DVT present\par \par Venous duplex 8/19/22 persistent thrombosis of LCF, FV and pop improved as compared to prior exam\par

## 2023-06-07 NOTE — HISTORY OF PRESENT ILLNESS
[FreeTextEntry1] : 68 y/o F with PMH HTN, DVT, Factor V Leiden mutation who presented to [facility] ED with c/o LLE edema and pain and outpt US which revealed L fem/pop/tibial DVT. She underwent thrombectomy and presents today for follow up. She reports she has continued to have LLE edema. She is not ambulating much sec to back pain and ankle discomfort. She also has LLE discomfort likely sec to edema.\par \par 6/9/22 S/P LLE venogram/thrombectomy and venoplasty  [de-identified] : Michelle is here with similar LLE problems. Swelling at the end of the day. But most of her pain comes from her lower back and R leg. She wears compression stockings. On Xarelto 10 mg daily now.

## 2023-06-07 NOTE — PHYSICAL EXAM
[Ankle Swelling (On Exam)] : present [Ankle Swelling Bilaterally] : bilaterally  [Varicose Veins Of Lower Extremities] : present [Varicose Veins Of The Left Leg] : of the left leg [] : present [Ankle Swelling On The Right] : mild [de-identified] : JESSICA,STACY F in NAD

## 2023-06-12 ENCOUNTER — APPOINTMENT (OUTPATIENT)
Dept: HEMATOLOGY ONCOLOGY | Facility: CLINIC | Age: 68
End: 2023-06-12
Payer: MEDICARE

## 2023-06-12 VITALS
HEART RATE: 54 BPM | TEMPERATURE: 97.4 F | BODY MASS INDEX: 42.54 KG/M2 | RESPIRATION RATE: 18 BRPM | DIASTOLIC BLOOD PRESSURE: 83 MMHG | OXYGEN SATURATION: 98 % | HEIGHT: 60 IN | WEIGHT: 216.69 LBS | SYSTOLIC BLOOD PRESSURE: 134 MMHG

## 2023-06-12 PROCEDURE — 99214 OFFICE O/P EST MOD 30 MIN: CPT

## 2023-06-12 NOTE — HISTORY OF PRESENT ILLNESS
[de-identified] : Patient presents for 6 month follow up after being  restarted on the Xarelto 20mg daily.  Patient was restarted on this due to a recurrence in the DVT.   After 6 months following the recurrent DVT,  Legs have been swelling on and off and remains uncomfortable.  \par \par Patient presents for follow up of her chronic DVT.  Pain on the right side but numbness in both.  \par Pain had started after the surgery, but is getting worse since the spine surgery 2021.  \par \par Doppler 6/7/23 Left leg us demonstrated chronic DVT from  the femoral vein.  \par \par Given that DVT stable the symptoms are more likely related to the spinal injection.  \par We have notes from Dr. Sheppard which was 2 years ago has not followed up since.  First surgery was in 2018.  \par Strongly reccomended going back to Dr. Sheppard for follow up especially if the surgery hurts even worse than when she just recovered from it.  \par \par  Paulo [id number]

## 2023-06-12 NOTE — ASSESSMENT
[FreeTextEntry1] : This is a 68 year old woman presenting with an acute provoked DVT following spinal surgery. Patient was treated with 3 months of lovenox after initial IVC filter placement given initial contraindication to anticoagulation due to the recent spine surgery.  On vascular evaluation for retrieval of IVC filter, patient noted to have proximal progression of the thrombosis and subacute thrombosis despite the lovenox treatment.  IVC filter retrieval postponed.  Patient transitioned to xarelto 20mg daily.  Patient completed therapy and had discontinued the medication followed by the removal of the IVC filter in 2021.  Repeat Doppler negative in May 2021. Patient was downgraded to the Xarelto 10mg daily dose leading up to a vascular surgery 6/2022.  Patient presents today with bilateral leg pain worse on the right side. Was unclear to her whether this was the DVT or the spine.\par \par Explained that the pain being worse on the right side was probably the spine disease.  The U/S from February showed no right sided DVT and the left DVT was chronic. In 6/7/23 the Left side was chronic and unchanged. Reccomended she rturn to Dr. Sheppard, she has not been back to that office for follow up in over a year.  \par Reccomended continuing the Xarelto 10mg for long term given FVL heterozygous and immobility from the spine pain.

## 2023-08-31 NOTE — ED ADULT NURSE NOTE - BMI (KG/M2)
Venu was in this morning he stated  that he forgot to ask about he ipratropium/Albuterol neb.  Pharm [facility]    38.6

## 2023-09-24 ENCOUNTER — NON-APPOINTMENT (OUTPATIENT)
Age: 68
End: 2023-09-24

## 2023-11-27 ENCOUNTER — OUTPATIENT (OUTPATIENT)
Dept: OUTPATIENT SERVICES | Facility: HOSPITAL | Age: 68
LOS: 1 days | Discharge: HOME | End: 2023-11-27

## 2023-11-27 DIAGNOSIS — Z98.890 OTHER SPECIFIED POSTPROCEDURAL STATES: Chronic | ICD-10-CM

## 2023-11-27 DIAGNOSIS — G56.03 CARPAL TUNNEL SYNDROME, BILATERAL UPPER LIMBS: Chronic | ICD-10-CM

## 2023-11-27 DIAGNOSIS — I82.409 ACUTE EMBOLISM AND THROMBOSIS OF UNSPECIFIED DEEP VEINS OF UNSPECIFIED LOWER EXTREMITY: ICD-10-CM

## 2023-11-27 DIAGNOSIS — Z98.49 CATARACT EXTRACTION STATUS, UNSPECIFIED EYE: Chronic | ICD-10-CM

## 2023-12-12 ENCOUNTER — APPOINTMENT (OUTPATIENT)
Dept: HEMATOLOGY ONCOLOGY | Facility: CLINIC | Age: 68
End: 2023-12-12
Payer: MEDICARE

## 2023-12-12 VITALS
RESPIRATION RATE: 16 BRPM | SYSTOLIC BLOOD PRESSURE: 120 MMHG | HEIGHT: 60 IN | WEIGHT: 212.61 LBS | HEART RATE: 51 BPM | BODY MASS INDEX: 41.74 KG/M2 | DIASTOLIC BLOOD PRESSURE: 64 MMHG | TEMPERATURE: 97.1 F | OXYGEN SATURATION: 97 %

## 2023-12-12 PROCEDURE — 99214 OFFICE O/P EST MOD 30 MIN: CPT

## 2023-12-12 RX ORDER — RIVAROXABAN 10 MG/1
10 TABLET, FILM COATED ORAL
Qty: 90 | Refills: 3 | Status: ACTIVE | COMMUNITY
Start: 2022-06-29 | End: 1900-01-01

## 2023-12-16 NOTE — PHYSICAL EXAM
[Normal] : affect appropriate [de-identified] : left leg with the hx of the DVT does not appear  to be edematous at all.   Niacinamide Counseling: I recommended taking niacin or niacinamide, also know as vitamin B3, twice daily. Recent evidence suggests that taking vitamin B3 (500 mg twice daily) can reduce the risk of actinic keratoses and non-melanoma skin cancers. Side effects of vitamin B3 include flushing and headache.

## 2023-12-16 NOTE — ASSESSMENT
[FreeTextEntry1] : This is a 68 year old woman presenting with an acute provoked DVT following spinal surgery. Patient was treated with 3 months of lovenox after initial IVC filter placement given initial contraindication to anticoagulation due to the recent spine surgery. On vascular evaluation for retrieval of IVC filter, patient noted to have proximal progression of the thrombosis and subacute thrombosis despite the lovenox treatment. IVC filter retrieval postponed. Patient transitioned to xarelto 20mg daily. Patient completed therapy and had discontinued the medication followed by the removal of the IVC filter in 2021. Repeat Doppler negative in May 2021. Patient was downgraded to the Xarelto 10mg daily dose leading up to a vascular surgery. Was held for surgery but not restarted afterwards, Recurrent DVT was noted. At this point long term anticoagulation was recommended Patient was then started on another 6 month course of xarelto 20mg daily.  patient returns for follow up.  Given 6 months has passed now, can decrease to xarelto 10mg for long term prophylaxis.  Hx of some GI bleeding, currently it is not apparent, can continue.  Hg remaisn stable.

## 2023-12-16 NOTE — HISTORY OF PRESENT ILLNESS
[de-identified] : This is a 68 year old woman with history of hypertension. Had spinal surgery complicated by Left lower extremity popliteal DVT. IVC filter placed initially given the contraindication to anticoagulation. On vascular evaluation for retrieval of IVC filter, subacute thrombosis was detected, and thrombosis seemed to have progressed proximally in the right femoral vein. Initially treated with treated with Lovenox but was more recently the transitioned to xarelto 20mg in November. Right femoral thrombosis. After several weeks of xarelto 20mg therapy, patient repeated a doppler U/S on 1/8/21 which demonstrated resolution of the Right leg DVT. Followed by a recurrent acute on chronic DVT extensively up to the left femoral and illiac vein.    6/10/22 Hg 12.4g/dl in the hospital for the Left leg DVT.    clarified, patient had a lupus anticoagulant once in December 31st 2020, not lupus.   This was repeated October 2021 and was negative.    [de-identified] : [id number] Owen  Patient presents for follow up of her chronic DVT remains on xarelto 10mg daily.  Patient feeling well, just had bloodwork from her PCP 10/27/23 Hg 12.9 normal creatinine .  Patient denies bleeding, still ahs some brining sensation on the right leg.    actually both legs, though the DVT was only in the left leg. Suspect that the numbness is not hematinic or circulatory in nature, hx of a spinal disease, hx of radical discectomy and bilateral fasciotomy.   Has history of a chronic DVT in the left femoral, and posterior tibial vein.    Again recommended patient return to Dr. Sheppard, last appointment  was in June 2021.   Patient declined given that they said that everything was fine.   This is incongruent with the previous 2021 documents.

## 2024-01-04 ENCOUNTER — APPOINTMENT (OUTPATIENT)
Dept: PHYSICAL MEDICINE AND REHAB | Facility: CLINIC | Age: 69
End: 2024-01-04
Payer: MEDICARE

## 2024-01-04 PROCEDURE — 99204 OFFICE O/P NEW MOD 45 MIN: CPT

## 2024-01-04 RX ORDER — GABAPENTIN 300 MG/1
300 CAPSULE ORAL 3 TIMES DAILY
Qty: 90 | Refills: 1 | Status: ACTIVE | COMMUNITY
Start: 2024-01-04 | End: 1900-01-01

## 2024-01-04 RX ORDER — METHYLPREDNISOLONE 4 MG/1
4 TABLET ORAL
Qty: 1 | Refills: 0 | Status: ACTIVE | COMMUNITY
Start: 2024-01-04 | End: 1900-01-01

## 2024-01-04 NOTE — PHYSICAL EXAM
[FreeTextEntry1] :  General exam  Constitutional: The patient appears well-developed, well-nourished, and in no apparent distress. Patient is well-groomed.  Skin: The skin is warm and dry, with normal turgor.  Eyes: PERRL.  ENMT: Ears: Hearing is grossly within normal limits.  Neck: Supple: The neck is supple.  Respiratory: Inspection: Breathing unlabored.  Neurologic: Alert and oriented x 3.  Psychiatric: Patient is cooperative and appropriate. Mood and affect are normal. Patient's insight is good, and memory and judgment are intact.    LUMBAR EXAM  APPEARANCE: (+) large midline lumbar surgical scar, well healed No gross deformity or malalignment No erythema, swelling or ecchymosis Decrease dlumbar lordosis No muscle atrophy of the left lower extremity No muscle atrophy of the right lower extremity  TENDERNESS: No trigger points over bilateral lumbar paraspinal muscles Absent over midline spinous processes +over left lumbar facet joints +over right lumbar facet joints + over left lumbar paraspinal muscles: erector spinae and quadratus lumborum + over right lumbar paraspinal muscles: erector spinae and quadratus lumborum +over left sacroiliac joint +over right sacroiliac joint  ROM: Decreased A/PROM of the lumbar spine + pain with flexion, extension of the lumbar spine  SPECIAL TESTS: + left straight leg raising test + right straight leg raising test FABERE left normal FABERE right normal FADIR left normal FADIR right normal  SENSORY TESTING: Diminished sensation to light touch Left L4/5 dermatomal distribution Intact to light touch Right L1-S2  MOTOR TESTING: Muscle tone of the left lower extremity is normal Muscle tone of the right lower extremity is normal  Left hip flexion strength is 5/5 Right hip flexion strength is 5/5  Left quadriceps strength is 5/5 Right quadriceps strength is 5/5  Left hamstrings strength is 5/5 Right hamstrings strength is 5/5  Left EHL strength is 5/5 Right EHL strength is 5/5  Left ankle dorsiflexion strength is 5/5 Right ankle dorsiflexion strength is 5/5  Left ankle plantar flexion strength is 5/5 Right ankle plantar flexion strength is 5/5  REFLEXES: Patella (L4) left 2+ Patella (L4) right 2+   GAIT: + sl antalgic gait Ambulates with SC Balance normal with ambulation

## 2024-01-04 NOTE — REVIEW OF SYSTEMS
[Negative] : Genitourinary [FreeTextEntry9] : (+) low back pain [de-identified] : (+) radiacular pain, numbness

## 2024-01-04 NOTE — HISTORY OF PRESENT ILLNESS
[FreeTextEntry1] : Audio/video  used; #[id number] Anuradha FELIX is an 68 year old F with PMH of HTN and b/l LE DVT on xarelto here for initial evaluation of low back pain with her daughter. Ms. FELIX was sent for consultation by Dr. Lyles (hematology).  Pain location: Low back, bilateral lower extremities Quality: Dull achy, occasionally sharp & pulsating Radiation: + radiation down bilateral LE Severity: 8/10 Onset: ~4yrs ago without inciting event. Hx of L3-L4, L4-L5, L5-S1 facetotomy followed by radical discecotomy and insertion of intervertebral expandable cage for arthrodesis 3mo afterwards in in 2020. Went to [facility] ED ~3 months ago for her back pain, was not DCed on any medications. Associated symptoms: difficulty walking Numbness: + numbness/tingling down LLE since surgery, worsening over past few months Weakness: Yes Exacerbated by: Walking, prolonged standing Improved by: Rest Bowel or bladder involvement: None  Denies bowel/bladder dysfunction, saddle anesthesia, fevers, chills, weight loss, night pain, or night sweats at this time.  The pain interferes with function, ADLs and quality of life. Patient had tried Acetaminophen, meloxicam, pregabalin, without any lasting relief of pain.  Patient had imaging studies to evaluate the pain. MRI L spine 7/2020 Patient had not had any nerve conduction studies to evaluate the symptoms.  Patient had tried physical therapy, and/or physician directed home exercises, stretching, lifestyle modification for over 1 month without any significant relief.

## 2024-01-04 NOTE — END OF VISIT
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 Any part of the history and physical exam performed by the physician in training was either directly reviewed and/or replicated by myself. Any procedure performed by the physician in training was performed under my direct supervision and with the consent of the patient. [Time Spent: ___ minutes] : I have spent [unfilled] minutes of time on the encounter.

## 2024-01-04 NOTE — ASSESSMENT
[FreeTextEntry1] : ROSALES FELIX is an 68 year old F with PMH of HTN and b/l LE DVT on xarelto here for initial evaluation of chronic low back pain over several years worsened after hx of L3-L4, L4-L5, L5-S1 facetotomy followed by radical discecotomy and insertion of intervertebral expandable cage for arthrodesis 3mo afterwards in in 2020. Her pain is likely multifactorial with both axial and radicular qualities.   Plan: - Obtain new T and L-spine MRI with and without contrast - Start medrol dose pack - Increase gabapentin to 300mg TID - Continue tylenol PRN - Continue PT - Follow up for MRI results  Patient had tried and failed conservative treatment. This includes but is not limited to: Acetaminophen, NSAIDs, neuropathic medications, physician directed home exercises and/or physical therapy for at least 8 weeks. After a thorough discussion, will proceed with ordering a lumbar spine MRI with and w/o contrast.  Asking for authorization for MRI T and L spine with and w/o contrast to help treat/diagnose patient's pain.   Patient was advised if the following symptoms develop: chills, fever, loss of bladder control, bowel incontinence or urinary retention, numbness/tingling or weakness is present in upper or lower extremities, to go to the nearest emergency room. This may be a new clinical condition not present at the time of the patient visit that may lead to paralysis and/or death. Patient advised if the above symptoms developed to also call the office immediately to inform us and to go to the nearest emergency room.   Patient reassured and educated on the diagnosis and treatment options. Risks and benefits of treatment and of delaying treatment discussed with patient. Risks discussed include but not limited to: progression of symptoms, worsening pain and functional status, etc.  This note was generated using Dragon medical dictation software. A reasonable effort had been made for proofreading its contents, but spelling mistakes or grammatical errors may still remain. If there are any questions or points of clarification needed please notify my office.   DOMINIK Shepard, DO PGY-4 Resident Physician

## 2024-02-06 ENCOUNTER — OUTPATIENT (OUTPATIENT)
Dept: OUTPATIENT SERVICES | Facility: HOSPITAL | Age: 69
LOS: 1 days | End: 2024-02-06
Payer: MEDICARE

## 2024-02-06 ENCOUNTER — APPOINTMENT (OUTPATIENT)
Dept: MRI IMAGING | Facility: CLINIC | Age: 69
End: 2024-02-06
Payer: MEDICARE

## 2024-02-06 DIAGNOSIS — Z98.890 OTHER SPECIFIED POSTPROCEDURAL STATES: Chronic | ICD-10-CM

## 2024-02-06 DIAGNOSIS — Z98.49 CATARACT EXTRACTION STATUS, UNSPECIFIED EYE: Chronic | ICD-10-CM

## 2024-02-06 DIAGNOSIS — G56.03 CARPAL TUNNEL SYNDROME, BILATERAL UPPER LIMBS: Chronic | ICD-10-CM

## 2024-02-06 DIAGNOSIS — Z00.8 ENCOUNTER FOR OTHER GENERAL EXAMINATION: ICD-10-CM

## 2024-02-06 PROCEDURE — 72157 MRI CHEST SPINE W/O & W/DYE: CPT | Mod: 26

## 2024-02-06 PROCEDURE — A9585: CPT

## 2024-02-06 PROCEDURE — 72158 MRI LUMBAR SPINE W/O & W/DYE: CPT | Mod: 26

## 2024-02-06 PROCEDURE — 72157 MRI CHEST SPINE W/O & W/DYE: CPT

## 2024-02-06 PROCEDURE — 72158 MRI LUMBAR SPINE W/O & W/DYE: CPT

## 2024-02-08 ENCOUNTER — APPOINTMENT (OUTPATIENT)
Dept: PHYSICAL MEDICINE AND REHAB | Facility: CLINIC | Age: 69
End: 2024-02-08
Payer: MEDICARE

## 2024-02-08 PROCEDURE — 99214 OFFICE O/P EST MOD 30 MIN: CPT

## 2024-02-08 NOTE — ASSESSMENT
[FreeTextEntry1] : ROSALES FELIX is an 68 year old F with PMH of HTN and b/l LE DVT on xarelto with low back pain over several years worsened after hx of L3-L4, L4-L5, L5-S1 facetotomy followed by radical discecotomy and insertion of intervertebral expandable cage for arthrodesis 3mo afterwards in in 2020. Pain is getting worse. Patient is with postlaminectomy syndrome with large seroma.  Patient reassured and educated on the diagnosis and treatment options. Risks and benefits of treatment and of delaying treatment discussed with patient. Risks discussed include but not limited to: progression of symptoms, worsening pain and functional status, etc.  This note was generated using Dragon medical dictation software. A reasonable effort had been made for proofreading its contents, but spelling mistakes or grammatical errors may still remain. If there are any questions or points of clarification needed please notify my office.  ROSALES FELIX is taking blood thinners: Xarelto at this time. Risks and benefits of having injection while on blood thinners explained to the patient. Risks discussed included, but not limited to: pain, infection, bleeding requiring emergency surgery, headaches, damage to vital organs.   ROSALES FELIX will need clearance from PCP/internist to hold blood thinner for 3-5 days prior to coming in for the procedure as per [facility] guidelines.  MRI T and L spine imaging reviewed with patient in office today. Imaging and report demonstrate: large post surgical seroma spaning from L1-L4 with thecal sac compression.  Continue Gabapentin Referring to spine surgery for evaluation Follow up 1 month if no surgical correction Will then consider CAUDAL JUANA  Patient was advised if the following symptoms develop: chills, fever, loss of bladder control, bowel incontinence or urinary retention, numbness/tingling or weakness is present in upper or lower extremities, to go to the nearest emergency room. This may be a new clinical condition not present at the time of the patient visit that may lead to paralysis and/or death. Patient advised if the above symptoms developed to also call the office immediately to inform us and to go to the nearest emergency room.

## 2024-02-08 NOTE — HISTORY OF PRESENT ILLNESS
[FreeTextEntry1] : Audio/video  used; #[id number]; Iva.  ROSALES FELIX is here for follow up. Since last visit she had MRI T and L spine. She had taken the steroid pack and is continuing Gabapentin at this time. However this did not help her pains. Pain is 10/10 on NRS in the mid and lower back with radiation down the legs.   Denies any new pain, numbness or weakness, bowel/bladder dysfunction, saddle anesthesia, fevers, chills, weight loss, night pain, or night sweats at this time.

## 2024-02-26 ENCOUNTER — APPOINTMENT (OUTPATIENT)
Dept: ORTHOPEDIC SURGERY | Facility: CLINIC | Age: 69
End: 2024-02-26
Payer: MEDICARE

## 2024-02-26 VITALS — HEIGHT: 60 IN | BODY MASS INDEX: 41.62 KG/M2 | WEIGHT: 212 LBS

## 2024-02-26 DIAGNOSIS — M47.24 OTHER SPONDYLOSIS WITH RADICULOPATHY, THORACIC REGION: ICD-10-CM

## 2024-02-26 DIAGNOSIS — M54.2 CERVICALGIA: ICD-10-CM

## 2024-02-26 PROCEDURE — 72040 X-RAY EXAM NECK SPINE 2-3 VW: CPT

## 2024-02-26 PROCEDURE — 72100 X-RAY EXAM L-S SPINE 2/3 VWS: CPT

## 2024-02-26 PROCEDURE — 99204 OFFICE O/P NEW MOD 45 MIN: CPT | Mod: 25

## 2024-03-08 ENCOUNTER — APPOINTMENT (OUTPATIENT)
Dept: PHYSICAL MEDICINE AND REHAB | Facility: CLINIC | Age: 69
End: 2024-03-08
Payer: MEDICARE

## 2024-03-08 DIAGNOSIS — G89.4 CHRONIC PAIN SYNDROME: ICD-10-CM

## 2024-03-08 PROCEDURE — 99214 OFFICE O/P EST MOD 30 MIN: CPT

## 2024-03-08 RX ORDER — TRAMADOL HYDROCHLORIDE 50 MG/1
50 TABLET, COATED ORAL
Qty: 14 | Refills: 0 | Status: ACTIVE | COMMUNITY
Start: 2024-03-08 | End: 1900-01-01

## 2024-03-08 RX ORDER — GABAPENTIN 100 MG/1
100 CAPSULE ORAL 3 TIMES DAILY
Qty: 90 | Refills: 1 | Status: ACTIVE | COMMUNITY
Start: 2017-12-06

## 2024-03-08 NOTE — HISTORY OF PRESENT ILLNESS
[FreeTextEntry1] : Audio/video  used; #[id number]; Gerhard FELIX is here for follow up. Since last visit she had seen ortho spine Dr. Dias on 2/26/24: "70 yo female with T9-Pelvis and revision Laminectomy and possible T11 screw migration on right with gait ataxia, myelopathic features, recommend CT Myelogram to evaluate lumbar spinal canal and fusion, as difficult to asses do to artifact from MR, and assess for stenosis in Cervical and Thoracic region, due to myelopathic features, f/u after testing."  Reports 10/10 pain on NRS with difficulty walking.   Denies any new pain, numbness or weakness, bowel/bladder dysfunction, saddle anesthesia, fevers, chills, weight loss, night pain, or night sweats at this time.

## 2024-03-08 NOTE — ASSESSMENT
[FreeTextEntry1] : ROSALES FELIX is an 68 year old F with PMH of HTN and b/l LE DVT on xarelto with low back pain over several years worsened after hx of L3-L4, L4-L5, L5-S1 facetotomy followed by radical discecotomy and insertion of intervertebral expandable cage for arthrodesis 3mo afterwards in in 2020. Pain is getting worse. Patient is with postlaminectomy syndrome with large seroma.  Patient reassured and educated on the diagnosis and treatment options. Risks and benefits of treatment and of delaying treatment discussed with patient. Risks discussed include but not limited to: progression of symptoms, worsening pain and functional status, etc.  This note was generated using Dragon medical dictation software. A reasonable effort had been made for proofreading its contents, but spelling mistakes or grammatical errors may still remain. If there are any questions or points of clarification needed please notify my office.  ROSALES FELIX is taking blood thinners: Xarelto at this time. Risks and benefits of having injection while on blood thinners explained to the patient. Risks discussed included, but not limited to: pain, infection, bleeding requiring emergency surgery, headaches, damage to vital organs.  ROSALES FELIX will need clearance from PCP/internist to hold blood thinner for 3-5 days prior to coming in for the procedure as per [facility] guidelines.  MRI T and L spine imaging reviewed with patient in office today. Imaging and report demonstrate: large post surgical seroma spaning from L1-L4 with thecal sac compression.  Continue Gabapentin Currently being worked up with Dr. Dias from spine surgery  I Stop was reviewed and patient is consistent with history presented. Tramadol 50mg PO q12 PRN severe 10/10 pain on NRS, 7 day supply #14 tablets. if no surgical correction will then consider CAUDAL JUANA Follow up 1 week if medication if helping and patient requires more, they will need to come in for UDT and sign opiate contract.  Patient was advised if the following symptoms develop: chills, fever, loss of bladder control, bowel incontinence or urinary retention, numbness/tingling or weakness is present in upper or lower extremities, to go to the nearest emergency room. This may be a new clinical condition not present at the time of the patient visit that may lead to paralysis and/or death. Patient advised if the above symptoms developed to also call the office immediately to inform us and to go to the nearest emergency room.

## 2024-03-15 ENCOUNTER — APPOINTMENT (OUTPATIENT)
Dept: CT IMAGING | Facility: CLINIC | Age: 69
End: 2024-03-15
Payer: MEDICARE

## 2024-03-15 ENCOUNTER — OUTPATIENT (OUTPATIENT)
Dept: OUTPATIENT SERVICES | Facility: HOSPITAL | Age: 69
LOS: 1 days | End: 2024-03-15
Payer: MEDICARE

## 2024-03-15 ENCOUNTER — RESULT REVIEW (OUTPATIENT)
Age: 69
End: 2024-03-15

## 2024-03-15 DIAGNOSIS — Z98.890 OTHER SPECIFIED POSTPROCEDURAL STATES: Chronic | ICD-10-CM

## 2024-03-15 DIAGNOSIS — M54.2 CERVICALGIA: ICD-10-CM

## 2024-03-15 DIAGNOSIS — Z98.49 CATARACT EXTRACTION STATUS, UNSPECIFIED EYE: Chronic | ICD-10-CM

## 2024-03-15 DIAGNOSIS — M48.061 SPINAL STENOSIS, LUMBAR REGION WITHOUT NEUROGENIC CLAUDICATION: ICD-10-CM

## 2024-03-15 DIAGNOSIS — G56.03 CARPAL TUNNEL SYNDROME, BILATERAL UPPER LIMBS: Chronic | ICD-10-CM

## 2024-03-15 DIAGNOSIS — M96.1 POSTLAMINECTOMY SYNDROME, NOT ELSEWHERE CLASSIFIED: ICD-10-CM

## 2024-03-15 DIAGNOSIS — Z00.8 ENCOUNTER FOR OTHER GENERAL EXAMINATION: ICD-10-CM

## 2024-03-15 PROCEDURE — 72131 CT LUMBAR SPINE W/O DYE: CPT | Mod: 26

## 2024-03-15 PROCEDURE — 72125 CT NECK SPINE W/O DYE: CPT | Mod: 26

## 2024-03-15 PROCEDURE — 76376 3D RENDER W/INTRP POSTPROCES: CPT

## 2024-03-15 PROCEDURE — 76376 3D RENDER W/INTRP POSTPROCES: CPT | Mod: 26

## 2024-03-15 PROCEDURE — 72128 CT CHEST SPINE W/O DYE: CPT | Mod: 26

## 2024-03-15 PROCEDURE — 72125 CT NECK SPINE W/O DYE: CPT

## 2024-03-15 PROCEDURE — 72131 CT LUMBAR SPINE W/O DYE: CPT

## 2024-03-15 PROCEDURE — 72128 CT CHEST SPINE W/O DYE: CPT

## 2024-03-25 ENCOUNTER — APPOINTMENT (OUTPATIENT)
Dept: ORTHOPEDIC SURGERY | Facility: CLINIC | Age: 69
End: 2024-03-25
Payer: MEDICARE

## 2024-03-25 VITALS — WEIGHT: 212 LBS | HEIGHT: 60 IN | BODY MASS INDEX: 41.62 KG/M2

## 2024-03-25 DIAGNOSIS — M47.812 SPONDYLOSIS W/OUT MYELOPATHY OR RADICULOPATHY, CERVICAL REGION: ICD-10-CM

## 2024-03-25 DIAGNOSIS — M50.20 OTHER CERVICAL DISC DISPLACEMENT, UNSPECIFIED CERVICAL REGION: ICD-10-CM

## 2024-03-25 DIAGNOSIS — M50.30 OTHER CERVICAL DISC DEGENERATION, UNSPECIFIED CERVICAL REGION: ICD-10-CM

## 2024-03-25 PROCEDURE — 99215 OFFICE O/P EST HI 40 MIN: CPT

## 2024-03-28 ENCOUNTER — APPOINTMENT (OUTPATIENT)
Dept: PHYSICAL MEDICINE AND REHAB | Facility: CLINIC | Age: 69
End: 2024-03-28
Payer: MEDICARE

## 2024-03-28 DIAGNOSIS — G89.29 MYALGIA, OTHER SITE: ICD-10-CM

## 2024-03-28 DIAGNOSIS — M96.1 POSTLAMINECTOMY SYNDROME, NOT ELSEWHERE CLASSIFIED: ICD-10-CM

## 2024-03-28 DIAGNOSIS — M79.18 MYALGIA, OTHER SITE: ICD-10-CM

## 2024-03-28 PROCEDURE — 99213 OFFICE O/P EST LOW 20 MIN: CPT

## 2024-03-28 RX ORDER — GABAPENTIN 300 MG/1
300 CAPSULE ORAL
Qty: 30 | Refills: 1 | Status: ACTIVE | COMMUNITY
Start: 2024-03-28 | End: 1900-01-01

## 2024-03-28 NOTE — ASSESSMENT
[FreeTextEntry1] : ROSALES FELIX is an 68 year old F with PMH of HTN and b/l LE DVT on xarelto with low back pain over several years worsened after hx of L3-L4, L4-L5, L5-S1 facetotomy followed by radical discecotomy and insertion of intervertebral expandable cage for arthrodesis 3mo afterwards in in 2020. Pain is getting worse. Patient is with postlaminectomy syndrome with large seroma.  Patient reassured and educated on the diagnosis and treatment options. Risks and benefits of treatment and of delaying treatment discussed with patient. Risks discussed include but not limited to: progression of symptoms, worsening pain and functional status, etc.  This note was generated using Dragon medical dictation software. A reasonable effort had been made for proofreading its contents, but spelling mistakes or grammatical errors may still remain. If there are any questions or points of clarification needed please notify my office.  ROSALES FELIX is taking blood thinners: Xarelto at this time. Risks and benefits of having injection while on blood thinners explained to the patient. Risks discussed included, but not limited to: pain, infection, bleeding requiring emergency surgery, headaches, damage to vital organs.  ROSALES FELIX will need clearance from PCP/internist to hold blood thinner for 3-5 days prior to coming in for the procedure as per [facility] guidelines.  MRI T and L spine imaging reviewed with. Imaging and report demonstrate: large post surgical seroma spaning from L1-L4 with thecal sac compression.  Continue Gabapentin Currently being worked up with Dr. Dias from spine surgery Dr. Dias's note reviewed - recommended ACDF  Patient was prescribed Gabapentin 300mg PO nightly for neuropathic pain. Monitor for sedation, dizziness and any signs of respiratory depression. Avoid taking together with opioid pain medicines and other drugs that depress the central nervous system function. Avoid sudden cessation after prolonged use, due to risk of seizures. Risks and benefits were explained and patient expressed understanding.  Patient had tried and failed conservative treatment. This includes but is not limited to: Acetaminophen, NSAIDs, muscle relaxants, neuropathic medications, physician directed home exercises and/or physical therapy for at least 8 weeks. After a thorough discussion of risks and benefits patient would like to proceed with CAUDAL EPIDURAL STEROID INJECTION WITH FLUOROSCOPIC GUIDANCE  Risks and benefits of having injection discussed with patient. Risks discussed included, but not limited to: pain, infection, bleeding requiring emergency surgery, headaches, damage to vital organs, etc.  At this time, patient appears to be psychologically stable. Based on the history, physical exam and diagnostic findings, patient will benefit from this procedure. The goal of this procedure is to reduce pain and inflammation, improve function and range of motion and to further promote increased activity and return to previous level of functioning. The ultimate goal of performing this procedure is to improve patient's quality of life.  Asking for authorization for CAUDAL EPIDURAL STEROID INJECTION WITH FLUOROSCOPIC GUIDANCE to help treat patients pain.  Patient will be scheduled for this procedure.  Patient was advised if the following symptoms develop: chills, fever, loss of bladder control, bowel incontinence or urinary retention, numbness/tingling or weakness is present in upper or lower extremities, to go to the nearest emergency room. This may be a new clinical condition not present at the time of the patient visit that may lead to paralysis and/or death. Patient advised if the above symptoms developed to also call the office immediately to inform us and to go to the nearest emergency room.

## 2024-03-28 NOTE — HISTORY OF PRESENT ILLNESS
[FreeTextEntry1] : ROSALES FELIX is here for follow up with her daughter. Since last visit she was seen by Dr. Dias and recommended cervical spinal surgery. From Dr. Mason's note on 3/25/24: "70 yo female with T9-Pelvis and revision Laminectomy and possible T11 screw migration on right with gait ataxia, myelopathic features, from C4-C6 DDD, stenosis, OPLL, recommend ACDF C4-C6. Re-evaluate lumbar spine post surgically. MRI Cervical spine before surgery."  However, patient is very hesitant to go for surgery at this time. She is trying to avoid it and is asking to try injection.   Denies any new pain, numbness or weakness, bowel/bladder dysfunction, saddle anesthesia, fevers, chills, weight loss, night pain, or night sweats at this time.

## 2024-03-28 NOTE — PHYSICAL EXAM
[FreeTextEntry1] : General exam   Constitutional: The patient appears well-developed, well-nourished, and in no apparent distress. Patient is well-groomed.    Skin: The skin is warm and dry, with normal turgor.  Eyes: PERRL.    ENMT: Ears: Hearing is grossly within normal limits.    Neck: Supple: The neck is supple.    Respiratory: Inspection: Breathing unlabored.    Neurologic: Alert and oriented x 3.   Psychiatric: Patient is cooperative and appropriate.  Mood and affect are normal.  Patient's insight is good, and memory and judgment are intact.  Lumbar Skin c/d/i without any erythema, swelling, effusion  Diffuse tenderness Multiple trigger points Healed surgical scar SLR + b/l

## 2024-04-02 NOTE — PRE-OP CHECKLIST - PATIENT'S PERSONAL PROPERTY GIVEN TO
I have scheduled her for a fetal echocardiogram next week due to history of ASD and sub optimal views of the heart.   
In summary, Ash is status post successful Amplatzer closure of her atrial septal defect. She has had an excellent surgical result. Her echocardiogram looks great today. I will plan on a follow up in 5 years for a repeat evaluation of her atrial septal defect. Please contact me if you have any questions or concerns.   
on unit

## 2024-04-17 ENCOUNTER — NON-APPOINTMENT (OUTPATIENT)
Age: 69
End: 2024-04-17

## 2024-06-13 NOTE — PATIENT PROFILE ADULT - NSTRANSFERBELONGINGSDISPO_GEN_A_NUR
no driving for 24 hrs  If you feel chest pain/palpitations/SOB, please contact cardiologist with patient

## 2024-07-08 ENCOUNTER — NON-APPOINTMENT (OUTPATIENT)
Age: 69
End: 2024-07-08

## 2024-07-10 ENCOUNTER — APPOINTMENT (OUTPATIENT)
Dept: PHYSICAL MEDICINE AND REHAB | Facility: CLINIC | Age: 69
End: 2024-07-10
Payer: MEDICARE

## 2024-07-10 VITALS
HEIGHT: 63 IN | SYSTOLIC BLOOD PRESSURE: 155 MMHG | DIASTOLIC BLOOD PRESSURE: 80 MMHG | WEIGHT: 215 LBS | BODY MASS INDEX: 38.09 KG/M2 | OXYGEN SATURATION: 98 % | HEART RATE: 64 BPM

## 2024-07-10 DIAGNOSIS — M17.0 BILATERAL PRIMARY OSTEOARTHRITIS OF KNEE: ICD-10-CM

## 2024-07-10 DIAGNOSIS — M48.02 SPINAL STENOSIS, CERVICAL REGION: ICD-10-CM

## 2024-07-10 DIAGNOSIS — M96.1 POSTLAMINECTOMY SYNDROME, NOT ELSEWHERE CLASSIFIED: ICD-10-CM

## 2024-07-10 DIAGNOSIS — M77.8 OTHER ENTHESOPATHIES, NOT ELSEWHERE CLASSIFIED: ICD-10-CM

## 2024-07-10 PROCEDURE — 99215 OFFICE O/P EST HI 40 MIN: CPT

## 2024-07-10 RX ORDER — PREDNISONE 10 MG/1
10 TABLET ORAL
Qty: 45 | Refills: 0 | Status: ACTIVE | COMMUNITY
Start: 2024-07-10 | End: 1900-01-01

## 2024-07-10 RX ORDER — ACETAMINOPHEN 500 MG/1
500 TABLET ORAL 3 TIMES DAILY
Qty: 90 | Refills: 0 | Status: ACTIVE | COMMUNITY
Start: 2024-07-10 | End: 1900-01-01

## 2024-08-14 ENCOUNTER — APPOINTMENT (OUTPATIENT)
Dept: PHYSICAL MEDICINE AND REHAB | Facility: CLINIC | Age: 69
End: 2024-08-14
Payer: MEDICARE

## 2024-08-14 VITALS
WEIGHT: 215 LBS | BODY MASS INDEX: 38.09 KG/M2 | HEART RATE: 78 BPM | RESPIRATION RATE: 18 BRPM | SYSTOLIC BLOOD PRESSURE: 146 MMHG | OXYGEN SATURATION: 93 % | DIASTOLIC BLOOD PRESSURE: 84 MMHG | HEIGHT: 63 IN

## 2024-08-14 DIAGNOSIS — M96.1 POSTLAMINECTOMY SYNDROME, NOT ELSEWHERE CLASSIFIED: ICD-10-CM

## 2024-08-14 DIAGNOSIS — M77.8 OTHER ENTHESOPATHIES, NOT ELSEWHERE CLASSIFIED: ICD-10-CM

## 2024-08-14 DIAGNOSIS — M48.02 SPINAL STENOSIS, CERVICAL REGION: ICD-10-CM

## 2024-08-14 DIAGNOSIS — M17.0 BILATERAL PRIMARY OSTEOARTHRITIS OF KNEE: ICD-10-CM

## 2024-08-14 PROCEDURE — 99214 OFFICE O/P EST MOD 30 MIN: CPT

## 2024-08-14 RX ORDER — DULOXETINE HYDROCHLORIDE 20 MG/1
20 CAPSULE, DELAYED RELEASE PELLETS ORAL
Qty: 30 | Refills: 0 | Status: ACTIVE | COMMUNITY
Start: 2024-08-14 | End: 1900-01-01

## 2024-08-14 RX ORDER — ACETAMINOPHEN 500 MG/1
500 TABLET ORAL 3 TIMES DAILY
Qty: 90 | Refills: 0 | Status: ACTIVE | COMMUNITY
Start: 2024-08-14 | End: 1900-01-01

## 2024-08-14 NOTE — REVIEW OF SYSTEMS
[Fever] : no fever [Eye Pain] : no eye pain [Earache] : no earache [Chest Pain] : no chest pain [Shortness Of Breath] : no shortness of breath [Abdominal Pain] : no abdominal pain [Dysuria] : no dysuria [Joint Pain] : no joint pain [Joint Stiffness] : no joint stiffness [Muscle Pain] : no muscle pain [Muscle Weakness] : no muscle weakness [Skin Wound] : no skin wound [Dizziness] : no dizziness [Insomnia] : no insomnia [Easy Bruising] : no tendency for easy bruising

## 2024-08-14 NOTE — DATA REVIEWED
[FreeTextEntry1] : MRI of the lumbar spine February 6, 2024 reveals Status post decompression from L1 to S1 and instrumented fusion from T10 to the lumbosacral spine. No spinal canal or foraminal stenosis of the thoracic spine. Moderate to severe left foraminal narrowing at L4/L5. Mild right lateral recess and foraminal narrowing at L5/S1. No spinal canal stenosis of the lumbar spine. Seroma in the posterior aspect of the lumbar spine spanning L1-L4.  CT of the cervical, thoracic and lumbar spine performed on March 15, 2024 reveals Cervical Craniovertebral Junction:  Maintained. C2-C3:  Maintained. C3-C4:  Mild posterior disc osteophyte complex lateralizes to the right with mild spinal canal stenosis. Mild right neural foraminal stenosis is present. C4-C5:  Posterior disc osteophyte complex is present with mild spinal canal stenosis. Moderate right neural foraminal stenosis is present. C5-C6:  Moderate loss of disc height is present with posterior disc osteophyte complex with right paracentral predominance and moderate to severe spinal canal stenosis. Moderate to severe bilateral neural foraminal stenosis is present. C6-C7:  Posterior disc osteophyte complexes present without stenosis. C7-T1:  Maintained although anterior osteophytes are present.  Thoracic T2-T3: Disc is maintained. There is mild right greater than left facet arthrosis. T3-T4: Slight posterior disc osteophyte complexes present without stenosis. There is mild bilateral facet arthrosis. T4-T5: Disc is relatively maintained with bridging anterior osteophytes. There is mild bilateral facet arthrosis. T5-T6: Disc is relatively maintained with attempted bridging anterior osteophytes. There is mild bilateral facet arthrosis. T6-T7: Disc is maintained with bridging anterior osteophytes. T7-T8: Shallow broad-based central protrusion is present without stenosis. There are bridging anterior osteophytes. T8-T9: Disc is relatively maintained with bridging anterior osteophytes and no stenosis. T9-T10: Disc is relatively maintained with bridging anterior osteophytes and no stenosis. T10-T11: Postsurgical changes are present with bridging anterior osteophytes and no stenosis. T11-T12: Postsurgical changes are present with bridging anterior osteophytes and no stenosis.  Lumbar T12-L1:  Postsurgical changes are present with bridging anterior osteophytes and no stenosis.  L1-L2:  Postsurgical changes are present with posterior disc osteophyte complex and mild spinal canal stenosis.  L2-L3: Postsurgical changes are present without spinal canal stenosis. Mild right neural foraminal stenosis is present.  L3-L4:  Postsurgical changes are present without stenosis.  L4-L5:  Postsurgical changes are present without spinal canal stenosis. Mild right and moderate left neural foraminal stenosis is present.  L5-S1:  Postsurgical changes are present without spinal canal stenosis. Mild to moderate right neural foraminal stenosis is present.  IMPRESSION: 1.  Multilevel degenerative disc disease is scattered throughout the spine with thoracolumbar spinal fusion hardware again seen extending between T10 and S1 and both ilium. 2.  C5-C6 demonstrate moderate to severe spinal canal stenosis. 3.  There is no significant thoracic or lumbar spinal canal stenosis. 4.  Variable neural foraminal stenosis is present.

## 2024-08-14 NOTE — HISTORY OF PRESENT ILLNESS
[FreeTextEntry1] :  69 year old female with multiple joint pain including neck and low back She did not go to therapy over the last month because she states her insurance did not approve therapy The prednisone taper worked to decrease her pain.  She did not start restorative physical therapy at the last office visit.  Neck pain She has pain in the shoulders for 1 year Pain:  3/10 Worse: 10/10 Quality: sharp  Frequency: constant The pain starts at the base of the neck and radiates to the shoulder The pain is worse with use of her arms. She has dropped objects from her hands  Low back pain She had surgery to the low back 4 years ago. She has continued low back pain Pain:  10/10 Worse: 10/10 Quality: squeezing  Frequency: constant Pain is worse with sitting The pain is worse standing, bending and walking She denies b/b difficulties  Bilateral knee pain Pain:  10/10 Worse: 10/10 Quality: grinding  Frequency: constant The pain is worse with standing and use of stairs and standing  She is presently on the following pain medications Gabapentin 600 mg po bid cyclobenzaprine 5 mg po qhs Tylenol  500 mg po 2 tablets po bid she is on xarelto 10 mg po qhs  The notes of Dr. Arun bailey were reviewed The notes of Dr. Raj Love were reviewed Patient states she saw a  rheumatologist 5 years ago but was not placed on any medication

## 2024-08-14 NOTE — PHYSICAL EXAM
[FreeTextEntry1] : Pleasant, in no distress. Language: English HEENT: Head: no trauma. Eyes: no discharge. Ears: No discharge. Nose No discharge. Throat: clear Neck: AROM 0-40 Negative Spurlings mild spasm Heart: RR, +S1, S2 Lungs: CTA Abdomen: soft, NT Lumbar spine: AROM 0-70, moderate  spasm  LUE: Shoulder: AROM 0-90 , tender to palpation  neg drop arm MS 3/5 Elbow: FAROM, MS 5-/5 reflexes 2/4 Wrist: FAROM, MS 5-/5 reflexes 2/4 Warm, nontender, pulse 2+  RUE:Shoulder: AROM 0-90 , tender to palpation  neg drop arm MS 3/5 Elbow: FAROM, MS 5-/5 reflexes 2/4 Wrist: FAROM, MS 5-/5 reflexes 2/4 Warm, nontender, pulse 2+  LLE: Hip: FAROM, MS 5-/5 Knee: FAROM, MS 5-/5  + crepitus reflexes 2/4 Ankle: FAROM, MS 5-/5 reflexes 2/4 Warm , nontender, pulse 2+ negative homans  RLE:Hip: FAROM, MS 5-/5 Knee: FAROM, MS 5-/5  + crepitus reflexes 2/4 Ankle: FAROM, MS 5-/5 reflexes 2/4 Warm , nontender, pulse 2+ negative homans  Gait: Spontaneous, reciprocal, safe with rollator  Sensation RUE: sensation is intact to light touch, pinprick  and proprioception LUE: sensation is intact to light touch, pinprick  and proprioception RLE: sensation is intact to light touch, pinprick  and proprioception. Neg SLR. Neg JOJO, Neg FADIR LLE: sensation is intact to light touch, pinprick  and proprioception. Neg SLR. Neg JOJO, Neg FADIR

## 2024-08-21 ENCOUNTER — LABORATORY RESULT (OUTPATIENT)
Age: 69
End: 2024-08-21

## 2024-08-22 DIAGNOSIS — M25.50 PAIN IN UNSPECIFIED JOINT: ICD-10-CM

## 2024-10-10 NOTE — PROGRESS NOTE ADULT - PROBLEM SELECTOR PLAN 8
d/c planning to rehab when medically stable
on therapeutic lovenox now  continue bowel regimen (miralax, senna). + BM on 9/26.
Yes

## 2024-12-03 ENCOUNTER — OUTPATIENT (OUTPATIENT)
Dept: OUTPATIENT SERVICES | Facility: HOSPITAL | Age: 69
LOS: 1 days | Discharge: HOME | End: 2024-12-03

## 2024-12-03 DIAGNOSIS — Z98.890 OTHER SPECIFIED POSTPROCEDURAL STATES: Chronic | ICD-10-CM

## 2024-12-03 DIAGNOSIS — G56.03 CARPAL TUNNEL SYNDROME, BILATERAL UPPER LIMBS: Chronic | ICD-10-CM

## 2024-12-03 DIAGNOSIS — Z98.49 CATARACT EXTRACTION STATUS, UNSPECIFIED EYE: Chronic | ICD-10-CM

## 2024-12-03 DIAGNOSIS — I82.409 ACUTE EMBOLISM AND THROMBOSIS OF UNSPECIFIED DEEP VEINS OF UNSPECIFIED LOWER EXTREMITY: ICD-10-CM
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
## 2024-12-10 ENCOUNTER — RESULT REVIEW (OUTPATIENT)
Age: 69
End: 2024-12-10

## 2024-12-10 ENCOUNTER — APPOINTMENT (OUTPATIENT)
Dept: HEMATOLOGY ONCOLOGY | Facility: CLINIC | Age: 69
End: 2024-12-10
Payer: MEDICARE

## 2024-12-10 VITALS
WEIGHT: 219.56 LBS | SYSTOLIC BLOOD PRESSURE: 133 MMHG | DIASTOLIC BLOOD PRESSURE: 87 MMHG | TEMPERATURE: 97.4 F | OXYGEN SATURATION: 97 % | HEART RATE: 75 BPM | BODY MASS INDEX: 38.9 KG/M2 | RESPIRATION RATE: 17 BRPM | HEIGHT: 63 IN

## 2024-12-10 LAB
BASOPHILS # BLD AUTO: 0.03 K/UL — SIGNIFICANT CHANGE UP (ref 0–0.2)
BASOPHILS NFR BLD AUTO: 0.5 % — SIGNIFICANT CHANGE UP (ref 0–2)
EOSINOPHIL # BLD AUTO: 0.04 K/UL — SIGNIFICANT CHANGE UP (ref 0–0.5)
EOSINOPHIL NFR BLD AUTO: 0.6 % — SIGNIFICANT CHANGE UP (ref 0–6)
HCT VFR BLD CALC: 36.1 % — SIGNIFICANT CHANGE UP (ref 34.5–45)
HGB BLD-MCNC: 11.9 G/DL — SIGNIFICANT CHANGE UP (ref 11.5–15.5)
IMM GRANULOCYTES NFR BLD AUTO: 0.2 % — SIGNIFICANT CHANGE UP (ref 0–0.9)
LYMPHOCYTES # BLD AUTO: 2.27 K/UL — SIGNIFICANT CHANGE UP (ref 1–3.3)
LYMPHOCYTES # BLD AUTO: 34.4 % — SIGNIFICANT CHANGE UP (ref 13–44)
MCHC RBC-ENTMCNC: 29.4 PG — SIGNIFICANT CHANGE UP (ref 27–34)
MCHC RBC-ENTMCNC: 33 G/DL — SIGNIFICANT CHANGE UP (ref 32–36)
MCV RBC AUTO: 89.1 FL — SIGNIFICANT CHANGE UP (ref 80–100)
MONOCYTES # BLD AUTO: 0.49 K/UL — SIGNIFICANT CHANGE UP (ref 0–0.9)
MONOCYTES NFR BLD AUTO: 7.4 % — SIGNIFICANT CHANGE UP (ref 2–14)
NEUTROPHILS # BLD AUTO: 3.75 K/UL — SIGNIFICANT CHANGE UP (ref 1.8–7.4)
NEUTROPHILS NFR BLD AUTO: 56.9 % — SIGNIFICANT CHANGE UP (ref 43–77)
NRBC # BLD: 0 /100 WBCS — SIGNIFICANT CHANGE UP (ref 0–0)
NRBC BLD-RTO: 0 /100 WBCS — SIGNIFICANT CHANGE UP (ref 0–0)
PLATELET # BLD AUTO: 223 K/UL — SIGNIFICANT CHANGE UP (ref 150–400)
RBC # BLD: 4.05 M/UL — SIGNIFICANT CHANGE UP (ref 3.8–5.2)
RBC # FLD: 15.9 % — HIGH (ref 10.3–14.5)
RETICS #: 105.7 K/UL — SIGNIFICANT CHANGE UP (ref 25–125)
RETICS/RBC NFR: 2.6 % — HIGH (ref 0.5–2.5)
WBC # BLD: 6.59 K/UL — SIGNIFICANT CHANGE UP (ref 3.8–10.5)
WBC # FLD AUTO: 6.59 K/UL — SIGNIFICANT CHANGE UP (ref 3.8–10.5)

## 2024-12-10 PROCEDURE — 99214 OFFICE O/P EST MOD 30 MIN: CPT

## 2024-12-12 ENCOUNTER — APPOINTMENT (OUTPATIENT)
Dept: RHEUMATOLOGY | Facility: CLINIC | Age: 69
End: 2024-12-12
Payer: MEDICARE

## 2024-12-12 VITALS
DIASTOLIC BLOOD PRESSURE: 86 MMHG | OXYGEN SATURATION: 99 % | WEIGHT: 217 LBS | SYSTOLIC BLOOD PRESSURE: 153 MMHG | BODY MASS INDEX: 38.45 KG/M2 | HEART RATE: 86 BPM | TEMPERATURE: 97.6 F | HEIGHT: 63 IN

## 2024-12-12 DIAGNOSIS — M54.42 LUMBAGO WITH SCIATICA, LEFT SIDE: ICD-10-CM

## 2024-12-12 DIAGNOSIS — G89.4 CHRONIC PAIN SYNDROME: ICD-10-CM

## 2024-12-12 DIAGNOSIS — G89.29 LUMBAGO WITH SCIATICA, LEFT SIDE: ICD-10-CM

## 2024-12-12 DIAGNOSIS — M54.41 LUMBAGO WITH SCIATICA, LEFT SIDE: ICD-10-CM

## 2024-12-12 DIAGNOSIS — M54.2 CERVICALGIA: ICD-10-CM

## 2024-12-12 DIAGNOSIS — M25.511 PAIN IN RIGHT SHOULDER: ICD-10-CM

## 2024-12-12 DIAGNOSIS — M25.50 PAIN IN UNSPECIFIED JOINT: ICD-10-CM

## 2024-12-12 PROCEDURE — 99204 OFFICE O/P NEW MOD 45 MIN: CPT

## 2024-12-13 LAB
FERRITIN SERPL-MCNC: 27 NG/ML
FOLATE SERPL-MCNC: 4.4 NG/ML
IRON SATN MFR SERPL: 16 %
IRON SERPL-MCNC: 55 UG/DL
TIBC SERPL-MCNC: 338 UG/DL
UIBC SERPL-MCNC: 284 UG/DL
VIT B12 SERPL-MCNC: >2000 PG/ML
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
## 2024-12-18 NOTE — ED ADULT NURSE NOTE - ISOLATION TYPE:
Ambulatory Care Coordination Note     2024 10:47 AM     Patient Current Location:  [place]     ACM contacted the caregiver by telephone. Verified name and  with caregiver as identifiers.         ACM: Stanford Estrada RN     Challenges to be reviewed by the provider   Additional needs identified to be addressed with provider No  none               Method of communication with provider: none.    Utilization: Patient has not had any utilization since our last call.     Care Summary Note:     The primary caregiver reported that the patient is doing well at this time. The patient continues to receive anticoagulant therapy, and the family has indicated that there are no medication refill needs at this moment. The registered nurse (RN) provided education on dietary recommendations related to anticoagulant therapy and discussed fall prevention strategies. The caregiver expressed understanding and had no questions at this time.    The patient continues to experience increase in confusion, and the caregiver has expressed concerns about the patient driving.  RN discussed various options for monitoring the patient’s location, including life alerts, [id number], tracking apps, and AirTags.  RN encouraged discussing with PCP on driving and further possible assessments with transportation  department. The caregiver was receptive to this information and did not have any questions at this time.has context menu       Offered patient enrollment in the Remote Patient Monitoring (RPM) program for in-home monitoring: Yes, but did not enroll at this time: already monitoring with home equipment.     Assessments Completed:   No changes since last call    Medications Reviewed:   Completed during this call    Advance Care Planning:   Not reviewed during this call     Care Planning:   Education Documentation  complete medication list, taught by Stanford Estrada RN at 2024 11:10 AM.  Learner: Patient  Readiness: 
None

## 2025-02-15 ENCOUNTER — NON-APPOINTMENT (OUTPATIENT)
Age: 70
End: 2025-02-15

## 2025-04-25 NOTE — PROGRESS NOTE ADULT - SUBJECTIVE AND OBJECTIVE BOX
Patient is a 63y old  Female who presents with a chief complaint of Wound infection (15 Mar 2018 18:13)      SUBJECTIVE / OVERNIGHT EVENTS: Pt c/o pain in back and head.     MEDICATIONS  (STANDING):  acetaminophen  IVPB. 1000 milliGRAM(s) IV Intermittent once  carvedilol 25 milliGRAM(s) Oral every 12 hours  docusate sodium 100 milliGRAM(s) Oral three times a day  gabapentin 300 milliGRAM(s) Oral three times a day  senna 2 Tablet(s) Oral at bedtime  sodium chloride 0.9% with potassium chloride 20 mEq/L 1000 milliLiter(s) (75 mL/Hr) IV Continuous <Continuous>  vancomycin  IVPB 1500 milliGRAM(s) IV Intermittent once    MEDICATIONS  (PRN):  acetaminophen   Tablet 650 milliGRAM(s) Oral every 6 hours PRN For Temp greater than 38 C (100.4 F)  acetaminophen   Tablet. 650 milliGRAM(s) Oral every 6 hours PRN Mild Pain (1 - 3)  diazepam    Tablet 5 milliGRAM(s) Oral every 8 hours PRN spasms  meclizine 12.5 milliGRAM(s) Oral two times a day PRN Dizziness  ondansetron Injectable 4 milliGRAM(s) IV Push every 6 hours PRN Nausea and/or Vomiting  oxyCODONE    IR 10 milliGRAM(s) Oral four times a day PRN Moderate Pain (4 - 6)  oxyCODONE    IR 5 milliGRAM(s) Oral every 4 hours PRN Moderate Pain (4 - 6)      Vital Signs Last 24 Hrs  T(C): 36.4 (16 Mar 2018 15:09), Max: 37.2 (15 Mar 2018 19:51)  T(F): 97.5 (16 Mar 2018 15:09), Max: 98.9 (15 Mar 2018 19:51)  HR: 54 (16 Mar 2018 15:09) (49 - 83)  BP: 118/68 (16 Mar 2018 15:09) (117/69 - 156/76)  BP(mean): 82 (16 Mar 2018 14:00) (82 - 100)  RR: 18 (16 Mar 2018 15:09) (10 - 18)  SpO2: 92% (16 Mar 2018 15:09) (92% - 100%)  CAPILLARY BLOOD GLUCOSE        I&O's Summary    15 Mar 2018 07:01  -  16 Mar 2018 07:00  --------------------------------------------------------  IN: 900 mL / OUT: 300 mL / NET: 600 mL    16 Mar 2018 07:01  -  16 Mar 2018 15:40  --------------------------------------------------------  IN: 150 mL / OUT: 0 mL / NET: 150 mL        PHYSICAL EXAM:  GENERAL: NAD, well-developed  HEAD:  Atraumatic, Normocephalic  EYES: EOMI, PERRLA, conjunctiva and sclera clear  NECK: Supple, No JVD  CHEST/LUNG: Clear to auscultation bilaterally; No wheeze  HEART: Regular rate and rhythm; No murmurs, rubs, or gallops  ABDOMEN: Soft, Nontender, Nondistended; Bowel sounds present  EXTREMITIES:  2+ Peripheral Pulses, No clubbing, cyanosis, or edema  PSYCH: AAOx3  NEUROLOGY: non-focal  SKIN: Unable to examine back wound as patient is currently unable to turn.     LABS:                        13.8   8.6   )-----------( 254      ( 15 Mar 2018 19:47 )             40.3     03-15    140  |  99  |  18  ----------------------------<  129<H>  4.1   |  28  |  0.86    Ca    9.7      15 Mar 2018 19:47    TPro  8.0  /  Alb  3.9  /  TBili  0.3  /  DBili  x   /  AST  14  /  ALT  13  /  AlkPhos  64  03-15    PT/INR - ( 15 Mar 2018 19:47 )   PT: 11.0 sec;   INR: 1.02 ratio         PTT - ( 15 Mar 2018 19:47 )  PTT:22.9 sec          RADIOLOGY & ADDITIONAL TESTS:    Imaging Personally Reviewed:    Consultant(s) Notes Reviewed:      Care Discussed with Consultants/Other Providers: decreased kala/increased time in double stance/decreased step length

## 2025-05-05 ENCOUNTER — APPOINTMENT (OUTPATIENT)
Dept: VASCULAR SURGERY | Facility: CLINIC | Age: 70
End: 2025-05-05

## 2025-05-19 ENCOUNTER — APPOINTMENT (OUTPATIENT)
Dept: VASCULAR SURGERY | Facility: CLINIC | Age: 70
End: 2025-05-19

## (undated) DEVICE — VALVE CONTROL COPILOT BLEEDBACK

## (undated) DEVICE — GLV 6.5 PROTEXIS (WHITE)

## (undated) DEVICE — SYR LUER LOK 10CC

## (undated) DEVICE — DRAPE FEMORAL ANGIOGRAPHY W TROUGH

## (undated) DEVICE — VISITEC 4X4

## (undated) DEVICE — INFLATOR ENCORE 26

## (undated) DEVICE — PLV-SCD MACHINE: Type: DURABLE MEDICAL EQUIPMENT

## (undated) DEVICE — BLADE SCALPEL SAFETY #11 WITH PLASTIC GREEN HANDLE

## (undated) DEVICE — PACK AV FISTULA

## (undated) DEVICE — DRSG TELFA 3 X 8

## (undated) DEVICE — Device

## (undated) DEVICE — DRAPE TOWEL BLUE 17" X 24"

## (undated) DEVICE — DRSG TAPE ADHESIVE .25X36"

## (undated) DEVICE — TORQUE DEVICE FOR GUIDEWIRE 0.0100.038"

## (undated) DEVICE — PACK CARD CATH CUSTOM

## (undated) DEVICE — SYR LUER LOK 20CC

## (undated) DEVICE — SUT PROLENE 5-0 24" C-1

## (undated) DEVICE — DRAPE INSTRUMENT POUCH 6.75" X 11"

## (undated) DEVICE — DRAPE LIGHT HANDLE COVER (GREEN)